# Patient Record
Sex: FEMALE | Race: WHITE | NOT HISPANIC OR LATINO | Employment: FULL TIME | URBAN - METROPOLITAN AREA
[De-identification: names, ages, dates, MRNs, and addresses within clinical notes are randomized per-mention and may not be internally consistent; named-entity substitution may affect disease eponyms.]

---

## 2017-04-13 DIAGNOSIS — J30.9 ALLERGIC RHINITIS, UNSPECIFIED ALLERGIC RHINITIS TRIGGER, UNSPECIFIED RHINITIS SEASONALITY: ICD-10-CM

## 2017-04-13 RX ORDER — MONTELUKAST SODIUM 10 MG/1
TABLET ORAL
Qty: 30 TABLET | Refills: 3 | Status: SHIPPED | OUTPATIENT
Start: 2017-04-13 | End: 2017-11-19

## 2017-04-13 NOTE — TELEPHONE ENCOUNTER
montelukast (SINGULAIR) 10 MG tablet  Last Written Prescription Date: 10/14/16  Last Fill Quantity: 30,  # refills: 3   Last Office Visit with FMAYDEE, SHANONP or [facility] prescribing provider:  10/14/2016                                              HERVE Gilbert  April 13, 2017  11:05 AM

## 2017-05-30 ENCOUNTER — OFFICE VISIT (OUTPATIENT)
Dept: URGENT CARE | Facility: URGENT CARE | Age: 52
End: 2017-05-30
Payer: COMMERCIAL

## 2017-05-30 VITALS
DIASTOLIC BLOOD PRESSURE: 82 MMHG | HEIGHT: 67 IN | OXYGEN SATURATION: 98 % | TEMPERATURE: 98 F | SYSTOLIC BLOOD PRESSURE: 124 MMHG | RESPIRATION RATE: 18 BRPM | HEART RATE: 66 BPM

## 2017-05-30 DIAGNOSIS — R10.9 FLANK PAIN: Primary | ICD-10-CM

## 2017-05-30 DIAGNOSIS — N30.01 ACUTE CYSTITIS WITH HEMATURIA: ICD-10-CM

## 2017-05-30 LAB
ALBUMIN UR-MCNC: NEGATIVE MG/DL
APPEARANCE UR: CLEAR
BILIRUB UR QL STRIP: NEGATIVE
COLOR UR AUTO: YELLOW
GLUCOSE UR STRIP-MCNC: NEGATIVE MG/DL
HGB UR QL STRIP: ABNORMAL
KETONES UR STRIP-MCNC: NEGATIVE MG/DL
LEUKOCYTE ESTERASE UR QL STRIP: ABNORMAL
NITRATE UR QL: NEGATIVE
PH UR STRIP: 5 PH (ref 5–7)
RBC #/AREA URNS AUTO: NORMAL /HPF (ref 0–2)
SP GR UR STRIP: >1.03 (ref 1–1.03)
URN SPEC COLLECT METH UR: ABNORMAL
UROBILINOGEN UR STRIP-ACNC: 0.2 EU/DL (ref 0.2–1)
WBC #/AREA URNS AUTO: NORMAL /HPF (ref 0–2)

## 2017-05-30 PROCEDURE — 87086 URINE CULTURE/COLONY COUNT: CPT | Performed by: NURSE PRACTITIONER

## 2017-05-30 PROCEDURE — 81001 URINALYSIS AUTO W/SCOPE: CPT | Performed by: NURSE PRACTITIONER

## 2017-05-30 PROCEDURE — 99213 OFFICE O/P EST LOW 20 MIN: CPT | Performed by: NURSE PRACTITIONER

## 2017-05-30 RX ORDER — NITROFURANTOIN 25; 75 MG/1; MG/1
100 CAPSULE ORAL 2 TIMES DAILY
Qty: 14 CAPSULE | Refills: 0 | Status: SHIPPED | OUTPATIENT
Start: 2017-05-30 | End: 2017-10-31

## 2017-05-30 NOTE — MR AVS SNAPSHOT
"              After Visit Summary   5/30/2017    Rayna Jackson    MRN: [id number]           Patient Information     Date Of Birth          1965        Visit Information        Provider Department      5/30/2017 7:20 PM Karl Trinh APRN [place] [facility] URGENT CARE        Today's Diagnoses     Flank pain    -  1    Acute cystitis with hematuria          Care Instructions      Dysuria  Dysuria is pain felt during urination. It is often described as a burning. Learn more about this problem and how it can be treated.     Painful urination (dysuria) is often caused by a problem in the urinary tract.   What causes dysuria?  Possible causes include:    Infection with a bacteria or virus. This can be a urinary tract infection (UTI). Or it may be a sexually transmitted infection (STI).    Sensitivity or allergy to chemicals. These chemicals are found in lotions and other products.    Prostate or bladder problems    Radiation therapy to the pelvic area  How is dysuria diagnosed?  Your health care provider will examine you. He or she will ask about your symptoms and health. After talking with you and doing a physical exam, your health care provider may know what is causing your dysuria. He or she will usually request  a sample of your urine. Tests of your urine, or a \"urinalysis,\" are done. A urinalysis may include:    Looking at the urine sample (visual exam)    Checking for substances (chemical exam)    Checking a small amount under a microscope (microscopic exam)  Some parts of the urinalysis may be done in the provider's office and some in a lab. And, the urine sample may be checked for bacteria and yeast (urine culture). Your health care provider will tell you more about these tests if they are needed.  How is dysuria treated?  Treatment depends on the cause. If you have a bacterial infection, you may need antibiotics. You may be given medications to make it easier for you to urinate and help relieve " pain. Your health care provider can tell you more about your treatment options. Untreated, symptoms may get worse.  Call the health care provider right away if you have any of the following:    Fever of 100.4 F (38 C) or higher     No improvement after three days of treatment    Trouble urinating because of pain    New or increased discharge from the vagina or penis    Rash or joint pain    Increased back or abdominal pain    Enlarged painful lymph nodes (lumps) in the groin     [id number] [employer]. [address] [place] [address]. All rights reserved. This information is not intended as a substitute for professional medical care. Always follow your healthcare professional's instructions.                Follow-ups after your visit        Your next 10 appointments already scheduled     Jun 02, 2017  7:00 AM CDT   SHORT with Mitra Keita MD   [facility] ([facility])    [address] [place] [id number] [phone]              Who to contact     If you have questions or need follow up information about today's clinic visit or your schedule please contact [facility] URGENT CARE directly at [phone].  Normal or non-critical lab and imaging results will be communicated to you by MyChart, letter or phone within 4 business days after the clinic has received the results. If you do not hear from us within 7 days, please contact the clinic through MyChart or phone. If you have a critical or abnormal lab result, we will notify you by phone as soon as possible.  Submit refill requests through Splice or call your pharmacy and they will forward the refill request to us. Please allow 3 business days for your refill to be completed.          Additional Information About Your Visit        [email or web address] Information     Splice gives you secure access to your electronic health record. If you see a primary care provider, you can  "also send messages to your care team and make appointments. If you have questions, please call your primary care clinic.  If you do not have a primary care provider, please call [phone] and they will assist you.        Care EveryWhere ID     This is your Care EveryWhere ID. This could be used by other organizations to access your [place] medical records  [id number]        Your Vitals Were     Pulse Temperature Respirations Height Pulse Oximetry       66 98  F (36.7  C) (Oral) 18 5' 7\" (1.702 m) 98%        Blood Pressure from Last 3 Encounters:   05/30/17 124/82   10/14/16 110/72   09/23/16 112/76    Weight from Last 3 Encounters:   10/14/16 207 lb (93.9 kg)   09/23/16 205 lb 4.8 oz (93.1 kg)   09/08/16 205 lb 3.2 oz (93.1 kg)              We Performed the Following     *UA reflex to Microscopic and Culture ([place] and [facility] (except [place] and [place])     Urine Culture Aerobic Bacterial     Urine Microscopic          Today's Medication Changes          These changes are accurate as of: 5/30/17  7:56 PM.  If you have any questions, ask your nurse or doctor.               Start taking these medicines.        Dose/Directions    nitrofurantoin (macrocrystal-monohydrate) 100 MG capsule   Commonly known as:  MACROBID   Used for:  Acute cystitis with hematuria   Started by:  Karl Trinh APRN CNP        Dose:  100 mg   Take 1 capsule (100 mg) by mouth 2 times daily   Quantity:  14 capsule   Refills:  0            Where to get your medicines      These medications were sent to [facility] [address] [place] [address] [address] [facility] [address]  [address] [facility] [address]     Phone:  [phone]     nitrofurantoin (macrocrystal-monohydrate) 100 MG capsule                Primary Care Provider Office Phone # Fax #    Mitra Keita [phone]       [address] [address]      "   Thank you!     Thank you for choosing [facility] URGENT CARE  for your care. Our goal is always to provide you with excellent care. Hearing back from our patients is one way we can continue to improve our services. Please take a few minutes to complete the written survey that you may receive in the mail after your visit with us. Thank you!             Your Updated Medication List - Protect others around you: Learn how to safely use, store and throw away your medicines at www.disposemymeds.org.          This list is accurate as of: 5/30/17  7:56 PM.  Always use your most recent med list.                   Brand Name Dispense Instructions for use    albuterol 108 (90 BASE) MCG/ACT Inhaler    PROAIR HFA/PROVENTIL HFA/VENTOLIN HFA    1 Inhaler    Inhale 2 puffs into the lungs as needed for shortness of breath / dyspnea or wheezing       fluticasone 50 MCG/ACT spray    FLONASE    1 Bottle    Spray 2 sprays into both nostrils daily       fluticasone 50 MCG/BLIST Aepb    FLOVENT DISKUS    120 each    Inhale 2 puffs (100 mcg) into the lungs daily       furosemide 20 MG tablet    LASIX    30 tablet    Take 1 tablet (20 mg) by mouth daily as needed       levonorgestrel 20 MCG/24HR IUD    MIRENA     1 each by Intrauterine route once       * LISINOPRIL PO      Take 5 mg by mouth daily       * lisinopril 5 MG tablet    PRINIVIL/ZESTRIL    90 tablet    Take 1 tablet (5 mg) by mouth daily       montelukast 10 MG tablet    SINGULAIR    30 tablet    TAKE 1 TABLET(10 MG) BY MOUTH AT BEDTIME       multivitamin, therapeutic with minerals Tabs tablet     30 each    Take 2 tablets by mouth daily       nitrofurantoin (macrocrystal-monohydrate) 100 MG capsule    MACROBID    14 capsule    Take 1 capsule (100 mg) by mouth 2 times daily       triamcinolone 0.1 % cream    KENALOG     Apply topically 2 times daily       * vitamin D3 2000 UNITS Caps     30 capsule    Take 2,000 Units by mouth daily       * cholecalciferol 1000 UNIT  tablet    vitamin D    30 tablet    Take 1 tablet (1,000 Units) by mouth daily       * Notice:  This list has 4 medication(s) that are the same as other medications prescribed for you. Read the directions carefully, and ask your doctor or other care provider to review them with you.

## 2017-05-31 NOTE — PROGRESS NOTES
"Chief Complaint   Patient presents with     Urgent Care     concerend for kidney infection, fever     Urinary Problem     frequency, fatique, nausea, swelling        SUBJECTIVE:  Rayna Jackson is a 52 year old female who presents with 2 days of urinary frequency, urgency and dysuria. Also some slight right-sided flank discomfort. No fevers. No chills. Some mild nausea. No prior history of kidney stone. Has an IUD. No STD concerns.        OBJECTIVE:  /82  Pulse 66  Temp 98  F (36.7  C) (Oral)  Resp 18  Ht 5' 7\" (1.702 m)  SpO2 98%   middle-aged female in no acute distress. Nontoxic looking. Soft nondistended abdomen with bowel sounds active throughout. No organomegaly. No rebound tenderness. Slight right-sided flank tenderness to percussion. Pelvic exam was deferred. Lung sounds were clear to auscultation throughout. Heart was of  regular rhythm and rate.    .  Results for orders placed or performed in visit on 05/30/17   *UA reflex to Microscopic and Culture ([place] and [facility] (except [place] and [place])   Result Value Ref Range    Color Urine Yellow     Appearance Urine Clear     Glucose Urine Negative NEG mg/dL    Bilirubin Urine Negative NEG    Ketones Urine Negative NEG mg/dL    Specific Gravity Urine >1.030 1.003 - 1.035    Blood Urine Trace (A) NEG    pH Urine 5.0 5.0 - 7.0 pH    Protein Albumin Urine Negative NEG mg/dL    Urobilinogen Urine 0.2 0.2 - 1.0 EU/dL    Nitrite Urine Negative NEG    Leukocyte Esterase Urine Small (A) NEG    Source Midstream Urine    Urine Microscopic   Result Value Ref Range    WBC Urine O - 2 0 - 2 /HPF    RBC Urine O - 2 0 - 2 /HPF         ASSESSMENT/PLAN:    (R10.9) Flank pain  (primary encounter diagnosis)//N30.01) Acute cystitis with hematuria  Comment: Symptoms suspicious of acute cystitis despite an equivocal urinalysis. Treated clinically. Differentials including kidney stones, and other etiologies were discussed. Urine culture is pending. " Plan to follow-up with persisting concerns. She has a follow-up appointment in 2 days.  Plan: nitrofurantoin, macrocrystal-monohydrate,         (MACROBID) 100 MG capsule, Urine Culture         Aerobic Bacterial            MIRZA Sparrow CNP

## 2017-05-31 NOTE — NURSING NOTE
"Chief Complaint   Patient presents with     Urgent Care     concerend for kidney infection, fever     Urinary Problem     frequency, fatique, nausea, swelling        Initial /82  Pulse 66  Temp 98  F (36.7  C) (Oral)  Resp 18  Ht 5' 7\" (1.702 m)  SpO2 98% Estimated body mass index is 32.42 kg/(m^2) as calculated from the following:    Height as of 9/23/16: 5' 7\" (1.702 m).    Weight as of 10/14/16: 207 lb (93.9 kg).  Medication Reconciliation: complete       Jerrica Crockett CMA      "

## 2017-05-31 NOTE — PATIENT INSTRUCTIONS
"  Dysuria  Dysuria is pain felt during urination. It is often described as a burning. Learn more about this problem and how it can be treated.     Painful urination (dysuria) is often caused by a problem in the urinary tract.   What causes dysuria?  Possible causes include:    Infection with a bacteria or virus. This can be a urinary tract infection (UTI). Or it may be a sexually transmitted infection (STI).    Sensitivity or allergy to chemicals. These chemicals are found in lotions and other products.    Prostate or bladder problems    Radiation therapy to the pelvic area  How is dysuria diagnosed?  Your health care provider will examine you. He or she will ask about your symptoms and health. After talking with you and doing a physical exam, your health care provider may know what is causing your dysuria. He or she will usually request  a sample of your urine. Tests of your urine, or a \"urinalysis,\" are done. A urinalysis may include:    Looking at the urine sample (visual exam)    Checking for substances (chemical exam)    Checking a small amount under a microscope (microscopic exam)  Some parts of the urinalysis may be done in the provider's office and some in a lab. And, the urine sample may be checked for bacteria and yeast (urine culture). Your health care provider will tell you more about these tests if they are needed.  How is dysuria treated?  Treatment depends on the cause. If you have a bacterial infection, you may need antibiotics. You may be given medications to make it easier for you to urinate and help relieve pain. Your health care provider can tell you more about your treatment options. Untreated, symptoms may get worse.  Call the health care provider right away if you have any of the following:    Fever of 100.4 F (38 C) or higher     No improvement after three days of treatment    Trouble urinating because of pain    New or increased discharge from the vagina or penis    Rash or joint " pain    Increased back or abdominal pain    Enlarged painful lymph nodes (lumps) in the groin     [id number] [employer]. [address] [place] [address], [place] [address]. All rights reserved. This information is not intended as a substitute for professional medical care. Always follow your healthcare professional's instructions.

## 2017-06-02 ENCOUNTER — OFFICE VISIT (OUTPATIENT)
Dept: FAMILY MEDICINE | Facility: CLINIC | Age: 52
End: 2017-06-02
Payer: COMMERCIAL

## 2017-06-02 VITALS
OXYGEN SATURATION: 94 % | HEIGHT: 67 IN | RESPIRATION RATE: 14 BRPM | SYSTOLIC BLOOD PRESSURE: 131 MMHG | HEART RATE: 73 BPM | BODY MASS INDEX: 32.02 KG/M2 | TEMPERATURE: 98.5 F | DIASTOLIC BLOOD PRESSURE: 90 MMHG | WEIGHT: 204 LBS

## 2017-06-02 DIAGNOSIS — M19.071 OSTEOARTHRITIS OF BOTH FEET, UNSPECIFIED OSTEOARTHRITIS TYPE: ICD-10-CM

## 2017-06-02 DIAGNOSIS — M17.0 OSTEOARTHRITIS OF BOTH KNEES, UNSPECIFIED OSTEOARTHRITIS TYPE: Primary | ICD-10-CM

## 2017-06-02 DIAGNOSIS — Z12.11 COLON CANCER SCREENING: ICD-10-CM

## 2017-06-02 DIAGNOSIS — M19.072 OSTEOARTHRITIS OF BOTH FEET, UNSPECIFIED OSTEOARTHRITIS TYPE: ICD-10-CM

## 2017-06-02 DIAGNOSIS — Z23 NEED FOR VACCINATION: ICD-10-CM

## 2017-06-02 PROCEDURE — 99214 OFFICE O/P EST MOD 30 MIN: CPT | Mod: 25 | Performed by: FAMILY MEDICINE

## 2017-06-02 PROCEDURE — 90715 TDAP VACCINE 7 YRS/> IM: CPT | Performed by: FAMILY MEDICINE

## 2017-06-02 PROCEDURE — 90471 IMMUNIZATION ADMIN: CPT | Performed by: FAMILY MEDICINE

## 2017-06-02 RX ORDER — TOPIRAMATE 100 MG/1
TABLET, FILM COATED ORAL
Refills: 0 | COMMUNITY
Start: 2017-01-03 | End: 2017-10-31

## 2017-06-02 NOTE — NURSING NOTE
"Chief Complaint   Patient presents with     Arthritis       Initial /90 (BP Location: Left arm, Patient Position: Chair, Cuff Size: Adult Large)  Pulse 73  Temp 98.5  F (36.9  C) (Oral)  Resp 14  Ht 5' 7\" (1.702 m)  Wt 204 lb (92.5 kg)  SpO2 94%  BMI 31.95 kg/m2 Estimated body mass index is 31.95 kg/(m^2) as calculated from the following:    Height as of this encounter: 5' 7\" (1.702 m).    Weight as of this encounter: 204 lb (92.5 kg).  Medication Reconciliation: complete   Tuan Erwin CMA       "

## 2017-06-02 NOTE — MR AVS SNAPSHOT
After Visit Summary   6/2/2017    Rayna Jackson    MRN: [id number]           Patient Information     Date Of Birth          1965        Visit Information        Provider Department      6/2/2017 7:00 AM Mitra Keita MD [facility]        Today's Diagnoses     Osteoarthritis of both knees, unspecified osteoarthritis type    -  1    Osteoarthritis of both feet, unspecified osteoarthritis type        Colon cancer screening          Care Instructions    Follow up as needed            Follow-ups after your visit        Additional Services     GASTROENTEROLOGY ADULT REF PROCEDURE ONLY       Last Lab Result: Creatinine (mg/dL)       Date                     Value                 09/08/2016               0.69             ----------  Body mass index is 31.95 kg/(m^2).     Needed:  No  Language:  English    Patient will be contacted to schedule procedure.     Please be aware that coverage of these services is subject to the terms and limitations of your health insurance plan.  Call member services at your health plan with any benefit or coverage questions.  Any procedures must be performed at a [facility] facility OR coordinated by your clinic's referral office.    Please bring the following with you to your appointment:    (1) Any X-Rays, CTs or MRIs which have been performed.  Contact the facility where they were done to arrange for  prior to your scheduled appointment.    (2) List of current medications   (3) This referral request   (4) Any documents/labs given to you for this referral            ORTHO  REFERRAL       [facility] is referring you to the Orthopedic  Services at [facility].       The  Representative will assist you in the coordination of your Orthopedic and Musculoskeletal Care as prescribed by your physician.    The  Representative will call you within 1 business day  to help schedule your appointment, or you may contact the [facility] Representative at:    All areas ~ [phone]     Type of Referral : Non Surgical       Timeframe requested: Routine    Coverage of these services is subject to the terms and limitations of your health insurance plan.  Please call member services at your health plan with any benefit or coverage questions.      If X-rays, CT or MRI's have been performed, please contact the facility where they were done to arrange for , prior to your scheduled appointment.  Please bring this referral request to your appointment and present it to your specialist.                  Who to contact     If you have questions or need follow up information about today's clinic visit or your schedule please contact [facility] directly at [phone].  Normal or non-critical lab and imaging results will be communicated to you by AVIShart, letter or phone within 4 business days after the clinic has received the results. If you do not hear from us within 7 days, please contact the clinic through AVIShart or phone. If you have a critical or abnormal lab result, we will notify you by phone as soon as possible.  Submit refill requests through [employer]ze or call your pharmacy and they will forward the refill request to us. Please allow 3 business days for your refill to be completed.          Additional Information About Your Visit        Channel Breeze Information     [employer] gives you secure access to your electronic health record. If you see a primary care provider, you can also send messages to your care team and make appointments. If you have questions, please call your primary care clinic.  If you do not have a primary care provider, please call [phone] and they will assist you.        Care EveryWhere ID     This is your Care EveryWhere ID. This could be used by other organizations to access your [place] medical records  [id number]        Your Vitals  "Were     Pulse Temperature Respirations Height Pulse Oximetry BMI (Body Mass Index)    73 98.5  F (36.9  C) (Oral) 14 5' 7\" (1.702 m) 94% 31.95 kg/m2       Blood Pressure from Last 3 Encounters:   06/02/17 131/90   05/30/17 124/82   10/14/16 110/72    Weight from Last 3 Encounters:   06/02/17 204 lb (92.5 kg)   10/14/16 207 lb (93.9 kg)   09/23/16 205 lb 4.8 oz (93.1 kg)              We Performed the Following     GASTROENTEROLOGY ADULT REF PROCEDURE ONLY     ORTHO  REFERRAL        Primary Care Provider Office Phone # Fax #    Mitra Keita [phone]       [address] [address]        Thank you!     Thank you for choosing [facility]  for your care. Our goal is always to provide you with excellent care. Hearing back from our patients is one way we can continue to improve our services. Please take a few minutes to complete the written survey that you may receive in the mail after your visit with us. Thank you!             Your Updated Medication List - Protect others around you: Learn how to safely use, store and throw away your medicines at www.disposemymeds.org.          This list is accurate as of: 6/2/17  7:34 AM.  Always use your most recent med list.                   Brand Name Dispense Instructions for use    albuterol 108 (90 BASE) MCG/ACT Inhaler    PROAIR HFA/PROVENTIL HFA/VENTOLIN HFA    1 Inhaler    Inhale 2 puffs into the lungs as needed for shortness of breath / dyspnea or wheezing       fluticasone 50 MCG/ACT spray    FLONASE    1 Bottle    Spray 2 sprays into both nostrils daily       fluticasone 50 MCG/BLIST Aepb    FLOVENT DISKUS    120 each    Inhale 2 puffs (100 mcg) into the lungs daily       furosemide 20 MG tablet    LASIX    30 tablet    Take 1 tablet (20 mg) by mouth daily as needed       levonorgestrel 20 MCG/24HR IUD    MIRENA     1 each by Intrauterine route once       * LISINOPRIL " PO      Take 5 mg by mouth daily       * lisinopril 5 MG tablet    PRINIVIL/ZESTRIL    90 tablet    Take 1 tablet (5 mg) by mouth daily       montelukast 10 MG tablet    SINGULAIR    30 tablet    TAKE 1 TABLET(10 MG) BY MOUTH AT BEDTIME       multivitamin, therapeutic with minerals Tabs tablet     30 each    Take 2 tablets by mouth daily       nitrofurantoin (macrocrystal-monohydrate) 100 MG capsule    MACROBID    14 capsule    Take 1 capsule (100 mg) by mouth 2 times daily       topiramate 100 MG tablet    TOPAMAX     TK 1 T PO QPM       triamcinolone 0.1 % cream    KENALOG     Apply topically 2 times daily       * vitamin D3 2000 UNITS Caps     30 capsule    Take 2,000 Units by mouth daily       * cholecalciferol 1000 UNIT tablet    vitamin D    30 tablet    Take 1 tablet (1,000 Units) by mouth daily       * Notice:  This list has 4 medication(s) that are the same as other medications prescribed for you. Read the directions carefully, and ask your doctor or other care provider to review them with you.

## 2017-06-02 NOTE — PROGRESS NOTES
SUBJECTIVE:                                                    Rayna Jackson is a 52 year old female who presents to clinic today for the following health issues:      Joint Pain     Onset: over 10    Description:   Location: left knee, right knee, left ankle, right ankle and hands  Character: stabbing    Intensity: 10/10    Progression of Symptoms: worse    Accompanying Signs & Symptoms:  Other symptoms: none   History:   Previous similar pain: YES      Precipitating factors:   Trauma or overuse: no     Alleviating factors:  Improved by: nothing       Therapies Tried and outcome: Advil does not help    Per patient, has been experiencing more pain in her ankles and knees lately. Has had Synvisc shot to her knee at least once and several steroid shots. Has yet to establish care with ortho since moving to the area.   Admits she has gained a lot of weight and knows this could be contributing to it. Has started on a paleo diet the last 6 weeks and is not seeing any progress.   Pain is better today.       Problem list and histories reviewed & adjusted, as indicated.  Additional history: as documented    Patient Active Problem List   Diagnosis     Benign essential hypertension     Osteoarthritis of both knees, unspecified osteoarthritis type     Bilateral edema of lower extremity     Osteoarthritis of both feet, unspecified osteoarthritis type     Past Surgical History:   Procedure Laterality Date     appendicitis  1980     CYSTECTOMY OVARIAN BENIGN  1980     KNEE SURGERY Left        Social History   Substance Use Topics     Smoking status: Never Smoker     Smokeless tobacco: Not on file     Alcohol use Not on file     Family History   Problem Relation Age of Onset     Hypertension Mother      Thyroid Disease Mother      Hypertension Father      DIABETES Other      great grandmother     Thyroid Disease Sister      Thyroid Disease Maternal Aunt            Reviewed and updated as needed this visit by clinical staff      "  Reviewed and updated as needed this visit by Provider         ROS:  Constitutional, msk, neuro  systems are negative, except as otherwise noted.    OBJECTIVE:                                                    /90 (BP Location: Left arm, Patient Position: Chair, Cuff Size: Adult Large)  Pulse 73  Temp 98.5  F (36.9  C) (Oral)  Resp 14  Ht 5' 7\" (1.702 m)  Wt 204 lb (92.5 kg)  SpO2 94%  BMI 31.95 kg/m2  Body mass index is 31.95 kg/(m^2).  GENERAL: healthy, alert and no distress  MS: b/l feet/ knees- no tenderness to palpation, no obvious effusion noted, full ROM  SKIN: no suspicious lesions or rashes  NEURO: Normal strength and tone, sensory exam grossly normal and gait normal including heel/toe/tandem walking    Diagnostic Test Results:  none      ASSESSMENT/PLAN:                                                        1. Osteoarthritis of both knees, unspecified osteoarthritis type  - recommend water aerobics as tolerated. Will refer to ortho for further evaluation.   No injection today as she does not have any acute pain.   - ORTHO  REFERRAL    2. Osteoarthritis of both feet, unspecified osteoarthritis type  - recommend proper foot wear for support.   - ORTHO  REFERRAL    3. Colon cancer screening  - due for colonoscopy  - GASTROENTEROLOGY ADULT REF PROCEDURE ONLY    4. Need for vaccination    - TDAP VACCINE (ADACEL)    See Patient Instructions    Mitra Keita MD  [facility]  "

## 2017-06-07 LAB
BACTERIA SPEC CULT: NORMAL
MICRO REPORT STATUS: NORMAL
SPECIMEN SOURCE: NORMAL

## 2017-06-30 ENCOUNTER — HOSPITAL ENCOUNTER (OUTPATIENT)
Facility: CLINIC | Age: 52
Discharge: HOME | End: 2017-06-30
Attending: INTERNAL MEDICINE | Admitting: INTERNAL MEDICINE
Payer: COMMERCIAL

## 2017-06-30 ENCOUNTER — RADIANT APPOINTMENT (OUTPATIENT)
Dept: GENERAL RADIOLOGY | Facility: CLINIC | Age: 52
End: 2017-06-30
Attending: FAMILY MEDICINE
Payer: COMMERCIAL

## 2017-06-30 ENCOUNTER — OFFICE VISIT (OUTPATIENT)
Dept: ORTHOPEDICS | Facility: CLINIC | Age: 52
End: 2017-06-30
Payer: COMMERCIAL

## 2017-06-30 VITALS
SYSTOLIC BLOOD PRESSURE: 110 MMHG | RESPIRATION RATE: 15 BRPM | OXYGEN SATURATION: 95 % | DIASTOLIC BLOOD PRESSURE: 79 MMHG

## 2017-06-30 VITALS
SYSTOLIC BLOOD PRESSURE: 110 MMHG | HEIGHT: 67 IN | DIASTOLIC BLOOD PRESSURE: 79 MMHG | BODY MASS INDEX: 32.02 KG/M2 | WEIGHT: 204 LBS

## 2017-06-30 DIAGNOSIS — G89.29 CHRONIC PAIN OF BOTH ANKLES: Primary | ICD-10-CM

## 2017-06-30 DIAGNOSIS — M25.571 BILATERAL ANKLE PAIN, UNSPECIFIED CHRONICITY: ICD-10-CM

## 2017-06-30 DIAGNOSIS — M25.572 CHRONIC PAIN OF BOTH ANKLES: Primary | ICD-10-CM

## 2017-06-30 DIAGNOSIS — M25.572 BILATERAL ANKLE PAIN, UNSPECIFIED CHRONICITY: ICD-10-CM

## 2017-06-30 DIAGNOSIS — M25.571 CHRONIC PAIN OF BOTH ANKLES: Primary | ICD-10-CM

## 2017-06-30 DIAGNOSIS — M19.071 PRIMARY OSTEOARTHRITIS OF BOTH ANKLES: ICD-10-CM

## 2017-06-30 DIAGNOSIS — M19.072 PRIMARY OSTEOARTHRITIS OF BOTH ANKLES: ICD-10-CM

## 2017-06-30 LAB — COLONOSCOPY: NORMAL

## 2017-06-30 PROCEDURE — G0121 COLON CA SCRN NOT HI RSK IND: HCPCS | Performed by: INTERNAL MEDICINE

## 2017-06-30 PROCEDURE — G0500 MOD SEDAT ENDO SERVICE >5YRS: HCPCS | Performed by: INTERNAL MEDICINE

## 2017-06-30 PROCEDURE — 45378 DIAGNOSTIC COLONOSCOPY: CPT | Performed by: INTERNAL MEDICINE

## 2017-06-30 PROCEDURE — 73610 X-RAY EXAM OF ANKLE: CPT | Mod: LT

## 2017-06-30 PROCEDURE — 99204 OFFICE O/P NEW MOD 45 MIN: CPT | Performed by: FAMILY MEDICINE

## 2017-06-30 PROCEDURE — 25000128 H RX IP 250 OP 636: Performed by: INTERNAL MEDICINE

## 2017-06-30 RX ORDER — FENTANYL CITRATE 50 UG/ML
INJECTION, SOLUTION INTRAMUSCULAR; INTRAVENOUS PRN
Status: DISCONTINUED | OUTPATIENT
Start: 2017-06-30 | End: 2017-06-30 | Stop reason: HOSPADM

## 2017-06-30 RX ORDER — ONDANSETRON 2 MG/ML
4 INJECTION INTRAMUSCULAR; INTRAVENOUS
Status: DISCONTINUED | OUTPATIENT
Start: 2017-06-30 | End: 2017-06-30 | Stop reason: HOSPADM

## 2017-06-30 RX ORDER — ONDANSETRON 4 MG/1
4 TABLET, ORALLY DISINTEGRATING ORAL EVERY 6 HOURS PRN
Status: DISCONTINUED | OUTPATIENT
Start: 2017-06-30 | End: 2017-06-30 | Stop reason: HOSPADM

## 2017-06-30 RX ORDER — LIDOCAINE 40 MG/G
CREAM TOPICAL
Status: DISCONTINUED | OUTPATIENT
Start: 2017-06-30 | End: 2017-06-30 | Stop reason: HOSPADM

## 2017-06-30 RX ORDER — NALOXONE HYDROCHLORIDE 0.4 MG/ML
.1-.4 INJECTION, SOLUTION INTRAMUSCULAR; INTRAVENOUS; SUBCUTANEOUS
Status: DISCONTINUED | OUTPATIENT
Start: 2017-06-30 | End: 2017-06-30 | Stop reason: HOSPADM

## 2017-06-30 RX ORDER — ONDANSETRON 2 MG/ML
4 INJECTION INTRAMUSCULAR; INTRAVENOUS EVERY 6 HOURS PRN
Status: DISCONTINUED | OUTPATIENT
Start: 2017-06-30 | End: 2017-06-30 | Stop reason: HOSPADM

## 2017-06-30 RX ORDER — FLUMAZENIL 0.1 MG/ML
0.2 INJECTION, SOLUTION INTRAVENOUS
Status: DISCONTINUED | OUTPATIENT
Start: 2017-06-30 | End: 2017-06-30 | Stop reason: HOSPADM

## 2017-06-30 NOTE — NURSING NOTE
"Chief Complaint   Patient presents with     Musculoskeletal Problem       Initial /79  Ht 5' 7\" (1.702 m)  Wt 204 lb (92.5 kg)  BMI 31.95 kg/m2 Estimated body mass index is 31.95 kg/(m^2) as calculated from the following:    Height as of this encounter: 5' 7\" (1.702 m).    Weight as of this encounter: 204 lb (92.5 kg).  Medication Reconciliation: complete     Chadd Singh ATC    "

## 2017-06-30 NOTE — PROGRESS NOTES
"ASSESSMENT & PLAN    ICD-10-CM    1. Chronic pain of both ankles M25.571 XR Ankle Bilateral G/E 3 Views    G89.29     M25.572    2. Primary osteoarthritis of both ankles M19.071     M19.072    Reviewed xrays - ankle > midfoot osteoarthritis  Discussed OTC use   Return for cortisone injections  Can pursue Physical therapy if desired  Can also consider custom orthotics    Follow up as needed. Call direct clinic number [[phone]] at any time with questions or concerns. Instructed to call the office if the condition evolves or worsens.    -----    SUBJECTIVE  Rayna Jackson is a/an 52 year old female who is seen in consultation at the request of Dr. Keita for evaluation of bilateral anterior ankle and proximal dorsal foot pain . The patient is seen by themselves. Has intermittent swelling, even non-weight bearing/without walking.    Onset: 6 month(s) ago. Reports insidious onset without acute precipitating event.  Worsened by: walking, sometimes stairs  Better with: rest  Quality: aching, swelling, constant (dorsal foot feels broken)  Pain Scale (maximum/current)/10: 10/10 / 5/10 mainly but reports no pain today  Treatments tried: ibuprofen  Orthopedic history: NO  Relevant surgical history: NO  Patient Social History: works at [employer]    Patient's past medical, surgical, social, and family histories were reviewed today and no changes are noted.    REVIEW OF SYSTEMS:  10 point ROS is negative other than symptoms noted above in HPI, Past Medical History or as stated below  Constitutional: NEGATIVE for fever, chills, change in weight  Skin: NEGATIVE for worrisome rashes, moles or lesions  GI/: NEGATIVE for bowel or bladder changes  Neuro: NEGATIVE for weakness, dizziness or paresthesias    OBJECTIVE:  /79  Ht 5' 7\" (1.702 m)  Wt 204 lb (92.5 kg)  BMI 31.95 kg/m2   General: healthy, alert and in no distress  HEENT: no scleral icterus or conjunctival erythema  Skin: no suspicious lesions " or rash. No jaundice.  CV:  no pedal edema  Resp: normal respiratory effort without conversational dyspnea   Psych: normal mood and affect  Gait: normal steady gait with appropriate coordination and balance  Neuro: Normal light sensory exam of lower extremity  MSK:  BILATERAL FOOT  Inspection:    no swelling, pes planus  Palpation:    Tender about the anterior/anteromedial tibiotalar joint. Mildly tender over midfoot.  Otherwise remainder of bony/ligamentous landmarks are non-tender.  Range of Motion:     Grossly intact and non-painful  Strength:    Grossly intact in all planes  Special Tests:    Negative: anterior drawer and Lisfranc joint tenderness    Independent visualization of the below image:  ANKLE BILATERAL THREE OR MORE VIEWS   6/30/2017 4:40 PM      HISTORY:  Pain in right ankle and joints of right and left feet.     IMPRESSION:   1. Right ankle: Mild spurring at the inferomedial aspect of the  tibiotalar joint. Calcaneal spurring.  2. Left ankle: Calcaneal spurring. Otherwise unremarkable.     MADINA WILSON MD    Patient's conditions were thoroughly discussed during today's visit with greater than 50% of the visit spent counseling the patient with total time spent face-to-face with the patient being 20 minutes.    Dilia Moura, DO [facility]

## 2017-06-30 NOTE — H&P
Pre-Endoscopy History and Physical     Rayna Jackson MRN# [id number]   YOB: 1965 Age: 52 year old     Date of Procedure: 6/30/2017  Primary care provider: Mitra Keita  Type of Endoscopy: Colonoscopy with possible biopsy, possible polypectomy  Reason for Procedure: screen  Type of Anesthesia Anticipated: Conscious Sedation    HPI:    Rayna is a 52 year old female who will be undergoing the above procedure.      A history and physical has been performed. The patient's medications and allergies have been reviewed. The risks and benefits of the procedure and the sedation options and risks were discussed with the patient.  All questions were answered and informed consent was obtained.      She denies a personal or family history of anesthesia complications or bleeding disorders.     Patient Active Problem List   Diagnosis     Benign essential hypertension     Osteoarthritis of both knees, unspecified osteoarthritis type     Bilateral edema of lower extremity     Osteoarthritis of both feet, unspecified osteoarthritis type        Past Medical History:   Diagnosis Date     Torn meniscus 2013    left         Past Surgical History:   Procedure Laterality Date     appendicitis  1980     CYSTECTOMY OVARIAN BENIGN  1980     KNEE SURGERY Left        Social History   Substance Use Topics     Smoking status: Never Smoker     Smokeless tobacco: Not on file     Alcohol use Not on file       Family History   Problem Relation Age of Onset     Hypertension Mother      Thyroid Disease Mother      Hypertension Father      DIABETES Other      great grandmother     Thyroid Disease Sister      Thyroid Disease Maternal Aunt        Prior to Admission medications    Medication Sig Start Date End Date Taking? Authorizing Provider   topiramate (TOPAMAX) 100 MG tablet TK 1 T PO QPM 1/3/17  Yes Reported, Patient   montelukast (SINGULAIR) 10 MG tablet TAKE 1 TABLET(10 MG) BY MOUTH AT BEDTIME 4/13/17  Yes Bossman  Mitra Yadav MD   fluticasone (FLOVENT DISKUS) 50 MCG/BLIST AEPB Inhale 2 puffs (100 mcg) into the lungs daily 10/14/16  Yes Mitra Keita MD   albuterol (PROAIR HFA, PROVENTIL HFA, VENTOLIN HFA) 108 (90 BASE) MCG/ACT inhaler Inhale 2 puffs into the lungs as needed for shortness of breath / dyspnea or wheezing 10/14/16  Yes Mitra Keita MD   Cholecalciferol (VITAMIN D3) 2000 UNITS CAPS Take 2,000 Units by mouth daily 10/14/16  Yes Mitra Keita MD   cholecalciferol (VITAMIN D3) 1000 UNIT tablet Take 1 tablet (1,000 Units) by mouth daily 10/14/16  Yes Mitra Keita MD   multivitamin, therapeutic with minerals (MULTI-VITAMIN) TABS Take 2 tablets by mouth daily 10/14/16  Yes Mitra Keita MD   fluticasone (FLONASE) 50 MCG/ACT nasal spray Spray 2 sprays into both nostrils daily 10/14/16  Yes Mitra Keita MD   triamcinolone (KENALOG) 0.1 % cream Apply topically 2 times daily   Yes Reported, Patient   nitrofurantoin, macrocrystal-monohydrate, (MACROBID) 100 MG capsule Take 1 capsule (100 mg) by mouth 2 times daily 5/30/17   Karl Trinh APRN CNP   lisinopril (PRINIVIL,ZESTRIL) 5 MG tablet Take 1 tablet (5 mg) by mouth daily 10/14/16   Mitra Keita MD   LISINOPRIL PO Take 5 mg by mouth daily    Reported, Patient   levonorgestrel (MIRENA) 20 MCG/24HR IUD 1 each by Intrauterine route once    Reported, Patient   furosemide (LASIX) 20 MG tablet Take 1 tablet (20 mg) by mouth daily as needed 9/8/16   Mitra Keita MD       Allergies   Allergen Reactions     Aspirin Other (See Comments)     Throat swells     Codeine Unknown     headache        REVIEW OF SYSTEMS:   5 point ROS negative except as noted above in HPI, including Gen., Resp., CV, GI &  system review.    PHYSICAL EXAM:   There were no vitals taken for this visit. Estimated body mass index is 31.95 kg/(m^2) as calculated from the  "following:    Height as of 6/2/17: 1.702 m (5' 7\").    Weight as of 6/2/17: 92.5 kg (204 lb).   GENERAL APPEARANCE: alert, and oriented  MENTAL STATUS: alert  AIRWAY EXAM: Mallampatti Class I (visualization of the soft palate, fauces, uvula, anterior and posterior pillars)  RESP: lungs clear to auscultation - no rales, rhonchi or wheezes  CV: regular rates and rhythm  DIAGNOSTICS:    Not indicated    IMPRESSION   ASA Class 1 - Healthy patient, no medical problems    PLAN:   Plan for Colonoscopy with possible biopsy, possible polypectomy. We discussed the risks, benefits and alternatives and the patient wished to proceed.    The above has been forwarded to the consulting provider.      Signed Electronically by: Brooks Villa  June 30, 2017          "

## 2017-06-30 NOTE — PATIENT INSTRUCTIONS
Thank you for allowing us to participate in your care today.  Please find below your visit diagnosis and the plan going forward.    1. Chronic pain of both ankles    2. Primary osteoarthritis of both ankles      Reviewed your xrays - ankle > midfoot osteoarthritis  Ok to take Tylenol (1,000mg - 2tbs) two times a day. If needed, can take Advil (2 tbs) in between the tylenol doses. If you're needing more than 5 days of Advil use to control pain then recommend cortisone. Call and schedule for bilateral ankle injections.  Can pursue Physical therapy if desired  Can also consider custom orthotics    Follow up as needed. Call direct clinic number [[phone]] at any time with questions or concerns.    Dilia Moura DO [facility]  Website: [email or web address]  Twitter: [email or web address]

## 2017-06-30 NOTE — OR NURSING
When pt received in triage area she said she is very  anxiouse and worried and that she is not  A cooperative pt ,pt was reassured and felt at ease ,during the procedure pt start loudly  Saying outch more sedation given and o2 applied then pt start feeling better and tolerated procedure well and said it was not bad ,but pt agreed with md it will be done next time with mac in or ,pt denies nausea

## 2017-06-30 NOTE — LETTER
June 19, 2017      Rayna Jackson  [address] [facility] [address]              Dear Rayna Jackson,    Thank you for choosing [facility]. You are scheduled for the following service.     Date:  6/30/2017 Friday             Procedure:  COLONOSCOPY  Doctor:        Dr. Brooks Villa   Arrival Time:  8:30 AM  *check in at Emergency/Endoscopy desk*  Procedure Time:  9:00 AM    Location:   [facility]        Endoscopy Department, First Floor (Enter through ER Doors) *        [address] [place], [place] [address]      [phone] or [phone] () to reschedule        Colonoscopy is the most accurate test to detect colon polyps and colon cancer; and the only test where polyps can be removed. During this procedure, a doctor examines the lining of your large intestine and rectum through a flexible tube.           Transportation  Arrange for a ride for the day of your procedure with a responsible adult.  A taxi ride is not an option unless you are accompanied by a responsible adult. If you fail to arrange transportation with a responsible adult, your procedure will be cancelled and rescheduled.    MIRALAX -GATORADE  PREP    Purchase the following supplies at your local pharmacy:  - 2 (two) bisacodyl tablets: each tablet contains 5 mg.  (Dulcolax  laxative NOT Dulcolax  stool softener)   - 1 (one) 8.3 oz bottle of Polyethylene Glycol (PEG) 3350 Powder   (MiraLAX , Smooth LAX , ClearLAX  or equivalent)  - 64 oz Gatorade    Regular Gatorade, Gatorade G2 , Powerade , Powerade Zero  or Pedialyte  is acceptable. Red colored flavors are not allowed; all other colors (yellow, green, orange, purple and blue) are okay. It is also okay to buy two 2.12 oz packets of powdered Gatorade that can be mixed with water to a total volume of 64 oz of liquid.  - 1 (one) 10 oz bottle of Magnesium Citrate (Red colored flavors are not allowed)  It is also okay for you to use  a 0.5 oz package of powdered magnesium citrate (17 g) mixed with 10 oz of water.      PREPARATION FOR COLONOSCOPY    7 days before:    Discontinue fiber supplements and medications containing iron. This includes Metamucil  and Fibercon ; and multivitamins with iron.  3 days before:    Begin a low-fiber diet. A low-fiber diet helps making the cleanout more effective.     Examples of a low-fiber diet include (but are not limited to): white bread, white rice, pasta, crackers, fish, chicken, eggs, ground beef, creamy peanut butter, cooked/steamed/boiled vegetables, canned fruit, bananas, melons, milk, plain yogurt cheese, salad dressing and other condiments.     The following are not allowed on a low-fiber diet: seeds, nuts, popcorn, bran, whole wheat, corn, quinoa, raw fruits and vegetables, berries and dried fruit, beans and lentils.    For additional details on low-fiber diet, please refer to the table on the last page.  2 days before:    Continue the low-fiber diet.     Drink at least 8 glasses of water throughout the day.     Stop eating solid foods at 11:45 pm.  1 day before:    In the morning: begin a clear liquid diet (liquids you can see through).     Examples of a clear liquid diet include: water, clear broth or bouillon, Gatorade, Pedialyte or Powerade, carbonated and non-carbonated soft drinks (Sprite , 7-Up , ginger ale), strained fruit juices without pulp (apple, white grape, white cranberry), Jell-O  and popsicles.     The following are not allowed on a clear liquid diet: red liquids, alcoholic beverages, coffee, dairy products (milk, creamer, and yogurt), protein shakes, creamy broths, juice with pulp and chewing tobacco.    At noon: take 2 (two) bisacodyl tablets     At 4 (and no later than 6pm): start drinking the Miralax-Gatorade preparation (8.3 oz of Miralax mixed with 64 oz of Gatorade in a large pitcher). Drink 1(one) 8 oz glass every 15 minutes thereafter, until the mixture is gone.    COLON  CLEANSING TIPS: drink adequate amounts of fluids before and after your colon cleansing to prevent dehydration. Stay near a toilet because you will have diarrhea. Even if you are sitting on the toilet, continue to drink the cleansing solution every 15 minutes. If you feel nauseous or vomit, rinse your mouth with water, take a 15 to 30-minute-break and then continue drinking the solution. You will be uncomfortable until the stool has flushed from your colon (in about 2 to 4 hours). You may feel chilled.    Day of your procedure  You may take all of your morning medications including blood pressure medications, blood thinners (if you have not been instructed to stop these by our office), methadone, anti-seizure medications with sips of water 3 hours prior to your procedure or earlier. Do not take insulin or vitamins prior to your procedure. Continue the clear liquid diet.   4 hours prior: drink 10 oz of magnesium citrate. It may be easier to drink it with a straw.    STOP consuming all liquids after that.     Do not take anything by mouth during this time.     Allow extra time to travel to your procedure as you may need to stop and use a restroom along the way.  You are ready for the procedure, if you followed all instructions and your stool is no longer formed, but clear or yellow liquid. If you are unsure whether your colon is clean, please call our office at [phone] before you leave for your appointment.  Bring the following to your procedure:  - Insurance Card/Photo ID.   - List of current medications including over-the-counter medications and supplements.   - Your rescue inhaler if you currently use one to control asthma.      Canceling or rescheduling your appointment:   If you must cancel or reschedule your appointment, please call [phone] as soon as possible.      COLONOSCOPY PRE-PROCEDURE CHECKLIST  If you have diabetes, ask your regular doctor for diet and medication restrictions.  If you take an  anticoagulant or anti-platelet medication (such as Coumadin , Lovenox , Pradaxa , Xarelto , Eliquis , etc.), please call your primary doctor for advice on holding this medication.  If you take aspirin you may continue to do so.  If you are or may be pregnant, please discuss the risks and benefits of this procedure with your doctor.          What happens during a colonoscopy?    Plan to spend up to two hours, starting at registration time, at the endoscopy center the day of your procedure. The colonoscopy takes an average of 15 to 30 minutes. Recovery time is about 30 minutes.    Before the exam:    You will change into a gown.    Your medical history and medication list will be reviewed with you, unless that has been done over the phone prior to the procedure.     A nurse will insert an intravenous (IV) line into your hand or arm.    The doctor will meet with you and will give you a consent form to sign.    During the exam:     Medicine will be given through the IV line to help you relax.     Your heart rate and oxygen levels will be monitored. If your blood pressure is low, you may be given fluids through the IV line.     The doctor will insert a flexible hollow tube, called a colonoscope, into your rectum. The scope will be advanced slowly through the large intestine (colon).    You may have a feeling of fullness or pressure.     If an abnormal tissue or a polyp is found, the doctor may remove it through the endoscope for closer examination, or biopsy. Tissue removal is painless    After the exam:           Any tissue samples removed during the exam will be sent to a lab for evaluation. It may take 5-7 working days for you to be notified of the results.     A nurse will provide you with complete discharge instructions before you leave the endoscopy center. Be sure to ask the nurse for specific instructions if you take blood thinners such as Aspirin, Coumadin or Plavix.     The doctor will prepare a full report for  you and for the physician who referred you for the procedure.     Your doctor will talk with you about the initial results of your exam.      Medication given during the exam will prohibit you from driving for the rest of the day.     Following the exam, you may resume your normal diet. Your first meal should be light, no greasy foods. Avoid alcohol until the next day.     You may resume your regular activities the day after the procedure.     LOW-FIBER DIET    Foods RECOMMENDED Foods to AVOID   Breads, Cereal, Rice and Pasta:   White bread, rolls, biscuits, croissant and lenora toast.   Waffles, Kinyarwanda toast and pancakes.   White rice, noodles, pasta, macaroni and peeled cooked potatoes.   Plain crackers and saltines.   Cooked cereals: farina, cream of rice.   Cold cereals: Puffed Rice , Rice Krispies , Corn Flakes  and Special K    Breads, Cereal, Rice and Pasta:   Breads or rolls with nuts, seeds or fruit.   Whole wheat, pumpernickel, rye breads and cornbread.   Potatoes with skin, brown or wild rice, and kasha (buckwheat).     Vegetables:   Tender cooked and canned vegetables without seeds: carrots, asparagus tips, green or wax beans, pumpkin, spinach, lima beans. Vegetables:   Raw or steamed vegetables.   Vegetables with seeds.   Sauerkraut.   Winter squash, peas, broccoli, Brussel sprouts, cabbage, onions, cauliflower, baked beans, peas and corn.   Fruits:   Strained fruit juice.   Canned fruit, except pineapple.   Ripe bananas and melon. Fruits:   Prunes and prune juice.   Raw fruits.   Dried fruits: figs, dates and raisins.   Milk/Dairy:   Milk: plain or flavored.   Yogurt, custard and ice cream.   Cheese and cottage cheese Milk/Dairy:     Meat and other proteins:   ground, well-cooked tender beef, lamb, ham, veal, pork, fish, poultry and organ meats.   Eggs.   Peanut butter without nuts. Meat and other proteins:   Tough, fibrous meats with gristle.   Dry beans, peas and lentils.   Peanut butter with  nuts.   Tofu.   Fats, Snack, Sweets, Condiments and Beverages:   Margarine, butter, oils, mayonnaise, sour cream and salad dressing, plain gravy.   Sugar, hard candy, clear jelly, honey and syrup.   Spices, cooked herbs, bouillon, broth and soups made with allowed vegetable, ketchup and mustard.   Coffee, tea and carbonated drinks.   Plain cakes, cookies and pretzels.   Gelatin, plain puddings, custard, ice cream, sherbet and popsicles. Fats, Snack, Sweets, Condiments and Beverages:   Nuts, seeds and coconut.   Jam, marmalade and preserves.   Pickles, olives, relish and horseradish.   All desserts containing nuts, seeds, dried fruit and coconut; or made from whole grains or bran.   Candy made with nuts or seeds.   Popcorn.           DIRECTIONS TO THE ENDOSCOPY DEPARTMENT     From the north ([facility])  Take [address] South, exit on [address] [address]. Get into the left hand davina, turn left (east), go one-half mile to [address] and turn left. Go north to the first stoplight, take a right on [address] and follow it to the Emergency entrance.    From the south ([facility])  Take 35N to the [address]E split and exit on [address] [address]. On [address] [address], turn left (west) to [address]. Turn right (north) on [address]. Go north to the first stoplight, take a right on [address] and follow it to the Emergency entrance.    From the east via 35E ([facility])  Take [address]E south to [address] [address] exit. Turn right on [address] [address]. Go west to [address]. Turn right (north) on [address]. Go to the first stoplight, take a right and follow on [address] to the Emergency entrance.    From the east via [address] ([facility])  Take [address] to [address]. Turn left (south) on [address] to [address]. Turn left (east) on [address] and follow it to the Emergency entrance.    From the west via [address] ([place])  Take [address]  east to [address]. Turn right (south) on [address] to [place]. Turn left (east) on [address] and follow it to the Emergency entrance.

## 2017-06-30 NOTE — MR AVS SNAPSHOT
After Visit Summary   6/30/2017    Rayna Jackson    MRN: [id number]           Patient Information     Date Of Birth          1965        Visit Information        Provider Department      6/30/2017 4:00 PM Dilia Moura DO [facility]        Today's Diagnoses     Chronic pain of both ankles    -  1    Primary osteoarthritis of both ankles          Care Instructions    Thank you for allowing us to participate in your care today.  Please find below your visit diagnosis and the plan going forward.    1. Chronic pain of both ankles    2. Primary osteoarthritis of both ankles      Reviewed your xrays - ankle > midfoot osteoarthritis  Ok to take Tylenol (1,000mg - 2tbs) two times a day. If needed, can take Advil (2 tbs) in between the tylenol doses. If you're needing more than 5 days of Advil use to control pain then recommend cortisone. Call and schedule for bilateral ankle injections.  Can pursue Physical therapy if desired  Can also consider custom orthotics    Follow up as needed. Call direct clinic number [[phone]] at any time with questions or concerns.    Dilia Moura DO [facility]  Website: [email or web address]  Twitter: [email or web address]            Follow-ups after your visit        Who to contact     If you have questions or need follow up information about today's clinic visit or your schedule please contact [facility] directly at [phone].  Normal or non-critical lab and imaging results will be communicated to you by MyChart, letter or phone within 4 business days after the clinic has received the results. If you do not hear from us within 7 days, please contact the clinic through MyChart or phone. If you have a critical or abnormal lab result, we will notify you by phone as soon as possible.  Submit refill requests through [employer] or call your pharmacy and they will forward the refill request to us.  "Please allow 3 business days for your refill to be completed.          Additional Information About Your Visit        MyChart Information     [employer] gives you secure access to your electronic health record. If you see a primary care provider, you can also send messages to your care team and make appointments. If you have questions, please call your primary care clinic.  If you do not have a primary care provider, please call [phone] and they will assist you.        Care EveryWhere ID     This is your Care EveryWhere ID. This could be used by other organizations to access your [facility] medical records  [id number]        Your Vitals Were     Height BMI (Body Mass Index)                5' 7\" (1.702 m) 31.95 kg/m2           Blood Pressure from Last 3 Encounters:   06/30/17 110/79   06/30/17 110/79   06/02/17 131/90    Weight from Last 3 Encounters:   06/30/17 204 lb (92.5 kg)   06/02/17 204 lb (92.5 kg)   10/14/16 207 lb (93.9 kg)               Primary Care Provider Office Phone # Fax #    Mitra Keita [phone]       [address] [address]        Equal Access to Services     JIMMIE GONZALEZ : Hadii aad ku hadasho Soomaali, waaxda luqadaha, qaybta kaalmada adeegyada, waxay idiin hayaven payton baldwin lalibra ah. So [facility] [phone].    ATENCIÓN: Si habla español, tiene a zamora disposición servicios gratuitos de asistencia lingüística. Llame al [phone].    We comply with applicable federal civil rights laws and [place] laws. We do not discriminate on the basis of race, color, national origin, age, disability sex, sexual orientation or gender identity.            Thank you!     Thank you for choosing [facility]  for your care. Our goal is always to provide you with excellent care. Hearing back from our patients is one way we can continue to improve our services. Please take a few minutes to complete the written " survey that you may receive in the mail after your visit with us. Thank you!             Your Updated Medication List - Protect others around you: Learn how to safely use, store and throw away your medicines at www.disposemymeds.org.          This list is accurate as of: 6/30/17  4:50 PM.  Always use your most recent med list.                   Brand Name Dispense Instructions for use Diagnosis    albuterol 108 (90 BASE) MCG/ACT Inhaler    PROAIR HFA/PROVENTIL HFA/VENTOLIN HFA    1 Inhaler    Inhale 2 puffs into the lungs as needed for shortness of breath / dyspnea or wheezing    Allergic rhinitis, unspecified allergic rhinitis trigger, unspecified rhinitis seasonality       fluticasone 50 MCG/ACT spray    FLONASE    1 Bottle    Spray 2 sprays into both nostrils daily    Allergic rhinitis, unspecified allergic rhinitis trigger, unspecified rhinitis seasonality       fluticasone 50 MCG/BLIST Aepb    FLOVENT DISKUS    120 each    Inhale 2 puffs (100 mcg) into the lungs daily    Allergic rhinitis, unspecified allergic rhinitis trigger, unspecified rhinitis seasonality       furosemide 20 MG tablet    LASIX    30 tablet    Take 1 tablet (20 mg) by mouth daily as needed    Bilateral edema of lower extremity       levonorgestrel 20 MCG/24HR IUD    MIRENA     1 each by Intrauterine route once        * LISINOPRIL PO      Take 5 mg by mouth daily        * lisinopril 5 MG tablet    PRINIVIL/ZESTRIL    90 tablet    Take 1 tablet (5 mg) by mouth daily    Benign essential hypertension       montelukast 10 MG tablet    SINGULAIR    30 tablet    TAKE 1 TABLET(10 MG) BY MOUTH AT BEDTIME    Allergic rhinitis, unspecified allergic rhinitis trigger, unspecified rhinitis seasonality       multivitamin, therapeutic with minerals Tabs tablet     30 each    Take 2 tablets by mouth daily    Vitamin D deficiency       nitroFURantoin (macrocrystal-monohydrate) 100 MG capsule    MACROBID    14 capsule    Take 1 capsule (100 mg) by mouth 2  times daily    Acute cystitis with hematuria       topiramate 100 MG tablet    TOPAMAX     TK 1 T PO QPM        triamcinolone 0.1 % cream    KENALOG     Apply topically 2 times daily        * vitamin D3 2000 UNITS Caps     30 capsule    Take 2,000 Units by mouth daily    Vitamin D deficiency       * cholecalciferol 1000 UNIT tablet    vitamin D    30 tablet    Take 1 tablet (1,000 Units) by mouth daily    Vitamin D deficiency       * Notice:  This list has 4 medication(s) that are the same as other medications prescribed for you. Read the directions carefully, and ask your doctor or other care provider to review them with you.

## 2017-10-26 ENCOUNTER — TELEPHONE (OUTPATIENT)
Dept: FAMILY MEDICINE | Facility: CLINIC | Age: 52
End: 2017-10-26

## 2017-10-26 NOTE — TELEPHONE ENCOUNTER
Panel Management Review      Patient has the following on her problem list:     Hypertension   Last three blood pressure readings:  BP Readings from Last 3 Encounters:   06/30/17 110/79   06/30/17 110/79   06/02/17 131/90     Blood pressure: Passed    HTN Guidelines:  Age 18-59 BP range:  Less than 140/90  Age 60-85 with Diabetes:  Less than 140/90  Age 60-85 without Diabetes:  less than 150/90        Composite cancer screening  Chart review shows that this patient is due/due soon for the following Colonoscopy  Summary:    Patient is due/failing the following:   BP CHECK    Action needed:   Patient needs nurse only appointment.    Type of outreach:    Phone, spoke to patient.  scheduled nurse only BP check for 10/27/17 @ 3:45 pm    Questions for provider review:    None                                                                                                                                    Edward Minor MA       Chart routed to none .

## 2017-10-27 ENCOUNTER — ALLIED HEALTH/NURSE VISIT (OUTPATIENT)
Dept: NURSING | Facility: CLINIC | Age: 52
End: 2017-10-27
Payer: COMMERCIAL

## 2017-10-27 VITALS — SYSTOLIC BLOOD PRESSURE: 142 MMHG | HEART RATE: 63 BPM | DIASTOLIC BLOOD PRESSURE: 100 MMHG

## 2017-10-27 DIAGNOSIS — Z01.30 BP CHECK: Primary | ICD-10-CM

## 2017-10-27 PROCEDURE — 99207 ZZC NO CHARGE NURSE ONLY: CPT

## 2017-10-27 NOTE — MR AVS SNAPSHOT
After Visit Summary   10/27/2017    Rayna Jackson    MRN: [id number]           Patient Information     Date Of Birth          1965        Visit Information        Provider Department      10/27/2017 3:45 PM CR GOLD MA/LPN [facility]        Today's Diagnoses     BP check    -  1       Follow-ups after your visit        Your next 10 appointments already scheduled     Nov 16, 2017  8:00 AM CST   SHORT with Mitra Keita MD   [facility] ([facility])    [address] [place] [address] [phone]              Who to contact     If you have questions or need follow up information about today's clinic visit or your schedule please contact [facility] directly at [phone].  Normal or non-critical lab and imaging results will be communicated to you by MyChart, letter or phone within 4 business days after the clinic has received the results. If you do not hear from us within 7 days, please contact the clinic through MyChart or phone. If you have a critical or abnormal lab result, we will notify you by phone as soon as possible.  Submit refill requests through [employer] or call your pharmacy and they will forward the refill request to us. Please allow 3 business days for your refill to be completed.          Additional Information About Your Visit        MyChart Information     [employer] gives you secure access to your electronic health record. If you see a primary care provider, you can also send messages to your care team and make appointments. If you have questions, please call your primary care clinic.  If you do not have a primary care provider, please call [phone] and they will assist you.        Care EveryWhere ID     This is your Care EveryWhere ID. This could be used by other organizations to access your [place] medical records  [id number]        Your Vitals Were     Pulse                    63            Blood Pressure from Last 3 Encounters:   10/27/17 (!) 142/100   06/30/17 110/79   06/30/17 110/79    Weight from Last 3 Encounters:   06/30/17 204 lb (92.5 kg)   06/02/17 204 lb (92.5 kg)   10/14/16 207 lb (93.9 kg)              Today, you had the following     No orders found for display       Primary Care Provider Office Phone # Fax #    Mitra Keita [phone]       [address] [facility] [address]        Equal Access to Services     [facility]: Hadii aad keshav hadasho Sobarrera, waaxda luqadaha, qaybta kaalmada aderamírezyaander, giacomo engel . So [facility] [phone].    ATENCIÓN: Si habla español, tiene a zamora disposición servicios gratuitos de asistencia lingüística. Llame al [phone].    We comply with applicable federal civil rights laws and [place] laws. We do not discriminate on the basis of race, color, national origin, age, disability, sex, sexual orientation, or gender identity.            Thank you!     Thank you for choosing [facility]  for your care. Our goal is always to provide you with excellent care. Hearing back from our patients is one way we can continue to improve our services. Please take a few minutes to complete the written survey that you may receive in the mail after your visit with us. Thank you!             Your Updated Medication List - Protect others around you: Learn how to safely use, store and throw away your medicines at www.disposemymeds.org.          This list is accurate as of: 10/27/17  4:19 PM.  Always use your most recent med list.                   Brand Name Dispense Instructions for use Diagnosis    albuterol 108 (90 BASE) MCG/ACT Inhaler    PROAIR HFA/PROVENTIL HFA/VENTOLIN HFA    1 Inhaler    Inhale 2 puffs into the lungs as needed for shortness of breath / dyspnea or wheezing    Allergic rhinitis, unspecified allergic rhinitis trigger, unspecified rhinitis seasonality        fluticasone 50 MCG/ACT spray    FLONASE    1 Bottle    Spray 2 sprays into both nostrils daily    Allergic rhinitis, unspecified allergic rhinitis trigger, unspecified rhinitis seasonality       fluticasone 50 MCG/BLIST Aepb    FLOVENT DISKUS    120 each    Inhale 2 puffs (100 mcg) into the lungs daily    Allergic rhinitis, unspecified allergic rhinitis trigger, unspecified rhinitis seasonality       furosemide 20 MG tablet    LASIX    30 tablet    Take 1 tablet (20 mg) by mouth daily as needed    Bilateral edema of lower extremity       levonorgestrel 20 MCG/24HR IUD    MIRENA     1 each by Intrauterine route once        * LISINOPRIL PO      Take 5 mg by mouth daily        * lisinopril 5 MG tablet    PRINIVIL/ZESTRIL    90 tablet    Take 1 tablet (5 mg) by mouth daily    Benign essential hypertension       montelukast 10 MG tablet    SINGULAIR    30 tablet    TAKE 1 TABLET(10 MG) BY MOUTH AT BEDTIME    Allergic rhinitis, unspecified allergic rhinitis trigger, unspecified rhinitis seasonality       multivitamin, therapeutic with minerals Tabs tablet     30 each    Take 2 tablets by mouth daily    Vitamin D deficiency       nitroFURantoin (macrocrystal-monohydrate) 100 MG capsule    MACROBID    14 capsule    Take 1 capsule (100 mg) by mouth 2 times daily    Acute cystitis with hematuria       topiramate 100 MG tablet    TOPAMAX     TK 1 T PO QPM        triamcinolone 0.1 % cream    KENALOG     Apply topically 2 times daily        * vitamin D3 2000 UNITS Caps     30 capsule    Take 2,000 Units by mouth daily    Vitamin D deficiency       * cholecalciferol 1000 UNIT tablet    vitamin D3    30 tablet    Take 1 tablet (1,000 Units) by mouth daily    Vitamin D deficiency       * Notice:  This list has 4 medication(s) that are the same as other medications prescribed for you. Read the directions carefully, and ask your doctor or other care provider to review them with you.

## 2017-10-27 NOTE — PROGRESS NOTES
Rayna Jackson is a 52 year old female who comes in today for a Blood Pressure check because of Panel patient .    *Document pulse and BP  *Use new set of vitals button for multiple readings.  *Use extended vitals for orthostatic    Vitals as recorded, a large cuff was used.    Current complaints: none    Disposition: results routed to MD/AP

## 2017-10-31 ENCOUNTER — OFFICE VISIT (OUTPATIENT)
Dept: FAMILY MEDICINE | Facility: CLINIC | Age: 52
End: 2017-10-31
Payer: COMMERCIAL

## 2017-10-31 VITALS
TEMPERATURE: 98.1 F | RESPIRATION RATE: 20 BRPM | HEIGHT: 65 IN | WEIGHT: 201 LBS | SYSTOLIC BLOOD PRESSURE: 141 MMHG | HEART RATE: 71 BPM | BODY MASS INDEX: 33.49 KG/M2 | DIASTOLIC BLOOD PRESSURE: 92 MMHG

## 2017-10-31 DIAGNOSIS — A08.4 VIRAL GASTROENTERITIS: Primary | ICD-10-CM

## 2017-10-31 PROCEDURE — 99213 OFFICE O/P EST LOW 20 MIN: CPT | Performed by: FAMILY MEDICINE

## 2017-10-31 NOTE — MR AVS SNAPSHOT
After Visit Summary   10/31/2017    Rayna Jackson    MRN: [id number]           Patient Information     Date Of Birth          1965        Visit Information        Provider Department      10/31/2017 11:00 AM Mitra Keita MD [facility]        Today's Diagnoses     Viral gastroenteritis    -  1      Care Instructions      Increase fluids  Follow up if symptoms persist or worsen   Viral Gastroenteritis (Adult)    Gastroenteritis is commonly called the stomach flu. It is most often caused by a virus that affects the stomach and intestinal tract and usually lasts from 2 to 7 days. Common viruses causing gastroenteritis include norovirus, rotavirus, and hepatitis A. Non-viral causes of gastroenteritis include bacteria, parasites, and toxins.  The danger from repeated vomiting or diarrhea is dehydration. This is the loss of too much fluid from the body. When this occurs, body fluids must be replaced. Antibiotics do not help with this illness because it is usually viral.Simple home treatment will be helpful.  Symptoms of viral gastroenteritis may include:    Watery, loose stools    Stomach pain or abdominal cramps    Fever and chills    Nausea and vomiting    Loss of bowel control    Headache  Home care  Gastroenteritis is transmitted by contact with the stool or vomit of an infected person. This can occur from person to person or from contact with a contaminated surface.  Follow these guidelines when caring for yourself at home:    If symptoms are severe, rest at home for the next 24 hours or until you are feeling better.    Wash your hands with soap and water or use alcohol-based  to prevent the spread of infection. Wash your hands after touching anyone who is sick.    Wash your hands or use alcohol-based  after using the toilet and before meals. Clean the toilet after each use.  Remember these tips when preparing food:    People with diarrhea should  not prepare or serve food to others. When preparing foods, wash your hands before and after.    Wash your hands after using cutting boards, countertops, knives, or utensils that have been in contact with raw food.    Keep uncooked meats away from cooked and ready-to-eat foods.  Medicine  You may use acetaminophen or NSAID medicines like ibuprofen or naproxen to control fever unless another medicine was given. If you have chronic liver or kidney disease, talk with your healthcare provider before using these medicines. Also talk with your provider if you've had a stomach ulcer or gastrointestinal bleeding. Don't give aspirin to anyone under 18 years of age who is ill with a fever. It may cause severe liver damage. Don't use NSAIDS is you are already taking one for another condition (like arthritis) or are on aspirin (such as for heart disease or after a stroke).  If medicine for vomiting or diarrhea are prescribed, take these only as directed. Do not take over-the-counter medicines for vomiting or diarrhea unless instructed by your healthcare provider.  Diet  Follow these guidelines for food:    Water and liquids are important so you don't get dehydrated. Drink a small amount at a time or suck on ice chips if you are vomiting.    If you eat, avoid fatty, greasy, spicy, or fried foods.    Don't eat dairy if you have diarrhea. This can make diarrhea worse.    Avoid tobacco, alcohol, and caffeine which may worsen symptoms.  During the first 24 hours (the first full day), follow the diet below:    Beverages. Sports drinks, soft drinks without caffeine, ginger ale, mineral water (plain or flavored), decaffeinated tea and coffee. If you are very dehydrated, sports drinks aren't a good choice. They have too much sugar and not enough electrolytes. In this case, commercially available products called oral rehydration solutions, are best.    Soups. Eat clear broth, consommé, and bouillon.    Desserts. Eat gelatin, popsicles,  and fruit juice bars.  During the next 24 hours (the second day), you may add the following to the above:    Hot cereal, plain toast, bread, rolls, and crackers    Plain noodles, rice, mashed potatoes, chicken noodle or rice soup    Unsweetened canned fruit (avoid pineapple), bananas    Limit fat intake to less than 15 grams per day. Do this by avoiding margarine, butter, oils, mayonnaise, sauces, gravies, fried foods, peanut butter, meat, poultry, and fish.    Limit fiber and avoid raw or cooked vegetables, fresh fruits (except bananas), and bran cereals.    Limit caffeine and chocolate. Don't use spices or seasonings other than salt.    Limit dairy products.    Avoid alcohol.  During the next 24 hours:    Gradually resume a normal diet as you feel better and your symptoms improve.    If at any time it starts getting worse again, go back to clear liquids until you feel better.  Follow-up care  Follow up with your healthcare provider, or as advised. Call your provider if you don't get better within 24 hours or if diarrhea lasts more than a week. Also follow up if you are unable to keep down liquids and get dehydrated. If a stool (diarrhea) sample was taken, call as directed for the results.  Call 911  Call 911 if any of these occur:    Trouble breathing    Chest pain    Confused    Severe drowsiness or trouble awakening    Fainting or loss of consciousness    Rapid heart rate    Seizure    Stiff neck  When to seek medical advice  Call your healthcare provider right away if any of these occur:    Abdominal pain that gets worse    Continued vomiting (unable to keep liquids down)    Frequent diarrhea (more than 5 times a day)    Blood in vomit or stool (black or red color)    Dark urine, reduced urine output, or extreme thirst    Weakness or dizziness    Drowsiness    Fever of 100.4 F (38 C) oral or higher that does not get better with fever medicine    New rash  Date Last Reviewed: 1/3/2016    2524-4774 [employer]. [address] [place] [address]. All rights reserved. This information is not intended as a substitute for professional medical care. Always follow your healthcare professional's instructions.                Follow-ups after your visit        Your next 10 appointments already scheduled     Nov 16, 2017  8:00 AM CST   SHORT with Mitra Keita MD   [facility] ([facility])    [address] [place] [address] [phone]              Who to contact     If you have questions or need follow up information about today's clinic visit or your schedule please contact [facility] directly at [phone].  Normal or non-critical lab and imaging results will be communicated to you by Immediahart, letter or phone within 4 business days after the clinic has received the results. If you do not hear from us within 7 days, please contact the clinic through Immediahart or phone. If you have a critical or abnormal lab result, we will notify you by phone as soon as possible.  Submit refill requests through [employer] or call your pharmacy and they will forward the refill request to us. Please allow 3 business days for your refill to be completed.          Additional Information About Your Visit        ImmediaharYongChe Information     [employer] gives you secure access to your electronic health record. If you see a primary care provider, you can also send messages to your care team and make appointments. If you have questions, please call your primary care clinic.  If you do not have a primary care provider, please call [phone] and they will assist you.        Care EveryWhere ID     This is your Care EveryWhere ID. This could be used by other organizations to access your [place] medical records  [id number]        Your Vitals Were     Pulse Temperature Respirations Height Breastfeeding? BMI (Body Mass Index)    71 98.1  F (36.7  C)  "(Oral) 20 5' 5\" (1.651 m) No 33.45 kg/m2       Blood Pressure from Last 3 Encounters:   10/31/17 (!) 141/92   10/27/17 (!) 142/100   06/30/17 110/79    Weight from Last 3 Encounters:   10/31/17 201 lb (91.2 kg)   06/30/17 204 lb (92.5 kg)   06/02/17 204 lb (92.5 kg)              Today, you had the following     No orders found for display         Today's Medication Changes          These changes are accurate as of: 10/31/17 11:24 AM.  If you have any questions, ask your nurse or doctor.               Stop taking these medicines if you haven't already. Please contact your care team if you have questions.     furosemide 20 MG tablet   Commonly known as:  LASIX   Stopped by:  Mitra Keita MD           lisinopril 5 MG tablet   Commonly known as:  PRINIVIL/ZESTRIL   Stopped by:  Mitra Keita MD           LISINOPRIL PO   Stopped by:  Mitra Keita MD           nitroFURantoin (macrocrystal-monohydrate) 100 MG capsule   Commonly known as:  MACROBID   Stopped by:  Mitra Keita MD           topiramate 100 MG tablet   Commonly known as:  TOPAMAX   Stopped by:  Mitra Keita MD                    Primary Care Provider Office Phone # Fax #    Mitra Keita [phone] [address] [facility] [address]        Equal Access to Services     [facility]: Hadii maday stout Sobarrera, waaxda luqadaha, qaybta kaalmagiacomo wilson. So [facility] [phone].    ATENCIÓN: Si habla español, tiene a zamora disposición servicios gratuitos de asistencia lingüística. Llame al [phone].    We comply with applicable federal civil rights laws and [place] laws. We do not discriminate on the basis of race, color, national origin, age, disability, sex, sexual orientation, or gender identity.            Thank you!     Thank you for choosing [facility]  for your care. Our " goal is always to provide you with excellent care. Hearing back from our patients is one way we can continue to improve our services. Please take a few minutes to complete the written survey that you may receive in the mail after your visit with us. Thank you!             Your Updated Medication List - Protect others around you: Learn how to safely use, store and throw away your medicines at www.disposemymeds.org.          This list is accurate as of: 10/31/17 11:24 AM.  Always use your most recent med list.                   Brand Name Dispense Instructions for use Diagnosis    albuterol 108 (90 BASE) MCG/ACT Inhaler    PROAIR HFA/PROVENTIL HFA/VENTOLIN HFA    1 Inhaler    Inhale 2 puffs into the lungs as needed for shortness of breath / dyspnea or wheezing    Allergic rhinitis, unspecified allergic rhinitis trigger, unspecified rhinitis seasonality       fluticasone 50 MCG/ACT spray    FLONASE    1 Bottle    Spray 2 sprays into both nostrils daily    Allergic rhinitis, unspecified allergic rhinitis trigger, unspecified rhinitis seasonality       fluticasone 50 MCG/BLIST Aepb    FLOVENT DISKUS    120 each    Inhale 2 puffs (100 mcg) into the lungs daily    Allergic rhinitis, unspecified allergic rhinitis trigger, unspecified rhinitis seasonality       levonorgestrel 20 MCG/24HR IUD    MIRENA     1 each by Intrauterine route once        montelukast 10 MG tablet    SINGULAIR    30 tablet    TAKE 1 TABLET(10 MG) BY MOUTH AT BEDTIME    Allergic rhinitis, unspecified allergic rhinitis trigger, unspecified rhinitis seasonality       multivitamin, therapeutic with minerals Tabs tablet     30 each    Take 2 tablets by mouth daily    Vitamin D deficiency       triamcinolone 0.1 % cream    KENALOG     Apply topically 2 times daily        * vitamin D3 2000 UNITS Caps     30 capsule    Take 2,000 Units by mouth daily    Vitamin D deficiency       * cholecalciferol 1000 UNIT tablet    vitamin D3    30 tablet    Take 1 tablet  (1,000 Units) by mouth daily    Vitamin D deficiency       * Notice:  This list has 2 medication(s) that are the same as other medications prescribed for you. Read the directions carefully, and ask your doctor or other care provider to review them with you.

## 2017-10-31 NOTE — PROGRESS NOTES
SUBJECTIVE:   Rayna Jackson is a 52 year old female who presents to clinic today for the following health issues:      Diarrhea  Onset: x2 days    Description:   Consistency of stool: watery  Blood in stool: no   Number of loose stools in past 24 hours: unsure, she states in the 100's    Progression of Symptoms:  improving    Accompanying Signs & Symptoms:  Fever: no   Nausea or vomiting; YES  Abdominal pain: YES  Episodes of constipation: no   Weight loss: no     History:   Ill contacts: no   Recent use of antibiotics: no    Recent travels: no          Recent medication-new or changes(Rx or OTC): no     Precipitating factors:       Alleviating factors:       Therapies Tried and outcome:  none; Outcome: none    Ate out over the weekend- pizza at [employer]  Symptoms started Sunday afternoon and continues to persist.   States if drinks water had diarrhea.   Denies any fever, or blood in stool.   Grandson was sick about a week ago when whole day care came down with a viral infection.           Problem list and histories reviewed & adjusted, as indicated.  Additional history: as documented    Patient Active Problem List   Diagnosis     Benign essential hypertension     Osteoarthritis of both knees, unspecified osteoarthritis type     Bilateral edema of lower extremity     Osteoarthritis of both feet, unspecified osteoarthritis type     Past Surgical History:   Procedure Laterality Date     appendicitis  1980     COLONOSCOPY N/A 6/30/2017    Procedure: COLONOSCOPY;  COLONOSCOPY   ;  Surgeon: Brooks Villa MD;  Location:  GI     CYSTECTOMY OVARIAN BENIGN  1980     KNEE SURGERY Left        Social History   Substance Use Topics     Smoking status: Never Smoker     Smokeless tobacco: Not on file     Alcohol use Not on file     Family History   Problem Relation Age of Onset     Hypertension Mother      Thyroid Disease Mother      Hypertension Father      DIABETES Other      great grandmother     Thyroid  "Disease Sister      Thyroid Disease Maternal Aunt              Reviewed and updated as needed this visit by clinical staffTobacco  Allergies       Reviewed and updated as needed this visit by Provider         ROS:  Constitutional, gi systems are negative, except as otherwise noted.      OBJECTIVE:   BP (!) 141/92 (BP Location: Right arm, Patient Position: Chair, Cuff Size: Adult Large)  Pulse 71  Temp 98.1  F (36.7  C) (Oral)  Resp 20  Ht 5' 5\" (1.651 m)  Wt 201 lb (91.2 kg)  Breastfeeding? No  BMI 33.45 kg/m2  Body mass index is 33.45 kg/(m^2).  GENERAL: healthy, alert and no distress  RESP: lungs clear to auscultation - no rales, rhonchi or wheezes  CV: regular rate and rhythm, normal S1 S2,  ABDOMEN: soft, nontender,and bowel sounds normal    Diagnostic Test Results:  none     ASSESSMENT/PLAN:       1. Viral gastroenteritis  - etiology and natural course discussed. Will continue with supportive care for now.   If persists or worsens will consider stool studies.       See Patient Instructions    Mitra Keita MD  [facility]    "

## 2017-10-31 NOTE — PATIENT INSTRUCTIONS
Increase fluids  Follow up if symptoms persist or worsen   Viral Gastroenteritis (Adult)    Gastroenteritis is commonly called the stomach flu. It is most often caused by a virus that affects the stomach and intestinal tract and usually lasts from 2 to 7 days. Common viruses causing gastroenteritis include norovirus, rotavirus, and hepatitis A. Non-viral causes of gastroenteritis include bacteria, parasites, and toxins.  The danger from repeated vomiting or diarrhea is dehydration. This is the loss of too much fluid from the body. When this occurs, body fluids must be replaced. Antibiotics do not help with this illness because it is usually viral.Simple home treatment will be helpful.  Symptoms of viral gastroenteritis may include:    Watery, loose stools    Stomach pain or abdominal cramps    Fever and chills    Nausea and vomiting    Loss of bowel control    Headache  Home care  Gastroenteritis is transmitted by contact with the stool or vomit of an infected person. This can occur from person to person or from contact with a contaminated surface.  Follow these guidelines when caring for yourself at home:    If symptoms are severe, rest at home for the next 24 hours or until you are feeling better.    Wash your hands with soap and water or use alcohol-based  to prevent the spread of infection. Wash your hands after touching anyone who is sick.    Wash your hands or use alcohol-based  after using the toilet and before meals. Clean the toilet after each use.  Remember these tips when preparing food:    People with diarrhea should not prepare or serve food to others. When preparing foods, wash your hands before and after.    Wash your hands after using cutting boards, countertops, knives, or utensils that have been in contact with raw food.    Keep uncooked meats away from cooked and ready-to-eat foods.  Medicine  You may use acetaminophen or NSAID medicines like ibuprofen or naproxen to control  fever unless another medicine was given. If you have chronic liver or kidney disease, talk with your healthcare provider before using these medicines. Also talk with your provider if you've had a stomach ulcer or gastrointestinal bleeding. Don't give aspirin to anyone under 18 years of age who is ill with a fever. It may cause severe liver damage. Don't use NSAIDS is you are already taking one for another condition (like arthritis) or are on aspirin (such as for heart disease or after a stroke).  If medicine for vomiting or diarrhea are prescribed, take these only as directed. Do not take over-the-counter medicines for vomiting or diarrhea unless instructed by your healthcare provider.  Diet  Follow these guidelines for food:    Water and liquids are important so you don't get dehydrated. Drink a small amount at a time or suck on ice chips if you are vomiting.    If you eat, avoid fatty, greasy, spicy, or fried foods.    Don't eat dairy if you have diarrhea. This can make diarrhea worse.    Avoid tobacco, alcohol, and caffeine which may worsen symptoms.  During the first 24 hours (the first full day), follow the diet below:    Beverages. Sports drinks, soft drinks without caffeine, ginger ale, mineral water (plain or flavored), decaffeinated tea and coffee. If you are very dehydrated, sports drinks aren't a good choice. They have too much sugar and not enough electrolytes. In this case, commercially available products called oral rehydration solutions, are best.    Soups. Eat clear broth, consommé, and bouillon.    Desserts. Eat gelatin, popsicles, and fruit juice bars.  During the next 24 hours (the second day), you may add the following to the above:    Hot cereal, plain toast, bread, rolls, and crackers    Plain noodles, rice, mashed potatoes, chicken noodle or rice soup    Unsweetened canned fruit (avoid pineapple), bananas    Limit fat intake to less than 15 grams per day. Do this by avoiding margarine, butter,  oils, mayonnaise, sauces, gravies, fried foods, peanut butter, meat, poultry, and fish.    Limit fiber and avoid raw or cooked vegetables, fresh fruits (except bananas), and bran cereals.    Limit caffeine and chocolate. Don't use spices or seasonings other than salt.    Limit dairy products.    Avoid alcohol.  During the next 24 hours:    Gradually resume a normal diet as you feel better and your symptoms improve.    If at any time it starts getting worse again, go back to clear liquids until you feel better.  Follow-up care  Follow up with your healthcare provider, or as advised. Call your provider if you don't get better within 24 hours or if diarrhea lasts more than a week. Also follow up if you are unable to keep down liquids and get dehydrated. If a stool (diarrhea) sample was taken, call as directed for the results.  Call 911  Call 911 if any of these occur:    Trouble breathing    Chest pain    Confused    Severe drowsiness or trouble awakening    Fainting or loss of consciousness    Rapid heart rate    Seizure    Stiff neck  When to seek medical advice  Call your healthcare provider right away if any of these occur:    Abdominal pain that gets worse    Continued vomiting (unable to keep liquids down)    Frequent diarrhea (more than 5 times a day)    Blood in vomit or stool (black or red color)    Dark urine, reduced urine output, or extreme thirst    Weakness or dizziness    Drowsiness    Fever of 100.4 F (38 C) oral or higher that does not get better with fever medicine    New rash  Date Last Reviewed: 1/3/2016    8880-5694 [employer]. [address] [place] [address], [place] [address]. All rights reserved. This information is not intended as a substitute for professional medical care. Always follow your healthcare professional's instructions.

## 2017-10-31 NOTE — NURSING NOTE
"Chief Complaint   Patient presents with     Gastrointestinal Problem     GI concerns x2 days, c/o loose stool and vomiting       Initial BP (!) 141/92 (BP Location: Right arm, Patient Position: Chair, Cuff Size: Adult Large)  Pulse 71  Temp 98.1  F (36.7  C) (Oral)  Resp 20  Ht 5' 5\" (1.651 m)  Wt 201 lb (91.2 kg)  Breastfeeding? No  BMI 33.45 kg/m2 Estimated body mass index is 33.45 kg/(m^2) as calculated from the following:    Height as of this encounter: 5' 5\" (1.651 m).    Weight as of this encounter: 201 lb (91.2 kg).  Medication Reconciliation: complete     Nichole Bermudez/MARGE  [place]---[facility]      "

## 2017-11-16 ENCOUNTER — OFFICE VISIT (OUTPATIENT)
Dept: FAMILY MEDICINE | Facility: CLINIC | Age: 52
End: 2017-11-16
Payer: COMMERCIAL

## 2017-11-16 VITALS
BODY MASS INDEX: 33.82 KG/M2 | WEIGHT: 203 LBS | HEART RATE: 65 BPM | SYSTOLIC BLOOD PRESSURE: 130 MMHG | RESPIRATION RATE: 16 BRPM | DIASTOLIC BLOOD PRESSURE: 88 MMHG | HEIGHT: 65 IN | TEMPERATURE: 98.4 F

## 2017-11-16 DIAGNOSIS — R63.5 WEIGHT GAIN: ICD-10-CM

## 2017-11-16 DIAGNOSIS — G89.29 CHRONIC NONINTRACTABLE HEADACHE, UNSPECIFIED HEADACHE TYPE: ICD-10-CM

## 2017-11-16 DIAGNOSIS — I10 BENIGN ESSENTIAL HYPERTENSION: Primary | ICD-10-CM

## 2017-11-16 DIAGNOSIS — R51.9 CHRONIC NONINTRACTABLE HEADACHE, UNSPECIFIED HEADACHE TYPE: ICD-10-CM

## 2017-11-16 PROBLEM — Z97.5 IUD (INTRAUTERINE DEVICE) IN PLACE: Status: ACTIVE | Noted: 2017-11-16

## 2017-11-16 PROCEDURE — 99214 OFFICE O/P EST MOD 30 MIN: CPT | Performed by: FAMILY MEDICINE

## 2017-11-16 RX ORDER — TOPIRAMATE 25 MG/1
TABLET, FILM COATED ORAL
Qty: 90 TABLET | Refills: 1 | Status: SHIPPED | OUTPATIENT
Start: 2017-11-16 | End: 2018-02-12

## 2017-11-16 NOTE — MR AVS SNAPSHOT
After Visit Summary   11/16/2017    Rayna Jackson    MRN: [id number]           Patient Information     Date Of Birth          1965        Visit Information        Provider Department      11/16/2017 8:00 AM Mitra Keita MD [facility]        Today's Diagnoses     Weight gain    -  1    Chronic nonintractable headache, unspecified headache type        IUD (intrauterine device) in place        Benign essential hypertension          Care Instructions    Track for the next few weeks  Follow up in 2-3 weeks or sooner if needed  Start topamax at bedtime and advil or preferred medication as needed          Follow-ups after your visit        Additional Services     ENDOCRINOLOGY ADULT REFERRAL       Your provider has referred you to: [facility]: [facility] [phone]   [email or web address]      Please be aware that coverage of these services is subject to the terms and limitations of your health insurance plan.  Call member services at your health plan with any benefit or coverage questions.      Please bring the following to your appointment:    >>   Any x-rays, CTs or MRIs which have been performed.  Contact the facility where they were done to arrange for  prior to your scheduled appointment.    >>   List of current medications   >>   This referral request   >>   Any documents/labs given to you for this referral                  Who to contact     If you have questions or need follow up information about today's clinic visit or your schedule please contact [facility] directly at [phone].  Normal or non-critical lab and imaging results will be communicated to you by MyChart, letter or phone within 4 business days after the clinic has received the results. If you do not hear from us within 7 days, please contact the clinic through MyChart or phone. If you have a critical or abnormal lab  "result, we will notify you by phone as soon as possible.  Submit refill requests through [employer] or call your pharmacy and they will forward the refill request to us. Please allow 3 business days for your refill to be completed.          Additional Information About Your Visit        [email or web address] Information     [employer] gives you secure access to your electronic health record. If you see a primary care provider, you can also send messages to your care team and make appointments. If you have questions, please call your primary care clinic.  If you do not have a primary care provider, please call [phone] and they will assist you.        Care EveryWhere ID     This is your Care EveryWhere ID. This could be used by other organizations to access your [facility] medical records  [id number]        Your Vitals Were     Pulse Temperature Respirations Height Breastfeeding? BMI (Body Mass Index)    65 98.4  F (36.9  C) (Oral) 16 5' 5\" (1.651 m) No 33.78 kg/m2       Blood Pressure from Last 3 Encounters:   11/16/17 130/88   10/31/17 (!) 141/92   10/27/17 (!) 142/100    Weight from Last 3 Encounters:   11/16/17 203 lb (92.1 kg)   10/31/17 201 lb (91.2 kg)   06/30/17 204 lb (92.5 kg)              We Performed the Following     ENDOCRINOLOGY ADULT REFERRAL          Today's Medication Changes          These changes are accurate as of: 11/16/17  8:29 AM.  If you have any questions, ask your nurse or doctor.               Start taking these medicines.        Dose/Directions    topiramate 25 MG tablet   Commonly known as:  TOPAMAX   Used for:  Chronic nonintractable headache, unspecified headache type   Started by:  Mitra Keita MD        Take 1 tablet at bedtime for 1 week then 2 tablets at bedtime   Quantity:  90 tablet   Refills:  1            Where to get your medicines      These medications were sent to [facility] [address] [place], [place] - [address] AT [facility] [place] [address], " [facility] [address]     Phone:  [phone]     topiramate 25 MG tablet                Primary Care Provider Office Phone # Fax #    Mitra Keita [phone] [address]  [facility] [address]        Equal Access to Services     JIMMIE GONZALEZ : Hadii aad ku hadasho Soomaali, waaxda luqadaha, qaybta kaalmada adeegyada, waxay idiin hayaan aderamírez kharlensh laantonian . So [facility] [phone].    ATENCIÓN: Si habla español, tiene a zamora disposición servicios gratuitos de asistencia lingüística. Harleyame al [phone].    We comply with applicable federal civil rights laws and [place] laws. We do not discriminate on the basis of race, color, national origin, age, disability, sex, sexual orientation, or gender identity.            Thank you!     Thank you for choosing [facility]  for your care. Our goal is always to provide you with excellent care. Hearing back from our patients is one way we can continue to improve our services. Please take a few minutes to complete the written survey that you may receive in the mail after your visit with us. Thank you!             Your Updated Medication List - Protect others around you: Learn how to safely use, store and throw away your medicines at www.disposemymeds.org.          This list is accurate as of: 11/16/17  8:29 AM.  Always use your most recent med list.                   Brand Name Dispense Instructions for use Diagnosis    albuterol 108 (90 BASE) MCG/ACT Inhaler    PROAIR HFA/PROVENTIL HFA/VENTOLIN HFA    1 Inhaler    Inhale 2 puffs into the lungs as needed for shortness of breath / dyspnea or wheezing    Allergic rhinitis, unspecified allergic rhinitis trigger, unspecified rhinitis seasonality       fluticasone 50 MCG/ACT spray    FLONASE    1 Bottle    Spray 2 sprays into both nostrils daily    Allergic rhinitis, unspecified allergic rhinitis trigger, unspecified rhinitis seasonality       fluticasone 50 MCG/BLIST  Aepb    FLOVENT DISKUS    120 each    Inhale 2 puffs (100 mcg) into the lungs daily    Allergic rhinitis, unspecified allergic rhinitis trigger, unspecified rhinitis seasonality       levonorgestrel 20 MCG/24HR IUD    MIRENA     1 each by Intrauterine route once        montelukast 10 MG tablet    SINGULAIR    30 tablet    TAKE 1 TABLET(10 MG) BY MOUTH AT BEDTIME    Allergic rhinitis, unspecified allergic rhinitis trigger, unspecified rhinitis seasonality       multivitamin, therapeutic with minerals Tabs tablet     30 each    Take 2 tablets by mouth daily    Vitamin D deficiency       topiramate 25 MG tablet    TOPAMAX    90 tablet    Take 1 tablet at bedtime for 1 week then 2 tablets at bedtime    Chronic nonintractable headache, unspecified headache type       triamcinolone 0.1 % cream    KENALOG     Apply topically 2 times daily        * vitamin D3 2000 UNITS Caps     30 capsule    Take 2,000 Units by mouth daily    Vitamin D deficiency       * cholecalciferol 1000 UNIT tablet    vitamin D3    30 tablet    Take 1 tablet (1,000 Units) by mouth daily    Vitamin D deficiency       * Notice:  This list has 2 medication(s) that are the same as other medications prescribed for you. Read the directions carefully, and ask your doctor or other care provider to review them with you.

## 2017-11-16 NOTE — PROGRESS NOTES
SUBJECTIVE:   Rayna Jackson is a 52 year old female who presents to clinic today for the following health issues:      Increased BP reading at recent nurse only visit.   Patient here today for recheck. In the past patient was on low dose BP med but stopped due to low blood pressures.   Tracks her BP at home and denies any highs.     Patient also reports worsening headaches over the last few months. She was on topamax daily for many years but was titrated off this med at [place] since it was getting better. She has not really had any headache issues until recently.   Usually wakes up with a  Headache- Takes 2 tylenol in Am and at night and advil prn but pain usually takes up to midday to get better. Denies any vision changes - had new glasses about 2-3 months ago.       She also had concerns about weight gain in the last year or so since moving to the area. When it is warmer walks about 1 mile.   Tried a weight loss program ([employer])  through her insurance with her results. Wants a diet pill to help with weight loss.     Wt Readings from Last 10 Encounters:   11/16/17 203 lb (92.1 kg)   10/31/17 201 lb (91.2 kg)   06/30/17 204 lb (92.5 kg)   06/02/17 204 lb (92.5 kg)   10/14/16 207 lb (93.9 kg)   09/23/16 205 lb 4.8 oz (93.1 kg)   09/08/16 205 lb 3.2 oz (93.1 kg)           Problem list and histories reviewed & adjusted, as indicated.  Additional history: as documented    Patient Active Problem List   Diagnosis     Benign essential hypertension     Osteoarthritis of both knees, unspecified osteoarthritis type     Bilateral edema of lower extremity     Osteoarthritis of both feet, unspecified osteoarthritis type     IUD (intrauterine device) in place     Chronic nonintractable headache, unspecified headache type     Past Surgical History:   Procedure Laterality Date     appendicitis  1980     COLONOSCOPY N/A 6/30/2017    Procedure: COLONOSCOPY;  COLONOSCOPY   ;  Surgeon: Brooks Villa MD;  [facility]      "CYSTECTOMY OVARIAN BENIGN  1980     KNEE SURGERY Left        Social History   Substance Use Topics     Smoking status: Never Smoker     Smokeless tobacco: Never Used     Alcohol use 0.0 oz/week     0 Standard drinks or equivalent per week      Comment: occ     Family History   Problem Relation Age of Onset     Hypertension Mother      Thyroid Disease Mother      Hypertension Father      DIABETES Other      great grandmother     Thyroid Disease Sister      Thyroid Disease Maternal Aunt              Reviewed and updated as needed this visit by clinical staffTobacco       Reviewed and updated as needed this visit by Provider          ROS:  Constitutional, HEENT, cardiovascular, pulmonary, GI, , musculoskeletal, neuro, skin, endocrine and psych systems are negative, except as otherwise noted.      OBJECTIVE:   /88 (BP Location: Left arm, Patient Position: Chair, Cuff Size: Adult Large)  Pulse 65  Temp 98.4  F (36.9  C) (Oral)  Resp 16  Ht 5' 5\" (1.651 m)  Wt 203 lb (92.1 kg)  Breastfeeding? No  BMI 33.78 kg/m2  Body mass index is 33.78 kg/(m^2).  GENERAL: healthy, alert and no distress  HENT: ear canals and TM's normal, nose and mouth without ulcers or lesions  NECK: no adenopathy, no asymmetry, masses, or scars and thyroid normal to palpation  RESP: lungs clear to auscultation - no rales, rhonchi or wheezes  CV: regular rate and rhythm, normal S1 S2, no S3 or S4, no murmur, click or rub, no peripheral edema and peripheral pulses strong  MS: no edema  PSYCH: mentation appears normal, affect normal/bright    Diagnostic Test Results:  none     ASSESSMENT/PLAN:     1. Benign essential hypertension  - continue to monitor BP if persistently elevated at > 130/90's will restart BP med.       2. Chronic nonintractable headache, unspecified headache type  - will restart on topamax nightly to see if this helps.   - topiramate (TOPAMAX) 25 MG tablet; Take 1 tablet at bedtime for 1 week then 2 tablets at bedtime  " Dispense: 90 tablet; Refill: 1    3. Weight gain  - will refer to endocrine for further discussion/treatment   - ENDOCRINOLOGY ADULT REFERRAL          See Patient Instructions    Mitra Keita MD  [facility]

## 2017-11-16 NOTE — PATIENT INSTRUCTIONS
Track for the next few weeks  Follow up in 2-3 weeks or sooner if needed  Start topamax at bedtime and advil or preferred medication as needed

## 2017-11-16 NOTE — NURSING NOTE
"Chief Complaint   Patient presents with     Hypertension     increased BP readings       Initial /88 (BP Location: Left arm, Patient Position: Chair, Cuff Size: Adult Large)  Pulse 65  Temp 98.4  F (36.9  C) (Oral)  Resp 16  Ht 5' 5\" (1.651 m)  Wt 203 lb (92.1 kg)  Breastfeeding? No  BMI 33.78 kg/m2 Estimated body mass index is 33.78 kg/(m^2) as calculated from the following:    Height as of this encounter: 5' 5\" (1.651 m).    Weight as of this encounter: 203 lb (92.1 kg).  Medication Reconciliation: complete     Nichole Bermudez/MARGE  [place]---[facility]      "

## 2017-11-17 PROBLEM — R51.9 CHRONIC NONINTRACTABLE HEADACHE, UNSPECIFIED HEADACHE TYPE: Status: ACTIVE | Noted: 2017-11-17

## 2017-11-17 PROBLEM — G89.29 CHRONIC NONINTRACTABLE HEADACHE, UNSPECIFIED HEADACHE TYPE: Status: ACTIVE | Noted: 2017-11-17

## 2017-11-19 ENCOUNTER — MYC REFILL (OUTPATIENT)
Dept: FAMILY MEDICINE | Facility: CLINIC | Age: 52
End: 2017-11-19

## 2017-11-19 DIAGNOSIS — J30.9 ALLERGIC RHINITIS: ICD-10-CM

## 2017-11-19 DIAGNOSIS — J30.2 SEASONAL ALLERGIC RHINITIS: Primary | ICD-10-CM

## 2017-11-20 NOTE — TELEPHONE ENCOUNTER
Message from [employer]:  Original authorizing provider: MD Rayna Trujillo would like a refill of the following medications:  montelukast (SINGULAIR) 10 MG tablet [Mitra Keita MD]    Preferred pharmacy: [facility] [address] [place] [address] [address] AT [facility] [address]    Comment:

## 2017-11-21 ENCOUNTER — MYC REFILL (OUTPATIENT)
Dept: FAMILY MEDICINE | Facility: CLINIC | Age: 52
End: 2017-11-21

## 2017-11-21 ENCOUNTER — OFFICE VISIT (OUTPATIENT)
Dept: NURSING | Facility: CLINIC | Age: 52
End: 2017-11-21

## 2017-11-21 VITALS
WEIGHT: 205.7 LBS | HEIGHT: 65 IN | DIASTOLIC BLOOD PRESSURE: 95 MMHG | SYSTOLIC BLOOD PRESSURE: 143 MMHG | BODY MASS INDEX: 34.27 KG/M2 | HEART RATE: 72 BPM

## 2017-11-21 DIAGNOSIS — I10 BENIGN ESSENTIAL HYPERTENSION: Primary | ICD-10-CM

## 2017-11-21 DIAGNOSIS — J30.9 ALLERGIC RHINITIS: ICD-10-CM

## 2017-11-21 DIAGNOSIS — J30.2 SEASONAL ALLERGIC RHINITIS: Primary | ICD-10-CM

## 2017-11-21 PROCEDURE — 99207 ZZC NO CHARGE NURSE ONLY: CPT | Performed by: FAMILY MEDICINE

## 2017-11-21 NOTE — MR AVS SNAPSHOT
"              After Visit Summary   11/21/2017    Rayna Jackson    MRN: [id number]           Patient Information     Date Of Birth          1965        Visit Information        Provider Department      11/21/2017 2:41 PM Janel Mckeon MD [facility]        Today's Diagnoses     Benign essential hypertension    -  1      Care Instructions    [facility] Hypertension Study   Visit 1     Thank you for your interest in the [facility] hypertension research study.    At the visit today we swabbed your cheeks to obtain a genetic test that will be used to guide your blood pressure treatment.      The research protocol requires that a patient currently on 1 blood pressure medicine stop or taper off of their blood pressure medication before starting genetically guided treatment.This is called a \"washout period\" and allows your body time to remove this medicine.  This process has been safely done in prior blood pressure studies.  You should have been given instructions on how to do this today.  If you still have questions please contact the research coordinator at [phone].    In the coming days you will be contacted by a research team member to schedule your next office visit.  After it is scheduled, be sure to let the research coordinator know as soon as possible if you cannot make it so that the visit can be rescheduled for you.     As a participant in the [facility] for Hypertension Study you will be asked to use a blood pressure cuff for the first 30 days of your study participation to see how your blood pressure is when you are off of your blood pressure medication. You are being asked to wear the blood pressure cuff to measure your blood pressure twice daily. Please ensure that measurements are taken in the morning after a five minute resting period and before exercising, eating, or consuming alcohol or caffeine products. Wait at least 30 minutes after showering before taking measurement.    You will be asked to " enter these daily blood pressure measurements onto a paper log. At the end of each week, you will need to enter your blood pressure values into the online blood pressure log provided by [employer] via  email link. If you are completing the surveys on paper, please return the paper blood pressure log to your clinic during your second study visit.     **IMPORTANT** If during these 30 days you record a systolic reading of 170  or higher or a diastolic reading of 110 or higher for one of your two blood pressure readings, wait 2-3 minutes and take a third blood pressure measurement. We ask that you then call the [employer] 24/7 triage system at [phone] if you record two high blood pressure readings. In addition, please contact the [employer] triage system if you experience any symptoms that may be related to hypertension.     If you record blood pressure at this level and also experience symptoms such  as chest pain, shortness of breath, numbness/weakness, change in vision or difficulty speaking call 911      In four weeks, your hypertension medications will be prescribed via phone or through [employer].     If you have any questions or concerns about the study please contact the research coordinator at [phone]. If your phone number, email or address changes please alert the research coordinator.    In the meantime, we ask that you complete the online surveys emailed out to  you, if completing online, or mailed out to you, if completing paper surveys,  before your next visit.    Lifestyle changes that can help control high blood pressure:  Even though PGEN is a study to test effectiveness of genetically guided medications for managing high blood pressure, there are several things you can do to ensure your blood pressure stays in good control:    Maintain a healthy weight (BMI<26). A modest amount of weight loss can be helpful    Limit salt intake to under 2400mg daily    Follow the DASH diet (lean meats, low salt, whole  grains, lots of fruits/vegies)    Stay active, try to get in 30 minutes of exercise daily.    Manage your daily stress.    Do not smoke cigarettes (or cut back)    Limit alcohol (2 drinks/day for men, 1 drink/day for women)            Follow-ups after your visit        Follow-up notes from your care team     Return in about 4 weeks (around 12/19/2017) for PGEN study visit.      Future tests that were ordered for you today     Open Future Orders        Priority Expected Expires Ordered    Basic metabolic panel Routine  11/21/2018 11/21/2017    Lipid panel reflex to direct LDL Fasting Routine 10/22/2018 11/21/2018 11/21/2017            Who to contact     If you have questions or need follow up information about today's clinic visit or your schedule please contact [facility] directly at [phone].  Normal or non-critical lab and imaging results will be communicated to you by [employer], letter or phone within 4 business days after the clinic has received the results. If you do not hear from us within 7 days, please contact the clinic through [employer] or phone. If you have a critical or abnormal lab result, we will notify you by phone as soon as possible.  Submit refill requests through [employer] or call your pharmacy and they will forward the refill request to us. Please allow 3 business days for your refill to be completed.          Additional Information About Your Visit        [employer] Information     [employer] gives you secure access to your electronic health record. If you see a primary care provider, you can also send messages to your care team and make appointments. If you have questions, please call your primary care clinic.  If you do not have a primary care provider, please call [phone] and they will assist you.        Care EveryWhere ID     This is your Care EveryWhere ID. This could be used by other organizations to access your [place] medical records  [id number]        Your Vitals Were   "   Pulse Height BMI (Body Mass Index)             72 5' 5\" (1.651 m) 34.23 kg/m2          Blood Pressure from Last 3 Encounters:   11/21/17 (!) 145/95   11/16/17 130/88   10/31/17 (!) 141/92    Weight from Last 3 Encounters:   11/21/17 205 lb 11.2 oz (93.3 kg)   11/16/17 203 lb (92.1 kg)   10/31/17 201 lb (91.2 kg)               Primary Care Provider Office Phone # Fax #    Mitra Vicenta Keita [phone]       [id number] [facility] [address]        Equal Access to Services     JIMMIE GONZALEZ : Shi Palmer, wakaydenda bubba, qaybta kaalmada esha, giacomo engel . [facility] [phone].    ATENCIÓN: Si habla español, tiene a zamora disposición servicios gratuitos de asistencia lingüística. [facility] [phone].    We comply with applicable federal civil rights laws and [place] laws. We do not discriminate on the basis of race, color, national origin, age, disability, sex, sexual orientation, or gender identity.            Thank you!     Thank you for choosing [facility]  for your care. Our goal is always to provide you with excellent care. Hearing back from our patients is one way we can continue to improve our services. Please take a few minutes to complete the written survey that you may receive in the mail after your visit with us. Thank you!             Your Updated Medication List - Protect others around you: Learn how to safely use, store and throw away your medicines at www.disposemymeds.org.          This list is accurate as of: 11/21/17  3:04 PM.  Always use your most recent med list.                   Brand Name Dispense Instructions for use Diagnosis    albuterol 108 (90 BASE) MCG/ACT Inhaler    PROAIR HFA/PROVENTIL HFA/VENTOLIN HFA    1 Inhaler    Inhale 2 puffs into the lungs as needed for shortness of breath / dyspnea or wheezing    Allergic rhinitis, unspecified allergic rhinitis trigger, unspecified rhinitis " seasonality       fluticasone 50 MCG/ACT spray    FLONASE    1 Bottle    Spray 2 sprays into both nostrils daily    Allergic rhinitis, unspecified allergic rhinitis trigger, unspecified rhinitis seasonality       fluticasone 50 MCG/BLIST Aepb    FLOVENT DISKUS    120 each    Inhale 2 puffs (100 mcg) into the lungs daily    Allergic rhinitis, unspecified allergic rhinitis trigger, unspecified rhinitis seasonality       levonorgestrel 20 MCG/24HR IUD    MIRENA     1 each by Intrauterine route once        montelukast 10 MG tablet    SINGULAIR    30 tablet    TAKE 1 TABLET(10 MG) BY MOUTH AT BEDTIME    Allergic rhinitis, unspecified allergic rhinitis trigger, unspecified rhinitis seasonality       multivitamin, therapeutic with minerals Tabs tablet     30 each    Take 2 tablets by mouth daily    Vitamin D deficiency       topiramate 25 MG tablet    TOPAMAX    90 tablet    Take 1 tablet at bedtime for 1 week then 2 tablets at bedtime    Chronic nonintractable headache, unspecified headache type       triamcinolone 0.1 % cream    KENALOG     Apply topically 2 times daily        * vitamin D3 2000 UNITS Caps     30 capsule    Take 2,000 Units by mouth daily    Vitamin D deficiency       * cholecalciferol 1000 UNIT tablet    vitamin D3    30 tablet    Take 1 tablet (1,000 Units) by mouth daily    Vitamin D deficiency       * Notice:  This list has 2 medication(s) that are the same as other medications prescribed for you. Read the directions carefully, and ask your doctor or other care provider to review them with you.

## 2017-11-21 NOTE — PROGRESS NOTES
"PGEN study visit :  Uncontrolled hypertension arm , first visit      SUBJECTIVE:  Patient is here for first PGEN research study visit. Please refer to research tab in the header for further details. Patient has uncontrolled hypertension and has a goal of 140/90 (per Problem list target chosen by PCP)     PCP note reviewed.  Current blood pressure medication is : none.  Off of medications for a year. Lisinopril made her cough      Current diet & lifestyle: active.  Ave diet  Smoking:  No   Alcohol consumption minimal   Other pertinent history       BP (!) 143/95  Pulse 72  Ht 5' 5\" (1.651 m)  Wt 205 lb 11.2 oz (93.3 kg)  BMI 34.23 kg/m2  Body mass index is 34.23 kg/(m^2).    Estimated body mass index is 34.23 kg/(m^2) as calculated from the following:    Height as of this encounter: 5' 5\" (1.651 m).    Weight as of this encounter: 205 lb 11.2 oz (93.3 kg).      Current Outpatient Prescriptions on File Prior to Visit:  topiramate (TOPAMAX) 25 MG tablet Take 1 tablet at bedtime for 1 week then 2 tablets at bedtime   montelukast (SINGULAIR) 10 MG tablet TAKE 1 TABLET(10 MG) BY MOUTH AT BEDTIME   fluticasone (FLOVENT DISKUS) 50 MCG/BLIST AEPB Inhale 2 puffs (100 mcg) into the lungs daily   albuterol (PROAIR HFA, PROVENTIL HFA, VENTOLIN HFA) 108 (90 BASE) MCG/ACT inhaler Inhale 2 puffs into the lungs as needed for shortness of breath / dyspnea or wheezing   Cholecalciferol (VITAMIN D3) 2000 UNITS CAPS Take 2,000 Units by mouth daily   cholecalciferol (VITAMIN D3) 1000 UNIT tablet Take 1 tablet (1,000 Units) by mouth daily   multivitamin, therapeutic with minerals (MULTI-VITAMIN) TABS Take 2 tablets by mouth daily   fluticasone (FLONASE) 50 MCG/ACT nasal spray Spray 2 sprays into both nostrils daily   triamcinolone (KENALOG) 0.1 % cream Apply topically 2 times daily   levonorgestrel (MIRENA) 20 MCG/24HR IUD 1 each by Intrauterine route once     No current facility-administered medications on file prior to visit. " "    Last Basic Metabolic Panel:  Lab Results   Component Value Date     09/08/2016      Lab Results   Component Value Date    POTASSIUM 4.0 09/08/2016     Lab Results   Component Value Date    CHLORIDE 105 09/08/2016     Lab Results   Component Value Date    RUPINDER 9.4 09/08/2016     Lab Results   Component Value Date    CO2 27 09/08/2016     Lab Results   Component Value Date    BUN 8 09/08/2016     Lab Results   Component Value Date    CR 0.69 09/08/2016     Lab Results   Component Value Date    GLC 97 09/08/2016        A baseline potassium, creatinine, BUN, GFR has not been done within past 12 months      OBJECTIVE:  Patient in in no apparent distress and able to provide full history for today's encounter. she  denies pain or any current illness   BP (!) 143/95  Pulse 72  Ht 5' 5\" (1.651 m)  Wt 205 lb 11.2 oz (93.3 kg)  BMI 34.23 kg/m2  Body mass index is 34.23 kg/(m^2).  Estimated body mass index is 34.23 kg/(m^2) as calculated from the following:    Height as of this encounter: 5' 5\" (1.651 m).    Weight as of this encounter: 205 lb 11.2 oz (93.3 kg).    Today's BP completed using cuff size: large on right side arm.    Is pulse 55 or greater? - Yes  Other Exam findings : speech and gait normal.                ASSESSMENT/PLAN  (I10)  Hypertension goal BP (blood pressure) < 140/90 (primary encounter diagnosis). PGEN  enrollment/first study visit.      Consent obtained and documented and research folder provided to patient today.    Patient consents to washout period of 4 weeks without blood pressure medications: NA.  Patient not on medications for 1 year.    Cheek swabs (genetic profile test) was obtained and provided to clinic lab today : Yes    Patient should NOT be provided genetic profile results until the end of the study (this is a single blinded study.  Clinicians will use genetic profile (see media tab) results to chose order of blood pressure medications in patients randomized to the intervention " arm.   Patients will remain blinded to results until the end of the study)    Full research packet also provide to patient .  Our research team will reach out to patient to schedule follow up visit as well.     Patient was counseled regarding the lifestyle changes (listed below)  to help with BP management Yes  Lifestyle changes that can help control high blood pressure:  Even though PGEN is a study to test effectiveness of genetically guided medications for managing high blood pressure, there are several things you can do to ensure your blood pressure stays in good control:    Maintain a healthy weight (BMI<26). A modest amount of weight loss can be helpful    Limit salt intake to under 2400mg daily    Follow the DASH diet (lean meats, low salt, whole grains, lots of fruits/vegies)    Stay active, try to get in 30 minutes of exercise daily.    Manage your daily stress.    Do not smoke cigarettes (or cut back)    Limit alcohol (2 drinks/day for men, 1 drink/day for women)  Was AVS  provided to patient with the relevant <dot>PGENPI dot phrase pulled into patient instructions Yes    Patient was given an opportunity to ask questions.  Patient verbalized understanding of this plan and is agreeable to continuing with this research study    Janel Mckeon MD

## 2017-11-21 NOTE — PATIENT INSTRUCTIONS
"[facility] Hypertension Study   Visit 1     Thank you for your interest in the [facility] hypertension research study.    At the visit today we swabbed your cheeks to obtain a genetic test that will be used to guide your blood pressure treatment.      The research protocol requires that a patient currently on 1 blood pressure medicine stop or taper off of their blood pressure medication before starting genetically guided treatment.This is called a \"washout period\" and allows your body time to remove this medicine.  This process has been safely done in prior blood pressure studies.  You should have been given instructions on how to do this today.  If you still have questions please contact the research coordinator at [phone].    In the coming days you will be contacted by a research team member to schedule your next office visit.  After it is scheduled, be sure to let the research coordinator know as soon as possible if you cannot make it so that the visit can be rescheduled for you.     As a participant in the [facility] for Hypertension Study you will be asked to use a blood pressure cuff for the first 30 days of your study participation to see how your blood pressure is when you are off of your blood pressure medication. You are being asked to wear the blood pressure cuff to measure your blood pressure twice daily. Please ensure that measurements are taken in the morning after a five minute resting period and before exercising, eating, or consuming alcohol or caffeine products. Wait at least 30 minutes after showering before taking measurement.    You will be asked to enter these daily blood pressure measurements onto a paper log. At the end of each week, you will need to enter your blood pressure values into the online blood pressure log provided by [employer] via  email link. If you are completing the surveys on paper, please return the paper blood pressure log to your clinic during your second study visit.     **IMPORTANT** If " during these 30 days you record a systolic reading of 170  or higher or a diastolic reading of 110 or higher for one of your two blood pressure readings, wait 2-3 minutes and take a third blood pressure measurement. We ask that you then call the [employer] 24/7 triage system at [phone] if you record two high blood pressure readings. In addition, please contact the [employer] triage system if you experience any symptoms that may be related to hypertension.     If you record blood pressure at this level and also experience symptoms such  as chest pain, shortness of breath, numbness/weakness, change in vision or difficulty speaking call 911      In four weeks, your hypertension medications will be prescribed via phone or through [employer].     If you have any questions or concerns about the study please contact the research coordinator at [phone]. If your phone number, email or address changes please alert the research coordinator.    In the meantime, we ask that you complete the online surveys emailed out to  you, if completing online, or mailed out to you, if completing paper surveys,  before your next visit.    Lifestyle changes that can help control high blood pressure:  Even though PGEN is a study to test effectiveness of genetically guided medications for managing high blood pressure, there are several things you can do to ensure your blood pressure stays in good control:    Maintain a healthy weight (BMI<26). A modest amount of weight loss can be helpful    Limit salt intake to under 2400mg daily    Follow the DASH diet (lean meats, low salt, whole grains, lots of fruits/vegies)    Stay active, try to get in 30 minutes of exercise daily.    Manage your daily stress.    Do not smoke cigarettes (or cut back)    Limit alcohol (2 drinks/day for men, 1 drink/day for women)

## 2017-11-22 RX ORDER — MONTELUKAST SODIUM 10 MG/1
10 TABLET ORAL AT BEDTIME
Qty: 30 TABLET | Refills: 3 | Status: SHIPPED | OUTPATIENT
Start: 2017-11-22 | End: 2018-08-20

## 2017-11-22 NOTE — TELEPHONE ENCOUNTER
Message from [employer]:  Original authorizing provider: MD Rayna Trujillo would like a refill of the following medications:  montelukast (SINGULAIR) 10 MG tablet [Mitra Keita MD]    Preferred pharmacy: [facility] [address] [place] [address] [address] [facility] [address]    Comment:

## 2017-11-22 NOTE — TELEPHONE ENCOUNTER
Message from [employer]:  Original authorizing provider: MD Rayna Trujillo would like a refill of the following medications:  montelukast (SINGULAIR) 10 MG tablet [Mitra Keita MD]    Preferred pharmacy: [facility] [address] [place] [address] [address] AT [facility] [address]    Comment:

## 2017-11-22 NOTE — TELEPHONE ENCOUNTER
Message from MyChart:  Original authorizing provider: MD Rayna Trujillo would like a refill of the following medications:  fluticasone (FLOVENT DISKUS) 50 MCG/BLIST AEPB [Mitra Keita MD]  montelukast (SINGULAIR) 10 MG tablet [Mitra Keita MD]    Preferred pharmacy: [facility] [address] [place] [address] [address] AT [facility] [address]    Comment:

## 2017-11-24 RX ORDER — MONTELUKAST SODIUM 10 MG/1
TABLET ORAL
Status: SHIPPED
Start: 2017-11-24 | End: 2018-03-26

## 2017-12-18 DIAGNOSIS — I10 HYPERTENSION GOAL BP (BLOOD PRESSURE) < 140/90: Primary | ICD-10-CM

## 2017-12-18 RX ORDER — CHLORTHALIDONE 25 MG/1
12.5 TABLET ORAL DAILY
Qty: 45 TABLET | Refills: 1 | Status: SHIPPED | OUTPATIENT
Start: 2017-12-18 | End: 2018-01-04 | Stop reason: ALTCHOICE

## 2017-12-18 NOTE — TELEPHONE ENCOUNTER
Hi    Your patient is a participant in the PGEN blood pressure study that [facility] is conducting.     she is scheduled to be started on medications based on PGEN protocol.    she has been randomized to JNC8 standard of care arm.  Please also do NOT share which group she has been randomized to since this is a single blinded study design.     I have placed the standing order as well as the initial blood pressure medication according to our study protocol.  Please refer to those orders for more details.       If you do NOT agree with the standing order, please route back to me with the changes you would like to see and I can then modify the order to reflect your adjustment based on clinical judgement.    More details about this study can be found by going to [email or web address].  Or typing <dot>[email or web address]>  In Epic      Study team please advise patient of prescription : chlorthalidone is a water pill that is generally well tolerated.  He should take only 1/2 pill daily (cut each pill in half). Any medication can cause side effects and the pharmacist will review some of the most common ones when they dispense the medication.  Please ask patient to advise us if anything unusual develops.  We will also likely need to recheck kidney function at a future study visit while on this medication as well.    Please arrange follow-up visit per protocol      Janel Mckeon MD  [facility]

## 2017-12-18 NOTE — TELEPHONE ENCOUNTER
This patient will be ready to be started with medication tomorrow, Tuesday, December 19th based on JNC-8 guidelines. Her pharmacy of choice is the [facility] [id number] ([address] [place], [place]). As a reminder please place the standing order in addition to prescribing medication.

## 2017-12-31 ENCOUNTER — OFFICE VISIT (OUTPATIENT)
Dept: URGENT CARE | Facility: URGENT CARE | Age: 52
End: 2017-12-31
Payer: COMMERCIAL

## 2017-12-31 VITALS
HEART RATE: 78 BPM | SYSTOLIC BLOOD PRESSURE: 110 MMHG | DIASTOLIC BLOOD PRESSURE: 80 MMHG | OXYGEN SATURATION: 96 % | TEMPERATURE: 99.4 F

## 2017-12-31 DIAGNOSIS — R05.9 COUGH: Primary | ICD-10-CM

## 2017-12-31 PROCEDURE — 99213 OFFICE O/P EST LOW 20 MIN: CPT | Performed by: FAMILY MEDICINE

## 2017-12-31 RX ORDER — AZITHROMYCIN 250 MG/1
TABLET, FILM COATED ORAL
Qty: 6 TABLET | Refills: 0 | Status: SHIPPED | OUTPATIENT
Start: 2017-12-31 | End: 2018-08-20

## 2017-12-31 RX ORDER — PREDNISONE 20 MG/1
40 TABLET ORAL DAILY
Qty: 10 TABLET | Refills: 0 | Status: SHIPPED | OUTPATIENT
Start: 2017-12-31 | End: 2018-01-05

## 2017-12-31 NOTE — PROGRESS NOTES
SUBJECTIVE:  Rayna Jackson, a 52 year old female scheduled an appointment to discuss the following issues:    Cough; cough is been going on for greater than a week.  There is associated shortness of breath with the significant cough.    Low-grade fever appetite is decreased no nausea and vomiting no diarrhea    Medical, social, surgical, and family histories reviewed.    ROS:  CONSTITUTIONAL: As above  E: NEGATIVE for vision changes   RESP:as above  CV: NEGATIVE for chest pain, palpitations   GI: NEGATIVE for nausea, abdominal pain, heartburn, or change in bowel habits  : NEGATIVE for frequency, dysuria, or hematuria  M: NEGATIVE for significant arthralgias or myalgia  N: NEGATIVE for weakness, dizziness or paresthesias or headache    OBJECTIVE:  /80 (BP Location: Right arm, Patient Position: Chair, Cuff Size: Adult Large)  Pulse 78  Temp 99.4  F (37.4  C) (Oral)  SpO2 96%  EXAM:  GENERAL APPEARANCE: mild distress  EYES: EOMI,  PERRL  HENT: ear significant nasal drainage  RESP: Coarse breath sounds  CV: regular rates and rhythm, normal S1 S2, no S3 or S4 and no murmur, click or rub -  ABDOMEN:  soft, nontender, no HSM or masses and bowel sounds normal  MS: extremities normal- no gross deformities noted, no evidence of inflammation in joints, FROM in all extremities.  SKIN: no suspicious lesions or rashes  NEURO: Normal strength and tone, sensory exam grossly normal, mentation intact and speech normal    ASSESSMENT/PLAN:  (R05) Cough  (primary encounter diagnosis)  Comment: Bronchitis, upper respiratory infection  Plan: azithromycin (ZITHROMAX) 250 MG tablet,         predniSONE (DELTASONE) 20 MG tablet

## 2017-12-31 NOTE — NURSING NOTE
"Chief Complaint   Patient presents with     Urgent Care     URI     Cough and colds x1.5 weeks- HA, bilateral eye swelling, hard to breath, Cx congestion. coughing up phlegm       Initial /80 (BP Location: Right arm, Patient Position: Chair, Cuff Size: Adult Large)  Pulse 78  Temp 99.4  F (37.4  C) (Oral)  SpO2 96% Estimated body mass index is 34.23 kg/(m^2) as calculated from the following:    Height as of 11/21/17: 5' 5\" (1.651 m).    Weight as of 11/21/17: 205 lb 11.2 oz (93.3 kg).  Medication Reconciliation: complete     Darcie Ramesh CMA (EMILI)        "

## 2018-01-02 ENCOUNTER — ALLIED HEALTH/NURSE VISIT (OUTPATIENT)
Dept: NURSING | Facility: CLINIC | Age: 53
End: 2018-01-02
Payer: COMMERCIAL

## 2018-01-02 VITALS
DIASTOLIC BLOOD PRESSURE: 88 MMHG | SYSTOLIC BLOOD PRESSURE: 138 MMHG | WEIGHT: 203 LBS | BODY MASS INDEX: 33.78 KG/M2 | HEART RATE: 72 BPM

## 2018-01-02 DIAGNOSIS — I10 BENIGN ESSENTIAL HYPERTENSION: ICD-10-CM

## 2018-01-02 LAB
ANION GAP SERPL CALCULATED.3IONS-SCNC: 11 MMOL/L (ref 3–14)
BUN SERPL-MCNC: 21 MG/DL (ref 7–30)
CALCIUM SERPL-MCNC: 9.5 MG/DL (ref 8.5–10.1)
CHLORIDE SERPL-SCNC: 101 MMOL/L (ref 94–109)
CHOLEST SERPL-MCNC: 212 MG/DL
CO2 SERPL-SCNC: 27 MMOL/L (ref 20–32)
CREAT SERPL-MCNC: 0.71 MG/DL (ref 0.52–1.04)
GFR SERPL CREATININE-BSD FRML MDRD: 87 ML/MIN/1.7M2
GLUCOSE SERPL-MCNC: 90 MG/DL (ref 70–99)
HDLC SERPL-MCNC: 58 MG/DL
LDLC SERPL CALC-MCNC: 123 MG/DL
NONHDLC SERPL-MCNC: 154 MG/DL
POTASSIUM SERPL-SCNC: 3 MMOL/L (ref 3.4–5.3)
SODIUM SERPL-SCNC: 139 MMOL/L (ref 133–144)
TRIGL SERPL-MCNC: 154 MG/DL

## 2018-01-02 PROCEDURE — 80061 LIPID PANEL: CPT | Performed by: FAMILY MEDICINE

## 2018-01-02 PROCEDURE — 36415 COLL VENOUS BLD VENIPUNCTURE: CPT | Performed by: FAMILY MEDICINE

## 2018-01-02 PROCEDURE — 80048 BASIC METABOLIC PNL TOTAL CA: CPT | Performed by: FAMILY MEDICINE

## 2018-01-02 PROCEDURE — 99207 ZZC NO CHARGE NURSE ONLY: CPT

## 2018-01-02 NOTE — PROGRESS NOTES
PGEN study visit  UNCONTROLLED HYPERTENSION ARM visit 2    SUBJECTIVE:  Rayna is here for 2nd PGEN research study visit. Please refer to research tab in the header and today's flow sheet for further details. Patient had uncontrolled  hypertension at enrollment and has a goal of <  140/90  (per Problem list target chosen by PCP)     Prior research note reviewed. Patient is on blood pressure medication(s) at this time.  she has been taking chlorthalidone  and is tolerating the medication well.      Current diet & lifestyle: DASH  Smoking: NA  Alcohol consumption occasional   Other pertinent history : was seen in  over the weekend for cough was Rx Zithromax.      BP Readings from Last 3 Encounters:   01/02/18 138/88   12/31/17 110/80   11/21/17 (!) 143/95       Current Outpatient Prescriptions   Medication Sig Dispense Refill     azithromycin (ZITHROMAX) 250 MG tablet Two tablets first day, then one tablet daily for four days. 6 tablet 0     predniSONE (DELTASONE) 20 MG tablet Take 2 tablets (40 mg) by mouth daily for 5 days 10 tablet 0     chlorthalidone (HYGROTON) 25 MG tablet Take 0.5 tablets (12.5 mg) by mouth daily 45 tablet 1     fluticasone (FLOVENT DISKUS) 50 MCG/BLIST AEPB Inhale 2 puffs into the lungs daily 120 each 1     montelukast (SINGULAIR) 10 MG tablet TAKE 1 TABLET(10 MG) BY MOUTH AT BEDTIME       montelukast (SINGULAIR) 10 MG tablet TAKE 1 TABLET(10 MG) BY MOUTH AT BEDTIME       montelukast (SINGULAIR) 10 MG tablet TAKE 1 TABLET(10 MG) BY MOUTH AT BEDTIME       montelukast (SINGULAIR) 10 MG tablet Take 1 tablet (10 mg) by mouth At Bedtime 30 tablet 3     topiramate (TOPAMAX) 25 MG tablet Take 1 tablet at bedtime for 1 week then 2 tablets at bedtime 90 tablet 1     albuterol (PROAIR HFA, PROVENTIL HFA, VENTOLIN HFA) 108 (90 BASE) MCG/ACT inhaler Inhale 2 puffs into the lungs as needed for shortness of breath / dyspnea or wheezing 1 Inhaler 3     Cholecalciferol (VITAMIN D3) 2000 UNITS CAPS Take 2,000  Units by mouth daily 30 capsule 3     cholecalciferol (VITAMIN D3) 1000 UNIT tablet Take 1 tablet (1,000 Units) by mouth daily 30 tablet 3     multivitamin, therapeutic with minerals (MULTI-VITAMIN) TABS Take 2 tablets by mouth daily 30 each 1     fluticasone (FLONASE) 50 MCG/ACT nasal spray Spray 2 sprays into both nostrils daily 1 Bottle 3     triamcinolone (KENALOG) 0.1 % cream Apply topically 2 times daily       levonorgestrel (MIRENA) 20 MCG/24HR IUD 1 each by Intrauterine route once       Potassium   Date Value Ref Range Status   09/08/2016 4.0 3.4 - 5.3 mmol/L Final     Creatinine   Date Value Ref Range Status   09/08/2016 0.69 0.52 - 1.04 mg/dL Final     Urea Nitrogen   Date Value Ref Range Status   09/08/2016 8 7 - 30 mg/dL Final     GFR Estimate   Date Value Ref Range Status   09/08/2016 >90  Non  GFR Calc   >60 mL/min/1.7m2 Final      A baseline potassium, creatinine, BUN, GFR has been done within past 12 months      OBJECTIVE:  Patient in in no apparent distress and able to provide full history for today's encounter. she denies pain or any current illness   Vitals: /88  Pulse 72  Wt 203 lb (92.1 kg)  BMI 33.78 kg/m2  Today's BP completed using cuff size: regular on right side arm.    Pulse Readings from Last 1 Encounters:   01/02/18 72     Is pulse 55 or greater? - Yes    BMI= Body mass index is 33.78 kg/(m^2).  Other exam findings no pertinent        ASSESSMENT/PLAN:   [facility] (this saves flowsheet data)    Blood pressure reading today is at the provider specified goal of <140/90.      1.  Based on PGEN RN HTN MGMT standing order the patient will be advised continue current medication regimen unchanged.     2.  We will be checking a metabolic lab panel today.  yes    3.  Follow up instructions include:     Next Provider visit: Follow up 4 weeks.    See avs for more details.  Full research packet also provide to patient previously  Our  research team will reach out to patient to schedule follow up visit as well.   Patient was counseled regarding the lifestyle changes (listed below)  to help with BP management Yes  Lifestyle changes that can help control high blood pressure:  Even though PGEN is a study to test effectiveness of genetically guided medications for managing high blood pressure, there are several things you can do to ensure your blood pressure stays in good control:    Maintain a healthy weight (BMI<26). A modest amount of weight loss can be helpful    Limit salt intake to under 2400mg daily    Follow the DASH diet (lean meats, low salt, whole grains, lots of fruits/vegies)    Stay active, try to get in 30 minutes of exercise daily.    Manage your daily stress.    Do not smoke cigarettes (or cut back)    Limit alcohol (2 drinks/day for men, 1 drink/day for women)  Was AVS  provided to patient with the relevant <dot>PGENPI dot phrase pulled into patient instructions Yes    Sadia Barroso CNP

## 2018-01-02 NOTE — MR AVS SNAPSHOT
After Visit Summary   1/2/2018    Rayna Jackson    MRN: [id number]           Patient Information     Date Of Birth          1965        Visit Information        Provider Department      1/2/2018 8:00 AM Provider, Chris Reyes [facility]        Today's Diagnoses     Benign essential hypertension          Care Instructions    [facility] Hypertension Study  Visit 2     Thank you for your continued participation in the hypertension study!  Please continue taking your hypertension medications as directed. Your next visit will be in four weeks and will be scheduled for you in the coming days. After it is scheduled, be sure to let the research coordinator know as soon as possible if you cannot make it so that the visit can be rescheduled for you.     Please contact the research coordinator if you receive care for your hypertension outside of your study visits.    If you have any questions, please contact the research coordinator at [phone]. If you experience any symptoms please call the [place] 24/7 triage number at [phone]. If your phone number, email or address changes please alert the research coordinator.     In the meantime, we ask that you complete the online surveys emailed out to you, if completing online, or mailed out to you, if completing paper surveys, before your next visit.            Follow-ups after your visit        Who to contact     If you have questions or need follow up information about today's clinic visit or your schedule please contact [facility] directly at [phone].  Normal or non-critical lab and imaging results will be communicated to you by MyChart, letter or phone within 4 business days after the clinic has received the results. If you do not hear from us within 7 days, please contact the clinic through MyChart or phone. If you have a critical or abnormal lab result, we will notify you by phone as soon as possible.  Submit refill requests  through [employer] or call your pharmacy and they will forward the refill request to us. Please allow 3 business days for your refill to be completed.          Additional Information About Your Visit        Today Tix[employer] Information     [employer] gives you secure access to your electronic health record. If you see a primary care provider, you can also send messages to your care team and make appointments. If you have questions, please call your primary care clinic.  If you do not have a primary care provider, please call [phone] and they will assist you.        Care EveryWhere ID     This is your Care EveryWhere ID. This could be used by other organizations to access your [place] medical records  [id number]         Blood Pressure from Last 3 Encounters:   12/31/17 110/80   11/21/17 (!) 143/95   11/16/17 130/88    Weight from Last 3 Encounters:   11/21/17 205 lb 11.2 oz (93.3 kg)   11/16/17 203 lb (92.1 kg)   10/31/17 201 lb (91.2 kg)              We Performed the Following     Basic metabolic panel     Lipid panel reflex to direct LDL Fasting        Primary Care Provider Office Phone # Fax #    Mitra Keita [phone] [address] [facility] [address]        Equal Access to Services     JIMMIE GONZALEZ : Hadii aad ku hadasho Soomaali, waaxda luqadaha, qaybta kaalmada adeegyada, giacomo vega. So [facility] [phone].    ATENCIÓN: Si habla español, tiene a zamora disposición servicios gratuitos de asistencia lingüística. Llame al [phone].    We comply with applicable federal civil rights laws and [place] laws. We do not discriminate on the basis of race, color, national origin, age, disability, sex, sexual orientation, or gender identity.            Thank you!     Thank you for choosing [facility]  for your care. Our goal is always to provide you with excellent care. Hearing back from our patients is one way we can continue to improve  our services. Please take a few minutes to complete the written survey that you may receive in the mail after your visit with us. Thank you!             Your Updated Medication List - Protect others around you: Learn how to safely use, store and throw away your medicines at www.disposemymeds.org.          This list is accurate as of: 1/2/18  8:19 AM.  Always use your most recent med list.                   Brand Name Dispense Instructions for use Diagnosis    albuterol 108 (90 BASE) MCG/ACT Inhaler    PROAIR HFA/PROVENTIL HFA/VENTOLIN HFA    1 Inhaler    Inhale 2 puffs into the lungs as needed for shortness of breath / dyspnea or wheezing    Allergic rhinitis, unspecified allergic rhinitis trigger, unspecified rhinitis seasonality       azithromycin 250 MG tablet    ZITHROMAX    6 tablet    Two tablets first day, then one tablet daily for four days.    Cough       chlorthalidone 25 MG tablet    HYGROTON    45 tablet    Take 0.5 tablets (12.5 mg) by mouth daily    Hypertension goal BP (blood pressure) < 140/90       fluticasone 50 MCG/ACT spray    FLONASE    1 Bottle    Spray 2 sprays into both nostrils daily    Allergic rhinitis, unspecified allergic rhinitis trigger, unspecified rhinitis seasonality       fluticasone 50 MCG/BLIST Aepb    FLOVENT DISKUS    120 each    Inhale 2 puffs into the lungs daily    Allergic rhinitis       levonorgestrel 20 MCG/24HR IUD    MIRENA     1 each by Intrauterine route once        * montelukast 10 MG tablet    SINGULAIR    30 tablet    Take 1 tablet (10 mg) by mouth At Bedtime    Seasonal allergic rhinitis       * montelukast 10 MG tablet    SINGULAIR     TAKE 1 TABLET(10 MG) BY MOUTH AT BEDTIME    Allergic rhinitis       * montelukast 10 MG tablet    SINGULAIR     TAKE 1 TABLET(10 MG) BY MOUTH AT BEDTIME    Seasonal allergic rhinitis       * montelukast 10 MG tablet    SINGULAIR     TAKE 1 TABLET(10 MG) BY MOUTH AT BEDTIME    Allergic rhinitis       multivitamin, therapeutic with  minerals Tabs tablet     30 each    Take 2 tablets by mouth daily    Vitamin D deficiency       predniSONE 20 MG tablet    DELTASONE    10 tablet    Take 2 tablets (40 mg) by mouth daily for 5 days    Cough       topiramate 25 MG tablet    TOPAMAX    90 tablet    Take 1 tablet at bedtime for 1 week then 2 tablets at bedtime    Chronic nonintractable headache, unspecified headache type       triamcinolone 0.1 % cream    KENALOG     Apply topically 2 times daily        * vitamin D3 2000 UNITS Caps     30 capsule    Take 2,000 Units by mouth daily    Vitamin D deficiency       * cholecalciferol 1000 UNIT tablet    vitamin D3    30 tablet    Take 1 tablet (1,000 Units) by mouth daily    Vitamin D deficiency       * Notice:  This list has 6 medication(s) that are the same as other medications prescribed for you. Read the directions carefully, and ask your doctor or other care provider to review them with you.

## 2018-01-02 NOTE — PATIENT INSTRUCTIONS
[facility] Hypertension Study  Visit 2     Thank you for your continued participation in the hypertension study!  Please continue taking your hypertension medications as directed. Your next visit will be in four weeks and will be scheduled for you in the coming days. After it is scheduled, be sure to let the research coordinator know as soon as possible if you cannot make it so that the visit can be rescheduled for you.     Please contact the research coordinator if you receive care for your hypertension outside of your study visits.    If you have any questions, please contact the research coordinator at [phone]. If you experience any symptoms please call the [facility] 24/7 triage number at [phone]. If your phone number, email or address changes please alert the research coordinator.     In the meantime, we ask that you complete the online surveys emailed out to you, if completing online, or mailed out to you, if completing paper surveys, before your next visit.

## 2018-01-04 ENCOUNTER — TELEPHONE (OUTPATIENT)
Dept: FAMILY MEDICINE | Facility: CLINIC | Age: 53
End: 2018-01-04
Payer: COMMERCIAL

## 2018-01-04 DIAGNOSIS — Z53.9 ERRONEOUS ENCOUNTER--DISREGARD: Primary | ICD-10-CM

## 2018-01-04 RX ORDER — AMLODIPINE BESYLATE 5 MG/1
5 TABLET ORAL DAILY
Qty: 30 TABLET | Refills: 1 | Status: SHIPPED | OUTPATIENT
Start: 2018-01-04 | End: 2018-01-30

## 2018-01-04 NOTE — PROGRESS NOTES
Reviewed BMP result with Patient.  She is on MVI with minerals. She will DC of Chlorthalidone and  start Norvasc 5 mg at this time.  Repeat BMP at follow up.   Patient amenable to this follow up plan.   Walter ALMARAZ

## 2018-01-30 ENCOUNTER — ALLIED HEALTH/NURSE VISIT (OUTPATIENT)
Dept: NURSING | Facility: CLINIC | Age: 53
End: 2018-01-30
Payer: COMMERCIAL

## 2018-01-30 DIAGNOSIS — I10 BENIGN ESSENTIAL HYPERTENSION: Primary | ICD-10-CM

## 2018-01-30 LAB
ANION GAP SERPL CALCULATED.3IONS-SCNC: 8 MMOL/L (ref 3–14)
BUN SERPL-MCNC: 15 MG/DL (ref 7–30)
CALCIUM SERPL-MCNC: 9.3 MG/DL (ref 8.5–10.1)
CHLORIDE SERPL-SCNC: 110 MMOL/L (ref 94–109)
CO2 SERPL-SCNC: 23 MMOL/L (ref 20–32)
CREAT SERPL-MCNC: 0.65 MG/DL (ref 0.52–1.04)
GFR SERPL CREATININE-BSD FRML MDRD: >90 ML/MIN/1.7M2
GLUCOSE SERPL-MCNC: 95 MG/DL (ref 70–99)
POTASSIUM SERPL-SCNC: 3.5 MMOL/L (ref 3.4–5.3)
SODIUM SERPL-SCNC: 141 MMOL/L (ref 133–144)

## 2018-01-30 PROCEDURE — 36415 COLL VENOUS BLD VENIPUNCTURE: CPT | Performed by: NURSE PRACTITIONER

## 2018-01-30 PROCEDURE — 80048 BASIC METABOLIC PNL TOTAL CA: CPT | Performed by: NURSE PRACTITIONER

## 2018-01-30 PROCEDURE — 99207 ZZC NO CHARGE LOS: CPT

## 2018-01-30 RX ORDER — AMLODIPINE BESYLATE 5 MG/1
5 TABLET ORAL DAILY
Qty: 30 TABLET | Refills: 1 | Status: SHIPPED | OUTPATIENT
Start: 2018-01-30 | End: 2018-04-06

## 2018-01-30 NOTE — MR AVS SNAPSHOT
After Visit Summary   1/30/2018    Rayna Jackson    MRN: [id number]           Patient Information     Date Of Birth          1965        Visit Information        Provider Department      1/30/2018 8:00 AM Provider, Chris Reyes [facility]        Today's Diagnoses     Benign essential hypertension    -  1      Care Instructions    [facility] Hypertension Study   Visit 3     Thank you for your continued participation in the hypertension study!  Please continue taking your hypertension medication as directed. Your next visit will be in four weeks and will be scheduled for you in the coming days. After scheduled, be sure to let the research coordinator know as soon as possible if you cannot make it so that the visit can be rescheduled for you.     Please contact the research coordinator if you receive care for your hypertension outside of your study visits.    If you have any questions, please contact the research coordinator at [phone]. If you experience any symptoms please call the [place] 24/7 triage number at [phone]. If your phone number, email or address changes please alert the research coordinator.     In the meantime, we ask that you complete the online surveys emailed out to you, if completing online, or mailed out to you, if completing paper surveys, before your next visit.            Follow-ups after your visit        Who to contact     If you have questions or need follow up information about today's clinic visit or your schedule please contact [facility] directly at [phone].  Normal or non-critical lab and imaging results will be communicated to you by MyChart, letter or phone within 4 business days after the clinic has received the results. If you do not hear from us within 7 days, please contact the clinic through MyChart or phone. If you have a critical or abnormal lab result, we will notify you by phone as soon as possible.  Submit refill requests  through [employer] or call your pharmacy and they will forward the refill request to us. Please allow 3 business days for your refill to be completed.          Additional Information About Your Visit        [employer] Information     [employer] gives you secure access to your electronic health record. If you see a primary care provider, you can also send messages to your care team and make appointments. If you have questions, please call your primary care clinic.  If you do not have a primary care provider, please call [phone] and they will assist you.        Care EveryWhere ID     This is your Care EveryWhere ID. This could be used by other organizations to access your [place] medical records  [id number]        Your Vitals Were     Pulse                   78            Blood Pressure from Last 3 Encounters:   01/30/18 110/80   01/02/18 138/88   12/31/17 110/80    Weight from Last 3 Encounters:   01/02/18 203 lb (92.1 kg)   11/21/17 205 lb 11.2 oz (93.3 kg)   11/16/17 203 lb (92.1 kg)              We Performed the Following     Basic metabolic panel  (Ca, Cl, CO2, Creat, Gluc, K, Na, BUN)          Where to get your medicines      These medications were sent to [facility]/pharmacy #[id number] - [facility] [address]  [address] [facility] [address]     Phone:  [phone]     amLODIPine 5 MG tablet          Primary Care Provider Office Phone # Fax #    Mitra Keita [phone] [address] [facility] [address]        Equal Access to Services     CHARLIE GONZALEZ : Hadii aad ku hadasho Soverónicaali, waaxda luqadaha, qaybta kaalmada adeneena, giacomo vega. So [facility] [phone].    ATENCIÓN: Si habla español, tiene a zamora disposición servicios gratuitos de asistencia lingüística. Llame al [phone].    We comply with applicable federal civil rights laws and [place] laws. We do not discriminate on the basis of race, color, national origin,  age, disability, sex, sexual orientation, or gender identity.            Thank you!     Thank you for choosing [facility]  for your care. Our goal is always to provide you with excellent care. Hearing back from our patients is one way we can continue to improve our services. Please take a few minutes to complete the written survey that you may receive in the mail after your visit with us. Thank you!             Your Updated Medication List - Protect others around you: Learn how to safely use, store and throw away your medicines at www.disposemymeds.org.          This list is accurate as of 1/30/18  8:13 AM.  Always use your most recent med list.                   Brand Name Dispense Instructions for use Diagnosis    albuterol 108 (90 BASE) MCG/ACT Inhaler    PROAIR HFA/PROVENTIL HFA/VENTOLIN HFA    1 Inhaler    Inhale 2 puffs into the lungs as needed for shortness of breath / dyspnea or wheezing    Allergic rhinitis, unspecified allergic rhinitis trigger, unspecified rhinitis seasonality       amLODIPine 5 MG tablet    NORVASC    30 tablet    Take 1 tablet (5 mg) by mouth daily    Benign essential hypertension       azithromycin 250 MG tablet    ZITHROMAX    6 tablet    Two tablets first day, then one tablet daily for four days.    Cough       fluticasone 50 MCG/ACT spray    FLONASE    1 Bottle    Spray 2 sprays into both nostrils daily    Allergic rhinitis, unspecified allergic rhinitis trigger, unspecified rhinitis seasonality       fluticasone 50 MCG/BLIST Aepb    FLOVENT DISKUS    120 each    Inhale 2 puffs into the lungs daily    Allergic rhinitis       levonorgestrel 20 MCG/24HR IUD    MIRENA     1 each by Intrauterine route once        * montelukast 10 MG tablet    SINGULAIR    30 tablet    Take 1 tablet (10 mg) by mouth At Bedtime    Seasonal allergic rhinitis       * montelukast 10 MG tablet    SINGULAIR     TAKE 1 TABLET(10 MG) BY MOUTH AT BEDTIME    Allergic rhinitis       * montelukast 10  MG tablet    SINGULAIR     TAKE 1 TABLET(10 MG) BY MOUTH AT BEDTIME    Seasonal allergic rhinitis       * montelukast 10 MG tablet    SINGULAIR     TAKE 1 TABLET(10 MG) BY MOUTH AT BEDTIME    Allergic rhinitis       multivitamin, therapeutic with minerals Tabs tablet     30 each    Take 2 tablets by mouth daily    Vitamin D deficiency       topiramate 25 MG tablet    TOPAMAX    90 tablet    Take 1 tablet at bedtime for 1 week then 2 tablets at bedtime    Chronic nonintractable headache, unspecified headache type       triamcinolone 0.1 % cream    KENALOG     Apply topically 2 times daily        * vitamin D3 2000 UNITS Caps     30 capsule    Take 2,000 Units by mouth daily    Vitamin D deficiency       * cholecalciferol 1000 UNIT tablet    vitamin D3    30 tablet    Take 1 tablet (1,000 Units) by mouth daily    Vitamin D deficiency       * Notice:  This list has 6 medication(s) that are the same as other medications prescribed for you. Read the directions carefully, and ask your doctor or other care provider to review them with you.

## 2018-01-30 NOTE — PROGRESS NOTES
PGEN study visit  UNCONTROLLED HYPERTENSION ARM visit 3    SUBJECTIVE:  Rayna is here for 3rd PGEN research study visit. Please refer to research tab in the header and today's flow sheet for further details. Patient had uncontrolled  hypertension at enrollment and has a goal of <  140/90 (per Problem list target chosen by PCP)     Prior research note reviewed. Patient is on blood pressure medication(s) at this time.  she has been taking Amlodipine 5 mg daily and is tolerating the medication well.      Current diet & lifestyle: unchanged  Smoking: none  Alcohol consumption unchanged  Other pertinent history none     BP Readings from Last 3 Encounters:   01/02/18 138/88   12/31/17 110/80   11/21/17 (!) 143/95       Current Outpatient Prescriptions   Medication Sig Dispense Refill     amLODIPine (NORVASC) 5 MG tablet Take 1 tablet (5 mg) by mouth daily 30 tablet 1     azithromycin (ZITHROMAX) 250 MG tablet Two tablets first day, then one tablet daily for four days. 6 tablet 0     fluticasone (FLOVENT DISKUS) 50 MCG/BLIST AEPB Inhale 2 puffs into the lungs daily 120 each 1     montelukast (SINGULAIR) 10 MG tablet TAKE 1 TABLET(10 MG) BY MOUTH AT BEDTIME       montelukast (SINGULAIR) 10 MG tablet TAKE 1 TABLET(10 MG) BY MOUTH AT BEDTIME       montelukast (SINGULAIR) 10 MG tablet TAKE 1 TABLET(10 MG) BY MOUTH AT BEDTIME       montelukast (SINGULAIR) 10 MG tablet Take 1 tablet (10 mg) by mouth At Bedtime 30 tablet 3     topiramate (TOPAMAX) 25 MG tablet Take 1 tablet at bedtime for 1 week then 2 tablets at bedtime 90 tablet 1     albuterol (PROAIR HFA, PROVENTIL HFA, VENTOLIN HFA) 108 (90 BASE) MCG/ACT inhaler Inhale 2 puffs into the lungs as needed for shortness of breath / dyspnea or wheezing 1 Inhaler 3     Cholecalciferol (VITAMIN D3) 2000 UNITS CAPS Take 2,000 Units by mouth daily 30 capsule 3     cholecalciferol (VITAMIN D3) 1000 UNIT tablet Take 1 tablet (1,000 Units) by mouth daily 30 tablet 3     multivitamin,  therapeutic with minerals (MULTI-VITAMIN) TABS Take 2 tablets by mouth daily 30 each 1     fluticasone (FLONASE) 50 MCG/ACT nasal spray Spray 2 sprays into both nostrils daily 1 Bottle 3     triamcinolone (KENALOG) 0.1 % cream Apply topically 2 times daily       levonorgestrel (MIRENA) 20 MCG/24HR IUD 1 each by Intrauterine route once       Potassium   Date Value Ref Range Status   01/02/2018 3.0 (L) 3.4 - 5.3 mmol/L Final     Creatinine   Date Value Ref Range Status   01/02/2018 0.71 0.52 - 1.04 mg/dL Final     Urea Nitrogen   Date Value Ref Range Status   01/02/2018 21 7 - 30 mg/dL Final     GFR Estimate   Date Value Ref Range Status   01/02/2018 87 >60 mL/min/1.7m2 Final     Comment:     Non  GFR Calc      A baseline potassium, creatinine, BUN, GFR has been done within past 12 months      OBJECTIVE:  Patient in in no apparent distress and able to provide full history for today's encounter. she denies pain or any current illness   Vitals: There were no vitals taken for this visit.  Today's BP completed using cuff size: regular on left side  arm.    Pulse Readings from Last 1 Encounters:   01/02/18 72     Is pulse 55 or greater? - Yes    BMI= There is no height or weight on file to calculate BMI.  Other exam findings none       ASSESSMENT/PLAN:   PGEN Flowsheet has been  'filed' ) for this visit (this saves flowsheet data)    Blood pressure reading today is at the provider specified goal of <140/90.      1.  Based on PGEN RN HTN MGMT standing order the patient will be advised continue current medication regimen unchanged.     2.  We will be checking a metabolic lab panel today.  Yes     3.  Follow up instructions include: hypokalemia noted on last BMP. To follow by next provider.      Next Provider visit: Follow up in 1 month..    See avs for more details.  Full research packet also provide to patient previously  Our research team will reach out to patient to schedule follow up visit as well.    Patient was counseled regarding the lifestyle changes (listed below)  to help with BP management Yes  Lifestyle changes that can help control high blood pressure:  Even though PGEN is a study to test effectiveness of genetically guided medications for managing high blood pressure, there are several things you can do to ensure your blood pressure stays in good control:    Maintain a healthy weight (BMI<26). A modest amount of weight loss can be helpful    Limit salt intake to under 2400mg daily    Follow the DASH diet (lean meats, low salt, whole grains, lots of fruits/vegies)    Stay active, try to get in 30 minutes of exercise daily.    Manage your daily stress.    Do not smoke cigarettes (or cut back)    Limit alcohol (2 drinks/day for men, 1 drink/day for women)  Was AVS  provided to patient with the relevant <dot>PGENPI dot phrase pulled into patient instructions Yes    MIRZA Mccall, CNP

## 2018-01-30 NOTE — PATIENT INSTRUCTIONS
[facility] Hypertension Study   Visit 3     Thank you for your continued participation in the hypertension study!  Please continue taking your hypertension medication as directed. Your next visit will be in four weeks and will be scheduled for you in the coming days. After scheduled, be sure to let the research coordinator know as soon as possible if you cannot make it so that the visit can be rescheduled for you.     Please contact the research coordinator if you receive care for your hypertension outside of your study visits.    If you have any questions, please contact the research coordinator at [phone]. If you experience any symptoms please call the [employer] 24/7 triage number at [phone]. If your phone number, email or address changes please alert the research coordinator.     In the meantime, we ask that you complete the online surveys emailed out to you, if completing online, or mailed out to you, if completing paper surveys, before your next visit.

## 2018-01-31 ENCOUNTER — MYC MEDICAL ADVICE (OUTPATIENT)
Dept: FAMILY MEDICINE | Facility: CLINIC | Age: 53
End: 2018-01-31

## 2018-02-09 ENCOUNTER — TELEPHONE (OUTPATIENT)
Dept: OTHER | Facility: CLINIC | Age: 53
End: 2018-02-09

## 2018-02-09 NOTE — TELEPHONE ENCOUNTER
2/9/2018    Call Regarding Onboarding P1 MMB    Attempt 1    Message on voicemail     Comments:           Outreach   AT

## 2018-02-12 DIAGNOSIS — G89.29 CHRONIC NONINTRACTABLE HEADACHE, UNSPECIFIED HEADACHE TYPE: ICD-10-CM

## 2018-02-12 DIAGNOSIS — R51.9 CHRONIC NONINTRACTABLE HEADACHE, UNSPECIFIED HEADACHE TYPE: ICD-10-CM

## 2018-02-12 NOTE — LETTER
[facility]  [address] [place], [address]  [phone]        February 14, 2018    Rayna Jackson                                                                                                                                       [address] [address] [address]            We recently received a call from your pharmacy requesting a refill of Topamax.     A review of your chart indicates that an appointment is required with your provider for 2-3 week med recheck/migraine recheck per Dr. Keita at your 11/16/17 visit. Please call the clinic at [phone] to schedule your appointment.     Taking care of your health is important to us and ongoing visits with your provider are vital to your care.  We look forward to seeing you in the near future.     Sincerely,     [facility]

## 2018-02-13 NOTE — TELEPHONE ENCOUNTER
"Requested Prescriptions   Pending Prescriptions Disp Refills     topiramate (TOPAMAX) 25 MG tablet [Pharmacy Med Name: TOPIRAMATE 25 MG TABLET]  Last Written Prescription Date:  11/16/2017  Last Fill Quantity: 90 tablet,  # refills: 1   Last office visit: 11/16/2017 with prescribing provider:  Bossman     Future Office Visit:   Next 5 appointments (look out 90 days)     Feb 27, 2018  8:00 AM CST   Nurse Only with  Pgen Provider   [facility]    [facility] [address] [place] [address] [phone]               90 tablet 0     Sig: TAKE 1 TABLET BY MOUTH AT BEDTIME X1 WEEK, THEN TAKE 2 TABLETS BY MOUTH AT BEDTIME    Anticonvulsants Protocol  Failed    2/12/2018 12:39 PM       Failed - Review Authorizing provider's last note.     Refer to last progress notes: confirm request is for original authorizing provider (cannot be through other providers).         Passed - Normal serum creatinine on file in past 6 months    Recent Labs   Lab Test  01/30/18   0831   CR  0.65          Passed - No active pregnancy on record       Passed - No positive pregnancy test in last 12 months       Passed - Recent or future visit with authorizing provider    Patient had office visit in the last 6 months or has a visit in the next 30 days with authorizing provider.  See \"Patient Info\" tab in inbasket, or \"Choose Columns\" in Meds & Orders section of the refill encounter.              "

## 2018-02-14 RX ORDER — TOPIRAMATE 25 MG/1
TABLET, FILM COATED ORAL
Qty: 90 TABLET | Refills: 0 | Status: SHIPPED | OUTPATIENT
Start: 2018-02-14 | End: 2019-02-21

## 2018-02-14 NOTE — TELEPHONE ENCOUNTER
Per below note was to RTC in 2-3 wks for recheck.  Letter sent.  Sent to provider.  Che Quiñones RN      11/16/17  ASSESSMENT/PLAN:      1. Benign essential hypertension  - continue to monitor BP if persistently elevated at > 130/90's will restart BP med.         2. Chronic nonintractable headache, unspecified headache type  - will restart on topamax nightly to see if this helps.   - topiramate (TOPAMAX) 25 MG tablet; Take 1 tablet at bedtime for 1 week then 2 tablets at bedtime  Dispense: 90 tablet; Refill: 1     3. Weight gain  - will refer to endocrine for further discussion/treatment   - ENDOCRINOLOGY ADULT REFERRAL     See Patient Instructions     Mitra Keita MD  [facility]     Instructions   Track for the next few weeks  Follow up in 2-3 weeks or sooner if needed  Start topamax at bedtime and advil or preferred medication as needed

## 2018-02-15 NOTE — TELEPHONE ENCOUNTER
2/15/2018    Call Regarding Onboarding P1 MMB    Attempt 2    Message on voicemail     Comments:       Outreach   Rosalinda Wooten

## 2018-02-15 NOTE — TELEPHONE ENCOUNTER
LMTRC on home/cell number    Nichole Bermudez/MARGE  [place]---[facility]

## 2018-02-15 NOTE — TELEPHONE ENCOUNTER
"Pt returned call,  Talking frantically about why she needed to be seen  Upset about recheck on BP because she is in HTN study  \"don't you guys talk to each other\"  Explained she needed follow up from 11.30.18 appt to recheck BP, follow up on restarting Topamax, HA's, wt loss  Reminded her to make appt with endocrinologist also,  \"she knows I am going to do that later when I get back in town\"    Susan Moss RN, BS  Clinical Nurse Triage.    "

## 2018-02-21 ENCOUNTER — MYC REFILL (OUTPATIENT)
Dept: FAMILY MEDICINE | Facility: CLINIC | Age: 53
End: 2018-02-21

## 2018-02-21 DIAGNOSIS — J30.2 SEASONAL ALLERGIC RHINITIS: Primary | ICD-10-CM

## 2018-02-22 RX ORDER — FLUTICASONE PROPIONATE 50 MCG
2 SPRAY, SUSPENSION (ML) NASAL DAILY
Qty: 1 BOTTLE | Refills: 1 | Status: SHIPPED | OUTPATIENT
Start: 2018-02-22 | End: 2018-06-29

## 2018-02-22 NOTE — TELEPHONE ENCOUNTER
Message from YaBattlehart:  Original authorizing provider: MD Rayna Trujillo would like a refill of the following medications:  fluticasone (FLONASE) 50 MCG/ACT nasal spray [Mitra Keita MD]    Preferred pharmacy: [facility]Y [facility][id number] [id number]    Comment:

## 2018-02-27 ENCOUNTER — ALLIED HEALTH/NURSE VISIT (OUTPATIENT)
Dept: NURSING | Facility: CLINIC | Age: 53
End: 2018-02-27
Payer: COMMERCIAL

## 2018-02-27 VITALS
HEART RATE: 70 BPM | SYSTOLIC BLOOD PRESSURE: 124 MMHG | WEIGHT: 202 LBS | BODY MASS INDEX: 33.61 KG/M2 | DIASTOLIC BLOOD PRESSURE: 86 MMHG

## 2018-02-27 DIAGNOSIS — I10 HTN, GOAL BELOW 140/90: Primary | ICD-10-CM

## 2018-02-27 PROCEDURE — 99207 ZZC NO CHARGE LOS: CPT

## 2018-02-27 NOTE — MR AVS SNAPSHOT
After Visit Summary   2/27/2018    Rayna Jackson    MRN: [id number]           Patient Information     Date Of Birth          1965        Visit Information        Provider Department      2/27/2018 8:00 AM Provider, Chris Reyes [facility]        Care Instructions      [facility]   Visit 4     Thank you for your continued participation in the hypertension study!  Please continue taking your hypertension medications as directed. Your next visit will be in one and a half months and will be scheduled for you in the coming days.     After it is scheduled, be sure to let the research coordinator know as soon as possible if you cannot make it so that the visit can be rescheduled for you.     Please contact the research coordinator if you receive care for your hypertension outside of your study visits.    If you have any questions, please contact the research coordinator at [phone]. If you experience any symptoms please call the [facility] 24/7 triage number at [phone]. If your phone number, email or address changes please alert the research coordinator.     In the meantime, we ask that you complete the online surveys emailed out to you, if completing online, or mailed out to you, if completing paper surveys, before your next visit.            Follow-ups after your visit        Who to contact     If you have questions or need follow up information about today's clinic visit or your schedule please contact [facility] directly at [phone].  Normal or non-critical lab and imaging results will be communicated to you by MyChart, letter or phone within 4 business days after the clinic has received the results. If you do not hear from us within 7 days, please contact the clinic through MyChart or phone. If you have a critical or abnormal lab result, we will notify you by phone as soon as possible.  Submit refill requests through [employer] or call your pharmacy and  they will forward the refill request to us. Please allow 3 business days for your refill to be completed.          Additional Information About Your Visit        [employer] Information     [employer] gives you secure access to your electronic health record. If you see a primary care provider, you can also send messages to your care team and make appointments. If you have questions, please call your primary care clinic.  If you do not have a primary care provider, please call [phone] and they will assist you.        Care EveryWhere ID     This is your Care EveryWhere ID. This could be used by other organizations to access your [facility] medical records  [id number]        Your Vitals Were     Pulse BMI (Body Mass Index)                70 33.61 kg/m2           Blood Pressure from Last 3 Encounters:   02/27/18 124/86   01/30/18 110/80   01/02/18 138/88    Weight from Last 3 Encounters:   02/27/18 91.6 kg (202 lb)   01/02/18 92.1 kg (203 lb)   11/21/17 93.3 kg (205 lb 11.2 oz)              Today, you had the following     No orders found for display       Primary Care Provider Office Phone # Fax #    Mitra Keita [phone] [address] [facility] [address]        Equal Access to Services     JIMMIE GONZALEZ : Hadii maday ku hadasho Soomaali, waaxda luqadaha, qaybta kaalmada adeegyada, giacomo vega. So [facility] [phone].    ATENCIÓN: Si habla español, tiene a zamora disposición servicios gratuitos de asistencia lingüística. Llame al [phone].    We comply with applicable federal civil rights laws and [place] laws. We do not discriminate on the basis of race, color, national origin, age, disability, sex, sexual orientation, or gender identity.            Thank you!     Thank you for choosing [facility]  for your care. Our goal is always to provide you with excellent care. Hearing back from our patients is one way we can continue to  improve our services. Please take a few minutes to complete the written survey that you may receive in the mail after your visit with us. Thank you!             Your Updated Medication List - Protect others around you: Learn how to safely use, store and throw away your medicines at www.disposemymeds.org.          This list is accurate as of 2/27/18  8:21 AM.  Always use your most recent med list.                   Brand Name Dispense Instructions for use Diagnosis    albuterol 108 (90 BASE) MCG/ACT Inhaler    PROAIR HFA/PROVENTIL HFA/VENTOLIN HFA    1 Inhaler    Inhale 2 puffs into the lungs as needed for shortness of breath / dyspnea or wheezing    Allergic rhinitis, unspecified allergic rhinitis trigger, unspecified rhinitis seasonality       amLODIPine 5 MG tablet    NORVASC    30 tablet    Take 1 tablet (5 mg) by mouth daily    Benign essential hypertension       azithromycin 250 MG tablet    ZITHROMAX    6 tablet    Two tablets first day, then one tablet daily for four days.    Cough       fluticasone 50 MCG/ACT spray    FLONASE    1 Bottle    Spray 2 sprays into both nostrils daily    Seasonal allergic rhinitis       fluticasone 50 MCG/BLIST Aepb    FLOVENT DISKUS    120 each    Inhale 2 puffs into the lungs daily    Allergic rhinitis       levonorgestrel 20 MCG/24HR IUD    MIRENA     1 each by Intrauterine route once        * montelukast 10 MG tablet    SINGULAIR    30 tablet    Take 1 tablet (10 mg) by mouth At Bedtime    Seasonal allergic rhinitis       * montelukast 10 MG tablet    SINGULAIR     TAKE 1 TABLET(10 MG) BY MOUTH AT BEDTIME    Allergic rhinitis       * montelukast 10 MG tablet    SINGULAIR     TAKE 1 TABLET(10 MG) BY MOUTH AT BEDTIME    Seasonal allergic rhinitis       * montelukast 10 MG tablet    SINGULAIR     TAKE 1 TABLET(10 MG) BY MOUTH AT BEDTIME    Allergic rhinitis       multivitamin, therapeutic with minerals Tabs tablet     30 each    Take 2 tablets by mouth daily    Vitamin D  deficiency       topiramate 25 MG tablet    TOPAMAX    90 tablet    TAKE 1 TABLET BY MOUTH AT BEDTIME X1 WEEK, THEN TAKE 2 TABLETS BY MOUTH AT BEDTIME    Chronic nonintractable headache, unspecified headache type       triamcinolone 0.1 % cream    KENALOG     Apply topically 2 times daily        * vitamin D3 2000 UNITS Caps     30 capsule    Take 2,000 Units by mouth daily    Vitamin D deficiency       * cholecalciferol 1000 UNIT tablet    vitamin D3    30 tablet    Take 1 tablet (1,000 Units) by mouth daily    Vitamin D deficiency       * Notice:  This list has 6 medication(s) that are the same as other medications prescribed for you. Read the directions carefully, and ask your doctor or other care provider to review them with you.

## 2018-02-27 NOTE — PATIENT INSTRUCTIONS
[facility] Hypertension Study   Visit 4     Thank you for your continued participation in the hypertension study!  Please continue taking your hypertension medications as directed. Your next visit will be in one and a half months and will be scheduled for you in the coming days.     After it is scheduled, be sure to let the research coordinator know as soon as possible if you cannot make it so that the visit can be rescheduled for you.     Please contact the research coordinator if you receive care for your hypertension outside of your study visits.    If you have any questions, please contact the research coordinator at [phone]. If you experience any symptoms please call the [facility] 24/7 triage number at [phone]. If your phone number, email or address changes please alert the research coordinator.     In the meantime, we ask that you complete the online surveys emailed out to you, if completing online, or mailed out to you, if completing paper surveys, before your next visit.

## 2018-02-27 NOTE — PROGRESS NOTES
PGEN study visit  UNCONTROLLED HYPERTENSION ARM visit 4    SUBJECTIVE:  Rayna is here for 4th PGEN research study visit. Please refer to research tab in the header and today's flow sheet for further details. Patient had uncontrolled  hypertension at enrollment and has a goal of <  140/90(per Problem list target chosen by PCP)     Prior research note reviewed. Patient is on blood pressure medication(s) at this time.  she has been taking amlodipine 5 mg daily and is tolerating the medication well.      Current diet & lifestyle: none   Other pertinent history none    BP Readings from Last 3 Encounters:   02/27/18 124/86   01/30/18 110/80   01/02/18 138/88       Current Outpatient Prescriptions   Medication Sig Dispense Refill     fluticasone (FLONASE) 50 MCG/ACT spray Spray 2 sprays into both nostrils daily 1 Bottle 1     topiramate (TOPAMAX) 25 MG tablet TAKE 1 TABLET BY MOUTH AT BEDTIME X1 WEEK, THEN TAKE 2 TABLETS BY MOUTH AT BEDTIME 90 tablet 0     amLODIPine (NORVASC) 5 MG tablet Take 1 tablet (5 mg) by mouth daily 30 tablet 1     azithromycin (ZITHROMAX) 250 MG tablet Two tablets first day, then one tablet daily for four days. 6 tablet 0     fluticasone (FLOVENT DISKUS) 50 MCG/BLIST AEPB Inhale 2 puffs into the lungs daily 120 each 1     montelukast (SINGULAIR) 10 MG tablet TAKE 1 TABLET(10 MG) BY MOUTH AT BEDTIME       montelukast (SINGULAIR) 10 MG tablet TAKE 1 TABLET(10 MG) BY MOUTH AT BEDTIME       montelukast (SINGULAIR) 10 MG tablet TAKE 1 TABLET(10 MG) BY MOUTH AT BEDTIME       montelukast (SINGULAIR) 10 MG tablet Take 1 tablet (10 mg) by mouth At Bedtime 30 tablet 3     albuterol (PROAIR HFA, PROVENTIL HFA, VENTOLIN HFA) 108 (90 BASE) MCG/ACT inhaler Inhale 2 puffs into the lungs as needed for shortness of breath / dyspnea or wheezing 1 Inhaler 3     Cholecalciferol (VITAMIN D3) 2000 UNITS CAPS Take 2,000 Units by mouth daily 30 capsule 3     cholecalciferol (VITAMIN D3) 1000 UNIT tablet Take 1 tablet  (1,000 Units) by mouth daily 30 tablet 3     multivitamin, therapeutic with minerals (MULTI-VITAMIN) TABS Take 2 tablets by mouth daily 30 each 1     triamcinolone (KENALOG) 0.1 % cream Apply topically 2 times daily       levonorgestrel (MIRENA) 20 MCG/24HR IUD 1 each by Intrauterine route once       Potassium   Date Value Ref Range Status   01/30/2018 3.5 3.4 - 5.3 mmol/L Final     Creatinine   Date Value Ref Range Status   01/30/2018 0.65 0.52 - 1.04 mg/dL Final     Urea Nitrogen   Date Value Ref Range Status   01/30/2018 15 7 - 30 mg/dL Final     GFR Estimate   Date Value Ref Range Status   01/30/2018 >90 >60 mL/min/1.7m2 Final     Comment:     Non  GFR Calc      A baseline potassium, creatinine, BUN, GFR has been done within past 12 months      OBJECTIVE:  Patient in in no apparent distress and able to provide full history for today's encounter. she denies pain or any current illness   Vitals: /86 (BP Location: Left arm)  Pulse 70  Wt 202 lb (91.6 kg)  BMI 33.61 kg/m2  Today's BP completed using cuff size: regular on left side  arm.    Pulse Readings from Last 1 Encounters:   02/27/18 70     Is pulse 55 or greater? - Yes    BMI= Body mass index is 33.61 kg/(m^2).  Other exam findings none       ASSESSMENT/PLAN:   PATRICIAN Flowsheet has been  'filed' ) for this visit (this saves flowsheet data)    Blood pressure reading today is at the provider specified goal of <140/90.      1.  Based on PGECIPRI[facility]O RN HTN MGMT standing order the patient will be advised continue current medication regimen unchanged.     2.  We will not be checking a metabolic lab panel today.      3.  Follow up instructions include:     Next Provider visit: Follow up 6 weeks.    See avs for more details.  Full research packet also provide to patient previously  Our research team will reach out to patient to schedule follow up visit as well.   Patient was counseled regarding the lifestyle changes (listed below)  to help with BP  management Yes  Lifestyle changes that can help control high blood pressure:  Even though PGEN is a study to test effectiveness of genetically guided medications for managing high blood pressure, there are several things you can do to ensure your blood pressure stays in good control:    Maintain a healthy weight (BMI<26). A modest amount of weight loss can be helpful    Limit salt intake to under 2400mg daily    Follow the DASH diet (lean meats, low salt, whole grains, lots of fruits/vegies)    Stay active, try to get in 30 minutes of exercise daily.    Manage your daily stress.    Do not smoke cigarettes (or cut back)    Limit alcohol (2 drinks/day for men, 1 drink/day for women)  Was AVS  provided to patient with the relevant <dot>PGENPI dot phrase pulled into patient instructions Yes    MIRZA Mccall, CNP  PGen Research study

## 2018-03-01 VITALS
HEART RATE: 78 BPM | BODY MASS INDEX: 33.78 KG/M2 | WEIGHT: 203 LBS | DIASTOLIC BLOOD PRESSURE: 80 MMHG | SYSTOLIC BLOOD PRESSURE: 110 MMHG

## 2018-03-24 DIAGNOSIS — J30.9 ALLERGIC RHINITIS: ICD-10-CM

## 2018-03-26 RX ORDER — MONTELUKAST SODIUM 10 MG/1
TABLET ORAL
Qty: 30 TABLET | Refills: 4 | Status: SHIPPED | OUTPATIENT
Start: 2018-03-26 | End: 2018-08-20

## 2018-04-06 DIAGNOSIS — I10 BENIGN ESSENTIAL HYPERTENSION: ICD-10-CM

## 2018-04-06 RX ORDER — AMLODIPINE BESYLATE 5 MG/1
5 TABLET ORAL DAILY
Qty: 30 TABLET | Refills: 1 | Status: SHIPPED | OUTPATIENT
Start: 2018-04-06 | End: 2018-05-24 | Stop reason: SINTOL

## 2018-04-06 NOTE — TELEPHONE ENCOUNTER
Controlled Substance Refill Request for Amlodipine 5 mg tab   Problem List Complete:  No     PROVIDER TO CONSIDER COMPLETION OF PROBLEM LIST AND OVERVIEW/CONTROLLED SUBSTANCE AGREEMENT    Last Written Prescription Date:  013018  Last Fill Quantity: 30,   # refills: 1    Last Office Visit with [facility] primary care provider: 11/16/17 Dr Causey    Future Office visit:   Next 5 appointments (look out 90 days)     Apr 11, 2018  8:00 AM CDT   Nurse Only with  Pgen Provider   [facility] ([facility]    [address] [place] [address] [phone]                  Controlled substance agreement on file: No.     Processing:  Fax Rx to [facility]  pharmacy   checked in past 6 months?  No, route to RN

## 2018-04-11 ENCOUNTER — ALLIED HEALTH/NURSE VISIT (OUTPATIENT)
Dept: NURSING | Facility: CLINIC | Age: 53
End: 2018-04-11
Payer: COMMERCIAL

## 2018-04-11 VITALS — DIASTOLIC BLOOD PRESSURE: 88 MMHG | SYSTOLIC BLOOD PRESSURE: 132 MMHG | BODY MASS INDEX: 33.95 KG/M2 | WEIGHT: 204 LBS

## 2018-04-11 DIAGNOSIS — I10 HTN, GOAL BELOW 140/90: Primary | ICD-10-CM

## 2018-04-11 PROCEDURE — 99207 ZZC NO CHARGE NURSE ONLY: CPT

## 2018-04-11 RX ORDER — LOSARTAN POTASSIUM 50 MG/1
50 TABLET ORAL DAILY
Qty: 30 TABLET | Refills: 1 | Status: SHIPPED | OUTPATIENT
Start: 2018-04-11 | End: 2018-05-24

## 2018-04-11 NOTE — PATIENT INSTRUCTIONS
[facility] Hypertension Study  Visit 5    Stop Amlodipine. Start Losartan 50mg daily.    Thank you for your continued participation in the hypertension study!  Please continue taking your hypertension medications as directed. Your next visit will be in one and a half months and will be scheduled for you in the coming days. After it is scheduled, be sure to let the research coordinator know as soon as possible if you cannot make it so that the visit can be rescheduled for you.     Please contact the research coordinator if you receive care for your hypertension outside of your study visits.    If you have any questions, please contact the research coordinator at [phone]. If you experience any symptoms please call the [employer] 24/7 triage number at [phone]. If your phone number, email or address changes please alert the research coordinator.     In the meantime, we ask that you complete the online surveys emailed out to you, if completing online, or mailed out to you, if completing paper surveys, before your next visit.

## 2018-04-11 NOTE — MR AVS SNAPSHOT
After Visit Summary   4/11/2018    Rayna Jackson    MRN: [id number]           Patient Information     Date Of Birth          1965        Visit Information        Provider Department      4/11/2018 8:00 AM Provider, Chris Reyes [facility]        Care Instructions    [facility]  Visit 5    Stop Amlodipine. Start Losartan 50mg daily.    Thank you for your continued participation in the hypertension study!  Please continue taking your hypertension medications as directed. Your next visit will be in one and a half months and will be scheduled for you in the coming days. After it is scheduled, be sure to let the research coordinator know as soon as possible if you cannot make it so that the visit can be rescheduled for you.     Please contact the research coordinator if you receive care for your hypertension outside of your study visits.    If you have any questions, please contact the research coordinator at [phone]. If you experience any symptoms please call the [place] 24/7 triage number at [phone]. If your phone number, email or address changes please alert the research coordinator.     In the meantime, we ask that you complete the online surveys emailed out to you, if completing online, or mailed out to you, if completing paper surveys, before your next visit.            Follow-ups after your visit        Who to contact     If you have questions or need follow up information about today's clinic visit or your schedule please contact [facility] [place] directly at [phone].  Normal or non-critical lab and imaging results will be communicated to you by MyChart, letter or phone within 4 business days after the clinic has received the results. If you do not hear from us within 7 days, please contact the clinic through MyChart or phone. If you have a critical or abnormal lab result, we will notify you by phone as soon as possible.  Submit refill requests  through ITDatabase or call your pharmacy and they will forward the refill request to us. Please allow 3 business days for your refill to be completed.          Additional Information About Your Visit        eCareer[employer] Information     ITDatabase gives you secure access to your electronic health record. If you see a primary care provider, you can also send messages to your care team and make appointments. If you have questions, please call your primary care clinic.  If you do not have a primary care provider, please call [phone] and they will assist you.        Care EveryWhere ID     This is your Care EveryWhere ID. This could be used by other organizations to access your [place] medical records  [id number]         Blood Pressure from Last 3 Encounters:   02/27/18 124/86   01/30/18 110/80   01/02/18 138/88    Weight from Last 3 Encounters:   02/27/18 202 lb (91.6 kg)   01/30/18 203 lb (92.1 kg)   01/02/18 203 lb (92.1 kg)              Today, you had the following     No orders found for display       Primary Care Provider Office Phone # Fax #    Mitra Keita [phone]       [address] [facility] [address]        Equal Access to Services     JIMMIE GONZALEZ : Hadii maday parr hadasho Soomaali, waaxda luqadaha, qaybta kaalmada adeegyada, giacomo vega. So [facility] [phone].    ATENCIÓN: Si habla español, tiene a zamora disposición servicios gratuitos de asistencia lingüística. Llame al [phone].    We comply with applicable federal civil rights laws and [place] laws. We do not discriminate on the basis of race, color, national origin, age, disability, sex, sexual orientation, or gender identity.            Thank you!     Thank you for choosing [facility]  for your care. Our goal is always to provide you with excellent care. Hearing back from our patients is one way we can continue to improve our services. Please take a few minutes to complete  the written survey that you may receive in the mail after your visit with us. Thank you!             Your Updated Medication List - Protect others around you: Learn how to safely use, store and throw away your medicines at www.[employer].org.          This list is accurate as of 4/11/18  8:16 AM.  Always use your most recent med list.                   Brand Name Dispense Instructions for use Diagnosis    albuterol 108 (90 Base) MCG/ACT Inhaler    PROAIR HFA/PROVENTIL HFA/VENTOLIN HFA    1 Inhaler    Inhale 2 puffs into the lungs as needed for shortness of breath / dyspnea or wheezing    Allergic rhinitis, unspecified allergic rhinitis trigger, unspecified rhinitis seasonality       amLODIPine 5 MG tablet    NORVASC    30 tablet    Take 1 tablet (5 mg) by mouth daily    Benign essential hypertension       azithromycin 250 MG tablet    ZITHROMAX    6 tablet    Two tablets first day, then one tablet daily for four days.    Cough       fluticasone 50 MCG/ACT spray    FLONASE    1 Bottle    Spray 2 sprays into both nostrils daily    Seasonal allergic rhinitis       fluticasone 50 MCG/BLIST Aepb    FLOVENT DISKUS    120 each    Inhale 2 puffs into the lungs daily    Allergic rhinitis       levonorgestrel 20 MCG/24HR IUD    MIRENA     1 each by Intrauterine route once        * montelukast 10 MG tablet    SINGULAIR    30 tablet    Take 1 tablet (10 mg) by mouth At Bedtime    Seasonal allergic rhinitis       * montelukast 10 MG tablet    SINGULAIR    30 tablet    TAKE 1 TABLET BY MOUTH AT BEDTIME    Allergic rhinitis       multivitamin, therapeutic with minerals Tabs tablet     30 each    Take 2 tablets by mouth daily    Vitamin D deficiency       topiramate 25 MG tablet    TOPAMAX    90 tablet    TAKE 1 TABLET BY MOUTH AT BEDTIME X1 WEEK, THEN TAKE 2 TABLETS BY MOUTH AT BEDTIME    Chronic nonintractable headache, unspecified headache type       triamcinolone 0.1 % cream    KENALOG     Apply topically 2 times daily         * vitamin D3 2000 units Caps     30 capsule    Take 2,000 Units by mouth daily    Vitamin D deficiency       * cholecalciferol 1000 UNIT tablet    vitamin D3    30 tablet    Take 1 tablet (1,000 Units) by mouth daily    Vitamin D deficiency       * Notice:  This list has 4 medication(s) that are the same as other medications prescribed for you. Read the directions carefully, and ask your doctor or other care provider to review them with you.

## 2018-04-11 NOTE — Clinical Note
I'm pretty sure her med changes were made in the wrong order - standing order said chlorthalidone, then losartan, then amlodipine (visit #2). She went from chlorthalidone to amlodipine, and today we switched to losartan due to swelling (visit #5). Do we need to do anything about this?

## 2018-04-11 NOTE — PROGRESS NOTES
PGEN study visit  UNCONTROLLED HYPERTENSION ARM visit 5    SUBJECTIVE:  Rayna is here for 5th PGEN research study visit. Please refer to research tab in the header and today's flow sheet for further details. Patient had uncontrolled  hypertension at enrollment and has a goal of <  140/90 (per Problem list target chosen by PCP)     Prior research note reviewed. Patient is on blood pressure medication(s) at this time.  she has been taking Amlodipine 5mg daily and is not tolerating the medication well.  Patient reports swelling of her ankles/feet for the past month or so, especially at night. She wakes up in pain due to the ankle swelling    Current diet & lifestyle: unchanged  Other pertinent history Ankle swelling, see above      BP Readings from Last 3 Encounters:   04/11/18 132/88   02/27/18 124/86   01/30/18 110/80       Current Outpatient Prescriptions   Medication Sig Dispense Refill     amLODIPine (NORVASC) 5 MG tablet Take 1 tablet (5 mg) by mouth daily 30 tablet 1     montelukast (SINGULAIR) 10 MG tablet TAKE 1 TABLET BY MOUTH AT BEDTIME 30 tablet 4     fluticasone (FLONASE) 50 MCG/ACT spray Spray 2 sprays into both nostrils daily 1 Bottle 1     topiramate (TOPAMAX) 25 MG tablet TAKE 1 TABLET BY MOUTH AT BEDTIME X1 WEEK, THEN TAKE 2 TABLETS BY MOUTH AT BEDTIME 90 tablet 0     azithromycin (ZITHROMAX) 250 MG tablet Two tablets first day, then one tablet daily for four days. 6 tablet 0     fluticasone (FLOVENT DISKUS) 50 MCG/BLIST AEPB Inhale 2 puffs into the lungs daily 120 each 1     montelukast (SINGULAIR) 10 MG tablet Take 1 tablet (10 mg) by mouth At Bedtime 30 tablet 3     albuterol (PROAIR HFA, PROVENTIL HFA, VENTOLIN HFA) 108 (90 BASE) MCG/ACT inhaler Inhale 2 puffs into the lungs as needed for shortness of breath / dyspnea or wheezing 1 Inhaler 3     Cholecalciferol (VITAMIN D3) 2000 UNITS CAPS Take 2,000 Units by mouth daily 30 capsule 3     cholecalciferol (VITAMIN D3) 1000 UNIT tablet Take 1 tablet  (1,000 Units) by mouth daily 30 tablet 3     multivitamin, therapeutic with minerals (MULTI-VITAMIN) TABS Take 2 tablets by mouth daily 30 each 1     triamcinolone (KENALOG) 0.1 % cream Apply topically 2 times daily       levonorgestrel (MIRENA) 20 MCG/24HR IUD 1 each by Intrauterine route once       Potassium   Date Value Ref Range Status   01/30/2018 3.5 3.4 - 5.3 mmol/L Final     Creatinine   Date Value Ref Range Status   01/30/2018 0.65 0.52 - 1.04 mg/dL Final     Urea Nitrogen   Date Value Ref Range Status   01/30/2018 15 7 - 30 mg/dL Final     GFR Estimate   Date Value Ref Range Status   01/30/2018 >90 >60 mL/min/1.7m2 Final     Comment:     Non  GFR Calc      A baseline potassium, creatinine, BUN, GFR has been done within past 12 months      OBJECTIVE:  Patient in in no apparent distress and able to provide full history for today's encounter. she denies pain or any current illness   Vitals: /88 (BP Location: Right arm, Cuff Size: Adult Regular)  Wt 204 lb (92.5 kg)  BMI 33.95 kg/m2  Today's BP completed using cuff size: regular on right side arm.    Pulse Readings from Last 1 Encounters:   02/27/18 70     Is pulse 55 or greater? - N/A    BMI= Body mass index is 33.95 kg/(m^2).  Other exam findings NA       ASSESSMENT/PLAN:   PGEN Flowsheet has been  'filed' ) for this visit (this saves flowsheet data)    Blood pressure reading today is at the provider specified goal of <140/90.      1.  Based on PGECIPRIANO RN HTN MGMT standing order the patient will be advised Stop amlodipine due to ankle swelling. Start Losartan 50mg daily. This was the 2nd med in her standing order, but patient started amlodipine after stopping chlorthalidone due to lab abnormalities.     2.  We will not be checking a metabolic lab panel today.  Check at visit #6 after being on Losartan    3.  Follow up instructions include:     Next Provider visit: Follow up 6 weeks.    See avs for more details.  Full research packet also  provide to patient previously  Our research team will reach out to patient to schedule follow up visit as well.   Patient was counseled regarding the lifestyle changes (listed below)  to help with BP management Yes  Lifestyle changes that can help control high blood pressure:  Even though PGEN is a study to test effectiveness of genetically guided medications for managing high blood pressure, there are several things you can do to ensure your blood pressure stays in good control:    Maintain a healthy weight (BMI<26). A modest amount of weight loss can be helpful    Limit salt intake to under 2400mg daily    Follow the DASH diet (lean meats, low salt, whole grains, lots of fruits/vegies)    Stay active, try to get in 30 minutes of exercise daily.    Manage your daily stress.    Do not smoke cigarettes (or cut back)    Limit alcohol (2 drinks/day for men, 1 drink/day for women)  Was AVS  provided to patient with the relevant <dot>PGENPI dot phrase pulled into patient instructions Yes    Letty SinghD

## 2018-05-24 ENCOUNTER — ALLIED HEALTH/NURSE VISIT (OUTPATIENT)
Dept: NURSING | Facility: CLINIC | Age: 53
End: 2018-05-24
Payer: COMMERCIAL

## 2018-05-24 VITALS
HEART RATE: 64 BPM | SYSTOLIC BLOOD PRESSURE: 122 MMHG | BODY MASS INDEX: 33.95 KG/M2 | WEIGHT: 204 LBS | DIASTOLIC BLOOD PRESSURE: 88 MMHG

## 2018-05-24 DIAGNOSIS — I10 HTN, GOAL BELOW 140/90: Primary | ICD-10-CM

## 2018-05-24 LAB
ANION GAP SERPL CALCULATED.3IONS-SCNC: 6 MMOL/L (ref 3–14)
BUN SERPL-MCNC: 10 MG/DL (ref 7–30)
CALCIUM SERPL-MCNC: 9 MG/DL (ref 8.5–10.1)
CHLORIDE SERPL-SCNC: 111 MMOL/L (ref 94–109)
CO2 SERPL-SCNC: 24 MMOL/L (ref 20–32)
CREAT SERPL-MCNC: 0.65 MG/DL (ref 0.52–1.04)
GFR SERPL CREATININE-BSD FRML MDRD: >90 ML/MIN/1.7M2
GLUCOSE SERPL-MCNC: 96 MG/DL (ref 70–99)
POTASSIUM SERPL-SCNC: 4 MMOL/L (ref 3.4–5.3)
SODIUM SERPL-SCNC: 141 MMOL/L (ref 133–144)

## 2018-05-24 PROCEDURE — 99207 ZZC NO CHARGE NURSE ONLY: CPT

## 2018-05-24 PROCEDURE — 80048 BASIC METABOLIC PNL TOTAL CA: CPT

## 2018-05-24 PROCEDURE — 36415 COLL VENOUS BLD VENIPUNCTURE: CPT

## 2018-05-24 RX ORDER — LOSARTAN POTASSIUM 50 MG/1
50 TABLET ORAL DAILY
Qty: 90 TABLET | Refills: 1 | Status: SHIPPED | OUTPATIENT
Start: 2018-05-24 | End: 2018-08-13

## 2018-05-24 NOTE — MR AVS SNAPSHOT
After Visit Summary   5/24/2018    Rayna Jackson    MRN: [id number]           Patient Information     Date Of Birth          1965        Visit Information        Provider Department      5/24/2018 8:30 AM Provider, Chris Reyes [facility]        Today's Diagnoses     HTN, goal below 140/90    -  1      Care Instructions    [facility] Hypertension Study  Visit 6     Continue Losartan 50mg daily    Thank you for your continued participation in the hypertension study!  Please continue taking your hypertension medications as directed. Your next visit will be in three months and will be scheduled for you in the coming days. After it is scheduled, be sure to let the research coordinator know as soon as possible if you cannot make it so that the visit can be rescheduled for you.     Please contact the research coordinator if you receive care for your hypertension outside of your study visits.    If you have any questions, please contact the research coordinator at [phone]. If you experience any symptoms please call the [place] 24/7 triage number at [phone]. If your phone number, email or address changes please alert the research coordinator.     In the meantime, we ask that you complete the online surveys emailed out to you, if completing online, or mailed out to you, if completing paper surveys, before your next visit.            Follow-ups after your visit        Follow-up notes from your care team     Return in about 3 months (around 8/24/2018).      Who to contact     If you have questions or need follow up information about today's clinic visit or your schedule please contact [facility] directly at [phone].  Normal or non-critical lab and imaging results will be communicated to you by MyChart, letter or phone within 4 business days after the clinic has received the results. If you do not hear from us within 7 days, please contact the clinic through [employer] or  phone. If you have a critical or abnormal lab result, we will notify you by phone as soon as possible.  Submit refill requests through [employer] or call your pharmacy and they will forward the refill request to us. Please allow 3 business days for your refill to be completed.          Additional Information About Your Visit        [employer] Information     [employer] gives you secure access to your electronic health record. If you see a primary care provider, you can also send messages to your care team and make appointments. If you have questions, please call your primary care clinic.  If you do not have a primary care provider, please call [phone] and they will assist you.        Care EveryWhere ID     This is your Care EveryWhere ID. This could be used by other organizations to access your [place] medical records  [id number]         Blood Pressure from Last 3 Encounters:   04/11/18 132/88   02/27/18 124/86   01/30/18 110/80    Weight from Last 3 Encounters:   04/11/18 204 lb (92.5 kg)   02/27/18 202 lb (91.6 kg)   01/30/18 203 lb (92.1 kg)              Today, you had the following     No orders found for display         Today's Medication Changes          These changes are accurate as of 5/24/18  8:31 AM.  If you have any questions, ask your nurse or doctor.               Stop taking these medicines if you haven't already. Please contact your care team if you have questions.     amLODIPine 5 MG tablet   Commonly known as:  NORVASC                    Primary Care Provider Office Phone # Fax #    Mitra Keita [phone] [address] [facility] [address]        Equal Access to Services     [facility]: Hadii maday parr hadasho Sobarrera, waaxda luqadaha, qaybta kaalmagiacomo wilson. So [facility] [phone].    ATENCIÓN: Si habla español, tiene a zamora disposición servicios gratuitos de asistencia lingüística. Llame al [phone].    We  comply with applicable federal civil rights laws and [place] laws. We do not discriminate on the basis of race, color, national origin, age, disability, sex, sexual orientation, or gender identity.            Thank you!     Thank you for choosing [facility]  for your care. Our goal is always to provide you with excellent care. Hearing back from our patients is one way we can continue to improve our services. Please take a few minutes to complete the written survey that you may receive in the mail after your visit with us. Thank you!             Your Updated Medication List - Protect others around you: Learn how to safely use, store and throw away your medicines at www.disposemymeds.org.          This list is accurate as of 5/24/18  8:31 AM.  Always use your most recent med list.                   Brand Name Dispense Instructions for use Diagnosis    albuterol 108 (90 Base) MCG/ACT Inhaler    PROAIR HFA/PROVENTIL HFA/VENTOLIN HFA    1 Inhaler    Inhale 2 puffs into the lungs as needed for shortness of breath / dyspnea or wheezing    Allergic rhinitis, unspecified allergic rhinitis trigger, unspecified rhinitis seasonality       azithromycin 250 MG tablet    ZITHROMAX    6 tablet    Two tablets first day, then one tablet daily for four days.    Cough       fluticasone 50 MCG/ACT spray    FLONASE    1 Bottle    Spray 2 sprays into both nostrils daily    Seasonal allergic rhinitis       fluticasone 50 MCG/BLIST Aepb    FLOVENT DISKUS    120 each    Inhale 2 puffs into the lungs daily    Allergic rhinitis       levonorgestrel 20 MCG/24HR IUD    MIRENA     1 each by Intrauterine route once        losartan 50 MG tablet    COZAAR    30 tablet    Take 1 tablet (50 mg) by mouth daily    HTN, goal below 140/90       * montelukast 10 MG tablet    SINGULAIR    30 tablet    Take 1 tablet (10 mg) by mouth At Bedtime    Seasonal allergic rhinitis       * montelukast 10 MG tablet    SINGULAIR    30 tablet    TAKE 1  TABLET BY MOUTH AT BEDTIME    Allergic rhinitis       multivitamin, therapeutic with minerals Tabs tablet     30 each    Take 2 tablets by mouth daily    Vitamin D deficiency       topiramate 25 MG tablet    TOPAMAX    90 tablet    TAKE 1 TABLET BY MOUTH AT BEDTIME X1 WEEK, THEN TAKE 2 TABLETS BY MOUTH AT BEDTIME    Chronic nonintractable headache, unspecified headache type       triamcinolone 0.1 % cream    KENALOG     Apply topically 2 times daily        * vitamin D3 2000 units Caps     30 capsule    Take 2,000 Units by mouth daily    Vitamin D deficiency       * cholecalciferol 1000 UNIT tablet    vitamin D3    30 tablet    Take 1 tablet (1,000 Units) by mouth daily    Vitamin D deficiency       * Notice:  This list has 4 medication(s) that are the same as other medications prescribed for you. Read the directions carefully, and ask your doctor or other care provider to review them with you.

## 2018-05-24 NOTE — PATIENT INSTRUCTIONS
[facility] Hypertension Study  Visit 6     Continue Losartan 50mg daily    Thank you for your continued participation in the hypertension study!  Please continue taking your hypertension medications as directed. Your next visit will be in three months and will be scheduled for you in the coming days. After it is scheduled, be sure to let the research coordinator know as soon as possible if you cannot make it so that the visit can be rescheduled for you.     Please contact the research coordinator if you receive care for your hypertension outside of your study visits.    If you have any questions, please contact the research coordinator at [phone]. If you experience any symptoms please call the [place] [employer] 24/7 triage number at [phone]. If your phone number, email or address changes please alert the research coordinator.     In the meantime, we ask that you complete the online surveys emailed out to you, if completing online, or mailed out to you, if completing paper surveys, before your next visit.

## 2018-05-24 NOTE — PROGRESS NOTES
PGEN study visit  UNCONTROLLED HYPERTENSION ARM visit 6    SUBJECTIVE:  Rayna is here for 6th PGEN research study visit. Please refer to research tab in the header and today's flow sheet for further details. Patient had uncontrolled  hypertension at enrollment and has a goal of <  140/90 (per Problem list target chosen by PCP)     Prior research note reviewed. Patient is on blood pressure medication(s) at this time.  she has been taking Losartan 50mg daily and is tolerating the medication well.      Current diet & lifestyle: unchanged  Other pertinent history: Patient had swelling from amlodipine last time and that has cleared up since switching to Losartan      BP Readings from Last 3 Encounters:   05/24/18 122/88   04/11/18 132/88   02/27/18 124/86       Current Outpatient Prescriptions   Medication Sig Dispense Refill     losartan (COZAAR) 50 MG tablet Take 1 tablet (50 mg) by mouth daily 90 tablet 1     albuterol (PROAIR HFA, PROVENTIL HFA, VENTOLIN HFA) 108 (90 BASE) MCG/ACT inhaler Inhale 2 puffs into the lungs as needed for shortness of breath / dyspnea or wheezing 1 Inhaler 3     azithromycin (ZITHROMAX) 250 MG tablet Two tablets first day, then one tablet daily for four days. 6 tablet 0     cholecalciferol (VITAMIN D3) 1000 UNIT tablet Take 1 tablet (1,000 Units) by mouth daily 30 tablet 3     Cholecalciferol (VITAMIN D3) 2000 UNITS CAPS Take 2,000 Units by mouth daily 30 capsule 3     fluticasone (FLONASE) 50 MCG/ACT spray Spray 2 sprays into both nostrils daily 1 Bottle 1     fluticasone (FLOVENT DISKUS) 50 MCG/BLIST AEPB Inhale 2 puffs into the lungs daily 120 each 1     levonorgestrel (MIRENA) 20 MCG/24HR IUD 1 each by Intrauterine route once       montelukast (SINGULAIR) 10 MG tablet TAKE 1 TABLET BY MOUTH AT BEDTIME 30 tablet 4     montelukast (SINGULAIR) 10 MG tablet Take 1 tablet (10 mg) by mouth At Bedtime 30 tablet 3     multivitamin, therapeutic with minerals (MULTI-VITAMIN) TABS Take 2 tablets by  mouth daily 30 each 1     topiramate (TOPAMAX) 25 MG tablet TAKE 1 TABLET BY MOUTH AT BEDTIME X1 WEEK, THEN TAKE 2 TABLETS BY MOUTH AT BEDTIME 90 tablet 0     triamcinolone (KENALOG) 0.1 % cream Apply topically 2 times daily       [DISCONTINUED] losartan (COZAAR) 50 MG tablet Take 1 tablet (50 mg) by mouth daily 30 tablet 1     Potassium   Date Value Ref Range Status   01/30/2018 3.5 3.4 - 5.3 mmol/L Final     Creatinine   Date Value Ref Range Status   01/30/2018 0.65 0.52 - 1.04 mg/dL Final     Urea Nitrogen   Date Value Ref Range Status   01/30/2018 15 7 - 30 mg/dL Final     GFR Estimate   Date Value Ref Range Status   01/30/2018 >90 >60 mL/min/1.7m2 Final     Comment:     Non  GFR Calc      A baseline potassium, creatinine, BUN, GFR has been done within past 12 months      OBJECTIVE:  Patient in in no apparent distress and able to provide full history for today's encounter. she denies pain or any current illness   Vitals: /88 (BP Location: Right arm, Cuff Size: Adult Regular)  Pulse 64  Wt 204 lb (92.5 kg)  BMI 33.95 kg/m2  Today's BP completed using cuff size: regular on right side arm.    Pulse Readings from Last 1 Encounters:   05/24/18 64     Is pulse 55 or greater? - Yes    BMI= Body mass index is 33.95 kg/(m^2).  Other exam findings        ASSESSMENT/PLAN:   PGEN Flowsheet has been  'filed' ) for this visit (this saves flowsheet data)    Blood pressure reading today is at the provider specified goal of <140/90.      1.  Based on PGEN RN HTN MGMT standing order the patient will be advised continue current medication regimen unchanged - Losartan 50mg daily.     2.  We will be checking a metabolic lab panel today.      3.  Follow up instructions include:     Next Provider visit: Follow up in 3 months..    See avs for more details.  Full research packet also provide to patient previously  Our research team will reach out to patient to schedule follow up visit as well.   Patient was  counseled regarding the lifestyle changes (listed below)  to help with BP management Yes  Lifestyle changes that can help control high blood pressure:  Even though PGEN is a study to test effectiveness of genetically guided medications for managing high blood pressure, there are several things you can do to ensure your blood pressure stays in good control:    Maintain a healthy weight (BMI<26). A modest amount of weight loss can be helpful    Limit salt intake to under 2400mg daily    Follow the DASH diet (lean meats, low salt, whole grains, lots of fruits/vegies)    Stay active, try to get in 30 minutes of exercise daily.    Manage your daily stress.    Do not smoke cigarettes (or cut back)    Limit alcohol (2 drinks/day for men, 1 drink/day for women)  Was AVS  provided to patient with the relevant <dot>PGENPI dot phrase pulled into patient instructions Yes    Letty SinghD

## 2018-06-29 DIAGNOSIS — J30.2 SEASONAL ALLERGIC RHINITIS: ICD-10-CM

## 2018-07-02 RX ORDER — FLUTICASONE PROPIONATE 50 MCG
SPRAY, SUSPENSION (ML) NASAL
Qty: 16 ML | Refills: 1 | Status: SHIPPED | OUTPATIENT
Start: 2018-07-02 | End: 2018-08-20

## 2018-07-02 NOTE — TELEPHONE ENCOUNTER
"Requested Prescriptions   Pending Prescriptions Disp Refills     fluticasone (FLONASE) 50 MCG/ACT spray [Pharmacy Med Name: FLUTICASONE PROP 50 MCG SPRAY]  Last Written Prescription Date:  2/22/2018  Last Fill Quantity: 1 bottle,  # refills: 1   Last office visit: 11/16/2017 with prescribing provider:  Bossman    16 mL 1     Sig: SPRAY 2 SPRAYS INTO BOTH NOSTRILS DAILY    Inhaled Steroids Protocol Passed    6/29/2018  4:41 PM       Passed - Patient is age 12 or older       Passed - Recent (12 mo) or future (30 days) visit within the authorizing provider's specialty    Patient had office visit in the last 12 months or has a visit in the next 30 days with authorizing provider or within the authorizing provider's specialty.  See \"Patient Info\" tab in inbasket, or \"Choose Columns\" in Meds & Orders section of the refill encounter.              "

## 2018-08-13 ENCOUNTER — ALLIED HEALTH/NURSE VISIT (OUTPATIENT)
Dept: FAMILY MEDICINE | Facility: CLINIC | Age: 53
End: 2018-08-13
Payer: COMMERCIAL

## 2018-08-13 VITALS
HEART RATE: 63 BPM | WEIGHT: 204 LBS | RESPIRATION RATE: 16 BRPM | TEMPERATURE: 98.7 F | SYSTOLIC BLOOD PRESSURE: 128 MMHG | OXYGEN SATURATION: 97 % | DIASTOLIC BLOOD PRESSURE: 78 MMHG | BODY MASS INDEX: 33.99 KG/M2 | HEIGHT: 65 IN

## 2018-08-13 DIAGNOSIS — I10 HTN, GOAL BELOW 140/90: ICD-10-CM

## 2018-08-13 PROCEDURE — 99213 OFFICE O/P EST LOW 20 MIN: CPT | Performed by: PHYSICIAN ASSISTANT

## 2018-08-13 RX ORDER — LOSARTAN POTASSIUM 50 MG/1
50 TABLET ORAL DAILY
Qty: 90 TABLET | Refills: 1 | Status: SHIPPED | OUTPATIENT
Start: 2018-08-13 | End: 2019-05-28

## 2018-08-13 NOTE — PROGRESS NOTES
PGEN study visit  UNCONTROLLED HYPERTENSION ARM visit 7    SUBJECTIVE:  Rayna is here for 7th PGEN research study visit. Please refer to research tab in the header and today's flow sheet for further details. Patient had uncontrolled  hypertension at enrollment and has a goal of <  140/90 (per Problem list target chosen by PCP)     Prior research note reviewed. Patient is on blood pressure medication(s) at this time.  She has been taking Losartan 50 mg and is tolerating the medication well.      Current diet & lifestyle: unchanged  Other pertinent history: Patient had swelling from amlodipine last time and that has cleared up since switching to Losartan       BP Readings from Last 3 Encounters:   08/13/18 128/78   05/24/18 122/88   04/11/18 132/88       Current Outpatient Prescriptions   Medication Sig Dispense Refill     albuterol (PROAIR HFA, PROVENTIL HFA, VENTOLIN HFA) 108 (90 BASE) MCG/ACT inhaler Inhale 2 puffs into the lungs as needed for shortness of breath / dyspnea or wheezing 1 Inhaler 3     cholecalciferol (VITAMIN D3) 1000 UNIT tablet Take 1 tablet (1,000 Units) by mouth daily 30 tablet 3     Cholecalciferol (VITAMIN D3) 2000 UNITS CAPS Take 2,000 Units by mouth daily 30 capsule 3     fluticasone (FLONASE) 50 MCG/ACT spray SPRAY 2 SPRAYS INTO BOTH NOSTRILS DAILY 16 mL 1     fluticasone (FLOVENT DISKUS) 50 MCG/BLIST AEPB Inhale 2 puffs into the lungs daily 120 each 1     levonorgestrel (MIRENA) 20 MCG/24HR IUD 1 each by Intrauterine route once       losartan (COZAAR) 50 MG tablet Take 1 tablet (50 mg) by mouth daily 90 tablet 1     montelukast (SINGULAIR) 10 MG tablet TAKE 1 TABLET BY MOUTH AT BEDTIME 30 tablet 4     montelukast (SINGULAIR) 10 MG tablet Take 1 tablet (10 mg) by mouth At Bedtime 30 tablet 3     multivitamin, therapeutic with minerals (MULTI-VITAMIN) TABS Take 2 tablets by mouth daily 30 each 1     topiramate (TOPAMAX) 25 MG tablet TAKE 1 TABLET BY MOUTH AT BEDTIME X1 WEEK, THEN TAKE 2  "TABLETS BY MOUTH AT BEDTIME 90 tablet 0     triamcinolone (KENALOG) 0.1 % cream Apply topically 2 times daily       azithromycin (ZITHROMAX) 250 MG tablet Two tablets first day, then one tablet daily for four days. (Patient not taking: Reported on 8/13/2018) 6 tablet 0     [DISCONTINUED] losartan (COZAAR) 50 MG tablet Take 1 tablet (50 mg) by mouth daily 90 tablet 1     Potassium   Date Value Ref Range Status   05/24/2018 4.0 3.4 - 5.3 mmol/L Final     Creatinine   Date Value Ref Range Status   05/24/2018 0.65 0.52 - 1.04 mg/dL Final     Urea Nitrogen   Date Value Ref Range Status   05/24/2018 10 7 - 30 mg/dL Final     GFR Estimate   Date Value Ref Range Status   05/24/2018 >90 >60 mL/min/1.7m2 Final     Comment:     Non  GFR Calc      A baseline potassium, creatinine, BUN, GFR has been done within past 12 months      OBJECTIVE:  Patient in in no apparent distress and able to provide full history for today's encounter. she denies pain or any current illness   Vitals: /78 (BP Location: Left arm, Patient Position: Sitting, Cuff Size: Adult Large)  Pulse 63  Temp 98.7  F (37.1  C) (Oral)  Resp 16  Ht 5' 4.5\" (1.638 m)  Wt 204 lb (92.5 kg)  SpO2 97%  BMI 34.48 kg/m2  Today's BP completed using cuff size: large on left side  arm.    Pulse Readings from Last 1 Encounters:   08/13/18 63     Is pulse 55 or greater? - Yes    BMI= Body mass index is 34.48 kg/(m^2).  Other exam findings: none       ASSESSMENT/PLAN:   PGEN Flowsheet has been  'filed' ) for this visit (this saves flowsheet data)    Blood pressure reading today is at the provider specified goal of <140/90.      1.  Based on PGEN RN HTN MGMT standing order the patient will be advised continue current medication regimen unchanged.     2.  We will not be checking a metabolic lab panel today.    3.  Follow up instructions include:     Next Provider visit: Follow up 4 months.    See avs for more details.  Full research packet also provide " to patient previously  Our research team will reach out to patient to schedule follow up visit as well.   Patient was counseled regarding the lifestyle changes (listed below)  to help with BP management Yes  Lifestyle changes that can help control high blood pressure:  Even though PGEN is a study to test effectiveness of genetically guided medications for managing high blood pressure, there are several things you can do to ensure your blood pressure stays in good control:    Maintain a healthy weight (BMI<26). A modest amount of weight loss can be helpful    Limit salt intake to under 2400mg daily    Follow the DASH diet (lean meats, low salt, whole grains, lots of fruits/vegies)    Stay active, try to get in 30 minutes of exercise daily.    Manage your daily stress.    Do not smoke cigarettes (or cut back)    Limit alcohol (2 drinks/day for men, 1 drink/day for women)  Was AVS  provided to patient with the relevant <dot>PGENPI dot phrase pulled into patient instructions Yes    Alisha BAEZ

## 2018-08-13 NOTE — PATIENT INSTRUCTIONS
[facility] Hypertension Study  Visit 7     Thank you for your continued participation in the hypertension study!  Please continue taking your hypertension medications as directed. Your next visit will be in four months and will be scheduled for you in the coming days. After it is scheduled, be sure to let the research coordinator know as soon as possible if you cannot make it so that the visit can be rescheduled for you.     Please contact the research coordinator if you receive care for your hypertension outside of your study visits.    If you have any questions, please contact the research coordinator at [phone]. If you experience any symptoms please call the [employer] 24/7 triage number at [phone]. If your phone number, email or address changes please alert the research coordinator.     In the meantime, we ask that you complete the online surveys emailed out to you, if completing online, or mailed out to you, if completing paper surveys, before your next visit.

## 2018-08-13 NOTE — MR AVS SNAPSHOT
After Visit Summary   8/13/2018    Rayna Jackson    MRN: [id number]           Patient Information     Date Of Birth          1965        Visit Information        Provider Department      8/13/2018 8:20 AM Alisha Nicole PA-C [facility]        Care Instructions    PGen Hypertension Study  Visit 7     Thank you for your continued participation in the hypertension study!  Please continue taking your hypertension medications as directed. Your next visit will be in four months and will be scheduled for you in the coming days. After it is scheduled, be sure to let the research coordinator know as soon as possible if you cannot make it so that the visit can be rescheduled for you.     Please contact the research coordinator if you receive care for your hypertension outside of your study visits.    If you have any questions, please contact the research coordinator at [phone]. If you experience any symptoms please call the [place] 24/7 triage number at [phone]. If your phone number, email or address changes please alert the research coordinator.     In the meantime, we ask that you complete the online surveys emailed out to you, if completing online, or mailed out to you, if completing paper surveys, before your next visit.          Follow-ups after your visit        Follow-up notes from your care team     Return in about 4 months (around 12/13/2018), or if symptoms worsen or fail to improve.      Who to contact     If you have questions or need follow up information about today's clinic visit or your schedule please contact [facility] directly at [phone].  Normal or non-critical lab and imaging results will be communicated to you by MyChart, letter or phone within 4 business days after the clinic has received the results. If you do not hear from us within 7 days, please contact the clinic through MyChart or phone. If you have a critical or abnormal  "lab result, we will notify you by phone as soon as possible.  Submit refill requests through [email or web address] or call your pharmacy and they will forward the refill request to us. Please allow 3 business days for your refill to be completed.          Additional Information About Your Visit        [employer] Information     [email or web address] gives you secure access to your electronic health record. If you see a primary care provider, you can also send messages to your care team and make appointments. If you have questions, please call your primary care clinic.  If you do not have a primary care provider, please call [phone] and they will assist you.        Care EveryWhere ID     This is your Care EveryWhere ID. This could be used by other organizations to access your [facility] medical records  [id number]        Your Vitals Were     Pulse Temperature Respirations Height Pulse Oximetry BMI (Body Mass Index)    63 98.7  F (37.1  C) (Oral) 16 5' 4.5\" (1.638 m) 97% 34.48 kg/m2       Blood Pressure from Last 3 Encounters:   08/13/18 128/78   05/24/18 122/88   04/11/18 132/88    Weight from Last 3 Encounters:   08/13/18 204 lb (92.5 kg)   05/24/18 204 lb (92.5 kg)   04/11/18 204 lb (92.5 kg)              Today, you had the following     No orders found for display       Primary Care Provider Office Phone # Fax #    Mitra Keita [phone]       [id number] [facility] [address]        Equal Access to Services     [facility] AH: Hadii aad ku hadasho Soomaali, waaxda luqadaha, qaybta kaalmada adeegyada, giacomo vega. So [facility] [phone].    ATENCIÓN: Si habla español, tiene a zamora disposición servicios gratuitos de asistencia lingüística. Llame al [phone].    We comply with applicable federal civil rights laws and [place] laws. We do not discriminate on the basis of race, color, national origin, age, disability, sex, sexual orientation, or gender identity.          "   Thank you!     Thank you for choosing [facility]  for your care. Our goal is always to provide you with excellent care. Hearing back from our patients is one way we can continue to improve our services. Please take a few minutes to complete the written survey that you may receive in the mail after your visit with us. Thank you!             Your Updated Medication List - Protect others around you: Learn how to safely use, store and throw away your medicines at www.disposemymeds.org.          This list is accurate as of 8/13/18  8:45 AM.  Always use your most recent med list.                   Brand Name Dispense Instructions for use Diagnosis    albuterol 108 (90 Base) MCG/ACT inhaler    PROAIR HFA/PROVENTIL HFA/VENTOLIN HFA    1 Inhaler    Inhale 2 puffs into the lungs as needed for shortness of breath / dyspnea or wheezing    Allergic rhinitis, unspecified allergic rhinitis trigger, unspecified rhinitis seasonality       azithromycin 250 MG tablet    ZITHROMAX    6 tablet    Two tablets first day, then one tablet daily for four days.    Cough       fluticasone 50 MCG/ACT spray    FLONASE    16 mL    SPRAY 2 SPRAYS INTO BOTH NOSTRILS DAILY    Seasonal allergic rhinitis       fluticasone 50 MCG/BLIST Aepb    FLOVENT DISKUS    120 each    Inhale 2 puffs into the lungs daily    Allergic rhinitis       levonorgestrel 20 MCG/24HR IUD    MIRENA     1 each by Intrauterine route once        losartan 50 MG tablet    COZAAR    90 tablet    Take 1 tablet (50 mg) by mouth daily    HTN, goal below 140/90       * montelukast 10 MG tablet    SINGULAIR    30 tablet    Take 1 tablet (10 mg) by mouth At Bedtime    Seasonal allergic rhinitis       * montelukast 10 MG tablet    SINGULAIR    30 tablet    TAKE 1 TABLET BY MOUTH AT BEDTIME    Allergic rhinitis       multivitamin, therapeutic with minerals Tabs tablet     30 each    Take 2 tablets by mouth daily    Vitamin D deficiency       topiramate 25 MG tablet     TOPAMAX    90 tablet    TAKE 1 TABLET BY MOUTH AT BEDTIME X1 WEEK, THEN TAKE 2 TABLETS BY MOUTH AT BEDTIME    Chronic nonintractable headache, unspecified headache type       triamcinolone 0.1 % cream    KENALOG     Apply topically 2 times daily        * vitamin D3 2000 units Caps     30 capsule    Take 2,000 Units by mouth daily    Vitamin D deficiency       * cholecalciferol 1000 UNIT tablet    vitamin D3    30 tablet    Take 1 tablet (1,000 Units) by mouth daily    Vitamin D deficiency       * Notice:  This list has 4 medication(s) that are the same as other medications prescribed for you. Read the directions carefully, and ask your doctor or other care provider to review them with you.

## 2018-08-15 ENCOUNTER — TELEPHONE (OUTPATIENT)
Dept: FAMILY MEDICINE | Facility: CLINIC | Age: 53
End: 2018-08-15

## 2018-08-15 NOTE — TELEPHONE ENCOUNTER
8/15/2018    Attempt 1    Contacted patient in regards to scheduling VIP mammogram  Message on voicemail         Comments:       Outreach   Jenni ANTONY

## 2018-08-20 ENCOUNTER — RADIANT APPOINTMENT (OUTPATIENT)
Dept: MAMMOGRAPHY | Facility: CLINIC | Age: 53
End: 2018-08-20
Payer: COMMERCIAL

## 2018-08-20 ENCOUNTER — OFFICE VISIT (OUTPATIENT)
Dept: FAMILY MEDICINE | Facility: CLINIC | Age: 53
End: 2018-08-20
Payer: COMMERCIAL

## 2018-08-20 VITALS
BODY MASS INDEX: 33.82 KG/M2 | WEIGHT: 203 LBS | HEART RATE: 73 BPM | DIASTOLIC BLOOD PRESSURE: 83 MMHG | OXYGEN SATURATION: 96 % | HEIGHT: 65 IN | RESPIRATION RATE: 16 BRPM | TEMPERATURE: 98.1 F | SYSTOLIC BLOOD PRESSURE: 119 MMHG

## 2018-08-20 DIAGNOSIS — J45.20 MILD INTERMITTENT ASTHMA WITHOUT COMPLICATION: ICD-10-CM

## 2018-08-20 DIAGNOSIS — J30.9 CHRONIC ALLERGIC RHINITIS, UNSPECIFIED SEASONALITY, UNSPECIFIED TRIGGER: Primary | ICD-10-CM

## 2018-08-20 DIAGNOSIS — I10 HTN, GOAL BELOW 140/90: ICD-10-CM

## 2018-08-20 DIAGNOSIS — G62.9 NEUROPATHY: ICD-10-CM

## 2018-08-20 DIAGNOSIS — Z12.31 VISIT FOR SCREENING MAMMOGRAM: ICD-10-CM

## 2018-08-20 DIAGNOSIS — G89.29 CHRONIC NONINTRACTABLE HEADACHE, UNSPECIFIED HEADACHE TYPE: ICD-10-CM

## 2018-08-20 DIAGNOSIS — R51.9 CHRONIC NONINTRACTABLE HEADACHE, UNSPECIFIED HEADACHE TYPE: ICD-10-CM

## 2018-08-20 LAB
HBA1C MFR BLD: 4.9 % (ref 0–5.6)
T4 FREE SERPL-MCNC: 0.88 NG/DL (ref 0.76–1.46)
TSH SERPL DL<=0.005 MIU/L-ACNC: 4.36 MU/L (ref 0.4–4)
VIT B12 SERPL-MCNC: 300 PG/ML (ref 193–986)

## 2018-08-20 PROCEDURE — 77063 BREAST TOMOSYNTHESIS BI: CPT | Mod: TC

## 2018-08-20 PROCEDURE — 84439 ASSAY OF FREE THYROXINE: CPT | Performed by: FAMILY MEDICINE

## 2018-08-20 PROCEDURE — 36415 COLL VENOUS BLD VENIPUNCTURE: CPT | Performed by: FAMILY MEDICINE

## 2018-08-20 PROCEDURE — 84207 ASSAY OF VITAMIN B-6: CPT | Mod: 90 | Performed by: FAMILY MEDICINE

## 2018-08-20 PROCEDURE — 84443 ASSAY THYROID STIM HORMONE: CPT | Performed by: FAMILY MEDICINE

## 2018-08-20 PROCEDURE — 83036 HEMOGLOBIN GLYCOSYLATED A1C: CPT | Performed by: FAMILY MEDICINE

## 2018-08-20 PROCEDURE — 99215 OFFICE O/P EST HI 40 MIN: CPT | Performed by: FAMILY MEDICINE

## 2018-08-20 PROCEDURE — 77067 SCR MAMMO BI INCL CAD: CPT | Mod: TC

## 2018-08-20 PROCEDURE — 99000 SPECIMEN HANDLING OFFICE-LAB: CPT | Performed by: FAMILY MEDICINE

## 2018-08-20 PROCEDURE — 82306 VITAMIN D 25 HYDROXY: CPT | Performed by: FAMILY MEDICINE

## 2018-08-20 PROCEDURE — 82607 VITAMIN B-12: CPT | Performed by: FAMILY MEDICINE

## 2018-08-20 RX ORDER — MONTELUKAST SODIUM 10 MG/1
1 TABLET ORAL AT BEDTIME
Qty: 90 TABLET | Refills: 1 | Status: SHIPPED | OUTPATIENT
Start: 2018-08-20 | End: 2019-08-20

## 2018-08-20 RX ORDER — TOPIRAMATE 25 MG/1
TABLET, FILM COATED ORAL
Qty: 90 TABLET | Refills: 0 | Status: CANCELLED | OUTPATIENT
Start: 2018-08-20

## 2018-08-20 RX ORDER — FLUTICASONE PROPIONATE 50 MCG
SPRAY, SUSPENSION (ML) NASAL
Qty: 16 ML | Refills: 3 | Status: SHIPPED | OUTPATIENT
Start: 2018-08-20 | End: 2019-11-09

## 2018-08-20 RX ORDER — TRIAMCINOLONE ACETONIDE 1 MG/G
CREAM TOPICAL 2 TIMES DAILY
Status: CANCELLED | OUTPATIENT
Start: 2018-08-20

## 2018-08-20 RX ORDER — FEXOFENADINE HCL 180 MG/1
180 TABLET ORAL
COMMUNITY
Start: 2018-05-28 | End: 2018-08-20

## 2018-08-20 RX ORDER — LOSARTAN POTASSIUM 50 MG/1
50 TABLET ORAL DAILY
Qty: 90 TABLET | Refills: 1 | Status: CANCELLED | OUTPATIENT
Start: 2018-08-20

## 2018-08-20 RX ORDER — ALBUTEROL SULFATE 90 UG/1
2 AEROSOL, METERED RESPIRATORY (INHALATION) PRN
Qty: 2 INHALER | Refills: 3 | Status: SHIPPED | OUTPATIENT
Start: 2018-08-20 | End: 2020-06-17

## 2018-08-20 RX ORDER — FEXOFENADINE HCL 180 MG/1
180 TABLET ORAL DAILY
Qty: 90 TABLET | Refills: 1 | Status: SHIPPED | OUTPATIENT
Start: 2018-08-20 | End: 2022-04-14

## 2018-08-20 RX ORDER — FLUTICASONE PROPIONATE 110 UG/1
2 AEROSOL, METERED RESPIRATORY (INHALATION) 2 TIMES DAILY
Qty: 3 INHALER | Refills: 1 | Status: SHIPPED | OUTPATIENT
Start: 2018-08-20 | End: 2019-05-09

## 2018-08-20 NOTE — PATIENT INSTRUCTIONS
Follow up in 6 months or sooner if needed  Please track headaches and notify me if worsening- set up e-visit

## 2018-08-20 NOTE — MR AVS SNAPSHOT
After Visit Summary   8/20/2018    Rayna Jackson    MRN: [id number]           Patient Information     Date Of Birth          1965        Visit Information        Provider Department      8/20/2018 7:00 AM Mitra Keita MD [facility]        Today's Diagnoses     Mild intermittent asthma without complication    -  1    Chronic allergic rhinitis, unspecified seasonality, unspecified trigger        HTN, goal below 140/90        Chronic nonintractable headache, unspecified headache type        Neuropathy          Care Instructions    Follow up in 6 months or sooner if needed  Please track headaches and notify me if worsening- set up e-visit            Follow-ups after your visit        Additional Services     NEUROLOGY ADULT REFERRAL       Your provider has referred you for the following:   Consult at [facility]: [facility] [phone]   [email or web address]    Please be aware that coverage of these services is subject to the terms and limitations of your health insurance plan.  Call member services at your health plan with any benefit or coverage questions.      Please bring the following with you to your appointment:    (1) Any X-Rays, CTs or MRIs which have been performed.  Contact the facility where they were done to arrange for  prior to your scheduled appointment.    (2) List of current medications  (3) This referral request   (4) Any documents/labs given to you for this referral                  Follow-up notes from your care team     Return in about 6 months (around 2/20/2019).      Your next 10 appointments already scheduled     Aug 20, 2018  8:30 AM CDT   MA SCREENING DIGITAL BILATERAL with EAMA1   [facility] [place] ([facility])    [address] ,[address]  [place] [address] [phone]           Do not use any powder, lotion or deodorant under your arms or on  "your breast. If you do, we will ask you to remove it before your exam.  Wear comfortable, two-piece clothing.  If you have any allergies, tell your care team.  Bring any previous mammograms from other facilities or have them mailed to the breast center. Three-dimensional (3D) mammograms are available at [facility] locations in [facility], [facility], [facility], [place], [place], and [place]. [facility] locations include [place] and [facility] in [place]. Benefits of 3D mammograms include: - Improved rate of cancer detection - Decreases your chance of having to go back for more tests, which means fewer: - \"False-positive\" results (This means that there is an abnormal area but it isn't cancer.) - Invasive testing procedures, such as a biopsy or surgery - Can provide clearer images of the breast if you have dense breast tissue. 3D mammography is an optional exam that anyone can have with a 2D mammogram. It doesn't replace or take the place of a 2D mammogram. 2D mammograms remain an effective screening test for all women.  Not all insurance companies cover the cost of a 3D mammogram. Check with your insurance.              Who to contact     If you have questions or need follow up information about today's clinic visit or your schedule please contact [facility] directly at [phone].  Normal or non-critical lab and imaging results will be communicated to you by MyChart, letter or phone within 4 business days after the clinic has received the results. If you do not hear from us within 7 days, please contact the clinic through MyChart or phone. If you have a critical or abnormal lab result, we will notify you by phone as soon as possible.  Submit refill requests through [employer] or call your pharmacy and they will forward the refill request to us. Please allow 3 business days for your refill to be completed.          Additional Information About Your Visit      " "  F[employer]hart Information     [employer] gives you secure access to your electronic health record. If you see a primary care provider, you can also send messages to your care team and make appointments. If you have questions, please call your primary care clinic.  If you do not have a primary care provider, please call [phone] and they will assist you.        Care EveryWhere ID     This is your Care EveryWhere ID. This could be used by other organizations to access your [facility] medical records  [id number]        Your Vitals Were     Pulse Temperature Respirations Height Pulse Oximetry Breastfeeding?    73 98.1  F (36.7  C) (Oral) 16 5' 4.5\" (1.638 m) 96% No    BMI (Body Mass Index)                   34.31 kg/m2            Blood Pressure from Last 3 Encounters:   08/20/18 119/83   08/13/18 128/78   05/24/18 122/88    Weight from Last 3 Encounters:   08/20/18 203 lb (92.1 kg)   08/13/18 204 lb (92.5 kg)   05/24/18 204 lb (92.5 kg)              We Performed the Following     Hemoglobin A1c     NEUROLOGY ADULT REFERRAL     TSH with free T4 reflex     Vitamin B12     Vitamin B6          Today's Medication Changes          These changes are accurate as of 8/20/18  7:48 AM.  If you have any questions, ask your nurse or doctor.               Start taking these medicines.        Dose/Directions    fluticasone 110 MCG/ACT Inhaler   Commonly known as:  FLOVENT HFA   Used for:  Mild intermittent asthma without complication   Started by:  Mitra Kieta MD        Dose:  2 puff   Inhale 2 puffs into the lungs 2 times daily   Quantity:  3 Inhaler   Refills:  1         These medicines have changed or have updated prescriptions.        Dose/Directions    fexofenadine 180 MG tablet   Commonly known as:  ALLEGRA   This may have changed:  when to take this   Used for:  Chronic allergic rhinitis, unspecified seasonality, unspecified trigger   Changed by:  Mitra Keita MD        Dose:  180 mg   Take 1 " tablet (180 mg) by mouth daily Take 180 mg by mouth   Quantity:  90 tablet   Refills:  1       montelukast 10 MG tablet   Commonly known as:  SINGULAIR   This may have changed:    - See the new instructions.  - Another medication with the same name was removed. Continue taking this medication, and follow the directions you see here.   Used for:  Chronic allergic rhinitis, unspecified seasonality, unspecified trigger, Mild intermittent asthma without complication   Changed by:  Mitra Keita MD        Dose:  1 tablet   Take 1 tablet (10 mg) by mouth At Bedtime   Quantity:  90 tablet   Refills:  1         Stop taking these medicines if you haven't already. Please contact your care team if you have questions.     azithromycin 250 MG tablet   Commonly known as:  ZITHROMAX   Stopped by:  Mitra Keita MD           fluticasone 50 MCG/BLIST Aepb   Commonly known as:  FLOVENT DISKUS   Stopped by:  Mitra Keita MD                Where to get your medicines      These medications were sent to [facility]/pharmacy #[id number] - [facility] [address]  [address] [address] [address]     Phone:  [phone]     albuterol 108 (90 Base) MCG/ACT inhaler    fexofenadine 180 MG tablet    fluticasone 110 MCG/ACT Inhaler    fluticasone 50 MCG/ACT spray    montelukast 10 MG tablet                Primary Care Provider Office Phone # Fax #    Mitra Keita [phone] [address] [address] [address]        Equal Access to Services     [facility]: Hadii maday parr hadshano Sobarrera, waaxda luqadaha, qaybta kaalmada adeneena, giacomo idiin hayaan adeeg kharash la'aan . So [facility] [phone].    ATENCIÓN: Si habla español, tiene a zamora disposición servicios gratuitos de asistencia lingüística. Llame al [phone].    We comply with applicable federal civil rights laws and [place] laws. We do not discriminate on the basis of race, color,  national origin, age, disability, sex, sexual orientation, or gender identity.            Thank you!     Thank you for choosing [facility]  for your care. Our goal is always to provide you with excellent care. Hearing back from our patients is one way we can continue to improve our services. Please take a few minutes to complete the written survey that you may receive in the mail after your visit with us. Thank you!             Your Updated Medication List - Protect others around you: Learn how to safely use, store and throw away your medicines at www.disposemymeds.org.          This list is accurate as of 8/20/18  7:48 AM.  Always use your most recent med list.                   Brand Name Dispense Instructions for use Diagnosis    albuterol 108 (90 Base) MCG/ACT inhaler    PROAIR HFA/PROVENTIL HFA/VENTOLIN HFA    2 Inhaler    Inhale 2 puffs into the lungs as needed for shortness of breath / dyspnea or wheezing    Mild intermittent asthma without complication       fexofenadine 180 MG tablet    ALLEGRA    90 tablet    Take 1 tablet (180 mg) by mouth daily Take 180 mg by mouth    Chronic allergic rhinitis, unspecified seasonality, unspecified trigger       fluticasone 110 MCG/ACT Inhaler    FLOVENT HFA    3 Inhaler    Inhale 2 puffs into the lungs 2 times daily    Mild intermittent asthma without complication       fluticasone 50 MCG/ACT spray    FLONASE    16 mL    SPRAY 2 SPRAYS INTO BOTH NOSTRILS DAILY    Chronic allergic rhinitis, unspecified seasonality, unspecified trigger       levonorgestrel 20 MCG/24HR IUD    MIRENA     1 each by Intrauterine route once        losartan 50 MG tablet    COZAAR    90 tablet    Take 1 tablet (50 mg) by mouth daily    HTN, goal below 140/90       montelukast 10 MG tablet    SINGULAIR    90 tablet    Take 1 tablet (10 mg) by mouth At Bedtime    Chronic allergic rhinitis, unspecified seasonality, unspecified trigger, Mild intermittent asthma without complication        multivitamin, therapeutic with minerals Tabs tablet     30 each    Take 2 tablets by mouth daily    Vitamin D deficiency       topiramate 25 MG tablet    TOPAMAX    90 tablet    TAKE 1 TABLET BY MOUTH AT BEDTIME X1 WEEK, THEN TAKE 2 TABLETS BY MOUTH AT BEDTIME    Chronic nonintractable headache, unspecified headache type       triamcinolone 0.1 % cream    KENALOG     Apply topically 2 times daily        * vitamin D3 2000 units Caps     30 capsule    Take 2,000 Units by mouth daily    Vitamin D deficiency       * cholecalciferol 1000 UNIT tablet    vitamin D3    30 tablet    Take 1 tablet (1,000 Units) by mouth daily    Vitamin D deficiency       * Notice:  This list has 2 medication(s) that are the same as other medications prescribed for you. Read the directions carefully, and ask your doctor or other care provider to review them with you.

## 2018-08-20 NOTE — PROGRESS NOTES
SUBJECTIVE:   Rayna Jackson is a 53 year old female who presents to clinic today for the following health issues:      History of Present Illness     Hypertension:     Outpatient blood pressures:  Are not being checked    Dietary sodium intake::  Not monitoring salt intake      Numbness of right great toe for the last month or so.   No inciting events. Does not recall ever having this issue in the past.     Also reports her asthma has been bad this season due to worsening allergies.   She was seen at an urgent care and given another flovent inhaler so she is now using 2 flovent inhalers and her rescue inhaler.     She continues to have headaches- did not find topamax helpful and has been off it for about a month.   Believes her headaches are worse when her allergies are worse.       Problem list and histories reviewed & adjusted, as indicated.  Additional history: as documented      Patient Active Problem List   Diagnosis     Benign essential hypertension     Osteoarthritis of both knees, unspecified osteoarthritis type     Bilateral edema of lower extremity     Osteoarthritis of both feet, unspecified osteoarthritis type     IUD (intrauterine device) in place     Chronic nonintractable headache, unspecified headache type     HTN, goal below 140/90     Past Surgical History:   Procedure Laterality Date     appendicitis  1980     COLONOSCOPY N/A 6/30/2017    Procedure: COLONOSCOPY;  COLONOSCOPY   ;  Surgeon: Brooks Villa MD;  Location:  GI     CYSTECTOMY OVARIAN BENIGN  1980     KNEE SURGERY Left        Social History   Substance Use Topics     Smoking status: Never Smoker     Smokeless tobacco: Never Used     Alcohol use 0.0 oz/week     0 Standard drinks or equivalent per week      Comment: occ     Family History   Problem Relation Age of Onset     Hypertension Mother      Thyroid Disease Mother      Hypertension Father      Diabetes Other      great grandmother     Thyroid Disease Sister      Thyroid  "Disease Maternal Aunt            ROS:  Constitutional, HEENT, cardiovascular, pulmonary, GI, , musculoskeletal, neuro, skin, endocrine and psych systems are negative, except as otherwise noted.    OBJECTIVE:     /83 (BP Location: Left arm, Patient Position: Chair, Cuff Size: Adult Large)  Pulse 73  Temp 98.1  F (36.7  C) (Oral)  Resp 16  Ht 5' 4.5\" (1.638 m)  Wt 203 lb (92.1 kg)  SpO2 96%  Breastfeeding? No  BMI 34.31 kg/m2  Body mass index is 34.31 kg/(m^2).  GENERAL: healthy, alert and no distress  HENT: ear canals and TM's normal, nose and mouth without ulcers or lesions  NECK: no adenopathy, no asymmetry, masses, or scars and thyroid normal to palpation  RESP: lungs clear to auscultation - no rales, rhonchi or wheezes  CV: regular rate and rhythm, normal S1 S2  MS: no edema  NEURO: decreased monofilament and vibratory sense right foot   SKIN: no rashes     Diagnostic Test Results:  none     ASSESSMENT/PLAN:       1. Chronic allergic rhinitis, unspecified seasonality, unspecified trigger  - has had some trouble this summer   - fluticasone (FLONASE) 50 MCG/ACT spray; SPRAY 2 SPRAYS INTO BOTH NOSTRILS DAILY  Dispense: 16 mL; Refill: 3  - montelukast (SINGULAIR) 10 MG tablet; Take 1 tablet (10 mg) by mouth At Bedtime  Dispense: 90 tablet; Refill: 1  - fexofenadine (ALLEGRA) 180 MG tablet; Take 1 tablet (180 mg) by mouth daily Take 180 mg by mouth  Dispense: 90 tablet; Refill: 1    2. HTN, goal below 140/90  - BP stable     3. Chronic nonintractable headache, unspecified headache type  - unchanged with topamax. She prefers to stay off topamax for now and monitor her symptoms.     4. Mild intermittent asthma without complication  - reviewed meds. Advised to use only one flovent inhaler rather than two as there is no validity in that  - albuterol (PROAIR HFA/PROVENTIL HFA/VENTOLIN HFA) 108 (90 Base) MCG/ACT inhaler; Inhale 2 puffs into the lungs as needed for shortness of breath / dyspnea or wheezing  " Dispense: 2 Inhaler; Refill: 3  - montelukast (SINGULAIR) 10 MG tablet; Take 1 tablet (10 mg) by mouth At Bedtime  Dispense: 90 tablet; Refill: 1  - fluticasone (FLOVENT HFA) 110 MCG/ACT Inhaler; Inhale 2 puffs into the lungs 2 times daily  Dispense: 3 Inhaler; Refill: 1  - T4 free    5. Neuropathy  - unknown etiology. [facility] database  - Hemoglobin A1c  - NEUROLOGY ADULT REFERRAL  - Vitamin B12  - Vitamin B6  - TSH with free T4 reflex    See Patient Instructions    Total time spent face to face was 45 minutes, greater than 50% in education and counseling regarding the above issues.       Mitra Keita MD  [facility]

## 2018-08-21 ASSESSMENT — ASTHMA QUESTIONNAIRES: ACT_TOTALSCORE: 15

## 2018-08-22 ENCOUNTER — MYC MEDICAL ADVICE (OUTPATIENT)
Dept: FAMILY MEDICINE | Facility: CLINIC | Age: 53
End: 2018-08-22

## 2018-08-22 LAB — VIT B6 SERPL-MCNC: 264.3 NMOL/L (ref 20–125)

## 2018-08-23 LAB — DEPRECATED CALCIDIOL+CALCIFEROL SERPL-MC: 41 UG/L (ref 20–75)

## 2018-12-03 ENCOUNTER — OFFICE VISIT (OUTPATIENT)
Dept: NURSING | Facility: CLINIC | Age: 53
End: 2018-12-03
Payer: COMMERCIAL

## 2018-12-03 VITALS — DIASTOLIC BLOOD PRESSURE: 80 MMHG | SYSTOLIC BLOOD PRESSURE: 138 MMHG | HEART RATE: 68 BPM

## 2018-12-03 DIAGNOSIS — Z00.6 EXAMINATION OF PARTICIPANT OR CONTROL IN CLINICAL RESEARCH: Primary | ICD-10-CM

## 2018-12-03 PROCEDURE — 99207 ZZC NO CHARGE NURSE ONLY: CPT

## 2018-12-03 NOTE — MR AVS SNAPSHOT
After Visit Summary   12/3/2018    Rayna Jackson    MRN: [id number]           Patient Information     Date Of Birth          1965        Visit Information        Provider Department      12/3/2018 8:00 AM ProviderRa [facility]        Today's Diagnoses     Examination of participant or control in clinical research    -  1       Follow-ups after your visit        Who to contact     If you have questions or need follow up information about today's clinic visit or your schedule please contact [facility] directly at [phone].  Normal or non-critical lab and imaging results will be communicated to you by A[employer], letter or phone within 4 business days after the clinic has received the results. If you do not hear from us within 7 days, please contact the clinic through [employer] or phone. If you have a critical or abnormal lab result, we will notify you by phone as soon as possible.  Submit refill requests through [employer] or call your pharmacy and they will forward the refill request to us. Please allow 3 business days for your refill to be completed.          Additional Information About Your Visit        MyChart Information     [employer] gives you secure access to your electronic health record. If you see a primary care provider, you can also send messages to your care team and make appointments. If you have questions, please call your primary care clinic.  If you do not have a primary care provider, please call [phone] and they will assist you.        Care EveryWhere ID     This is your Care EveryWhere ID. This could be used by other organizations to access your [place] medical records  [id number]        Your Vitals Were     Pulse                   68            Blood Pressure from Last 3 Encounters:   12/03/18 138/80   08/20/18 119/83   08/13/18 128/78    Weight from Last 3 Encounters:   08/20/18 203 lb (92.1 kg)   08/13/18 204 lb (92.5 kg)   05/24/18  204 lb (92.5 kg)              Today, you had the following     No orders found for display       Primary Care Provider Office Phone # Fax #    Mitra Keita [phone] [address]  [facility] [address]        Equal Access to Services     [facility] : Hadii aad ku hadasho Soomaali, waaxda luqadaha, qaybta kaalmada adeegyada, waxay yaimain hayaven aderamírez nicolasa toro . So [facility] [phone].    ATENCIÓN: Si habla español, tiene a zamora disposición servicios gratuitos de asistencia lingüística. Ll[facility] [phone].    We comply with applicable federal civil rights laws and [place] laws. We do not discriminate on the basis of race, color, national origin, age, disability, sex, sexual orientation, or gender identity.            Thank you!     Thank you for choosing [facility]  for your care. Our goal is always to provide you with excellent care. Hearing back from our patients is one way we can continue to improve our services. Please take a few minutes to complete the written survey that you may receive in the mail after your visit with us. Thank you!             Your Updated Medication List - Protect others around you: Learn how to safely use, store and throw away your medicines at www.disposemymeds.org.          This list is accurate as of 12/3/18  8:20 AM.  Always use your most recent med list.                   Brand Name Dispense Instructions for use Diagnosis    albuterol 108 (90 Base) MCG/ACT inhaler    PROAIR HFA/PROVENTIL HFA/VENTOLIN HFA    2 Inhaler    Inhale 2 puffs into the lungs as needed for shortness of breath / dyspnea or wheezing    Mild intermittent asthma without complication       fexofenadine 180 MG tablet    ALLEGRA    90 tablet    Take 1 tablet (180 mg) by mouth daily Take 180 mg by mouth    Chronic allergic rhinitis, unspecified seasonality, unspecified trigger       fluticasone 110 MCG/ACT inhaler    FLOVENT HFA    3 Inhaler    Inhale  2 puffs into the lungs 2 times daily    Mild intermittent asthma without complication       fluticasone 50 MCG/ACT nasal spray    FLONASE    16 mL    SPRAY 2 SPRAYS INTO BOTH NOSTRILS DAILY    Chronic allergic rhinitis, unspecified seasonality, unspecified trigger       levonorgestrel 20 MCG/24HR IUD    MIRENA     1 each by Intrauterine route once        losartan 50 MG tablet    COZAAR    90 tablet    Take 1 tablet (50 mg) by mouth daily    HTN, goal below 140/90       montelukast 10 MG tablet    SINGULAIR    90 tablet    Take 1 tablet (10 mg) by mouth At Bedtime    Chronic allergic rhinitis, unspecified seasonality, unspecified trigger, Mild intermittent asthma without complication       multivitamin w/minerals tablet     30 each    Take 2 tablets by mouth daily    Vitamin D deficiency       topiramate 25 MG tablet    TOPAMAX    90 tablet    TAKE 1 TABLET BY MOUTH AT BEDTIME X1 WEEK, THEN TAKE 2 TABLETS BY MOUTH AT BEDTIME    Chronic nonintractable headache, unspecified headache type       triamcinolone 0.1 % external cream    KENALOG     Apply topically 2 times daily        * vitamin D3 2000 units Caps     30 capsule    Take 2,000 Units by mouth daily    Vitamin D deficiency       * vitamin D3 1000 units (25 mcg) tablet    CHOLECALCIFEROL    30 tablet    Take 1 tablet (1,000 Units) by mouth daily    Vitamin D deficiency       * Notice:  This list has 2 medication(s) that are the same as other medications prescribed for you. Read the directions carefully, and ask your doctor or other care provider to review them with you.

## 2018-12-03 NOTE — PROGRESS NOTES
PGEN study visit  UNCONTROLLED HYPERTENSION ARM visit 8    SUBJECTIVE:  Rayna is here for 8th PGEN research study visit. Please refer to research tab in the header and today's flow sheet for further details. Patient had uncontrolled  hypertension at enrollment and has a goal of <  140/90 (per Problem list target chosen by PCP)     Prior research note reviewed. Patient is on blood pressure medication(s) at this time.  She has been taking losartan 50 mg daily and is tolerating the medication well.      Current diet & lifestyle: no change  Smoking: no change  Alcohol consumption no change  Other pertinent history: none reported      BP Readings from Last 3 Encounters:   08/20/18 119/83   08/13/18 128/78   05/24/18 122/88       Current Outpatient Prescriptions   Medication Sig Dispense Refill     albuterol (PROAIR HFA/PROVENTIL HFA/VENTOLIN HFA) 108 (90 Base) MCG/ACT inhaler Inhale 2 puffs into the lungs as needed for shortness of breath / dyspnea or wheezing 2 Inhaler 3     cholecalciferol (VITAMIN D3) 1000 UNIT tablet Take 1 tablet (1,000 Units) by mouth daily 30 tablet 3     Cholecalciferol (VITAMIN D3) 2000 UNITS CAPS Take 2,000 Units by mouth daily 30 capsule 3     fexofenadine (ALLEGRA) 180 MG tablet Take 1 tablet (180 mg) by mouth daily Take 180 mg by mouth 90 tablet 1     fluticasone (FLONASE) 50 MCG/ACT spray SPRAY 2 SPRAYS INTO BOTH NOSTRILS DAILY 16 mL 3     fluticasone (FLOVENT HFA) 110 MCG/ACT Inhaler Inhale 2 puffs into the lungs 2 times daily 3 Inhaler 1     levonorgestrel (MIRENA) 20 MCG/24HR IUD 1 each by Intrauterine route once       losartan (COZAAR) 50 MG tablet Take 1 tablet (50 mg) by mouth daily 90 tablet 1     montelukast (SINGULAIR) 10 MG tablet Take 1 tablet (10 mg) by mouth At Bedtime 90 tablet 1     multivitamin, therapeutic with minerals (MULTI-VITAMIN) TABS Take 2 tablets by mouth daily 30 each 1     topiramate (TOPAMAX) 25 MG tablet TAKE 1 TABLET BY MOUTH AT BEDTIME X1 WEEK, THEN TAKE 2  TABLETS BY MOUTH AT BEDTIME 90 tablet 0     triamcinolone (KENALOG) 0.1 % cream Apply topically 2 times daily       Potassium   Date Value Ref Range Status   05/24/2018 4.0 3.4 - 5.3 mmol/L Final     Creatinine   Date Value Ref Range Status   05/24/2018 0.65 0.52 - 1.04 mg/dL Final     Urea Nitrogen   Date Value Ref Range Status   05/24/2018 10 7 - 30 mg/dL Final     GFR Estimate   Date Value Ref Range Status   05/24/2018 >90 >60 mL/min/1.7m2 Final     Comment:     Non  GFR Calc      A baseline potassium, creatinine, BUN, GFR has been done within past 12 months      OBJECTIVE:  Patient in in no apparent distress and able to provide full history for today's encounter. she denies pain or any current illness   Vitals: There were no vitals taken for this visit.  Today's BP completed using cuff size: large on right side arm.    Pulse Readings from Last 1 Encounters:   08/20/18 73     Is pulse 55 or greater? - Yes    BMI= There is no height or weight on file to calculate BMI.       ASSESSMENT/PLAN:   [facility] Flowsheet has been  'filed' ) for this visit (this saves flowsheet data)    Blood pressure reading today is at the provider specified goal of <140/90.      1.  [facility] the patient will be advised continue current medication regimen unchanged.     2.  We will not be checking a metabolic lab panel today.      3.  Follow up instructions include: making an appointment with her PCP (Mitra Keita) in the next 3 - 6 months as this is the last visit as part of the [facility] blood pressure study. I told Rayna that we will send her genetic results to her if she requests them from the central study office.       See avs for more details.  Full research packet also provide to patient previously    Patient was counseled regarding the lifestyle changes (listed below)  to help with BP management Yes  Lifestyle changes that can help control high blood pressure:  Even though [facility]  study to test effectiveness of genetically guided medications for managing high blood pressure, there are several things you can do to ensure your blood pressure stays in good control:    Maintain a healthy weight (BMI<26). A modest amount of weight loss can be helpful    Limit salt intake to under 2400mg daily    Follow the DASH diet (lean meats, low salt, whole grains, lots of fruits/vegies)    Stay active, try to get in 30 minutes of exercise daily.    Manage your daily stress.    Do not smoke cigarettes (or cut back)    Limit alcohol (2 drinks/day for men, 1 drink/day for women)  Was AVS  provided to patient with the relevant <dot>PGENPI dot phrase pulled into patient instructions No    Patient was advised to follow up with PCP (Bossman)  to continue ongoing care of HTN since study visits are now complete.      Eloy Kaur PharmD

## 2018-12-03 NOTE — Clinical Note
BP within range, no change.  Instructed Rayna to make an appt with PCP for BP management in the next 3 - 6 months.

## 2018-12-20 ENCOUNTER — TELEPHONE (OUTPATIENT)
Dept: FAMILY MEDICINE | Facility: CLINIC | Age: 53
End: 2018-12-20

## 2018-12-20 PROBLEM — J45.909 ASTHMA: Status: ACTIVE | Noted: 2018-05-28

## 2018-12-20 PROBLEM — J45.40 MODERATE PERSISTENT ASTHMA WITHOUT COMPLICATION: Status: ACTIVE | Noted: 2018-06-14

## 2018-12-20 PROBLEM — Z91.09 ENVIRONMENTAL ALLERGIES: Status: ACTIVE | Noted: 2018-06-14

## 2018-12-20 PROBLEM — J30.9 ALLERGIC RHINITIS: Status: ACTIVE | Noted: 2018-05-28

## 2018-12-20 NOTE — TELEPHONE ENCOUNTER
Panel Management Review      Patient has the following on her problem list: None      Composite cancer screening  Chart review shows that this patient is due/due soon for the following None  Summary:    Patient is due/failing the following:   Problem list    Action needed:   Updated problem list with dx of asthma per chart    Type of outreach:    update problem list    Questions for provider review:    None                                                                                                                                    Nichole Bermudez/MARGE  [place]---[facility]       Chart routed to none .

## 2019-02-18 ENCOUNTER — OFFICE VISIT (OUTPATIENT)
Dept: URGENT CARE | Facility: URGENT CARE | Age: 54
End: 2019-02-18
Payer: COMMERCIAL

## 2019-02-18 VITALS
TEMPERATURE: 102.7 F | OXYGEN SATURATION: 95 % | SYSTOLIC BLOOD PRESSURE: 109 MMHG | DIASTOLIC BLOOD PRESSURE: 72 MMHG | HEART RATE: 97 BPM

## 2019-02-18 DIAGNOSIS — J11.1 INFLUENZA-LIKE ILLNESS: ICD-10-CM

## 2019-02-18 DIAGNOSIS — R07.0 THROAT PAIN: ICD-10-CM

## 2019-02-18 DIAGNOSIS — N10 PYELONEPHRITIS, ACUTE: Primary | ICD-10-CM

## 2019-02-18 DIAGNOSIS — R30.0 DYSURIA: ICD-10-CM

## 2019-02-18 DIAGNOSIS — R82.90 NONSPECIFIC FINDING ON EXAMINATION OF URINE: ICD-10-CM

## 2019-02-18 DIAGNOSIS — J45.40 MODERATE PERSISTENT ASTHMA WITHOUT COMPLICATION: ICD-10-CM

## 2019-02-18 LAB
ALBUMIN UR-MCNC: 100 MG/DL
APPEARANCE UR: ABNORMAL
BACTERIA #/AREA URNS HPF: ABNORMAL /HPF
BILIRUB UR QL STRIP: ABNORMAL
COLOR UR AUTO: YELLOW
FLUAV+FLUBV AG SPEC QL: NEGATIVE
FLUAV+FLUBV AG SPEC QL: NEGATIVE
GLUCOSE UR STRIP-MCNC: NEGATIVE MG/DL
HGB UR QL STRIP: ABNORMAL
KETONES UR STRIP-MCNC: 15 MG/DL
LEUKOCYTE ESTERASE UR QL STRIP: ABNORMAL
MUCOUS THREADS #/AREA URNS LPF: PRESENT /LPF
NITRATE UR QL: NEGATIVE
NON-SQ EPI CELLS #/AREA URNS LPF: ABNORMAL /LPF
PH UR STRIP: 5.5 PH (ref 5–7)
RBC #/AREA URNS AUTO: ABNORMAL /HPF
SOURCE: ABNORMAL
SP GR UR STRIP: >1.03 (ref 1–1.03)
SPECIMEN SOURCE: NORMAL
UROBILINOGEN UR STRIP-ACNC: 0.2 EU/DL (ref 0.2–1)
WBC #/AREA URNS AUTO: ABNORMAL /HPF

## 2019-02-18 PROCEDURE — 87804 INFLUENZA ASSAY W/OPTIC: CPT | Performed by: FAMILY MEDICINE

## 2019-02-18 PROCEDURE — 96372 THER/PROPH/DIAG INJ SC/IM: CPT | Performed by: FAMILY MEDICINE

## 2019-02-18 PROCEDURE — 99215 OFFICE O/P EST HI 40 MIN: CPT | Mod: 25 | Performed by: FAMILY MEDICINE

## 2019-02-18 PROCEDURE — 87086 URINE CULTURE/COLONY COUNT: CPT | Performed by: FAMILY MEDICINE

## 2019-02-18 PROCEDURE — 81001 URINALYSIS AUTO W/SCOPE: CPT | Performed by: FAMILY MEDICINE

## 2019-02-18 RX ORDER — OSELTAMIVIR PHOSPHATE 75 MG/1
75 CAPSULE ORAL 2 TIMES DAILY
Qty: 10 CAPSULE | Refills: 0 | Status: SHIPPED | OUTPATIENT
Start: 2019-02-18 | End: 2019-03-11

## 2019-02-18 RX ORDER — CEFTRIAXONE SODIUM 1 G
1 VIAL (EA) INJECTION ONCE
Status: COMPLETED | OUTPATIENT
Start: 2019-02-18 | End: 2019-02-18

## 2019-02-18 RX ORDER — CEFDINIR 300 MG/1
300 CAPSULE ORAL 2 TIMES DAILY
Qty: 20 CAPSULE | Refills: 0 | Status: SHIPPED | OUTPATIENT
Start: 2019-02-18 | End: 2019-03-11

## 2019-02-18 RX ADMIN — Medication 1 G: at 15:11

## 2019-02-18 NOTE — PROGRESS NOTES
SUBJECTIVE:  Chief Complaint   Patient presents with     Urgent Care     URI     Cold, fever, cough, coughing up phlegm, sore throat, myalgia, HA. Sx yesterday     Urinary Problem     Lower back pain radiates up to flank, lower abdominal quadrant pain- urgency        Rayna Jackson is a 54 year old female who  presents today for a possible UTI with associated  left side back pain.   Symptoms of dysuria, frequency, burning, back pain, nausea, fever and chills have been going on for 2day(s).  Hematuria no.  sudden onset, still present and worseningand severe.  Has    symptoms of fever and chills.   has nausea , one time vomiting.  No history of vaginal discharge. This patient does   have a history of urinary tract infections.   Has not had previous pyelonephritis.   Patient denies long duration and rigors or vaginal discharge, vaginal odor and vaginal itching -  But she says she feels the worse she has ever felt with an illness    She is concerned that she has influenza, though no specific exposure, with her sudden onset of fevers/ chills, body aches, headache, sore throat, forceful cough-    She wants treatment with tamiflu    She has history of asthma moderate persistent-  Uses flovent inhaler bid and singulair daily,  And has needed albuterol bid and feels more tightness in her chest with this illness    Past Medical History:   Diagnosis Date     Torn meniscus 2013    left      Patient Active Problem List   Diagnosis     Benign essential hypertension     Osteoarthritis of both knees, unspecified osteoarthritis type     Bilateral edema of lower extremity     Osteoarthritis of both feet, unspecified osteoarthritis type     IUD (intrauterine device) in place     Chronic nonintractable headache, unspecified headache type     HTN, goal below 140/90     Allergic rhinitis     Asthma     Environmental allergies     Moderate persistent asthma without complication       ALLERGIES:  Lisinopril; Amlodipine; Aspirin; and  Codeine      Current Outpatient Medications on File Prior to Visit:  fluticasone (FLONASE) 50 MCG/ACT spray SPRAY 2 SPRAYS INTO BOTH NOSTRILS DAILY   levonorgestrel (MIRENA) 20 MCG/24HR IUD 1 each by Intrauterine route once   losartan (COZAAR) 50 MG tablet Take 1 tablet (50 mg) by mouth daily   albuterol (PROAIR HFA/PROVENTIL HFA/VENTOLIN HFA) 108 (90 Base) MCG/ACT inhaler Inhale 2 puffs into the lungs as needed for shortness of breath / dyspnea or wheezing (Patient not taking: Reported on 2/18/2019)   cholecalciferol (VITAMIN D3) 1000 UNIT tablet Take 1 tablet (1,000 Units) by mouth daily (Patient not taking: Reported on 12/3/2018)   Cholecalciferol (VITAMIN D3) 2000 UNITS CAPS Take 2,000 Units by mouth daily (Patient not taking: Reported on 12/3/2018)   fexofenadine (ALLEGRA) 180 MG tablet Take 1 tablet (180 mg) by mouth daily Take 180 mg by mouth (Patient not taking: Reported on 2/18/2019)   fluticasone (FLOVENT HFA) 110 MCG/ACT Inhaler Inhale 2 puffs into the lungs 2 times daily (Patient not taking: Reported on 2/18/2019)   montelukast (SINGULAIR) 10 MG tablet Take 1 tablet (10 mg) by mouth At Bedtime (Patient not taking: Reported on 2/18/2019)   multivitamin, therapeutic with minerals (MULTI-VITAMIN) TABS Take 2 tablets by mouth daily (Patient not taking: Reported on 12/3/2018)   topiramate (TOPAMAX) 25 MG tablet TAKE 1 TABLET BY MOUTH AT BEDTIME X1 WEEK, THEN TAKE 2 TABLETS BY MOUTH AT BEDTIME (Patient not taking: Reported on 2/18/2019)   triamcinolone (KENALOG) 0.1 % cream Apply topically 2 times daily     No current facility-administered medications on file prior to visit.     Social History     Tobacco Use     Smoking status: Never Smoker     Smokeless tobacco: Never Used   Substance Use Topics     Alcohol use: Yes     Alcohol/week: 0.0 oz     Comment: occ       Family History   Problem Relation Age of Onset     Hypertension Mother      Thyroid Disease Mother      Hypertension Father      Diabetes Other          great grandmother     Thyroid Disease Sister      Thyroid Disease Maternal Aunt      Review Of Systems    Constitutional:   fevers, chills, fatigue  Skin: negative for rash or lesions  Eyes: negative for eye pain, visual changes  Ears/Nose/Throat: negative for earache  , sinus trouble, has  sore throat  Respiratory:  some shortness of breath, dyspnea on exertion  , wheezing  Cardiovascular: negative for, palpitations,  But has felt tachycardia    Gastrointestinal: negative for vomiting, abdominal pain and diarrhea  Genitourinary:  mild dysuria and no  Hematuria- pain over bladder  Back:   pain with back movement,  CVA pain  Left greater than right  Musculoskeletal:  generalized joint/ musle pain, with no joint swelling    Neurologic:   generalized fatigue, but no local weakness or incoordination  Psychiatric:  feeling anxious with severity of illness       OBJECTIVE:  /72 (BP Location: Right arm, Patient Position: Chair, Cuff Size: Adult Large)   Pulse 97   Temp 102.7  F (39.3  C) (Oral)   SpO2 95%   GENERAL APPEARANCE: alert, moderate distress and cooperative  RESP: lungs clear to auscultation - no rales, rhonchi or wheezes  CV: regular rates and rhythm, normal S1 S2, no murmur noted  ABDOMEN:  soft, nontender, no HSM or masses and bowel sounds normal  BACK: left  CVA tenderness  SKIN: no suspicious lesions or rashes     EYES:   EOMI,   conjunctiva clear  HENT:   External ears with no swelling or lesions   Nose and lips without  Swelling, ulcers, erythema or lesions  NECK:   normal pain free ROM  EXTREMITIES:   Full ROM without expression of pain or limitation x 4 extremities  NEURO:   Normal strength and tone, ambulation without difficulty,   normal speech and mentation  SKIN:  no suspicious lesions or rashes  PSYCH:   mentation and affect appears normal and patient appearance--appropriately groomed    Results for orders placed or performed in visit on 02/18/19   UA reflex to Microscopic and Culture    Result Value Ref Range    Color Urine Yellow     Appearance Urine Cloudy     Glucose Urine Negative NEG^Negative mg/dL    Bilirubin Urine Small (A) NEG^Negative    Ketones Urine 15 (A) NEG^Negative mg/dL    Specific Gravity Urine >1.030 1.003 - 1.035    Blood Urine Moderate (A) NEG^Negative    pH Urine 5.5 5.0 - 7.0 pH    Protein Albumin Urine 100 (A) NEG^Negative mg/dL    Urobilinogen Urine 0.2 0.2 - 1.0 EU/dL    Nitrite Urine Negative NEG^Negative    Leukocyte Esterase Urine Small (A) NEG^Negative    Source Midstream Urine    Urine Microscopic   Result Value Ref Range    WBC Urine 10-25 (A) OTO5^0 - 5 /HPF    RBC Urine 10-25 (A) OTO2^O - 2 /HPF    Squamous Epithelial /LPF Urine Few FEW^Few /LPF    Bacteria Urine Many (A) NEG^Negative /HPF    Mucous Urine Present (A) NEG^Negative /LPF   Influenza A/B antigen   Result Value Ref Range    Influenza A/B Agn Specimen Nasal     Influenza A Negative NEG^Negative    Influenza B Negative NEG^Negative          ASSESSMENT:   Throat pain     - Influenza A/B antigen  - Urine Microscopic    Dysuria     - UA reflex to Microscopic and Culture  - Urine Culture Aerobic Bacterial    Nonspecific finding on examination of urine     - Urine Culture Aerobic Bacterial    Pyelonephritis, acute     - cefdinir (OMNICEF) 300 MG capsule; Take 1 capsule (300 mg) by mouth 2 times daily for 10 days    Drink plenty of fluids.  Prevention and treatment of UTI's discussed.Signs and symptoms of pyelonephritis mentioned.  Follow up  At  or ER physician if worsening, especially increased fever/ chills/ back pain/ weakness    We discussed that kidney infections can be quite severe illnesses and so more aggressive and prolonged treatment of antibiotics is necessary.  In very severe cases a patient may require hospitalization    We discussed that a urine culture is being performed and that if we find that if there is a bacteria in the urine that is resistant to the prescribed antibiotic he/she will  receive call to change the antibiotic to better cover the infection.      Influenza-like illness     - oseltamivir (TAMIFLU) 75 MG capsule; Take 1 capsule (75 mg) by mouth 2 times daily for 5 days May give suspension as alternative     We discussed the limitations of influenza testing and limitations of  antiviral therapy against influenza, that treatment should usually be initiated within 2 days of the start of symptoms and is most critical for persons with weak immunity , very young, very old and chronic respiratory illnesses.  Discussed that the influenza test only detects about 50- 60 % of cases of influenza. So even though the flu test is negative, that influenza disease is still possible.  In the context of the current widespread influenza in the state and community,  The likelihood of the patient having active influenza disease is high since there is a typical influenza presentation.         Symptomatic therapy suggested:  Rest, drink lots of fluids,  Acetaminophen / ibuprofen for body aches and fever,  Salt water gargles for sore throat,  OTC anesthetic lozenges for sore throat,  OTC cough medications.   Call or return to clinic prn if these symptoms worsen or fail to improve as anticipated.    Treatment and prophylaxis for close contacts  was discussed  Advised that influenza illness usually lasts a week, sometimes more and that the patient should avoid contact with others, and cover the mouth when coughing to limit spread of the infection.    Discussed that influenza is a potent virus that can cause damage to airways making increased risk for developing bronchitis or pneumonia.  In severe cases of chest symptoms and antibiotic can treat the bacterial superinfection, but I explained that the antibiotic is not effective against the influenza virus.    Moderate persistent asthma without complication   patient currently controlling asthma with flovent bid and daily singulair and prn albuterol inhaler,  Concern  of exacerbation of her asthma with influenza-  Discussed that oral steroids could complicate her fight against pyelonephritis and influenza,  So instead I recommended that if she is having worsening wheezing to increase her flovent to TID-  So the steroid effect will be local, not systemic      Patient was advised to follow-up with primary care in 1-2 days- to re-assess her status (close follow-up) -  She is dealing with 3 serious conditions - pyelonephritis, influenza and asthma,  With high risk of complications.  If acute worsening go to ER

## 2019-02-18 NOTE — LETTER
[facility]  [address]  [facility] [address]  [phone]      February 18, 2019    RE:  Rayna Jackson                                                                                                                                                       [address]  [facility] [address]            To whom it may concern:    Rayna Jackson is under my professional care for    Throat pain  Dysuria  Nonspecific finding on examination of urine  Pyelonephritis, acute  Influenza-like illness.   May return to work /   with no restrictions when shortness of breath, severe cough, fevers and chills are resolved.  Influenza is usually infectious for 7-10 days.      Sincerely,        Morelia Main MD  [facility]

## 2019-02-18 NOTE — PATIENT INSTRUCTIONS
Patient Education     Influenza (Adult)    Influenza is also called the flu. It is a viral illness that affects the air passages of your lungs. It is different from the common cold. The flu can easily be passed from one to person to another. It may be spread through the air by coughing and sneezing. Or it can be spread by touching the sick person and then touching your own eyes, nose, or mouth.  The flu starts 1 to 3 days after you are exposed to the flu virus. It may last for 1 to 2 weeks but many people feel tired or fatigued for many weeks afterward. You usually don t need to take antibiotics unless you have a complication. This might be an ear or sinus infection or pneumonia.  Symptoms of the flu may be mild or severe. They can include extreme tiredness (wanting to stay in bed all day), chills, fevers, muscle aches, soreness with eye movement, headache, and a dry, hacking cough.  Home care  Follow these guidelines when caring for yourself at home:    Avoid being around cigarette smoke, whether yours or other people s.    Acetaminophen or ibuprofen will help ease your fever, muscle aches, and headache. Don t give aspirin to anyone younger than 18 who has the flu. Aspirin can harm the liver.    Nausea and loss of appetite are common with the flu. Eat light meals. Drink 6 to 8 glasses of liquids every day. Good choices are water, sport drinks, soft drinks without caffeine, juices, tea, and soup. Extra fluids will also help loosen secretions in your nose and lungs.    Over-the-counter cold medicines will not make the flu go away faster. But the medicines may help with coughing, sore throat, and congestion in your nose and sinuses. Don t use a decongestant if you have high blood pressure.    Stay home until your fever has been gone for at least 24 hours without using medicine to reduce fever.  Follow-up care  Follow up with your healthcare provider, or as advised, if you are not getting better over the next  "week.  If you are age 65 or older, talk with your provider about getting a pneumococcal vaccine every 5 years. You should also get this vaccine if you have chronic asthma or COPD. All adults should get a flu vaccine every fall. Ask your provider about this.  When to seek medical advice  Call your healthcare provider right away if any of these occur:    Cough with lots of colored mucus (sputum) or blood in your mucus    Chest pain, shortness of breath, wheezing, or trouble breathing    Severe headache, or face, neck, or ear pain    New rash with fever    Fever of 100.4 F (38 C) or higher, or as directed by your healthcare provider    Confusion, behavior change, or seizure    Severe weakness or dizziness    You get a new fever or cough after getting better for a few days  Date Last Reviewed: 1/1/2017 2000-2018 [employer]. [address] [place] [address]. All rights reserved. This information is not intended as a substitute for professional medical care. Always follow your healthcare professional's instructions.           Patient Education     Kidney Infection (Adult Female)    An infection in one or both kidneys is called \"pyelonephritis.\" It usually happens when bacteria (or rarely, viruses, fungi, or other disease-causing organisms) get into the kidney. The bacteria (or other disease-causing organisms) can enter the kidneys from the bladder or blood traveling from other parts of the body. A kidney infection can become serious. It can cause severe illness, scarring of the kidneys, or kidney failure if not treated properly.  Common causes for this problem include:    Not keeping the genital area clean and dry, which promotes the growth of bacteria    Wiping back to front which drags bacteria from the rectum toward the urinary opening (urethra)    Wearing tight pants or underwear (this lets moisture build up in the genital area, which helps bacteria grow)    Holding urine in for long periods " of time    Dehydration  Kidney infections can cause symptoms similar to a bladder infection. Symptoms include:    Pain (or burning) when urinating    Having to urinate more often than usual    Blood in the urine (pink or red)    Abdominal pain or discomfort, usually in the lower abdomen    Pain in the side or back    Pain above the pubic bone    Fever or chills    Vomiting    Loss of appetite  Treatment is oral antibiotics, or in more severe cases, intramuscular or IV antibiotics. These are started right away and may be changed once urine culture results determine the infecting organisms. Treatment helps prevent a more serious kidney infection.  Medicines  Medicines can help in the treatment of a bladder infection:    Take antibiotics until they are used up, even if you feel better. It is important to finish them to make sure the infection is gone.    Unless another medicine was prescribed, you can use over-the-counter medicines for pain, fever, or discomfort. If you have chronic liver of kidney disease, talk with your healthcare provider before using these medicines. Also talk with your provider if you've ever had a stomach ulcer or gastrointestinal (GI) bleeding, or are taking blood thinners.  Home care  The following are general care guidelines:    Stay home from work or school. Rest in bed until your fever breaks and you are feeling better, or as advised by your healthcare provider.    Drink lots of fluid unless you must restrict fluids for other medical reasons. This will force the medicine into your urinary system and flush the bacteria out of your body. Ask your healthcare provider how much you should drink.    Don't have sex until you have finished all of your medicine and your symptoms are gone.    Don't have caffeine, alcohol, or spicy foods. These foods may irritate the kidneys and bladder.    Don't take bubble baths. Sensitivity to the chemicals in bubble baths can irritate the urethra.    Make sure you  wipe from front to back after using the toilet.    Wear loose cloths and cotton underwear.  Prevention  These self-care steps can help prevent future infections:    Drink plenty of fluids to prevent dehydration and flush out the bladder. Do this unless you must restrict fluids for other health reasons, or your healthcare provider told you not to.    Proper cleaning after going to the bathroom in important. Make sure you wipe from front to back after using the toilet.    Urinate more often. Don't try to hold urine in for a long time.    Don't wear tight-fitting pants and underwear.    Improve your diet to prevent constipation. Eat more fruits, vegetables, and fiber. Eat less junk and fatty foods. Constipation can make a urinary tract infection more likely. Talk with your healthcare provider if you have trouble with bowel movements.    Urinate right after intercourse to flush out the bladder.  Follow-up care  Follow up with your healthcare provider, or as advised. Additional testing may be needed to make sure the infection has cleared. Close follow-up and further testing is very important to find the cause and to prevent future infections.  If a urine culture was done, you will be contacted if your treatment needs to be changed. If directed, you may call to find out the results.  If you had an X-ray, CT scan, or other diagnostic test, you will be notified of any new findings that may affect your care.  Call 911  Call 911 if any of the following occur:    Trouble breathing    Fainting or loss of consciousness    Rapid or very slow heart rate    Weakness, dizziness, or fainting    Difficulty arousing or confusion  When to seek medical advice  Call your healthcare provider right away if any of these occur:    Fever 100.4 F (38 C) or higher, or as directed by your healthcare provider    Not feeling better within 1 to 2 days after starting antibiotics    Any symptom that continues after 3 days of treatment    Increasing  pain in the stomach, back, side, or groin area    Repeated vomiting    Not able to take prescribed medicine due to nausea or another reason    Bloody, dark-colored, or foul smelling urine    Trouble urinating or decreased urine output    No urine for 8 hours, no tears when crying, sunken eyes, or dry mouth  Date Last Reviewed: 10/1/2016    0829-0667 [employer]. [address] [place] [address]. All rights reserved. This information is not intended as a substitute for professional medical care. Always follow your healthcare professional's instructions.

## 2019-02-19 ENCOUNTER — TRANSFERRED RECORDS (OUTPATIENT)
Dept: HEALTH INFORMATION MANAGEMENT | Facility: CLINIC | Age: 54
End: 2019-02-19

## 2019-02-20 LAB
BACTERIA SPEC CULT: NORMAL
SPECIMEN SOURCE: NORMAL

## 2019-02-21 ENCOUNTER — OFFICE VISIT (OUTPATIENT)
Dept: FAMILY MEDICINE | Facility: CLINIC | Age: 54
End: 2019-02-21
Payer: COMMERCIAL

## 2019-02-21 VITALS
SYSTOLIC BLOOD PRESSURE: 102 MMHG | HEIGHT: 65 IN | HEART RATE: 76 BPM | BODY MASS INDEX: 33.32 KG/M2 | RESPIRATION RATE: 12 BRPM | TEMPERATURE: 98.7 F | OXYGEN SATURATION: 97 % | DIASTOLIC BLOOD PRESSURE: 70 MMHG | WEIGHT: 200 LBS

## 2019-02-21 DIAGNOSIS — J11.1 INFLUENZA-LIKE ILLNESS: ICD-10-CM

## 2019-02-21 DIAGNOSIS — N10 PYELONEPHRITIS, ACUTE: Primary | ICD-10-CM

## 2019-02-21 LAB
ALBUMIN UR-MCNC: NEGATIVE MG/DL
APPEARANCE UR: CLEAR
BACTERIA #/AREA URNS HPF: ABNORMAL /HPF
BILIRUB UR QL STRIP: NEGATIVE
COLOR UR AUTO: YELLOW
GLUCOSE UR STRIP-MCNC: NEGATIVE MG/DL
HGB UR QL STRIP: ABNORMAL
KETONES UR STRIP-MCNC: NEGATIVE MG/DL
LEUKOCYTE ESTERASE UR QL STRIP: ABNORMAL
MUCOUS THREADS #/AREA URNS LPF: PRESENT /LPF
NITRATE UR QL: NEGATIVE
NON-SQ EPI CELLS #/AREA URNS LPF: ABNORMAL /LPF
PH UR STRIP: 6.5 PH (ref 5–7)
RBC #/AREA URNS AUTO: ABNORMAL /HPF
SOURCE: ABNORMAL
SP GR UR STRIP: 1.01 (ref 1–1.03)
TRANS CELLS #/AREA URNS HPF: ABNORMAL /HPF
UROBILINOGEN UR STRIP-ACNC: 0.2 EU/DL (ref 0.2–1)
WBC #/AREA URNS AUTO: ABNORMAL /HPF

## 2019-02-21 PROCEDURE — 81001 URINALYSIS AUTO W/SCOPE: CPT | Performed by: FAMILY MEDICINE

## 2019-02-21 PROCEDURE — 99213 OFFICE O/P EST LOW 20 MIN: CPT | Performed by: FAMILY MEDICINE

## 2019-02-21 ASSESSMENT — MIFFLIN-ST. JEOR: SCORE: 1510.13

## 2019-02-21 NOTE — LETTER
My Asthma Action Plan  Name: Rayna Jackson   YOB: 1965  Date: 2/21/2019   My doctor: Mitra Keita MD   My clinic: [facility]        My Control Medicine:   My Rescue Medicine:    My Asthma Severity:   Avoid your asthma triggers:                GREEN ZONE   Good Control    I feel good    No cough or wheeze    Can work, sleep and play without asthma symptoms       Take your asthma control medicine every day.     1. If exercise triggers your asthma, take your rescue medication    15 minutes before exercise or sports, and    During exercise if you have asthma symptoms  2. Spacer to use with inhaler: If you have a spacer, make sure to use it with your inhaler             YELLOW ZONE Getting Worse  I have ANY of these:    I do not feel good    Cough or wheeze    Chest feels tight    Wake up at night   1. Keep taking your Green Zone medications  2. Start taking your rescue medicine:    every 20 minutes for up to 1 hour. Then every 4 hours for 24-48 hours.  3. If you stay in the Yellow Zone for more than 12-24 hours, contact your doctor.  4. If you do not return to the Green Zone in 12-24 hours or you get worse, start taking your oral steroid medicine if prescribed by your provider.           RED ZONE Medical Alert - Get Help  I have ANY of these:    I feel awful    Medicine is not helping    Breathing getting harder    Trouble walking or talking    Nose opens wide to breathe       1. Take your rescue medicine NOW  2. If your provider has prescribed an oral steroid medicine, start taking it NOW  3. Call your doctor NOW  4. If you are still in the Red Zone after 20 minutes and you have not reached your doctor:    Take your rescue medicine again and    Call 911 or go to the emergency room right away    See your regular doctor within 2 weeks of an Emergency Room or Urgent Care visit for follow-up treatment.          Annual Reminders:  Meet with Asthma Educator,  Flu Shot in the  Fall, consider Pneumonia Vaccination for patients with asthma (aged 19 and older).    Pharmacy:    [id number] - [place], [place] - [address] AT [facility] [address]  [id number] - [place], [place] - [address]                      Asthma Triggers  How To Control Things That Make Your Asthma Worse    Triggers are things that make your asthma worse.  Look at the list below to help you find your triggers and what you can do about them.  You can help prevent asthma flare-ups by staying away from your triggers.      Trigger                                                          What you can do   Cigarette Smoke  Tobacco smoke can make asthma worse. Do not allow smoking in your home, car or around you.  Be sure no one smokes at a child s day care or school.  If you smoke, ask your health care provider for ways to help you quit.  Ask family members to quit too.  Ask your health care provider for a referral to Quit Plan to help you quit smoking, or call 3-173-873-PLAN.     Colds, Flu, Bronchitis  These are common triggers of asthma. Wash your hands often.  Don t touch your eyes, nose or mouth.  Get a flu shot every year.     Dust Mites  These are tiny bugs that live in cloth or carpet. They are too small to see. Wash sheets and blankets in hot water every week.   Encase pillows and mattress in dust mite proof covers.  Avoid having carpet if you can. If you have carpet, vacuum weekly.   Use a dust mask and HEPA vacuum.   Pollen and Outdoor Mold  Some people are allergic to trees, grass, or weed pollen, or molds. Try to keep your windows closed.  Limit time out doors when pollen count is high.   Ask you health care provider about taking medicine during allergy season.     Animal Dander  Some people are allergic to skin flakes, urine or saliva from pets with fur or feathers. Keep pets with fur or feathers out of your home.    If you can t keep the pet outdoors, then keep the pet out  of your bedroom.  Keep the bedroom door closed.  Keep pets off cloth furniture and away from stuffed toys.     Mice, Rats, and Cockroaches  Some people are allergic to the waste from these pests.   Cover food and garbage.  Clean up spills and food crumbs.  Store grease in the refrigerator.   Keep food out of the bedroom.   Indoor Mold  This can be a trigger if your home has high moisture. Fix leaking faucets, pipes, or other sources of water.   Clean moldy surfaces.  Dehumidify basement if it is damp and smelly.   Smoke, Strong Odors, and Sprays  These can reduce air quality. Stay away from strong odors and sprays, such as perfume, powder, hair spray, paints, smoke incense, paint, cleaning products, candles and new carpet.   Exercise or Sports  Some people with asthma have this trigger. Be active!  Ask your doctor about taking medicine before sports or exercise to prevent symptoms.    Warm up for 5-10 minutes before and after sports or exercise.     Other Triggers of Asthma  Cold air:  Cover your nose and mouth with a scarf.  Sometimes laughing or crying can be a trigger.  Some medicines and food can trigger asthma.

## 2019-02-21 NOTE — PROGRESS NOTES
"  SUBJECTIVE:   Rayna Jackson is a 54 year old female who presents to clinic today for the following health issues:      ED/UC Followup:    Facility:  [facility]  Date of visit: 2/18/2019  Reason for visit: uri and uti  Current Status: better     States urinary symptoms are still present. No blood in urine. On Monday her pain was >10/10 but today is 2/10. Denies any fever.   Reports having a lot of post nasal drainage and some cough. Not using cough medicine at this time because \" I'm taking too many things right now\".        Problem list and histories reviewed & adjusted, as indicated.  Additional history: as documented    Patient Active Problem List   Diagnosis     Benign essential hypertension     Osteoarthritis of both knees, unspecified osteoarthritis type     Bilateral edema of lower extremity     Osteoarthritis of both feet, unspecified osteoarthritis type     IUD (intrauterine device) in place     Chronic nonintractable headache, unspecified headache type     HTN, goal below 140/90     Allergic rhinitis     Asthma     Environmental allergies     Moderate persistent asthma without complication     Past Surgical History:   Procedure Laterality Date     appendicitis  1980     COLONOSCOPY N/A 6/30/2017    Procedure: COLONOSCOPY;  COLONOSCOPY   ;  Surgeon: Brooks Villa MD;  Location:  GI     CYSTECTOMY OVARIAN BENIGN  1980     KNEE SURGERY Left        Social History     Tobacco Use     Smoking status: Never Smoker     Smokeless tobacco: Never Used   Substance Use Topics     Alcohol use: Yes     Alcohol/week: 0.0 oz     Comment: occ     Family History   Problem Relation Age of Onset     Hypertension Mother      Thyroid Disease Mother      Hypertension Father      Diabetes Other         great grandmother     Thyroid Disease Sister      Thyroid Disease Maternal Aunt            Reviewed and updated as needed this visit by clinical staff       Reviewed and updated as needed this visit by " "Provider         ROS:  Constitutional, HEENT, pulmonary, gi and gu systems are negative, except as otherwise noted.    OBJECTIVE:     /70 (BP Location: Right arm, Patient Position: Chair, Cuff Size: Adult Regular)   Pulse 76   Temp 98.7  F (37.1  C) (Oral)   Resp 12   Ht 1.638 m (5' 4.5\")   Wt 90.7 kg (200 lb)   SpO2 97%   BMI 33.80 kg/m    Body mass index is 33.8 kg/m .  GENERAL: healthy, alert and no distress  HENT: ear canals and TM's normal, nose and mouth without ulcers or lesions  NECK: no adenopathy, no asymmetry, masses, or scars and thyroid normal to palpation  RESP: lungs clear to auscultation - no rales, rhonchi or wheezes and some cough during exam   CV: regular rate and rhythm, normal S1 S2  ABDOMEN: soft, nontender,  and tenderness suprapubic, mildly positive CVAT L>R    Diagnostic Test Results:  Results for orders placed or performed in visit on 02/21/19 (from the past 24 hour(s))   *UA reflex to Microscopic and Culture ([place] [facility] (except [place] and [place])   Result Value Ref Range    Color Urine Yellow     Appearance Urine Clear     Glucose Urine Negative NEG^Negative mg/dL    Bilirubin Urine Negative NEG^Negative    Ketones Urine Negative NEG^Negative mg/dL    Specific Gravity Urine 1.015 1.003 - 1.035    Blood Urine Small (A) NEG^Negative    pH Urine 6.5 5.0 - 7.0 pH    Protein Albumin Urine Negative NEG^Negative mg/dL    Urobilinogen Urine 0.2 0.2 - 1.0 EU/dL    Nitrite Urine Negative NEG^Negative    Leukocyte Esterase Urine Small (A) NEG^Negative    Source Midstream Urine    Urine Microscopic   Result Value Ref Range    WBC Urine 5-10 (A) OTO5^0 - 5 /HPF    RBC Urine O - 2 OTO2^O - 2 /HPF    Squamous Epithelial /LPF Urine Moderate (A) FEW^Few /LPF    Transitional Epi Few FEW^Few /HPF    Bacteria Urine Few (A) NEG^Negative /HPF    Mucous Urine Present (A) NEG^Negative /LPF       ASSESSMENT/PLAN:         1. Pyelonephritis, acute  - UA better. Continue with " omnicef as prescribed  - *UA reflex to Microscopic and Culture ([place] [facility] (except [place] and [place])  - Urine Microscopic    2. Influenza-like illness  - improving. Continue supportive care       See Patient Instructions    Mitra Keita MD  [facility]

## 2019-02-22 ASSESSMENT — ASTHMA QUESTIONNAIRES: ACT_TOTALSCORE: 25

## 2019-03-11 ENCOUNTER — ANCILLARY PROCEDURE (OUTPATIENT)
Dept: GENERAL RADIOLOGY | Facility: CLINIC | Age: 54
End: 2019-03-11
Attending: FAMILY MEDICINE
Payer: COMMERCIAL

## 2019-03-11 ENCOUNTER — OFFICE VISIT (OUTPATIENT)
Dept: FAMILY MEDICINE | Facility: CLINIC | Age: 54
End: 2019-03-11
Payer: COMMERCIAL

## 2019-03-11 VITALS
BODY MASS INDEX: 34.49 KG/M2 | OXYGEN SATURATION: 98 % | SYSTOLIC BLOOD PRESSURE: 126 MMHG | TEMPERATURE: 98.2 F | HEIGHT: 65 IN | WEIGHT: 207 LBS | HEART RATE: 67 BPM | DIASTOLIC BLOOD PRESSURE: 82 MMHG | RESPIRATION RATE: 16 BRPM

## 2019-03-11 DIAGNOSIS — R53.83 FATIGUE, UNSPECIFIED TYPE: ICD-10-CM

## 2019-03-11 DIAGNOSIS — J10.00 PNEUMONIA DUE TO INFLUENZA A VIRUS, UNSPECIFIED LATERALITY, UNSPECIFIED PART OF LUNG: ICD-10-CM

## 2019-03-11 DIAGNOSIS — R06.02 SHORTNESS OF BREATH: ICD-10-CM

## 2019-03-11 DIAGNOSIS — R06.02 SHORTNESS OF BREATH: Primary | ICD-10-CM

## 2019-03-11 LAB — HBA1C MFR BLD: 5.4 % (ref 0–5.6)

## 2019-03-11 PROCEDURE — 71046 X-RAY EXAM CHEST 2 VIEWS: CPT

## 2019-03-11 PROCEDURE — 83036 HEMOGLOBIN GLYCOSYLATED A1C: CPT | Performed by: FAMILY MEDICINE

## 2019-03-11 PROCEDURE — 99214 OFFICE O/P EST MOD 30 MIN: CPT | Performed by: FAMILY MEDICINE

## 2019-03-11 PROCEDURE — 84207 ASSAY OF VITAMIN B-6: CPT | Mod: 90 | Performed by: FAMILY MEDICINE

## 2019-03-11 PROCEDURE — 99000 SPECIMEN HANDLING OFFICE-LAB: CPT | Performed by: FAMILY MEDICINE

## 2019-03-11 PROCEDURE — 36415 COLL VENOUS BLD VENIPUNCTURE: CPT | Performed by: FAMILY MEDICINE

## 2019-03-11 PROCEDURE — 84443 ASSAY THYROID STIM HORMONE: CPT | Performed by: FAMILY MEDICINE

## 2019-03-11 PROCEDURE — 84439 ASSAY OF FREE THYROXINE: CPT | Performed by: FAMILY MEDICINE

## 2019-03-11 PROCEDURE — 82306 VITAMIN D 25 HYDROXY: CPT | Performed by: FAMILY MEDICINE

## 2019-03-11 RX ORDER — PREDNISONE 20 MG/1
20 TABLET ORAL DAILY
Qty: 5 TABLET | Refills: 0 | Status: SHIPPED | OUTPATIENT
Start: 2019-03-11 | End: 2019-04-15

## 2019-03-11 RX ORDER — OLOPATADINE HYDROCHLORIDE 2 MG/ML
SOLUTION/ DROPS OPHTHALMIC
COMMUNITY
Start: 2018-05-12 | End: 2020-05-12

## 2019-03-11 RX ORDER — LEVOFLOXACIN 500 MG/1
500 TABLET, FILM COATED ORAL DAILY
Qty: 7 TABLET | Refills: 0 | Status: SHIPPED | OUTPATIENT
Start: 2019-03-11 | End: 2019-04-15

## 2019-03-11 ASSESSMENT — MIFFLIN-ST. JEOR: SCORE: 1549.83

## 2019-03-11 NOTE — PROGRESS NOTES
"  SUBJECTIVE:   Rayna Jackson is a 54 year old female who presents to clinic today for the following health issues:      f/u influenza, has improved but c/o extreme fatigue, ST, and cough and chest congestion    Reports some post nasal drainage.   Feeling super fatigued since recent flu.   Notes some shortness of breath- \"seems to come and go. Some days feels like gasping for air'.   Increase use of inhalers from baseline.   Notes this fatigue has been there for many months just worsening now.           Problem list and histories reviewed & adjusted, as indicated.  Additional history: as documented    Patient Active Problem List   Diagnosis     Benign essential hypertension     Osteoarthritis of both knees, unspecified osteoarthritis type     Bilateral edema of lower extremity     Osteoarthritis of both feet, unspecified osteoarthritis type     IUD (intrauterine device) in place     Chronic nonintractable headache, unspecified headache type     HTN, goal below 140/90     Allergic rhinitis     Asthma     Environmental allergies     Moderate persistent asthma without complication     Past Surgical History:   Procedure Laterality Date     appendicitis  1980     COLONOSCOPY N/A 6/30/2017    Procedure: COLONOSCOPY;  COLONOSCOPY   ;  Surgeon: Brooks Villa MD;  Location:  GI     CYSTECTOMY OVARIAN BENIGN  1980     KNEE SURGERY Left        Social History     Tobacco Use     Smoking status: Never Smoker     Smokeless tobacco: Never Used   Substance Use Topics     Alcohol use: Yes     Alcohol/week: 0.0 oz     Comment: occ     Family History   Problem Relation Age of Onset     Hypertension Mother      Thyroid Disease Mother      Hypertension Father      Diabetes Other         great grandmother     Thyroid Disease Sister      Thyroid Disease Maternal Aunt            Reviewed and updated as needed this visit by clinical staff  Allergies  Meds       Reviewed and updated as needed this visit by Provider     " "    ROS:  Constitutional, HEENT, pulmonary, GI, , musculoskeletal, neuro, skin, endocrine and psych systems are negative, except as otherwise noted.    OBJECTIVE:     /82 (BP Location: Right arm, Patient Position: Chair, Cuff Size: Adult Large)   Pulse 67   Temp 98.2  F (36.8  C) (Oral)   Resp 16   Ht 1.651 m (5' 5\")   Wt 93.9 kg (207 lb)   SpO2 98%   Breastfeeding? No   BMI 34.45 kg/m    Body mass index is 34.45 kg/m .  GENERAL: no distress and fatigued  NECK: no adenopathy, no asymmetry, masses, or scars and thyroid normal to palpation  RESP: lungs clear to auscultation - no rales, rhonchi or wheezes  CV: regular rate and rhythm, normal S1 S2,  PSYCH: mentation appears normal, affect normal/bright and judgement and insight intact    Diagnostic Test Results:  CXR. I independently visualized the xray: no acute infiltrates noted     ASSESSMENT/PLAN:         1. Shortness of breath  - XR Chest 2 Views; Future  - predniSONE (DELTASONE) 20 MG tablet; Take 20 mg by mouth daily.  Dispense: 5 tablet; Refill: 0  - T4 free  - T4 free    2. Fatigue, unspecified type  - will rule out underlying cause   - TSH with free T4 reflex  - Vitamin D Deficiency  - Vitamin B6  - Hemoglobin A1c    3. Pneumonia due to influenza A virus, unspecified laterality, unspecified part of lung  - armstrong tart on Abx. Supportive care discussed   - levofloxacin (LEVAQUIN) 500 MG tablet; Take 1 tablet (500 mg) by mouth daily for 7 days  Dispense: 7 tablet; Refill: 0    See Patient Instructions    Mitra Keita MD  [facility]    "

## 2019-03-12 LAB
DEPRECATED CALCIDIOL+CALCIFEROL SERPL-MC: 23 UG/L (ref 20–75)
T4 FREE SERPL-MCNC: 0.97 NG/DL (ref 0.76–1.46)
TSH SERPL DL<=0.005 MIU/L-ACNC: 6.7 MU/L (ref 0.4–4)

## 2019-03-13 DIAGNOSIS — E03.8 SUBCLINICAL HYPOTHYROIDISM: Primary | ICD-10-CM

## 2019-03-13 RX ORDER — LEVOTHYROXINE SODIUM 25 UG/1
25 TABLET ORAL DAILY
Qty: 90 TABLET | Refills: 0 | Status: SHIPPED | OUTPATIENT
Start: 2019-03-13 | End: 2019-05-20

## 2019-03-14 LAB — VIT B6 SERPL-MCNC: 54.2 NMOL/L (ref 20–125)

## 2019-04-15 ENCOUNTER — OFFICE VISIT (OUTPATIENT)
Dept: FAMILY MEDICINE | Facility: CLINIC | Age: 54
End: 2019-04-15
Payer: COMMERCIAL

## 2019-04-15 ENCOUNTER — ANCILLARY PROCEDURE (OUTPATIENT)
Dept: GENERAL RADIOLOGY | Facility: CLINIC | Age: 54
End: 2019-04-15
Attending: FAMILY MEDICINE
Payer: COMMERCIAL

## 2019-04-15 VITALS
TEMPERATURE: 98.3 F | DIASTOLIC BLOOD PRESSURE: 86 MMHG | HEIGHT: 65 IN | OXYGEN SATURATION: 98 % | SYSTOLIC BLOOD PRESSURE: 122 MMHG | RESPIRATION RATE: 16 BRPM | HEART RATE: 71 BPM | WEIGHT: 205 LBS | BODY MASS INDEX: 34.16 KG/M2

## 2019-04-15 DIAGNOSIS — M16.12 OSTEOARTHRITIS OF LEFT HIP, UNSPECIFIED OSTEOARTHRITIS TYPE: ICD-10-CM

## 2019-04-15 DIAGNOSIS — M25.552 LEFT HIP PAIN: ICD-10-CM

## 2019-04-15 DIAGNOSIS — M25.552 LEFT HIP PAIN: Primary | ICD-10-CM

## 2019-04-15 DIAGNOSIS — E03.8 SUBCLINICAL HYPOTHYROIDISM: ICD-10-CM

## 2019-04-15 DIAGNOSIS — L72.3 SEBACEOUS CYST: ICD-10-CM

## 2019-04-15 PROCEDURE — 73502 X-RAY EXAM HIP UNI 2-3 VIEWS: CPT

## 2019-04-15 PROCEDURE — 99214 OFFICE O/P EST MOD 30 MIN: CPT | Performed by: FAMILY MEDICINE

## 2019-04-15 ASSESSMENT — MIFFLIN-ST. JEOR: SCORE: 1540.75

## 2019-04-15 NOTE — PATIENT INSTRUCTIONS
Patient Education     Understanding Osteoarthritis of the Hip    A joint is a place where two bones meet. The hip is a ball-and-socket joint. It is formed where the ball at the top of the thighbone (femur) rests in the socket in the pelvic bone. The hip joint allows the leg to move freely in many directions.  Hip osteoarthritis is a condition where parts of the hip joint wear out. It can lead to pain, stiffness, and limited movement.   What is osteoarthritis?  All joints contain a smooth tissue called cartilage. Cartilage cushions the ends of bones, helping them glide against each other. Hip osteoarthritis occurs when cartilage in the hip joint begins to break down and wear away. The ball and socket bones may then become exposed and rub together. The cartilage may become rough and pitted and may begin to wear away. This prevents smooth movement of the joint and can lead to pain.  Causes of osteoarthritis of the hip  Causes can include:    Wear and tear from normal use of the hip over time    Overuse of the hip during sports or work activities    Being overweight. This increases stress on the hip joint.    Injury to the hip    Infection of the hip joint  Symptoms of osteoarthritis of the hip  The main symptom of hip osteoarthritis is pain. The pain is most often felt in the groin area. It may also travel down the leg to the knee or to the back of the hip. The pain usually gets worse with activity, such as walking or climbing stairs. The pain may get better with rest. The joint may also be stiff first thing in the morning or after periods of sitting or lying down. Stiffness usually gets better with movement.  Treating osteoarthritis of the hip  Osteoarthritis is a long-term condition. Treatment usually focuses on managing symptoms. Treatment may include:    Over-the-counter or prescription medicines taken by mouth to help relieve pain and swelling    Injections of medicine into the joint to help relieve symptoms for  a time    A weight-loss plan if you are overweight    A plan of physical therapy and exercises to improve strength and flexibility around the joint    Cold or heat packs help relieve pain and stiffness    Assistive devices that help with movement, such as a cane or a walker    Assistive devices that make activities of daily life easier, such as raised toilet seats or shower bars  If other treatments don t do enough to relieve severe symptoms, you may need surgery to replace the joint. This surgery replaces the hip joint with an artificial joint. It can help relieve pain and stiffness and improve function of the hip.     When to call your healthcare provider  Call your healthcare provider right away if you have any of these:    Fever of 100.4 F (38 C) or higher, or as directed    Symptoms that don t get better with prescribed medicines or get worse    New symptoms   Date Last Reviewed: 3/10/2016    [id number] [employer]. [address] [place] [address], [place] [address]. All rights reserved. This information is not intended as a substitute for professional medical care. Always follow your healthcare professional's instructions.

## 2019-04-15 NOTE — PROGRESS NOTES
SUBJECTIVE:   Rayna Jackson is a 54 year old female who presents to clinic today for the following health issues:      HPI      1/ lump on right side of neck for several years, getting bigger in size   Denies any tenderness.     2/ fell @1 month ago and c/o right hip pain, not improving  Per patient, she was shoveling snow when she slipped and fell. She fell on her left side and has had constant pain since then. Denies any bruising.   Notes that since then she is having more pain especially in the underwear line and sometimes radiates to leg.   Rolling in beg, sitting for long periods of time worsens the pain  Alleviating factors: none  Therapies tried: advil, tylenol.        Additional history: as documented    Reviewed and updated as needed this visit by clinical staff  Tobacco  Allergies  Meds         Reviewed and updated as needed this visit by Provider           Patient Active Problem List   Diagnosis     Benign essential hypertension     Osteoarthritis of both knees, unspecified osteoarthritis type     Bilateral edema of lower extremity     Osteoarthritis of both feet, unspecified osteoarthritis type     IUD (intrauterine device) in place     Chronic nonintractable headache, unspecified headache type     HTN, goal below 140/90     Allergic rhinitis     Asthma     Environmental allergies     Moderate persistent asthma without complication     Past Surgical History:   Procedure Laterality Date     appendicitis  1980     COLONOSCOPY N/A 6/30/2017    Procedure: COLONOSCOPY;  COLONOSCOPY   ;  Surgeon: Brooks Villa MD;  Location: [facility]     CYSTECTOMY OVARIAN BENIGN  1980     KNEE SURGERY Left        Social History     Tobacco Use     Smoking status: Never Smoker     Smokeless tobacco: Never Used   Substance Use Topics     Alcohol use: Yes     Alcohol/week: 0.0 oz     Comment: occ     Family History   Problem Relation Age of Onset     Hypertension Mother      Thyroid Disease Mother      Hypertension Father   "    Diabetes Other         great grandmother     Thyroid Disease Sister      Thyroid Disease Maternal Aunt            ROS:  Constitutional, msk, skin systems are negative, except as otherwise noted.    OBJECTIVE:     /86 (BP Location: Right arm, Patient Position: Chair, Cuff Size: Adult Large)   Pulse 71   Temp 98.3  F (36.8  C) (Oral)   Resp 16   Ht 1.651 m (5' 5\")   Wt 93 kg (205 lb)   SpO2 98%   Breastfeeding? No   BMI 34.11 kg/m    Body mass index is 34.11 kg/m .  GENERAL: healthy, alert and no distress  MS: left hip- pain with abduction and adduction, + TTP anterior groin, negative log roll   SKIN: ~0.5 cm sebaceous cyst right supraclavicle region. Non-tender     Diagnostic Test Results:  XR left hip: I independently visualized the xray: degenerative changes noted       ASSESSMENT/PLAN:         1. Left hip pain  - XR Hip Left 2-3 Views; Future  - [facility], AND CH[facility]CT[facility] REFERRAL; Future    2. Osteoarthritis of left hip, unspecified osteoarthritis type  - will start PT. Patient will follow up with ortho for joint injection   - [facility], AND CHIR[facility]RACTIC REFERRAL; Future    3. Sebaceous cyst- right supraclavicle   - she prefers watchful waiting for now     4. Subclinical hypothyroidism  - recheck in 1 month   - **TSH with free T4 reflex FUTURE anytime; Future    See Patient Instructions    Mitra Keita MD  [facility]  "

## 2019-04-25 ENCOUNTER — OFFICE VISIT (OUTPATIENT)
Dept: ORTHOPEDICS | Facility: CLINIC | Age: 54
End: 2019-04-25
Payer: COMMERCIAL

## 2019-04-25 DIAGNOSIS — M16.12 PRIMARY OSTEOARTHRITIS OF LEFT HIP: Primary | ICD-10-CM

## 2019-04-25 PROCEDURE — 20611 DRAIN/INJ JOINT/BURSA W/US: CPT | Mod: LT | Performed by: FAMILY MEDICINE

## 2019-04-25 PROCEDURE — 99207 ZZC NO CHARGE LOS: CPT | Performed by: FAMILY MEDICINE

## 2019-04-25 RX ADMIN — METHYLPREDNISOLONE ACETATE 40 MG: 40 INJECTION, SUSPENSION INTRA-ARTICULAR; INTRALESIONAL; INTRAMUSCULAR; SOFT TISSUE at 15:58

## 2019-04-25 RX ADMIN — ROPIVACAINE HYDROCHLORIDE 3 ML: 5 INJECTION, SOLUTION EPIDURAL; INFILTRATION; PERINEURAL at 15:58

## 2019-04-25 NOTE — LETTER
4/25/2019         RE: Rayna Jackson  [address]  [place] [address]        Dear Colleague,    Thank you for referring your patient, Rayna Jackson, to the [facility]. Please see a copy of my visit note below.    ASSESSMENT & PLAN    1. Primary osteoarthritis of left hip      Referral directly from PCP for left hip intra-articular steroid injection  Steroid injection of the left hip: intra-articular  was performed today in clinic    Follow up with your PCP if pain is not improving after 2-3 weeks and with physical therapy.     -----      SUBJECTIVE:  Rayna Jackson is a 54 year old female who is seen in for left hip intra-articular corticosteroid injection as order by Mitra Keita M.D    They indicate that their current pain level is 7/10.  Pain is located left anterior hip and deep inside and is made worse with walking and at night. They have tried Tylenol and ibuprofen.      The patient is seen by themselves.    Independent visualization of the below image:  Recent Results (from the past 744 hour(s))   XR Hip Left 2-3 Views    Narrative    XR LEFT HIP TWO-THREE VIEWS  4/15/2019 4:41 PM     HISTORY: Left hip pain.    COMPARISON: None.      Impression    IMPRESSION: Mild joint space narrowing and irregularity in the left  hip joint space with mild osteophyte formation corresponding to  osteoarthritis. No acute fracture. Pelvic calcifications and IUD  incidentally and partially visible.    MARILYNN HOWARD MD         Large Joint Injection/Arthocentesis: L hip joint  Date/Time: 4/25/2019 3:58 PM  Performed by: Dilia Moura DO  Authorized by: Dilia Moura DO     Indications:  Pain and osteoarthritis  Needle Size:  22 G  Guidance: ultrasound    Approach:  Anterior  Location:  Hip      Site:  L hip joint  Medications:  40 mg methylPREDNISolone 40 MG/ML; 3 mL ropivacaine 5 MG/ML  Outcome:  Tolerated well, no immediate complications  Procedure discussed:  discussed risks, benefits, and alternatives    Consent Given by:  Patient  Timeout: timeout called immediately prior to procedure    Prep: patient was prepped and draped in usual sterile fashion       Pain noted to be a 7/10 before completion of the procedure and 2/10 after completion of the procedure.        Dilia Moura DO [facility]        Again, thank you for allowing me to participate in the care of your patient.        Sincerely,        Dilia Moura DO

## 2019-04-25 NOTE — PATIENT INSTRUCTIONS
1. Primary osteoarthritis of left hip      Steroid injection of the left hip: intra-articular  was performed today in clinic    - Would not soak in a hot tub, bath or swimming pool for 48 hours  - Ok to shower  - Ice today and only do your normal amounts of activity  - The lidocaine (what is giving you pain relief right now) will likely stop working in 1-2 hours.  You will then have pain again, similar to before you received the injection. The corticosteroid will not start working until approximately 1-2 weeks from now.  In a small percentage of people, cortisone can cause flushing/redness in the face. This usually lasts for 1-3 days and resolves. Cool compress and Ibuprofen/Tylenol can help if this happens.    Follow up with your PCP if pain is not improving after 2-3 weeks and with physical therapy.

## 2019-04-25 NOTE — PROGRESS NOTES
"ASSESSMENT & PLAN  There are no Patient Instructions on file for this visit.  -----    SUBJECTIVE  Rayna Jackson is a/an 54 year old female who is seen in consultation at the request of  Mitra Keita M.D. for evaluation of left hip pain. The patient is seen {sjaparent:883299}.    Onset: {sjadays/weeks/months/years:421687}. {Precipitating Event:001218}  Location of Pain: {side:5002} ***  Rating of Pain at worst: {PAIN SCALE:370971}  Rating of Pain Currently: {PAIN SCALE:473596}  Worsened by: ***  Better with: ***  Treatments tried: {sjatreatmentoptions:610492}  Quality: {sjasymptomdescriptor:579241}  Associated symptoms: {thassociatedsx:037537}  Orthopedic history: {YES - DATE/NO:116189::\"NO\"}  Relevant surgical history: {YES - DATE/NO:309689::\"NO\"}  Patient Social History: {social history:121570}    Patient's past medical, surgical, social, and family histories were reviewed today {SBDROSEXCEPTIONS:549120}    REVIEW OF SYSTEMS:  10 point ROS is negative other than symptoms noted above in HPI, Past Medical History or as stated below  Constitutional: NEGATIVE for fever, chills, change in weight  Skin: NEGATIVE for worrisome rashes, moles or lesions  GI/: NEGATIVE for bowel or bladder changes  Neuro: NEGATIVE for weakness, dizziness or paresthesias    OBJECTIVE:  There were no vitals taken for this visit.   General: healthy, alert and in no distress  HEENT: no scleral icterus or conjunctival erythema  Skin: no suspicious lesions or rash. No jaundice.  CV: no pedal edema  Resp: normal respiratory effort without conversational dyspnea   Psych: normal mood and affect  Gait: normal steady gait with appropriate coordination and balance  Neuro: Normal light sensory exam of lower extremity  MSK:  {LEFT/RIGHT CAPS:330032} HIP  Inspection:    No obvious deformity or asymmetry, level pelvis  Palpation:    Tender about the {[facility] HIP PALPATION:496995:a}. Otherwise all other landmarks are nontender.  Active Range of " Motion:     Flexion {Pain limited:365080}, IR {Pain limited:901648}, ER  {Pain limited:977080}  Strength:    Flexion {[facility] STRENGTH RANGE:443187}, adduction {[facility] STRENGTH RANGE:861836}, abduction {[facility] STRENGTH RANGE:621841}  Special Tests:    Positive: {[facility] HIP SPECIAL TESTS REV:864964}    Negative: {[facility] HIP SPECIAL TESTS REV:067827}    Independent visualization of the below image:  No results found for this or any previous visit (from the past 24 hour(s)).  Recent Results (from the past 744 hour(s))   XR Hip Left 2-3 Views    Narrative    XR LEFT HIP TWO-THREE VIEWS  4/15/2019 4:41 PM     HISTORY: Left hip pain.    COMPARISON: None.      Impression    IMPRESSION: Mild joint space narrowing and irregularity in the left  hip joint space with mild osteophyte formation corresponding to  osteoarthritis. No acute fracture. Pelvic calcifications and IUD  incidentally and partially visible.    MARILYNN HOWARD MD         Patient's conditions were thoroughly discussed during today's visit with greater than 50% of the visit spent counseling the patient with total time spent face-to-face with the patient being *** minutes.    Dilia Moura DO [facility]

## 2019-04-25 NOTE — PROGRESS NOTES
ASSESSMENT & PLAN    1. Primary osteoarthritis of left hip      Referral directly from PCP for left hip intra-articular steroid injection  Steroid injection of the left hip: intra-articular  was performed today in clinic    Follow up with your PCP if pain is not improving after 2-3 weeks and with physical therapy.     -----      SUBJECTIVE:  Rayna Jackson is a 54 year old female who is seen in for left hip intra-articular corticosteroid injection as order by Mitra Keita M.D    They indicate that their current pain level is 7/10.  Pain is located left anterior hip and deep inside and is made worse with walking and at night. They have tried Tylenol and ibuprofen.      The patient is seen by themselves.    Independent visualization of the below image:  Recent Results (from the past 744 hour(s))   XR Hip Left 2-3 Views    Narrative    XR LEFT HIP TWO-THREE VIEWS  4/15/2019 4:41 PM     HISTORY: Left hip pain.    COMPARISON: None.      Impression    IMPRESSION: Mild joint space narrowing and irregularity in the left  hip joint space with mild osteophyte formation corresponding to  osteoarthritis. No acute fracture. Pelvic calcifications and IUD  incidentally and partially visible.    MARILYNN HOWARD MD         Large Joint Injection/Arthocentesis: L hip joint  Date/Time: 4/25/2019 3:58 PM  Performed by: Dilia Moura DO  Authorized by: Dilia Moura DO     Indications:  Pain and osteoarthritis  Needle Size:  22 G  Guidance: ultrasound    Approach:  Anterior  Location:  Hip      Site:  L hip joint  Medications:  40 mg methylPREDNISolone 40 MG/ML; 3 mL ropivacaine 5 MG/ML  Outcome:  Tolerated well, no immediate complications  Procedure discussed: discussed risks, benefits, and alternatives    Consent Given by:  Patient  Timeout: timeout called immediately prior to procedure    Prep: patient was prepped and draped in usual sterile fashion       Pain noted to be a 7/10 before completion of the  procedure and 2/10 after completion of the procedure.        Dilia Moura DO [facility]

## 2019-04-30 RX ORDER — ROPIVACAINE HYDROCHLORIDE 5 MG/ML
3 INJECTION, SOLUTION EPIDURAL; INFILTRATION; PERINEURAL
Status: DISCONTINUED | OUTPATIENT
Start: 2019-04-25 | End: 2023-08-09

## 2019-04-30 RX ORDER — METHYLPREDNISOLONE ACETATE 40 MG/ML
40 INJECTION, SUSPENSION INTRA-ARTICULAR; INTRALESIONAL; INTRAMUSCULAR; SOFT TISSUE
Status: DISCONTINUED | OUTPATIENT
Start: 2019-04-25 | End: 2023-08-09

## 2019-05-09 DIAGNOSIS — J45.20 MILD INTERMITTENT ASTHMA WITHOUT COMPLICATION: ICD-10-CM

## 2019-05-09 NOTE — TELEPHONE ENCOUNTER
"Requested Prescriptions   Pending Prescriptions Disp Refills     FLOVENT  MCG/ACT inhaler [Pharmacy Med Name: FLOVENT  MCG INHALER] 36 Inhaler 0     Sig: TAKE 2 PUFFS BY MOUTH TWICE A DAY    Last Written Prescription Date:  8/20/18  Last Fill Quantity: 3 inhaler,  # refills: 1   Last office visit: 4/15/2019 with prescribing provider:  Marium Carmichael   Future Office Visit:   Next 5 appointments (look out 90 days)    May 17, 2019  8:15 AM CDT  [facility] [address] [place] [id number]  [phone]                Inhaled Steroids Protocol Passed - 5/9/2019  5:32 AM        Passed - Patient is age 12 or older        Passed - Asthma control assessment score within normal limits in last 6 months     Please review ACT score.           Passed - Medication is active on med list        Passed - Recent (6 mo) or future (30 days) visit within the authorizing provider's specialty     Patient had office visit in the last 6 months or has a visit in the next 30 days with authorizing provider or within the authorizing provider's specialty.  See \"Patient Info\" tab in inbasket, or \"Choose Columns\" in Meds & Orders section of the refill encounter.              "

## 2019-05-10 ENCOUNTER — THERAPY VISIT (OUTPATIENT)
Dept: PHYSICAL THERAPY | Facility: CLINIC | Age: 54
End: 2019-05-10
Payer: COMMERCIAL

## 2019-05-10 DIAGNOSIS — M25.552 HIP PAIN, LEFT: ICD-10-CM

## 2019-05-10 DIAGNOSIS — M25.552 LEFT HIP PAIN: ICD-10-CM

## 2019-05-10 DIAGNOSIS — M16.12 OSTEOARTHRITIS OF LEFT HIP, UNSPECIFIED OSTEOARTHRITIS TYPE: ICD-10-CM

## 2019-05-10 PROCEDURE — 97530 THERAPEUTIC ACTIVITIES: CPT | Mod: GP | Performed by: PHYSICAL THERAPIST

## 2019-05-10 PROCEDURE — 97110 THERAPEUTIC EXERCISES: CPT | Mod: GP | Performed by: PHYSICAL THERAPIST

## 2019-05-10 PROCEDURE — 97161 PT EVAL LOW COMPLEX 20 MIN: CPT | Mod: GP | Performed by: PHYSICAL THERAPIST

## 2019-05-10 RX ORDER — DEXAMETHASONE 4 MG/1
TABLET ORAL
Qty: 36 INHALER | Refills: 0 | Status: SHIPPED | OUTPATIENT
Start: 2019-05-10 | End: 2020-02-10

## 2019-05-10 ASSESSMENT — ACTIVITIES OF DAILY LIVING (ADL)
STANDING_FOR_15_MINUTES: SLIGHT DIFFICULTY
GOING_DOWN_1_FLIGHT_OF_STAIRS: SLIGHT DIFFICULTY
WALKING_UP_STEEP_HILLS: SLIGHT DIFFICULTY
WALKING_APPROXIMATELY_10_MINUTES: SLIGHT DIFFICULTY
RECREATIONAL_ACTIVITIES: SLIGHT DIFFICULTY
LIGHT_TO_MODERATE_WORK: SLIGHT DIFFICULTY
WALKING_INITIALLY: SLIGHT DIFFICULTY
HOS_ADL_ITEM_SCORE_TOTAL: 40
GOING_UP_1_FLIGHT_OF_STAIRS: SLIGHT DIFFICULTY
GETTING_INTO_AND_OUT_OF_AN_AVERAGE_CAR: SLIGHT DIFFICULTY
HOS_ADL_COUNT: 13
WALKING_15_MINUTES_OR_GREATER: SLIGHT DIFFICULTY
WALKING_DOWN_STEEP_HILLS: SLIGHT DIFFICULTY
HOS_ADL_HIGHEST_POTENTIAL_SCORE: 52
STEPPING_UP_AND_DOWN_CURBS: NO DIFFICULTY AT ALL
PUTTING_ON_SOCKS_AND_SHOES: SLIGHT DIFFICULTY
ROLLING_OVER_IN_BED: SLIGHT DIFFICULTY
SITTING_FOR_15_MINUTES: SLIGHT DIFFICULTY

## 2019-05-10 NOTE — TELEPHONE ENCOUNTER
Prescription approved per [facility] Refill Protocol.  Fara Pappas RN

## 2019-05-10 NOTE — PROGRESS NOTES
[facility] Initial Evaluation  Subjective:  The history is provided by the patient. No  was used.   Rayna Jackson is a 54 year old female with a left hip condition.  Condition occurred with:  Degenerative joint disease.  Condition occurred: for unknown reasons.  This is a new condition  Pt c/o L hip pain x 3 months.  States fell on ice while shoveling.  Imaging showed L hip OA.  MD order date 4/15/19.  Pt had L hip injection 4/25/19 with good improvement noted..    Patient reports pain:  Lateral.  Radiates to:  No radiation.  Pain is described as sharp and aching and is intermittent and reported as 2/10 and 5/10.  Associated symptoms:  Loss of motion/stiffness and loss of strength. Pain is the same all the time.  Symptoms are exacerbated by ascending stairs, bending/squatting, descending stairs and walking and relieved by rest.  Since onset symptoms are gradually improving.  Special tests:  X-ray (OA).  Previous treatment includes other (injection).  There was significant improvement following previous treatment.  General health as reported by patient is good.  Pertinent medical history includes:  Asthma, high blood pressure, overweight and osteoarthritis.  Medical allergies: see EPIC.    Current medications:  High blood pressure medication and thyroid medication.  Current occupation   .  Patient is currently not working due to present treatment problem.  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                                Objective:  System                                           Hip Evaluation  HIP AROM:  AROM:   Left Hip:        Right Hip:   Normal: WFL.                  Hip PROM:  Hip PROM:  Left Hip:    Right Hip:  Normal: WFL, ERP IR/ER.                          Hip Strength:  : glute med 4/5, max 4+/5. : Glute med/max 4+ to 5-/5.                          Hip Special Testing:    Left hip positive for the following special tests:   Fadir/Labrum      Hip Palpation:    Left hip tenderness present at:   Bursa    Functional Testing:          Quad:      Bilateral leg squat:  Moderate loss of control and mild loss of control     Proprioception:    Stork balance test:   Left:    30sec mod mm activity  Right:  30sec   % of Uninvolved:                General     ROS    Assessment/Plan:    Patient is a 54 year old female with left side hip complaints.    Patient has the following significant findings with corresponding treatment plan.                Diagnosis 1:  L hip pain  Pain -  hot/cold therapy and home program  Decreased ROM/flexibility - manual therapy and therapeutic exercise  Decreased strength - therapeutic exercise and therapeutic activities  Decreased proprioception - neuro re-education and therapeutic activities  Impaired muscle performance - neuro re-education  Decreased function - therapeutic activities    Therapy Evaluation Codes:     Cumulative Therapy Evaluation is: Low complexity.    Previous and current functional limitations:  (See Goal Flow Sheet for this information)    Short term and Long term goals: (See Goal Flow Sheet for this information)     Communication ability:  Patient appears to be able to clearly communicate and understand verbal and written communication and follow directions correctly.  Treatment Explanation - The following has been discussed with the patient:   RX ordered/plan of care  Anticipated outcomes  Possible risks and side effects  This patient would benefit from PT intervention to resume normal activities.   Rehab potential is good.    Frequency:  1 X week, once daily  Duration:  for 8 weeks  Discharge Plan:  Achieve all LTG.  Independent in home treatment program.  Reach maximal therapeutic benefit.    Please refer to the daily flowsheet for treatment today, total treatment time and time spent performing 1:1 timed codes.

## 2019-05-17 ENCOUNTER — OFFICE VISIT (OUTPATIENT)
Dept: ENDOCRINOLOGY | Facility: CLINIC | Age: 54
End: 2019-05-17
Payer: COMMERCIAL

## 2019-05-17 VITALS
TEMPERATURE: 97.9 F | DIASTOLIC BLOOD PRESSURE: 81 MMHG | SYSTOLIC BLOOD PRESSURE: 135 MMHG | HEART RATE: 65 BPM | RESPIRATION RATE: 12 BRPM | BODY MASS INDEX: 34.6 KG/M2 | WEIGHT: 207.9 LBS | OXYGEN SATURATION: 96 %

## 2019-05-17 DIAGNOSIS — E66.811 CLASS 1 OBESITY DUE TO EXCESS CALORIES WITH SERIOUS COMORBIDITY AND BODY MASS INDEX (BMI) OF 34.0 TO 34.9 IN ADULT: ICD-10-CM

## 2019-05-17 DIAGNOSIS — E66.09 CLASS 1 OBESITY DUE TO EXCESS CALORIES WITH SERIOUS COMORBIDITY AND BODY MASS INDEX (BMI) OF 34.0 TO 34.9 IN ADULT: ICD-10-CM

## 2019-05-17 DIAGNOSIS — E03.8 SUBCLINICAL HYPOTHYROIDISM: ICD-10-CM

## 2019-05-17 DIAGNOSIS — R63.5 ABNORMAL WEIGHT GAIN: ICD-10-CM

## 2019-05-17 DIAGNOSIS — E78.5 HYPERLIPIDEMIA LDL GOAL <100: Primary | ICD-10-CM

## 2019-05-17 DIAGNOSIS — R73.09 ELEVATED GLUCOSE: ICD-10-CM

## 2019-05-17 LAB
ALBUMIN SERPL-MCNC: 4 G/DL (ref 3.4–5)
ALP SERPL-CCNC: 86 U/L (ref 40–150)
ALT SERPL W P-5'-P-CCNC: 15 U/L (ref 0–50)
ANION GAP SERPL CALCULATED.3IONS-SCNC: 7 MMOL/L (ref 3–14)
AST SERPL W P-5'-P-CCNC: 15 U/L (ref 0–45)
BILIRUB SERPL-MCNC: 1.1 MG/DL (ref 0.2–1.3)
BUN SERPL-MCNC: 11 MG/DL (ref 7–30)
CALCIUM SERPL-MCNC: 9.3 MG/DL (ref 8.5–10.1)
CHLORIDE SERPL-SCNC: 109 MMOL/L (ref 94–109)
CHOLEST SERPL-MCNC: 244 MG/DL
CO2 SERPL-SCNC: 26 MMOL/L (ref 20–32)
CORTIS SERPL-MCNC: 5.7 UG/DL (ref 4–22)
CREAT SERPL-MCNC: 0.66 MG/DL (ref 0.52–1.04)
GFR SERPL CREATININE-BSD FRML MDRD: >90 ML/MIN/{1.73_M2}
GLUCOSE SERPL-MCNC: 87 MG/DL (ref 70–99)
HDLC SERPL-MCNC: 55 MG/DL
INSULIN SERPL-ACNC: 12.7 MU/L (ref 3–25)
LDLC SERPL CALC-MCNC: 155 MG/DL
NONHDLC SERPL-MCNC: 189 MG/DL
POTASSIUM SERPL-SCNC: 3.9 MMOL/L (ref 3.4–5.3)
PROT SERPL-MCNC: 7.6 G/DL (ref 6.8–8.8)
SODIUM SERPL-SCNC: 142 MMOL/L (ref 133–144)
T4 FREE SERPL-MCNC: 0.96 NG/DL (ref 0.76–1.46)
TRIGL SERPL-MCNC: 171 MG/DL
TSH SERPL DL<=0.005 MIU/L-ACNC: 6.46 MU/L (ref 0.4–4)

## 2019-05-17 PROCEDURE — 99214 OFFICE O/P EST MOD 30 MIN: CPT | Performed by: CLINICAL NURSE SPECIALIST

## 2019-05-17 PROCEDURE — 80053 COMPREHEN METABOLIC PANEL: CPT | Performed by: CLINICAL NURSE SPECIALIST

## 2019-05-17 PROCEDURE — 82533 TOTAL CORTISOL: CPT | Performed by: CLINICAL NURSE SPECIALIST

## 2019-05-17 PROCEDURE — 84439 ASSAY OF FREE THYROXINE: CPT | Performed by: FAMILY MEDICINE

## 2019-05-17 PROCEDURE — 84443 ASSAY THYROID STIM HORMONE: CPT | Performed by: FAMILY MEDICINE

## 2019-05-17 PROCEDURE — 80061 LIPID PANEL: CPT | Performed by: CLINICAL NURSE SPECIALIST

## 2019-05-17 PROCEDURE — 84681 ASSAY OF C-PEPTIDE: CPT | Performed by: CLINICAL NURSE SPECIALIST

## 2019-05-17 PROCEDURE — 83525 ASSAY OF INSULIN: CPT | Performed by: CLINICAL NURSE SPECIALIST

## 2019-05-17 PROCEDURE — 36415 COLL VENOUS BLD VENIPUNCTURE: CPT | Performed by: CLINICAL NURSE SPECIALIST

## 2019-05-17 RX ORDER — PHENTERMINE HYDROCHLORIDE 37.5 MG/1
37.5 TABLET ORAL
Qty: 32 TABLET | Refills: 2 | Status: SHIPPED | OUTPATIENT
Start: 2019-05-17 | End: 2019-08-20

## 2019-05-17 NOTE — LETTER
5/17/2019         RE: Rayna Jackson  [address]  [facility] [id number]        Dear Colleague,    Thank you for referring your patient, Rayna Jackson, to the [facility]. Please see a copy of my visit note below.    Name: Rayna Jackson  Seen at the request of No ref. provider found for obesity.  HPI:  Rayna Jackson is a 54 year old female who presents for the evaluation/management of obestiy.  Has noted abnormal weight gain over the past 3 years - states she has gained 70 lbs.   Making healthy food choices - doesn't feel she overeats.  Unable to lose weight and has continued to gain.  Previously tried a program called [employer], also weight watchers, [place] [facility] was most effective - lost 20 lbs     Diarrhea/Constipation:No  Changes in Hair or Skin:No hair loss  Diabetes:No but states her blood sugars are 'always' high-normal.  No GD with pregnancy but was monitored more closely because glucose levels were elevated but not in the diabetes range.  + FH of DM2 - father and MGGM.  Sleep Apnea/Snores:snores - was seen by specialist who did not feel sleep apnea eval was indicated, didn't believe she had sleep apnea  Hypertension or CAD:Yes: Controlled on Losartan  Hyperlipidemia:Yes: untreated.  Family history of Obesity: Yes  Diet:  Healthy food choices, low carb, portion controlled  Exercise: Difficult due to joint pain, + OA  Subclinical Hypothyroidism.  Recently diagnosed.  Currently treated with levothyroxine 25 mcg/day.  TFT's done today are pending.  + fatigue and cold intolerance.  FH + for thyroid disease - Mother, MGM, maternal aunt, sister.  Previous lymph node biopsy + for Lupus - was seen at [place], no diagnosis of Lupus has been confirmed.  PMH/PSH:  Past Medical History:   Diagnosis Date     Torn meniscus 2013    left      Past Surgical History:   Procedure Laterality Date     appendicitis  1980     COLONOSCOPY N/A 6/30/2017    Procedure: COLONOSCOPY;  COLONOSCOPY   ;  Surgeon:  Brooks Villa MD;  Location: [facility]     CYSTECTOMY OVARIAN BENIGN  1980     KNEE SURGERY Left      Family Hx:  Family History   Problem Relation Age of Onset     Hypertension Mother      Thyroid Disease Mother      Hypertension Father      Diabetes Other         great grandmother     Thyroid Disease Sister      Thyroid Disease Maternal Aunt      Thyroid disease: Yes, mother, sister, aunt, MGM        DM2: Yes: father, MGGM         Autoimmune: DM1, SLE, RA, Vitiligo     Social Hx:  Social History     Socioeconomic History     Marital status:      Spouse name: Juan F     Number of children: 4     Years of education: Not on file     Highest education level: Not on file   Occupational History     Occupation: manager     Employer: [employer]   Social Needs     Financial resource strain: Not on file     Food insecurity:     Worry: Not on file     Inability: Not on file     Transportation needs:     Medical: Not on file     Non-medical: Not on file   Tobacco Use     Smoking status: Never Smoker     Smokeless tobacco: Never Used   Substance and Sexual Activity     Alcohol use: Yes     Alcohol/week: 0.0 oz     Comment: occ     Drug use: No     Sexual activity: Yes     Birth control/protection: IUD   Lifestyle     Physical activity:     Days per week: Not on file     Minutes per session: Not on file     Stress: Not on file   Relationships     Social connections:     Talks on phone: Not on file     Gets together: Not on file     Attends Congregation service: Not on file     Active member of club or organization: Not on file     Attends meetings of clubs or organizations: Not on file     Relationship status: Not on file     Intimate partner violence:     Fear of current or ex partner: Not on file     Emotionally abused: Not on file     Physically abused: Not on file     Forced sexual activity: Not on file   Other Topics Concern     Parent/sibling w/ CABG, MI or angioplasty before 65F 55M? Not Asked   Social History  Narrative     Not on file          MEDICATIONS:  has a current medication list which includes the following prescription(s): albuterol, fexofenadine, flovent hfa, fluticasone, levonorgestrel, levothyroxine, losartan, montelukast, olopatadine, triamcinolone, and phentermine, and the following Facility-Administered Medications: methylprednisolone and ropivacaine.    ROS   ROS: 10 point ROS neg other than the symptoms noted above in the HPI.    GENERAL: no weight loss   HEENT: no dysphagia or neck pain or tenderness, + upper respiratory allergy symptoms  CV: no chest pain, pressure or palpitations  LUNGS: no SOB   ABDOMEN: no diarrhea, constipation, abdominal pain  MSK: + joint pain  ENDOCRINE: + cold intolerance    Physical Exam   VS: /81 (BP Location: Right arm, Patient Position: Chair, Cuff Size: Adult Large)   Pulse 65   Temp 97.9  F (36.6  C) (Oral)   Resp 12   Wt 94.3 kg (207 lb 14.4 oz)   SpO2 96%   Breastfeeding? No   BMI 34.60 kg/m     GENERAL: AXOX3, NAD, well dressed, answering questions appropriately, appears stated age.  HEENT: OP clear, no LAD, no TM, non-tender, no exopthalmous, no proptosis, EOMI, no lig lag, no retraction  NECK:  Supple, no thyromegaly, no adenopathy  CV: RRR, no rubs, gallops, no murmurs  LUNGS: CTAB, no wheezes, rales, or ronchi  ABDOMEN: soft, nontender, nondistended, +BS, no organomegaly, no broad striae noted.  EXTREMITIES: no edema, +pulses, no rashes, no lesions  NEUROLOGY: CN grossly intact, + DTR upper and lower extremity, no tremors  MSK: grossly intact  SKIN: no acanthosis, skin tags; no rashes, no lesions, no intertrigo    LABS:  !COMPREHENSIVE Latest Ref Rng & Units 1/30/2018 5/24/2018   SODIUM 133 - 144 mmol/L 141 141   POTASSIUM 3.4 - 5.3 mmol/L 3.5 4.0   CHLORIDE 94 - 109 mmol/L 110 (H) 111 (H)   BUN 7 - 30 mg/dL 15 10   Creatinine 0.52 - 1.04 mg/dL 0.65 0.65   Glucose 70 - 99 mg/dL 95 96   ANION GAP 3 - 14 mmol/L 8 6   CALCIUM 8.5 - 10.1 mg/dL 9.3 9.0  "    !COMPREHENSIVE Latest Ref Rng & Units 9/8/2016   AST 0 - 45 U/L 66 (H)   ALT 0 - 50 U/L 57 (H)     Component      Latest Ref Rng & Units 8/20/2018 3/11/2019   Hemoglobin A1C      0 - 5.6 % 4.9 5.4     !LIPID/HEPATIC Latest Ref Rng & Units 1/2/2018   CHOLESTEROL <200 mg/dL 212 (H)   TRIGLYCERIDES <150 mg/dL 154 (H)   HDL CHOLESTEROL >49 mg/dL 58   LDL CHOLESTEROL, CALCULATED <100 mg/dL 123 (H)   NON HDL CHOLESTEROL <130 mg/dL 154 (H)     Component      Latest Ref Rng & Units 3/11/2019   Vitamin D Deficiency screening      20 - 75 ug/L 23     !THYROID Latest Ref Rng & Units 3/11/2019 8/20/2018   TSH 0.40 - 4.00 mU/L 6.70 (H) 4.36 (H)   T4 FREE 0.76 - 1.46 ng/dL 0.97 0.88     Vital Signs 3/11/2019 4/15/2019 5/17/2019   Weight (LB) 207 lb 205 lb 207 lb 14.4 oz   Height 5' 5\" 5' 5\"    BMI (Calculated) 34.52 34.11    Waist circumference:  43.5\" (today)    All pertinent notes, labs, and images personally reviewed by me.     A/P  Ms.Jodi ROBERT Jackson is a 54 year old here for the evaluation of obestiy:    1. Obesity/abnormal weight gain-  BMI 34-35.  Obesity related comorbidity include HTN, OA, hyperlipidemia.    Obesity is associated with a significant increase in mortality and risk of many disorders, including diabetes mellitus, hypertension, dyslipidemia, heart disease, stroke, sleep apnea, cancer, and many others. Conversely, weight loss is associated with a reduction in obesity-associated morbidity.    Endocrine evaluation of obesity includes Diabetes/prediabetes, Cushing's and thyroid dysfunction.  The relevant work up for the diagnosis and management of obesity and various treatment options were discussed. Body Mass Index (BMI) has been a standard means for calculating risk for overweight and obesity. The new American Association of Clinical Endocrinology (AACE) algorithm recommends lifestyle modifications as the initial phase of treatment for all patients with the BMI equal or greater than 25 kg/m2. Lifestyle " modifications includes use of medical nutrition therapy, exercise, tobacco cessation, adequate quality and quantity of sleep, limited consumption of alcohol and reduced stress through implementation of a structured, multidisciplinary program.    In patients with complications associated with obesity, graded interventions are recommended including pharmacological therapy such as phentermine, orlistat, lorcaserin and phentermine/topiramate ER, contrave ( buproprion/naltreone) and the use of very low calorie meal replacement programs.    If medical intervention is insufficient, surgical therapy may be considered, especially in patients with BMI greater than or equal to 35 kg/m2 with multiple complications. Surgical treatments include lap-band, gastric sleeve or gastric bypass surgery.    I informed the pt that:  1.Weight loss medications can be taken to assist with weight reduction when combined with appropriate healthy lifestyle changes.  2.I discussed possible s/e, risks and benefits of weight loss medications.  3.All medications are FDA approved, however, some may be used ''off label'' for their weight loss benefits and some ''short term'' medications can be used for longer periods to achieve desired clinical outcomes.  4.All patients taking weight loss medications must be seen in clinic for refill authorization.    Counseling exercise ( V65.41)  Dietary counseling( V65.3)  Calculated BMI above the upper parameter and f/u plan was documented in the medical record.  Discussed indications, risks and benefits of all medications prescribed, and answered questions to patient's satisfaction.  Recommend IDM program  Start Phentermine, 1/2 tab x 1 week then 1 tab per day - take later in the morning between 10 am and 2 pm.    2.  History of elevated glucose.  Previous A1c in normal range.  Will obtain fasting glucose, insulin, and cpeptide today to rule out insulin resistance.    3.  Hyperlipidemia.  Obtain fasting lipid  panel today.    Labs ordered today:   Orders Placed This Encounter   Procedures     C-peptide     Insulin level     Lipid panel reflex to direct LDL Fasting     Cortisol     Comprehensive metabolic panel     Radiology/Consults ordered today: None    More than 50% of the time spent with Ms. Jackson on counseling / coordinating her care.  Total face to face time was 30 minutes.      Follow-up:  3 months    Kyra Helms NP  Endocrinology  [facility]  CC: Mitra Keita   ____________________________________________________________          Again, thank you for allowing me to participate in the care of your patient.        Sincerely,        MIRZA Hays CNP

## 2019-05-17 NOTE — PROGRESS NOTES
Name: Rayna Jackson  Seen at the request of No ref. provider found for obesity.  HPI:  Rayna Jackson is a 54 year old female who presents for the evaluation/management of obestiy.  Has noted abnormal weight gain over the past 3 years - states she has gained 70 lbs.   Making healthy food choices - doesn't feel she overeats.  Unable to lose weight and has continued to gain.  Previously tried a program called [employer], also weight watchers, [place] [facility] was most effective - lost 20 lbs     Diarrhea/Constipation:No  Changes in Hair or Skin:No hair loss  Diabetes:No but states her blood sugars are 'always' high-normal.  No GD with pregnancy but was monitored more closely because glucose levels were elevated but not in the diabetes range.  + FH of DM2 - father and MGGM.  Sleep Apnea/Snores:snores - was seen by specialist who did not feel sleep apnea eval was indicated, didn't believe she had sleep apnea  Hypertension or CAD:Yes: Controlled on Losartan  Hyperlipidemia:Yes: untreated.  Family history of Obesity: Yes  Diet:  Healthy food choices, low carb, portion controlled  Exercise: Difficult due to joint pain, + OA  Subclinical Hypothyroidism.  Recently diagnosed.  Currently treated with levothyroxine 25 mcg/day.  TFT's done today are pending.  + fatigue and cold intolerance.  FH + for thyroid disease - Mother, MGM, maternal aunt, sister.  Previous lymph node biopsy + for Lupus - was seen at [place], no diagnosis of Lupus has been confirmed.  PMH/PSH:  Past Medical History:   Diagnosis Date     Torn meniscus 2013    left      Past Surgical History:   Procedure Laterality Date     appendicitis  1980     COLONOSCOPY N/A 6/30/2017    Procedure: COLONOSCOPY;  COLONOSCOPY   ;  Surgeon: Brooks Villa MD;  Location:  GI     CYSTECTOMY OVARIAN BENIGN  1980     KNEE SURGERY Left      Family Hx:  Family History   Problem Relation Age of Onset     Hypertension Mother      Thyroid Disease Mother      Hypertension Father       Diabetes Other         great grandmother     Thyroid Disease Sister      Thyroid Disease Maternal Aunt      Thyroid disease: Yes, mother, sister, aunt, MGM        DM2: Yes: father, MGGM         Autoimmune: DM1, SLE, RA, Vitiligo     Social Hx:  Social History     Socioeconomic History     Marital status:      Spouse name: Juan F     Number of children: 4     Years of education: Not on file     Highest education level: Not on file   Occupational History     Occupation: manager     Employer: [employer]   Social Needs     Financial resource strain: Not on file     Food insecurity:     Worry: Not on file     Inability: Not on file     Transportation needs:     Medical: Not on file     Non-medical: Not on file   Tobacco Use     Smoking status: Never Smoker     Smokeless tobacco: Never Used   Substance and Sexual Activity     Alcohol use: Yes     Alcohol/week: 0.0 oz     Comment: occ     Drug use: No     Sexual activity: Yes     Birth control/protection: IUD   Lifestyle     Physical activity:     Days per week: Not on file     Minutes per session: Not on file     Stress: Not on file   Relationships     Social connections:     Talks on phone: Not on file     Gets together: Not on file     Attends Adventism service: Not on file     Active member of club or organization: Not on file     Attends meetings of clubs or organizations: Not on file     Relationship status: Not on file     Intimate partner violence:     Fear of current or ex partner: Not on file     Emotionally abused: Not on file     Physically abused: Not on file     Forced sexual activity: Not on file   Other Topics Concern     Parent/sibling w/ CABG, MI or angioplasty before 65F 55M? Not Asked   Social History Narrative     Not on file          MEDICATIONS:  has a current medication list which includes the following prescription(s): albuterol, fexofenadine, flovent hfa, fluticasone, levonorgestrel, levothyroxine, losartan, montelukast, olopatadine,  triamcinolone, and phentermine, and the following Facility-Administered Medications: methylprednisolone and ropivacaine.    ROS   ROS: 10 point ROS neg other than the symptoms noted above in the HPI.    GENERAL: no weight loss   HEENT: no dysphagia or neck pain or tenderness, + upper respiratory allergy symptoms  CV: no chest pain, pressure or palpitations  LUNGS: no SOB   ABDOMEN: no diarrhea, constipation, abdominal pain  MSK: + joint pain  ENDOCRINE: + cold intolerance    Physical Exam   VS: /81 (BP Location: Right arm, Patient Position: Chair, Cuff Size: Adult Large)   Pulse 65   Temp 97.9  F (36.6  C) (Oral)   Resp 12   Wt 94.3 kg (207 lb 14.4 oz)   SpO2 96%   Breastfeeding? No   BMI 34.60 kg/m    GENERAL: AXOX3, NAD, well dressed, answering questions appropriately, appears stated age.  HEENT: OP clear, no LAD, no TM, non-tender, no exopthalmous, no proptosis, EOMI, no lig lag, no retraction  NECK:  Supple, no thyromegaly, no adenopathy  CV: RRR, no rubs, gallops, no murmurs  LUNGS: CTAB, no wheezes, rales, or ronchi  ABDOMEN: soft, nontender, nondistended, +BS, no organomegaly, no broad striae noted.  EXTREMITIES: no edema, +pulses, no rashes, no lesions  NEUROLOGY: CN grossly intact, + DTR upper and lower extremity, no tremors  MSK: grossly intact  SKIN: no acanthosis, skin tags; no rashes, no lesions, no intertrigo    LABS:  !COMPREHENSIVE Latest Ref Rng & Units 1/30/2018 5/24/2018   SODIUM 133 - 144 mmol/L 141 141   POTASSIUM 3.4 - 5.3 mmol/L 3.5 4.0   CHLORIDE 94 - 109 mmol/L 110 (H) 111 (H)   BUN 7 - 30 mg/dL 15 10   Creatinine 0.52 - 1.04 mg/dL 0.65 0.65   Glucose 70 - 99 mg/dL 95 96   ANION GAP 3 - 14 mmol/L 8 6   CALCIUM 8.5 - 10.1 mg/dL 9.3 9.0     !COMPREHENSIVE Latest Ref Rng & Units 9/8/2016   AST 0 - 45 U/L 66 (H)   ALT 0 - 50 U/L 57 (H)     Component      Latest Ref Rng & Units 8/20/2018 3/11/2019   Hemoglobin A1C      0 - 5.6 % 4.9 5.4     !LIPID/HEPATIC Latest Ref Rng & Units  "1/2/2018   CHOLESTEROL <200 mg/dL 212 (H)   TRIGLYCERIDES <150 mg/dL 154 (H)   HDL CHOLESTEROL >49 mg/dL 58   LDL CHOLESTEROL, CALCULATED <100 mg/dL 123 (H)   NON HDL CHOLESTEROL <130 mg/dL 154 (H)     Component      Latest Ref Rng & Units 3/11/2019   Vitamin D Deficiency screening      20 - 75 ug/L 23     !THYROID Latest Ref Rng & Units 3/11/2019 8/20/2018   TSH 0.40 - 4.00 mU/L 6.70 (H) 4.36 (H)   T4 FREE 0.76 - 1.46 ng/dL 0.97 0.88     Vital Signs 3/11/2019 4/15/2019 5/17/2019   Weight (LB) 207 lb 205 lb 207 lb 14.4 oz   Height 5' 5\" 5' 5\"    BMI (Calculated) 34.52 34.11    Waist circumference:  43.5\" (today)    All pertinent notes, labs, and images personally reviewed by me.     A/P  Ms.Jodi ROBERT Jackson is a 54 year old here for the evaluation of obestiy:    1. Obesity/abnormal weight gain-  BMI 34-35.  Obesity related comorbidity include HTN, OA, hyperlipidemia.    Obesity is associated with a significant increase in mortality and risk of many disorders, including diabetes mellitus, hypertension, dyslipidemia, heart disease, stroke, sleep apnea, cancer, and many others. Conversely, weight loss is associated with a reduction in obesity-associated morbidity.    Endocrine evaluation of obesity includes Diabetes/prediabetes, Cushing's and thyroid dysfunction.  The relevant work up for the diagnosis and management of obesity and various treatment options were discussed. Body Mass Index (BMI) has been a standard means for calculating risk for overweight and obesity. The new American Association of Clinical Endocrinology (AACE) algorithm recommends lifestyle modifications as the initial phase of treatment for all patients with the BMI equal or greater than 25 kg/m2. Lifestyle modifications includes use of medical nutrition therapy, exercise, tobacco cessation, adequate quality and quantity of sleep, limited consumption of alcohol and reduced stress through implementation of a structured, multidisciplinary program.    In " patients with complications associated with obesity, graded interventions are recommended including pharmacological therapy such as phentermine, orlistat, lorcaserin and phentermine/topiramate ER, contrave ( buproprion/naltreone) and the use of very low calorie meal replacement programs.    If medical intervention is insufficient, surgical therapy may be considered, especially in patients with BMI greater than or equal to 35 kg/m2 with multiple complications. Surgical treatments include lap-band, gastric sleeve or gastric bypass surgery.    I informed the pt that:  1.Weight loss medications can be taken to assist with weight reduction when combined with appropriate healthy lifestyle changes.  2.I discussed possible s/e, risks and benefits of weight loss medications.  3.All medications are FDA approved, however, some may be used ''off label'' for their weight loss benefits and some ''short term'' medications can be used for longer periods to achieve desired clinical outcomes.  4.All patients taking weight loss medications must be seen in clinic for refill authorization.    Counseling exercise ( V65.41)  Dietary counseling( V65.3)  Calculated BMI above the upper parameter and f/u plan was documented in the medical record.  Discussed indications, risks and benefits of all medications prescribed, and answered questions to patient's satisfaction.  Recommend IDM program  Start Phentermine, 1/2 tab x 1 week then 1 tab per day - take later in the morning between 10 am and 2 pm.    2.  History of elevated glucose.  Previous A1c in normal range.  Will obtain fasting glucose, insulin, and cpeptide today to rule out insulin resistance.    3.  Hyperlipidemia.  Obtain fasting lipid panel today.    Labs ordered today:   Orders Placed This Encounter   Procedures     C-peptide     Insulin level     Lipid panel reflex to direct LDL Fasting     Cortisol     Comprehensive metabolic panel     Radiology/Consults ordered today:  None    More than 50% of the time spent with Ms. Jackson on counseling / coordinating her care.  Total face to face time was 30 minutes.      Follow-up:  3 months    Kyra Helms NP  [facility]  CC: Mitra Keita   ____________________________________________________________

## 2019-05-17 NOTE — PATIENT INSTRUCTIONS
"Waist circ. 43.5\"      I recommend reading The Obesity Code.  Affiliated website: [email or web address]    Start Phentermine at 1/2 tab daily x 1 week then increase to one tab per day.  I recommend taking it later in the morning, 10 am to 2 pm - whatever time works best for you.     [place] move program - handouts  Hungry [email or web address] - recipes  Eating Well.com - recipes  [employer] - podcast    Calorie [email or web address]    Follow up in 3 months.      "

## 2019-05-20 DIAGNOSIS — E03.8 SUBCLINICAL HYPOTHYROIDISM: ICD-10-CM

## 2019-05-20 LAB — C PEPTIDE SERPL-MCNC: 2.5 NG/ML (ref 0.9–6.9)

## 2019-05-20 RX ORDER — LEVOTHYROXINE SODIUM 50 UG/1
50 TABLET ORAL DAILY
Qty: 60 TABLET | Refills: 0 | Status: SHIPPED | OUTPATIENT
Start: 2019-05-20 | End: 2019-06-13

## 2019-05-24 ENCOUNTER — TELEPHONE (OUTPATIENT)
Dept: ENDOCRINOLOGY | Facility: CLINIC | Age: 54
End: 2019-05-24

## 2019-05-24 NOTE — TELEPHONE ENCOUNTER
Received letter from [facility] stating Phentermine has been approved for coverage as long as there are no changes in patient's prescription coverage plan.  Effective 5/17/19 - 8/17/2019.   Spoke with patient.  Patient states she is aware of approval and has already filled the Rxn.  Archana Arizmendi CMA on 5/24/2019 at 11:48 AM

## 2019-05-25 DIAGNOSIS — I10 HTN, GOAL BELOW 140/90: ICD-10-CM

## 2019-05-27 RX ORDER — LOSARTAN POTASSIUM 50 MG/1
TABLET ORAL
Qty: 0.1 TABLET | Refills: 0 | OUTPATIENT
Start: 2019-05-27

## 2019-05-27 NOTE — RESULT ENCOUNTER NOTE
Rayna,  Your cholesterol numbers are a little elevated but everything else looks pretty goo - your glucose was normal and measures for insulin resistance were also normal so no indication of high insulin levels.    Hopefully, the phentermine will help you with the weight loss.  Here's a copy of the results for your records.  I'll see you again in 3 months.  Kyra Helms NP  Endocrinology

## 2019-05-28 ENCOUNTER — MYC REFILL (OUTPATIENT)
Dept: FAMILY MEDICINE | Facility: CLINIC | Age: 54
End: 2019-05-28

## 2019-05-28 DIAGNOSIS — I10 HTN, GOAL BELOW 140/90: ICD-10-CM

## 2019-05-28 RX ORDER — LOSARTAN POTASSIUM 50 MG/1
50 TABLET ORAL DAILY
Qty: 90 TABLET | Refills: 1 | Status: CANCELLED | OUTPATIENT
Start: 2019-05-28

## 2019-05-28 NOTE — TELEPHONE ENCOUNTER
"Requested Prescriptions   Pending Prescriptions Disp Refills     losartan (COZAAR) 50 MG tablet [Pharmacy Med Name: LOSARTAN POTASSIUM 50 MG TAB] 90 tablet 1     Sig: TAKE 1 TABLET BY MOUTH EVERY DAY       Angiotensin-II Receptors Failed - 5/25/2019  9:38 AM        Failed - Recent (12 mo) or future (30 days) visit within the authorizing provider's specialty     Patient had office visit in the last 12 months or has a visit in the next 30 days with authorizing provider or within the authorizing provider's specialty.  See \"Patient Info\" tab in inbasket, or \"Choose Columns\" in Meds & Orders section of the refill encounter.              Passed - Blood pressure under 140/90 in past 12 months     BP Readings from Last 3 Encounters:   05/17/19 135/81   04/15/19 122/86   03/11/19 126/82                 Passed - Medication is active on med list        Passed - Patient is age 18 or older        Passed - No active pregnancy on record        Passed - Normal serum creatinine on file in past 12 months     Recent Labs   Lab Test 05/17/19  0930   CR 0.66             Passed - Normal serum potassium on file in past 12 months     Recent Labs   Lab Test 05/17/19  0930   POTASSIUM 3.9                    Passed - No positive pregnancy test in past 12 months        Refused Prescriptions:                       Disp   Refills    losartan (COZAAR) 50 MG tablet [Pharmacy M*0.1 ta*0        Sig: TAKE 1 TABLET BY MOUTH EVERY DAY  Refused By: GILLES SMITH  Reason for Refusal: Patient no longer under provider care      "

## 2019-05-29 NOTE — TELEPHONE ENCOUNTER
"Requested Prescriptions   Pending Prescriptions Disp Refills     losartan (COZAAR) 50 MG tablet 90 tablet 1     Sig: Take 1 tablet (50 mg) by mouth daily  Last Written Prescription Date:  8/13/2018  Last Fill Quantity: 90 tablet,  # refills: 1   Last Office Visit: 4/15/2019   Future Office Visit:    Next 5 appointments (look out 90 days)    Aug 23, 2019  7:30 AM CDT  (Arrive by 7:20 AM)  Return Visit with MIRZA Hays CNP  [facility] [address] [facility] [address]  [phone]              Angiotensin-II Receptors Failed - 5/28/2019  4:52 PM        Failed - Recent (12 mo) or future (30 days) visit within the authorizing provider's specialty     Patient had office visit in the last 12 months or has a visit in the next 30 days with authorizing provider or within the authorizing provider's specialty.  See \"Patient Info\" tab in inbasket, or \"Choose Columns\" in Meds & Orders section of the refill encounter.            Passed - Blood pressure under 140/90 in past 12 months     BP Readings from Last 3 Encounters:   05/17/19 135/81   04/15/19 122/86   03/11/19 126/82           Passed - Medication is active on med list        Passed - Patient is age 18 or older        Passed - No active pregnancy on record        Passed - Normal serum creatinine on file in past 12 months     Recent Labs   Lab Test 05/17/19  0930   CR 0.66           Passed - Normal serum potassium on file in past 12 months     Recent Labs   Lab Test 05/17/19  0930   POTASSIUM 3.9            Passed - No positive pregnancy test in past 12 months          "

## 2019-05-29 NOTE — TELEPHONE ENCOUNTER
"Requested Prescriptions   Pending Prescriptions Disp Refills     losartan (COZAAR) 50 MG tablet 90 tablet 1     Sig: Take 1 tablet (50 mg) by mouth daily  Last Written Prescription Date:  8/13/2018  Last Fill Quantity: 90 tablet,  # refills: 1   Last Office Visit: 4/15/2019   Future Office Visit:    Next 5 appointments (look out 90 days)    Aug 23, 2019  7:30 AM CDT  (Arrive by 7:20 AM)  Return Visit with MIRZA Hays CNP  [facility]) [address] [facility] [address]  [phone]              Angiotensin-II Receptors Failed - 5/28/2019  2:20 PM        Failed - Recent (12 mo) or future (30 days) visit within the authorizing provider's specialty     Patient had office visit in the last 12 months or has a visit in the next 30 days with authorizing provider or within the authorizing provider's specialty.  See \"Patient Info\" tab in inbasket, or \"Choose Columns\" in Meds & Orders section of the refill encounter.            Passed - Blood pressure under 140/90 in past 12 months     BP Readings from Last 3 Encounters:   05/17/19 135/81   04/15/19 122/86   03/11/19 126/82           Passed - Medication is active on med list        Passed - Patient is age 18 or older        Passed - No active pregnancy on record        Passed - Normal serum creatinine on file in past 12 months     Recent Labs   Lab Test 05/17/19  0930   CR 0.66           Passed - Normal serum potassium on file in past 12 months     Recent Labs   Lab Test 05/17/19  0930   POTASSIUM 3.9            Passed - No positive pregnancy test in past 12 months          "

## 2019-05-31 RX ORDER — LOSARTAN POTASSIUM 50 MG/1
50 TABLET ORAL DAILY
Qty: 90 TABLET | Refills: 0 | Status: SHIPPED | OUTPATIENT
Start: 2019-05-31 | End: 2019-09-06

## 2019-06-08 DIAGNOSIS — E03.8 SUBCLINICAL HYPOTHYROIDISM: ICD-10-CM

## 2019-06-08 NOTE — TELEPHONE ENCOUNTER
"Requested Prescriptions   Pending Prescriptions Disp Refills     levothyroxine (SYNTHROID/LEVOTHROID) 25 MCG tablet [Pharmacy Med Name: LEVOTHYROXINE 25 MCG TABLET] 90 tablet 0     Sig: TAKE 1 TABLET (25 MCG) BY MOUTH DAILY       Thyroid Protocol Failed - 6/8/2019  8:47 AM        Failed - Normal TSH on file in past 12 months     Recent Labs   Lab Test 05/17/19  0803   TSH 6.46*              Passed - Patient is 12 years or older        Passed - Recent (12 mo) or future (30 days) visit within the authorizing provider's specialty     Patient had office visit in the last 12 months or has a visit in the next 30 days with authorizing provider or within the authorizing provider's specialty.  See \"Patient Info\" tab in inbasket, or \"Choose Columns\" in Meds & Orders section of the refill encounter.              Passed - Medication is active on med list        Passed - No active pregnancy on record     If patient is pregnant or has had a positive pregnancy test, please check TSH.          Passed - No positive pregnancy test in past 12 months     If patient is pregnant or has had a positive pregnancy test, please check TSH.            "

## 2019-06-11 RX ORDER — LEVOTHYROXINE SODIUM 25 UG/1
25 TABLET ORAL DAILY
Qty: 30 TABLET | Refills: 0 | Status: SHIPPED | OUTPATIENT
Start: 2019-06-11 | End: 2019-06-13 | Stop reason: DRUGHIGH

## 2019-06-11 NOTE — TELEPHONE ENCOUNTER
Medication is being filled for a one time refill only as patient is due for follow up TSH from medication dose adjustment.  30 day refill given.  Please call and assist with scheduling appointment prior to next refill   Junie PAZ RN - Triage  [facility]

## 2019-06-12 DIAGNOSIS — E03.8 SUBCLINICAL HYPOTHYROIDISM: ICD-10-CM

## 2019-06-12 LAB — TSH SERPL DL<=0.005 MIU/L-ACNC: 2.33 MU/L (ref 0.4–4)

## 2019-06-12 PROCEDURE — 36415 COLL VENOUS BLD VENIPUNCTURE: CPT | Performed by: FAMILY MEDICINE

## 2019-06-12 PROCEDURE — 84443 ASSAY THYROID STIM HORMONE: CPT | Performed by: FAMILY MEDICINE

## 2019-06-13 DIAGNOSIS — E03.8 SUBCLINICAL HYPOTHYROIDISM: ICD-10-CM

## 2019-06-13 RX ORDER — LEVOTHYROXINE SODIUM 50 UG/1
50 TABLET ORAL DAILY
Qty: 90 TABLET | Refills: 0 | Status: SHIPPED | OUTPATIENT
Start: 2019-06-13 | End: 2019-10-14

## 2019-08-20 DIAGNOSIS — E66.09 CLASS 1 OBESITY DUE TO EXCESS CALORIES WITH SERIOUS COMORBIDITY AND BODY MASS INDEX (BMI) OF 34.0 TO 34.9 IN ADULT: ICD-10-CM

## 2019-08-20 DIAGNOSIS — J30.9 CHRONIC ALLERGIC RHINITIS: ICD-10-CM

## 2019-08-20 DIAGNOSIS — J45.20 MILD INTERMITTENT ASTHMA WITHOUT COMPLICATION: ICD-10-CM

## 2019-08-20 DIAGNOSIS — E66.811 CLASS 1 OBESITY DUE TO EXCESS CALORIES WITH SERIOUS COMORBIDITY AND BODY MASS INDEX (BMI) OF 34.0 TO 34.9 IN ADULT: ICD-10-CM

## 2019-08-20 RX ORDER — PHENTERMINE HYDROCHLORIDE 37.5 MG/1
TABLET ORAL
Qty: 32 TABLET | Refills: 2 | Status: SHIPPED | OUTPATIENT
Start: 2019-08-20 | End: 2019-08-23

## 2019-08-20 RX ORDER — MONTELUKAST SODIUM 10 MG/1
1 TABLET ORAL AT BEDTIME
Qty: 90 TABLET | Refills: 1 | Status: SHIPPED | OUTPATIENT
Start: 2019-08-20 | End: 2019-10-29

## 2019-08-20 NOTE — TELEPHONE ENCOUNTER
Requested Prescriptions   Pending Prescriptions Disp Refills     phentermine (ADIPEX-P) 37.5 MG tablet [Pharmacy Med Name: PHENTERMINE 37.5 MG TABLET] 32 tablet 2     Sig: TAKE 1 TABLET BY MOUTH EVERY MORNING BEFORE BREAKFAST       There is no refill protocol information for this order            Last Written Prescription Date:  5/17/19  Last Fill Quantity: 32 tablet,   # refills: 2  Last Office Visit: 5/17/2019 (ROBERT Helms)  Future Office visit:    Next 5 appointments (look out 90 days)    Aug 23, 2019  7:30 AM CDT  (Arrive by 7:20 AM)  Return Visit with MIRZA Hays [facility] ([facility]) [address]  [facility] [address] [phone]           Routing refill request to provider for review/approval because:  Drug not on the [facility], [facility] or [facility] refill protocol or controlled substance

## 2019-08-20 NOTE — TELEPHONE ENCOUNTER
"Last Written Prescription Date:  8/20/18  Last Fill Quantity: 90 tablet,  # refills: 1   Last office visit: 4/15/2019 with prescribing provider:  Bossman   Future Office Visit:   Next 5 appointments (look out 90 days)    Aug 23, 2019  7:30 AM CDT  (Arrive by 7:20 AM)  Return Visit with MIRZA Hays CNP  [facility] [address]  [facility] [id number] [phone]         Requested Prescriptions   Pending Prescriptions Disp Refills     montelukast (SINGULAIR) 10 MG tablet [Pharmacy Med Name: MONTELUKAST SOD 10 MG TABLET] 90 tablet 1     Sig: TAKE 1 TABLET (10 MG) BY MOUTH AT BEDTIME       Leukotriene Inhibitors Protocol Passed - 8/20/2019  7:59 AM        Passed - Patient is age 12 or older     If patient is under 16, ok to refill using age based dosing.           Passed - Asthma control assessment score within normal limits in last 6 months     Please review ACT score.           Passed - Medication is active on med list        Passed - Recent (6 mo) or future (30 days) visit within the authorizing provider's specialty     Patient had office visit in the last 6 months or has a visit in the next 30 days with authorizing provider or within the authorizing provider's specialty.  See \"Patient Info\" tab in inbasket, or \"Choose Columns\" in Meds & Orders section of the refill encounter.            ACT Total Scores 8/20/2018 2/21/2019   ACT TOTAL SCORE (Goal Greater than or Equal to 20) 15 25   In the past 12 months, how many times did you visit the emergency room for your asthma without being admitted to the hospital? 1 0   In the past 12 months, how many times were you hospitalized overnight because of your asthma? 0 0       "

## 2019-08-21 ENCOUNTER — TELEPHONE (OUTPATIENT)
Dept: ENDOCRINOLOGY | Facility: CLINIC | Age: 54
End: 2019-08-21

## 2019-08-21 ENCOUNTER — MYC MEDICAL ADVICE (OUTPATIENT)
Dept: FAMILY MEDICINE | Facility: CLINIC | Age: 54
End: 2019-08-21

## 2019-08-21 NOTE — TELEPHONE ENCOUNTER
Fax from [facility], phentermine PA  19, SHAJI GRAMAJO MD ACCIDENTALLY SENT PHENTERMINE REFILL YESTERDAY, FABRICIO PARKER IS PRESCRIBER    **ROUTED TO , PLEASE ADVISE IF ONGOING RX? ROUTE TO PA POOL IF NEEDED, INFORM PHARMACY IF THERAPY COMPLETED    PT HAS APPOINTMENT --PLEASE DISCUSS AT VISIT IF OKAY    Pharmacy Benefit Information:    Provider: LS  Phone #: [phone]  BIN: [id number]  ID#: [id number]  Name: [facility]  GRP: [id number]  PCN: [id number]  Medication/SIG: phentermine 37.5  Pharmacy: [facility]    Received letter from [facility] [place] stating Phentermine has been approved for coverage as long as there are no changes in patient's prescription coverage plan.  Effective 19 - 2019.   Spoke with patient.  Patient states she is aware of approval and has already filled the Rxn.  Archana Arizmendi CMA on 2019 at 11:48 AM    Next 5 appointments (look out 90 days)    Aug 23, 2019  7:30 AM CDT  (Arrive by 7:20 AM)  Return Visit with MIRZA Hays [facility] ([facility]) [address]  [facility] [address] [phone]          Sandra Plascencia RN, BSN  Message handled by Nurse Triage.

## 2019-08-22 NOTE — TELEPHONE ENCOUNTER
Obstetrics and gynecology   [place] [address] [place] [address]   Phone:[phone]      Sent info to patient    Nichole Bermudez/MARGE  [place]---[facility]

## 2019-08-23 ENCOUNTER — OFFICE VISIT (OUTPATIENT)
Dept: ENDOCRINOLOGY | Facility: CLINIC | Age: 54
End: 2019-08-23
Payer: COMMERCIAL

## 2019-08-23 VITALS
WEIGHT: 181 LBS | SYSTOLIC BLOOD PRESSURE: 110 MMHG | RESPIRATION RATE: 12 BRPM | BODY MASS INDEX: 30.12 KG/M2 | DIASTOLIC BLOOD PRESSURE: 77 MMHG | HEART RATE: 76 BPM

## 2019-08-23 DIAGNOSIS — E66.09 CLASS 1 OBESITY DUE TO EXCESS CALORIES WITH SERIOUS COMORBIDITY AND BODY MASS INDEX (BMI) OF 34.0 TO 34.9 IN ADULT: ICD-10-CM

## 2019-08-23 DIAGNOSIS — E66.811 CLASS 1 OBESITY DUE TO EXCESS CALORIES WITH SERIOUS COMORBIDITY AND BODY MASS INDEX (BMI) OF 34.0 TO 34.9 IN ADULT: ICD-10-CM

## 2019-08-23 PROCEDURE — 99213 OFFICE O/P EST LOW 20 MIN: CPT | Performed by: CLINICAL NURSE SPECIALIST

## 2019-08-23 RX ORDER — PHENTERMINE HYDROCHLORIDE 37.5 MG/1
TABLET ORAL
Qty: 32 TABLET | Refills: 2 | Status: SHIPPED | OUTPATIENT
Start: 2019-08-23 | End: 2019-11-22

## 2019-08-23 NOTE — LETTER
8/23/2019         RE: Rayna Jackson  [address]  [facility] [address]        Dear Colleague,    Thank you for referring your patient, Rayna Jackson, to the [facility]. Please see a copy of my visit note below.    Name: Rayna Jackson  F/u for obesity (Last seen 5/17/2019).  HPI:  Rayna Jackson is a 54 year old female who presents for the evaluation/management of obestiy.  Has noted abnormal weight gain over the past 3 years - states she has gained 70 lbs.   Making healthy food choices - doesn't feel she overeats.  Unable to lose weight and has continued to gain.  Previously tried a program called [employer], also weight watchers, [place] [facility] was most effective - lost 20 lbs     Started Phentermine 37.5 mg daily May 2019.  Is tolerating phentermine well.  Also following a keto diet.  Has lost 26 lbs since starting phentermine + diet and exercise efforts.  Has started playing Bunchball ball again.  Diabetes:No but states her blood sugars are 'always' high-normal.  No GD with pregnancy but was monitored more closely because glucose levels were elevated but not in the diabetes range.  + FH of DM2 - father and MGGM.  Sleep Apnea/Snores:snores - was seen by specialist who did not feel sleep apnea eval was indicated, didn't believe she had sleep apnea  Hypertension or CAD:Yes: Controlled on Losartan  Hyperlipidemia:Yes: untreated.  Diet:  Healthy food choices, low carb, portion controlled  Exercise: Difficult due to joint pain, + OA  Subclinical Hypothyroidism.  Recently diagnosed.  Currently treated with levothyroxine 50 mcg/day.  FH + for thyroid disease - Mother, MGM, maternal aunt, sister.  Previous lymph node biopsy + for Lupus - was seen at [facility], no diagnosis of Lupus has been confirmed.  PMH/PSH:  Past Medical History:   Diagnosis Date     Torn meniscus 2013    left      Past Surgical History:   Procedure Laterality Date     appendicitis  1980     COLONOSCOPY N/A 6/30/2017    Procedure:  COLONOSCOPY;  COLONOSCOPY   ;  Surgeon: Brooks Villa MD;  Location: [facility]     CYSTECTOMY OVARIAN BENIGN  1980     KNEE SURGERY Left      Family Hx:  Family History   Problem Relation Age of Onset     Hypertension Mother      Thyroid Disease Mother      Hypertension Father      Diabetes Other         great grandmother     Thyroid Disease Sister      Thyroid Disease Maternal Aunt      Thyroid disease: Yes, mother, sister, aunt, MGM        DM2: Yes: father, MGGM         Autoimmune: DM1, SLE, RA, Vitiligo     Social Hx:  Social History     Socioeconomic History     Marital status:      Spouse name: Juan F     Number of children: 4     Years of education: Not on file     Highest education level: Not on file   Occupational History     Occupation: manager     Employer: [employer]   Social Needs     Financial resource strain: Not on file     Food insecurity:     Worry: Not on file     Inability: Not on file     Transportation needs:     Medical: Not on file     Non-medical: Not on file   Tobacco Use     Smoking status: Never Smoker     Smokeless tobacco: Never Used   Substance and Sexual Activity     Alcohol use: Yes     Alcohol/week: 0.0 oz     Comment: occ     Drug use: No     Sexual activity: Yes     Birth control/protection: IUD   Lifestyle     Physical activity:     Days per week: Not on file     Minutes per session: Not on file     Stress: Not on file   Relationships     Social connections:     Talks on phone: Not on file     Gets together: Not on file     Attends Hoahaoism service: Not on file     Active member of club or organization: Not on file     Attends meetings of clubs or organizations: Not on file     Relationship status: Not on file     Intimate partner violence:     Fear of current or ex partner: Not on file     Emotionally abused: Not on file     Physically abused: Not on file     Forced sexual activity: Not on file   Other Topics Concern     Parent/sibling w/ CABG, MI or angioplasty  before 65F 55M? Not Asked   Social History Narrative     Not on file          MEDICATIONS:  has a current medication list which includes the following prescription(s): albuterol, fexofenadine, flovent hfa, fluticasone, levonorgestrel, levothyroxine, losartan, montelukast, olopatadine, phentermine, and triamcinolone, and the following Facility-Administered Medications: methylprednisolone and ropivacaine.    ROS   ROS: 10 point ROS neg other than the symptoms noted above in the HPI.    GENERAL: + weight loss - intentional  HEENT: no dysphagia or neck pain or tenderness  CV: no chest pain, pressure or palpitations  LUNGS: no SOB   ABDOMEN: no diarrhea, constipation, abdominal pain    Physical Exam   VS: /77 (BP Location: Right arm, Patient Position: Chair, Cuff Size: Adult Large)   Pulse 76   Resp 12   Wt 82.1 kg (181 lb)   Breastfeeding? No   BMI 30.12 kg/m     GENERAL: AXOX3, NAD, well dressed, answering questions appropriately, appears stated age.  HEENT: no exopthalmous, no proptosis, no lig lag, no retraction  CV: RRR, no rubs, gallops, no murmurs  LUNGS: CTAB  EXTREMITIES: no edema  NEUROLOGY: CN grossly intact, no tremors  MSK: grossly intact    LABS:  !COMPREHENSIVE Latest Ref Rng & Units 5/17/2019   SODIUM 133 - 144 mmol/L 142   POTASSIUM 3.4 - 5.3 mmol/L 3.9   CHLORIDE 94 - 109 mmol/L 109   BUN 7 - 30 mg/dL 11   Creatinine 0.52 - 1.04 mg/dL 0.66   Glucose 70 - 99 mg/dL 87   ANION GAP 3 - 14 mmol/L 7   CALCIUM 8.5 - 10.1 mg/dL 9.3   ALBUMIN 3.4 - 5.0 g/dL 4.0     !COMPREHENSIVE Latest Ref Rng & Units 5/17/2019   AST 0 - 45 U/L 15   ALT 0 - 50 U/L 15     Component      Latest Ref Rng & Units 8/20/2018 3/11/2019   Hemoglobin A1C      0 - 5.6 % 4.9 5.4     !LIPID/HEPATIC Latest Ref Rng & Units 1/2/2018   CHOLESTEROL <200 mg/dL 212 (H)   TRIGLYCERIDES <150 mg/dL 154 (H)   HDL CHOLESTEROL >49 mg/dL 58   LDL CHOLESTEROL, CALCULATED <100 mg/dL 123 (H)   NON HDL CHOLESTEROL <130 mg/dL 154 (H)  "    Component      Latest Ref Rng & Units 3/11/2019   Vitamin D Deficiency screening      20 - 75 ug/L 23     !THYROID Latest Ref Rng & Units 6/12/2019   TSH 0.40 - 4.00 mU/L 2.33     Vital Signs 5/17/2019 8/23/2019   Weight (LB) 207 lb 14.4 oz 181 lb   Height     BMI (Calculated)  30.12     Waist circumference:  43.5\" (5/2019).  38.5\" (today)    All pertinent notes, labs, and images personally reviewed by me.     A/P  Ms.Jodi ROBERT Jackson is a 54 year old here for the evaluation of obestiy:    1. Obesity/abnormal weight gain-  BMI 34-35--> 30.12.  Obesity related comorbidity include HTN, OA, hyperlipidemia.    Obesity is associated with a significant increase in mortality and risk of many disorders, including diabetes mellitus, hypertension, dyslipidemia, heart disease, stroke, sleep apnea, cancer, and many others. Conversely, weight loss is associated with a reduction in obesity-associated morbidity.    Endocrine evaluation of obesity includes Diabetes/prediabetes, Cushing's and thyroid dysfunction.  The relevant work up for the diagnosis and management of obesity and various treatment options were discussed. Body Mass Index (BMI) has been a standard means for calculating risk for overweight and obesity. The new American Association of Clinical Endocrinology (AACE) algorithm recommends lifestyle modifications as the initial phase of treatment for all patients with the BMI equal or greater than 25 kg/m2. Lifestyle modifications includes use of medical nutrition therapy, exercise, tobacco cessation, adequate quality and quantity of sleep, limited consumption of alcohol and reduced stress through implementation of a structured, multidisciplinary program.    In patients with complications associated with obesity, graded interventions are recommended including pharmacological therapy such as phentermine, orlistat, lorcaserin and phentermine/topiramate ER, contrave ( buproprion/naltreone) and the use of very low calorie meal " replacement programs.    If medical intervention is insufficient, surgical therapy may be considered, especially in patients with BMI greater than or equal to 35 kg/m2 with multiple complications. Surgical treatments include lap-band, gastric sleeve or gastric bypass surgery.    I informed the pt that:  1.Weight loss medications can be taken to assist with weight reduction when combined with appropriate healthy lifestyle changes.  2.I discussed possible s/e, risks and benefits of weight loss medications.  3.All medications are FDA approved, however, some may be used ''off label'' for their weight loss benefits and some ''short term'' medications can be used for longer periods to achieve desired clinical outcomes.  4.All patients taking weight loss medications must be seen in clinic for refill authorization.    Counseling exercise ( V65.41)  Dietary counseling( V65.3)  Calculated BMI above the upper parameter and f/u plan was documented in the medical record.  Discussed indications, risks and benefits of all medications prescribed, and answered questions to patient's satisfaction.  Continue current low carb diet.  Started Phentermine 37.5 mg/day 5/2019.  Has lost 26 lbs since starting phentermine.  Continue phentermine 37.5 mg/day    Labs ordered today:   No orders of the defined types were placed in this encounter.    Radiology/Consults ordered today: None    More than 50% of the time spent with Ms. Jackson on counseling / coordinating her care.  Total face to face time was 15 minutes.      Follow-up:  3 months    Kyra Helms NP  Endocrinology  [facility]  CC: Mitra Keita   ____________________________________________________________        Again, thank you for allowing me to participate in the care of your patient.        Sincerely,        MIRZA Hays CNP

## 2019-08-23 NOTE — PROGRESS NOTES
Name: Rayna Jackson  F/u for obesity (Last seen 5/17/2019).  HPI:  Rayna Jackson is a 54 year old female who presents for the evaluation/management of obestiy.  Has noted abnormal weight gain over the past 3 years - states she has gained 70 lbs.   Making healthy food choices - doesn't feel she overeats.  Unable to lose weight and has continued to gain.  Previously tried a program called [facility], also weight watchers, [place] [facility] was most effective - lost 20 lbs     Started Phentermine 37.5 mg daily May 2019.  Is tolerating phentermine well.  Also following a keto diet.  Has lost 26 lbs since starting phentermine + diet and exercise efforts.  Has started playing [employer] ball again.  Diabetes:No but states her blood sugars are 'always' high-normal.  No GD with pregnancy but was monitored more closely because glucose levels were elevated but not in the diabetes range.  + FH of DM2 - father and MGGM.  Sleep Apnea/Snores:snores - was seen by specialist who did not feel sleep apnea eval was indicated, didn't believe she had sleep apnea  Hypertension or CAD:Yes: Controlled on Losartan  Hyperlipidemia:Yes: untreated.  Diet:  Healthy food choices, low carb, portion controlled  Exercise: Difficult due to joint pain, + OA  Subclinical Hypothyroidism.  Recently diagnosed.  Currently treated with levothyroxine 50 mcg/day.  FH + for thyroid disease - Mother, MGM, maternal aunt, sister.  Previous lymph node biopsy + for Lupus - was seen at [facility], no diagnosis of Lupus has been confirmed.  PMH/PSH:  Past Medical History:   Diagnosis Date     Torn meniscus 2013    left      Past Surgical History:   Procedure Laterality Date     appendicitis  1980     COLONOSCOPY N/A 6/30/2017    Procedure: COLONOSCOPY;  COLONOSCOPY   ;  Surgeon: Brooks Villa MD;  Location:  GI     CYSTECTOMY OVARIAN BENIGN  1980     KNEE SURGERY Left      Family Hx:  Family History   Problem Relation Age of Onset     Hypertension Mother      Thyroid Disease  Mother      Hypertension Father      Diabetes Other         great grandmother     Thyroid Disease Sister      Thyroid Disease Maternal Aunt      Thyroid disease: Yes, mother, sister, aunt, MGM        DM2: Yes: father, MGGM         Autoimmune: DM1, SLE, RA, Vitiligo     Social Hx:  Social History     Socioeconomic History     Marital status:      Spouse name: Juan F     Number of children: 4     Years of education: Not on file     Highest education level: Not on file   Occupational History     Occupation: manager     Employer: [employer]   Social Needs     Financial resource strain: Not on file     Food insecurity:     Worry: Not on file     Inability: Not on file     Transportation needs:     Medical: Not on file     Non-medical: Not on file   Tobacco Use     Smoking status: Never Smoker     Smokeless tobacco: Never Used   Substance and Sexual Activity     Alcohol use: Yes     Alcohol/week: 0.0 oz     Comment: occ     Drug use: No     Sexual activity: Yes     Birth control/protection: IUD   Lifestyle     Physical activity:     Days per week: Not on file     Minutes per session: Not on file     Stress: Not on file   Relationships     Social connections:     Talks on phone: Not on file     Gets together: Not on file     Attends Restorationist service: Not on file     Active member of club or organization: Not on file     Attends meetings of clubs or organizations: Not on file     Relationship status: Not on file     Intimate partner violence:     Fear of current or ex partner: Not on file     Emotionally abused: Not on file     Physically abused: Not on file     Forced sexual activity: Not on file   Other Topics Concern     Parent/sibling w/ CABG, MI or angioplasty before 65F 55M? Not Asked   Social History Narrative     Not on file          MEDICATIONS:  has a current medication list which includes the following prescription(s): albuterol, fexofenadine, flovent hfa, fluticasone, levonorgestrel, levothyroxine,  losartan, montelukast, olopatadine, phentermine, and triamcinolone, and the following Facility-Administered Medications: methylprednisolone and ropivacaine.    ROS   ROS: 10 point ROS neg other than the symptoms noted above in the HPI.    GENERAL: + weight loss - intentional  HEENT: no dysphagia or neck pain or tenderness  CV: no chest pain, pressure or palpitations  LUNGS: no SOB   ABDOMEN: no diarrhea, constipation, abdominal pain    Physical Exam   VS: /77 (BP Location: Right arm, Patient Position: Chair, Cuff Size: Adult Large)   Pulse 76   Resp 12   Wt 82.1 kg (181 lb)   Breastfeeding? No   BMI 30.12 kg/m    GENERAL: AXOX3, NAD, well dressed, answering questions appropriately, appears stated age.  HEENT: no exopthalmous, no proptosis, no lig lag, no retraction  CV: RRR, no rubs, gallops, no murmurs  LUNGS: CTAB  EXTREMITIES: no edema  NEUROLOGY: CN grossly intact, no tremors  MSK: grossly intact    LABS:  !COMPREHENSIVE Latest Ref Rng & Units 5/17/2019   SODIUM 133 - 144 mmol/L 142   POTASSIUM 3.4 - 5.3 mmol/L 3.9   CHLORIDE 94 - 109 mmol/L 109   BUN 7 - 30 mg/dL 11   Creatinine 0.52 - 1.04 mg/dL 0.66   Glucose 70 - 99 mg/dL 87   ANION GAP 3 - 14 mmol/L 7   CALCIUM 8.5 - 10.1 mg/dL 9.3   ALBUMIN 3.4 - 5.0 g/dL 4.0     !COMPREHENSIVE Latest Ref Rng & Units 5/17/2019   AST 0 - 45 U/L 15   ALT 0 - 50 U/L 15     Component      Latest Ref Rng & Units 8/20/2018 3/11/2019   Hemoglobin A1C      0 - 5.6 % 4.9 5.4     !LIPID/HEPATIC Latest Ref Rng & Units 1/2/2018   CHOLESTEROL <200 mg/dL 212 (H)   TRIGLYCERIDES <150 mg/dL 154 (H)   HDL CHOLESTEROL >49 mg/dL 58   LDL CHOLESTEROL, CALCULATED <100 mg/dL 123 (H)   NON HDL CHOLESTEROL <130 mg/dL 154 (H)     Component      Latest Ref Rng & Units 3/11/2019   Vitamin D Deficiency screening      20 - 75 ug/L 23     !THYROID Latest Ref Rng & Units 6/12/2019   TSH 0.40 - 4.00 mU/L 2.33     Vital Signs 5/17/2019 8/23/2019   Weight (LB) 207 lb 14.4 oz 181 lb   Height    "  BMI (Calculated)  30.12     Waist circumference:  43.5\" (5/2019).  38.5\" (today)    All pertinent notes, labs, and images personally reviewed by me.     A/P  Ms.Jodi ROBERT Jackson is a 54 year old here for the evaluation of obestiy:    1. Obesity/abnormal weight gain-  BMI 34-35--> 30.12.  Obesity related comorbidity include HTN, OA, hyperlipidemia.    Obesity is associated with a significant increase in mortality and risk of many disorders, including diabetes mellitus, hypertension, dyslipidemia, heart disease, stroke, sleep apnea, cancer, and many others. Conversely, weight loss is associated with a reduction in obesity-associated morbidity.    Endocrine evaluation of obesity includes Diabetes/prediabetes, Cushing's and thyroid dysfunction.  The relevant work up for the diagnosis and management of obesity and various treatment options were discussed. Body Mass Index (BMI) has been a standard means for calculating risk for overweight and obesity. The new American Association of Clinical Endocrinology (AACE) algorithm recommends lifestyle modifications as the initial phase of treatment for all patients with the BMI equal or greater than 25 kg/m2. Lifestyle modifications includes use of medical nutrition therapy, exercise, tobacco cessation, adequate quality and quantity of sleep, limited consumption of alcohol and reduced stress through implementation of a structured, multidisciplinary program.    In patients with complications associated with obesity, graded interventions are recommended including pharmacological therapy such as phentermine, orlistat, lorcaserin and phentermine/topiramate ER, contrave ( buproprion/naltreone) and the use of very low calorie meal replacement programs.    If medical intervention is insufficient, surgical therapy may be considered, especially in patients with BMI greater than or equal to 35 kg/m2 with multiple complications. Surgical treatments include lap-band, gastric sleeve or gastric " bypass surgery.    I informed the pt that:  1.Weight loss medications can be taken to assist with weight reduction when combined with appropriate healthy lifestyle changes.  2.I discussed possible s/e, risks and benefits of weight loss medications.  3.All medications are FDA approved, however, some may be used ''off label'' for their weight loss benefits and some ''short term'' medications can be used for longer periods to achieve desired clinical outcomes.  4.All patients taking weight loss medications must be seen in clinic for refill authorization.    Counseling exercise ( V65.41)  Dietary counseling( V65.3)  Calculated BMI above the upper parameter and f/u plan was documented in the medical record.  Discussed indications, risks and benefits of all medications prescribed, and answered questions to patient's satisfaction.  Continue current low carb diet.  Started Phentermine 37.5 mg/day 5/2019.  Has lost 26 lbs since starting phentermine.  Continue phentermine 37.5 mg/day    Labs ordered today:   No orders of the defined types were placed in this encounter.    Radiology/Consults ordered today: None    More than 50% of the time spent with Ms. Jackson on counseling / coordinating her care.  Total face to face time was 15 minutes.      Follow-up:  3 months    Kyra Helms NP  Endocrinology  [facility]  CC: Mitra Keita   ____________________________________________________________

## 2019-08-27 ENCOUNTER — MYC MEDICAL ADVICE (OUTPATIENT)
Dept: ENDOCRINOLOGY | Facility: CLINIC | Age: 54
End: 2019-08-27

## 2019-09-05 NOTE — TELEPHONE ENCOUNTER
PA team please assist   My insurance is not allowing a faxed pre authorization for phentermine.  They are requesting that you call them at [phone]. Sorry for the extra work.      Phone call to patient     She has had PAs on this medication in the past and it has not taken this long, apologized to patient for this and advised it was sent to PA team and will resend again today advising that they need to place a phone call as they will not accept fax PA     Sonali Varela, Registered Nurse   [facility]

## 2019-09-05 NOTE — TELEPHONE ENCOUNTER
Patient calling upset that she has not heard back on PA, she would like a call back on status. Ruth Behrens.

## 2019-09-06 DIAGNOSIS — I10 HTN, GOAL BELOW 140/90: ICD-10-CM

## 2019-09-06 NOTE — TELEPHONE ENCOUNTER
PRIOR AUTHORIZATION DENIED    Medication: Phentermine     Denial Date: 9/6/2019    Denial Rational: plan will only cover 3 months worth in 1 year    Appeal Information:

## 2019-09-06 NOTE — TELEPHONE ENCOUNTER
"Requested Prescriptions   Pending Prescriptions Disp Refills     losartan (COZAAR) 50 MG tablet [Pharmacy Med Name: LOSARTAN POTASSIUM 50 MG TAB]  Last Written Prescription Date:  5/31/2019  Last Fill Quantity: 90 tabs,  # refills: 0   Last office visit: 4/15/2019 with prescribing provider:  oBnnie   Future Office Visit:   Next 5 appointments (look out 90 days)    Nov 22, 2019 10:00 AM CST  Return Visit with MIRZA Hays CNP  [facility] [address] [facility] [id number] [phone]          90 tablet 0     Sig: TAKE 1 TABLET BY MOUTH EVERY DAY       Angiotensin-II Receptors Failed - 9/6/2019  1:07 PM        Failed - Recent (12 mo) or future (30 days) visit within the authorizing provider's specialty     Patient had office visit in the last 12 months or has a visit in the next 30 days with authorizing provider or within the authorizing provider's specialty.  See \"Patient Info\" tab in inbasket, or \"Choose Columns\" in Meds & Orders section of the refill encounter.              Passed - Last blood pressure under 140/90 in past 12 months     BP Readings from Last 3 Encounters:   08/23/19 110/77   05/17/19 135/81   04/15/19 122/86                 Passed - Medication is active on med list        Passed - Patient is age 18 or older        Passed - No active pregnancy on record        Passed - Normal serum creatinine on file in past 12 months     Recent Labs   Lab Test 05/17/19  0930   CR 0.66             Passed - Normal serum potassium on file in past 12 months     Recent Labs   Lab Test 05/17/19  0930   POTASSIUM 3.9                    Passed - No positive pregnancy test in past 12 months          "

## 2019-09-06 NOTE — TELEPHONE ENCOUNTER
Left message to call back.  Kyra, may we offer patient purchase out of pocket?  Or change to ?   Roby Mancera RN

## 2019-09-06 NOTE — TELEPHONE ENCOUNTER
PA Initiation    Medication: Phentermine   Insurance Company: [employer] - Phone [phone] Fax [phone]  Pharmacy Filling the Rx: [id number] - [place], [place] - [address]  Filling Pharmacy Phone:    Filling Pharmacy Fax:    Start Date: 9/6/2019

## 2019-09-09 NOTE — TELEPHONE ENCOUNTER
Please let her know and offer she pay out of pocket.  Depending on where she fills the Rx and with use of coupon ([facility] is one), it should be less than $20 per month.  Kyra Helms NP  Endocrinology

## 2019-09-09 NOTE — TELEPHONE ENCOUNTER
Patient calling and states her insurance told her the reason it was denied was that they did not get a phone call as requested.  She states insurance told her it would be covered if we called.  She states if it is covered it is $2 vs $20.  She is wanting to know if call was made and if so what date and time call made so she can tell her insurance.  Routing to PA pool to clarify.  See [employer] message with phone #.  Che Quiñones RN

## 2019-09-10 RX ORDER — LOSARTAN POTASSIUM 50 MG/1
TABLET ORAL
Qty: 90 TABLET | Refills: 0 | Status: SHIPPED | OUTPATIENT
Start: 2019-09-10 | End: 2019-10-29

## 2019-09-12 NOTE — TELEPHONE ENCOUNTER
This can be the frustrating thing when patients call the insurance company because Yes it is technically covered but she has already exhausted the 3 months that the insurance will cover. In the denial documentation there is a screen shot of the denial from the insurance company.

## 2019-10-04 ENCOUNTER — HEALTH MAINTENANCE LETTER (OUTPATIENT)
Age: 54
End: 2019-10-04

## 2019-10-14 DIAGNOSIS — E03.8 SUBCLINICAL HYPOTHYROIDISM: ICD-10-CM

## 2019-10-14 RX ORDER — LEVOTHYROXINE SODIUM 50 UG/1
TABLET ORAL
Qty: 90 TABLET | Refills: 1 | Status: SHIPPED | OUTPATIENT
Start: 2019-10-14 | End: 2020-03-23

## 2019-10-14 NOTE — TELEPHONE ENCOUNTER
Prescription approved per [facility] Refill Protocol.  Kedar Brewer, RN, BSN

## 2019-10-29 ENCOUNTER — OFFICE VISIT (OUTPATIENT)
Dept: FAMILY MEDICINE | Facility: CLINIC | Age: 54
End: 2019-10-29
Payer: COMMERCIAL

## 2019-10-29 VITALS
WEIGHT: 173 LBS | SYSTOLIC BLOOD PRESSURE: 124 MMHG | BODY MASS INDEX: 28.82 KG/M2 | DIASTOLIC BLOOD PRESSURE: 81 MMHG | HEART RATE: 78 BPM | TEMPERATURE: 98.3 F | RESPIRATION RATE: 16 BRPM | HEIGHT: 65 IN

## 2019-10-29 DIAGNOSIS — Z00.00 ROUTINE GENERAL MEDICAL EXAMINATION AT A HEALTH CARE FACILITY: Primary | ICD-10-CM

## 2019-10-29 DIAGNOSIS — J30.9 CHRONIC ALLERGIC RHINITIS: ICD-10-CM

## 2019-10-29 DIAGNOSIS — M16.12 OSTEOARTHRITIS OF LEFT HIP, UNSPECIFIED OSTEOARTHRITIS TYPE: ICD-10-CM

## 2019-10-29 DIAGNOSIS — I10 BENIGN ESSENTIAL HYPERTENSION: ICD-10-CM

## 2019-10-29 DIAGNOSIS — J45.20 MILD INTERMITTENT ASTHMA WITHOUT COMPLICATION: ICD-10-CM

## 2019-10-29 PROCEDURE — 99213 OFFICE O/P EST LOW 20 MIN: CPT | Mod: 25 | Performed by: FAMILY MEDICINE

## 2019-10-29 PROCEDURE — 99396 PREV VISIT EST AGE 40-64: CPT | Performed by: FAMILY MEDICINE

## 2019-10-29 RX ORDER — LOSARTAN POTASSIUM 50 MG/1
50 TABLET ORAL DAILY
Qty: 90 TABLET | Refills: 1 | Status: SHIPPED | OUTPATIENT
Start: 2019-10-29 | End: 2020-05-12

## 2019-10-29 RX ORDER — MONTELUKAST SODIUM 10 MG/1
1 TABLET ORAL AT BEDTIME
Qty: 90 TABLET | Refills: 1 | Status: SHIPPED | OUTPATIENT
Start: 2019-10-29 | End: 2020-05-12

## 2019-10-29 RX ORDER — MELOXICAM 7.5 MG/1
7.5 TABLET ORAL DAILY PRN
Qty: 30 TABLET | Refills: 1 | Status: SHIPPED | OUTPATIENT
Start: 2019-10-29 | End: 2020-01-23

## 2019-10-29 SDOH — SOCIAL STABILITY: SOCIAL NETWORK: ARE YOU MARRIED, WIDOWED, DIVORCED, SEPARATED, NEVER MARRIED, OR LIVING WITH A PARTNER?: MARRIED

## 2019-10-29 SDOH — SOCIAL STABILITY: SOCIAL NETWORK: HOW OFTEN DO YOU ATTENT MEETINGS OF THE CLUB OR ORGANIZATION YOU BELONG TO?: MORE THAN 4 TIMES PER YEAR

## 2019-10-29 SDOH — HEALTH STABILITY: PHYSICAL HEALTH: ON AVERAGE, HOW MANY DAYS PER WEEK DO YOU ENGAGE IN MODERATE TO STRENUOUS EXERCISE (LIKE A BRISK WALK)?: 0 DAYS

## 2019-10-29 SDOH — HEALTH STABILITY: MENTAL HEALTH: HOW OFTEN DO YOU HAVE A DRINK CONTAINING ALCOHOL?: 2-4 TIMES A MONTH

## 2019-10-29 SDOH — SOCIAL STABILITY: SOCIAL NETWORK: HOW OFTEN DO YOU GET TOGETHER WITH FRIENDS OR RELATIVES?: TWICE A WEEK

## 2019-10-29 SDOH — ECONOMIC STABILITY: TRANSPORTATION INSECURITY
IN THE PAST 12 MONTHS, HAS THE LACK OF TRANSPORTATION KEPT YOU FROM MEDICAL APPOINTMENTS OR FROM GETTING MEDICATIONS?: NO

## 2019-10-29 SDOH — ECONOMIC STABILITY: FOOD INSECURITY: WITHIN THE PAST 12 MONTHS, YOU WORRIED THAT YOUR FOOD WOULD RUN OUT BEFORE YOU GOT MONEY TO BUY MORE.: NEVER TRUE

## 2019-10-29 SDOH — SOCIAL STABILITY: SOCIAL NETWORK
IN A TYPICAL WEEK, HOW MANY TIMES DO YOU TALK ON THE PHONE WITH FAMILY, FRIENDS, OR NEIGHBORS?: MORE THAN THREE TIMES A WEEK

## 2019-10-29 SDOH — SOCIAL STABILITY: SOCIAL NETWORK
DO YOU BELONG TO ANY CLUBS OR ORGANIZATIONS SUCH AS CHURCH GROUPS UNIONS, FRATERNAL OR ATHLETIC GROUPS, OR SCHOOL GROUPS?: YES

## 2019-10-29 SDOH — HEALTH STABILITY: PHYSICAL HEALTH: ON AVERAGE, HOW MANY MINUTES DO YOU ENGAGE IN EXERCISE AT THIS LEVEL?: 0 MIN

## 2019-10-29 SDOH — ECONOMIC STABILITY: TRANSPORTATION INSECURITY
IN THE PAST 12 MONTHS, HAS LACK OF TRANSPORTATION KEPT YOU FROM MEETINGS, WORK, OR FROM GETTING THINGS NEEDED FOR DAILY LIVING?: NO

## 2019-10-29 SDOH — SOCIAL STABILITY: SOCIAL NETWORK: HOW OFTEN DO YOU ATTEND CHURCH OR RELIGIOUS SERVICES?: 1 TO 4 TIMES PER YEAR

## 2019-10-29 SDOH — ECONOMIC STABILITY: FOOD INSECURITY: WITHIN THE PAST 12 MONTHS, THE FOOD YOU BOUGHT JUST DIDN'T LAST AND YOU DIDN'T HAVE MONEY TO GET MORE.: NEVER TRUE

## 2019-10-29 SDOH — HEALTH STABILITY: MENTAL HEALTH: HOW MANY STANDARD DRINKS CONTAINING ALCOHOL DO YOU HAVE ON A TYPICAL DAY?: 1 OR 2

## 2019-10-29 SDOH — HEALTH STABILITY: MENTAL HEALTH
STRESS IS WHEN SOMEONE FEELS TENSE, NERVOUS, ANXIOUS, OR CAN'T SLEEP AT NIGHT BECAUSE THEIR MIND IS TROUBLED. HOW STRESSED ARE YOU?: NOT AT ALL

## 2019-10-29 SDOH — ECONOMIC STABILITY: INCOME INSECURITY: HOW HARD IS IT FOR YOU TO PAY FOR THE VERY BASICS LIKE FOOD, HOUSING, MEDICAL CARE, AND HEATING?: NOT HARD AT ALL

## 2019-10-29 SDOH — HEALTH STABILITY: MENTAL HEALTH: HOW OFTEN DO YOU HAVE 6 OR MORE DRINKS ON ONE OCCASION?: NEVER

## 2019-10-29 ASSESSMENT — ENCOUNTER SYMPTOMS
CHILLS: 0
CONSTIPATION: 0
DIZZINESS: 1
FEVER: 0
DIARRHEA: 0
HEMATOCHEZIA: 0
EYE PAIN: 0
NERVOUS/ANXIOUS: 0
ABDOMINAL PAIN: 0
HEMATURIA: 0
COUGH: 0

## 2019-10-29 ASSESSMENT — MIFFLIN-ST. JEOR: SCORE: 1395.6

## 2019-10-29 NOTE — PATIENT INSTRUCTIONS
Patient Education     Prevention Guidelines, Women Ages 50 to 64  Screening tests and vaccines are an important part of managing your health. A screening test is done to find possible disorders or diseases in people who don't have any symptoms. The goal is to find a disease early so lifestyle changes can be made and you can be watched more closely to reduce the risk of disease, or to detect it early enough to treat it most effectively. Screening tests are not considered diagnostic, but are used to determine if more testing is needed. Health counseling is essential, too. Below are guidelines for these, for women ages 50 to 64. Talk with your healthcare provider to make sure you re up to date on what you need.  Screening Who needs it How often   Type 2 diabetes or prediabetes All women beginning at age 45 and women without symptoms at any age who are overweight or obese and have 1 or more additional risk factors for diabetes. At  least every 3 years   Type 2 diabetes or prediabetes All women diagnosed with gestational diabetes Lifelong testing every 3 years   Type 2 diabetes All women with prediabetes Every year   Alcohol misuse All women in this age group At routine exams   Blood pressure All women in this age group Yearly checkup if your blood pressure is normal  Normal blood pressure is less than 120/80 mm Hg  If your blood pressure reading is higher than normal, follow the advice of your healthcare provider   Breast cancer All women at average risk in this age group Yearly mammogram should be done until age 54. At age 55, switch to mammograms every other year or choose to continue yearly mammograms.  All women should be familiar with the potential benefits and risks of breast cancer screening with mammograms.      Cervical cancer All women in this age group, except women who have had a complete hysterectomy Pap test every 3 years or Pap test with human papillomavirus (HPV) test every 5 years   Chlamydia Women at  increased risk for infection At routine exams   Colorectal cancer All women in this age group Flexible sigmoidoscopy every 5 years, or colonoscopy every 10 years, or double-contrast barium enema every 5 years; yearly fecal occult blood test or fecal immunochemical test; or a stool DNA test as often as your health care provider advises; talk with your health care provider about which tests are best for you   Depression All women in this age group At routine exams   Gonorrhea Sexually active women at increased risk for infection At routine exams   Hepatitis C Anyone at increased risk; 1 time for those born between 1945 and 1965 At routine exams   High cholesterol or triglycerides All women in this age group who are at risk for coronary artery disease At least every 5 years   HIV All women At routine exams   Lung cancer Adults age 55 to 80 who have smoked Yearly screening in smokers with 30 pack-year history of smoking or who quit within 15 years   Obesity All women in this age group At routine exams   Osteoporosis Women who are postmenopausal Ask your healthcare provider   Syphilis Women at increased risk for infection - talk with your healthcare provider At routine exams   Tuberculosis Women at increased risk for infection - talk with your healthcare provider Ask your healthcare provider   Vision All women in this age group Ask your healthcare provider   Vaccine Who needs it How often   Chickenpox (varicella) All women in this age group who have no record of this infection or vaccine 2 doses; the second dose should be given at least 4 weeks after the first dose   Hepatitis A Women at increased risk for infection - talk with your healthcare provider 2 doses given at least 6 months apart   Hepatitis B Women at increased risk for infection - talk with your healthcare provider 3 doses over 6 months; second dose should be given 1 month after the first dose; the third dose should be given at least 2 months after the second  dose and at least 4 months after the first dose   Haemophilus influenzaeType B (HIB) Women at increased risk for infection - talk with your healthcare provider 1 to 3 doses   Influenza (flu) All women in this age group Once a year   Measles, mumps, rubella (MMR) Women in this age group through their late 50s who have no record of these infections or vaccines 1 dose   Meningococcal Women at increased risk for infection - talk with your healthcare provider 1 or more doses   Pneumococcal conjugate vaccine (PCV13) and pneumococcal polysaccharide vaccine (PPSV23) Women at increased risk for infection - talk with your healthcare provider PCV13: 1 dose ages 19 to 65 (protects against 13 types of pneumococcal bacteria)  PPSV23: 1 to 2 doses through age 64, or 1 dose at 65 or older (protects against 23 types of pneumococcal bacteria)   Tetanus/diphtheria/pertussis (Td/Tdap) booster All women in this age group Td every 10 years, or a one-time dose of Tdap instead of a Td booster after age 18, then Td every 10 years   Zoster All women ages 60 and older 1 dose   Counseling Who needs it How often   BRCA gene mutation testing for breast and ovarian cancer susceptibility Women with increased risk for having gene mutation When your risk is known   Breast cancer and chemoprevention Women at high risk for breast cancer When your risk is known   Diet and exercise Women who are overweight or obese When diagnosed, and then at routine exams   Sexually transmitted infection prevention Women at increased risk for infection - talk with your healthcare provider At routine exams   Use of daily aspirin Women ages 55 and up in this age group who are at risk for cardiovascular health problems such as stroke When your risk is known   Use of tobacco and the health effects it can cause All women in this age group Every exam   1American Cancer Society  Date Last Reviewed: 1/26/2016 2000-2018 [employer]. [address],  [place] [address]. All rights reserved. This information is not intended as a substitute for professional medical care. Always follow your healthcare professional's instructions.

## 2019-10-29 NOTE — PROGRESS NOTES
SUBJECTIVE:   CC: Rayna Jackson is an 54 year old woman who presents for preventive health visit.     Healthy Habits:     Getting at least 3 servings of Calcium per day:  NO    Bi-annual eye exam:  Yes    Dental care twice a year:  Yes    Sleep apnea or symptoms of sleep apnea:  None    Diet:  Carbohydrate counting    Frequency of exercise:  None    Taking medications regularly:  Yes    Medication side effects:  None    PHQ-2 Total Score: 0    Additional concerns today:  No    1. Left hip pain- reports she is starting to have pain again and wonders if it is time for another steroid injection.    starting to have some discomfort especially at night when rolling in bed.     2. HTN: does not really check her BP at home. Reports compliance with meds. Has noted a couple of dizzy episodes. Unsure if related to her BP.     3. Asthma- no recent flare ups. Using flovent and singular daily         weight loss- continues to take phentermine. Due to follow up in a few weeks   Wt Readings from Last 4 Encounters:   10/29/19 78.5 kg (173 lb)   08/23/19 82.1 kg (181 lb)   05/17/19 94.3 kg (207 lb 14.4 oz)   04/15/19 93 kg (205 lb)         Today's PHQ-2 Score:   PHQ-2 ( 1999 Pf[employer]) 10/29/2019   Q1: Little interest or pleasure in doing things 0   Q2: Feeling down, depressed or hopeless 0   PHQ-2 Score 0   Q1: Little interest or pleasure in doing things Not at all   Q2: Feeling down, depressed or hopeless Not at all   PHQ-2 Score 0       Abuse: Current or Past(Physical, Sexual or Emotional)- No  Do you feel safe in your environment? Yes    Social History     Tobacco Use     Smoking status: Never Smoker     Smokeless tobacco: Never Used   Substance Use Topics     Alcohol use: Yes     Alcohol/week: 0.0 standard drinks     Frequency: 2-4 times a month     Drinks per session: 1 or 2     Binge frequency: Never     Comment: occ         Alcohol Use 10/29/2019   Prescreen: >3 drinks/day or >7 drinks/week? No       Reviewed orders with  "patient.  Reviewed health maintenance and updated orders accordingly - Yes  Labs reviewed in [facility]    Mammogram Screening: Patient over age 50, mutual decision to screen reflected in health maintenance.    Pertinent mammograms are reviewed under the imaging tab.  History of abnormal Pap smear: NO - age 30-65 PAP every 5 years with negative HPV co-testing recommended  PAP / HPV Latest Ref Rng & Units 5/1/2016   PAP Negative Negative     Reviewed and updated as needed this visit by clinical staff  Tobacco  Allergies  Meds  Problems  Med Hx  Surg Hx  Fam Hx  Soc Hx          Reviewed and updated as needed this visit by Provider  Tobacco  Allergies  Meds  Problems  Med Hx  Surg Hx  Fam Hx            Review of Systems   Constitutional: Negative for chills and fever.   HENT: Negative for congestion and ear pain.    Eyes: Negative for pain.   Respiratory: Negative for cough.    Cardiovascular: Negative for chest pain.   Gastrointestinal: Negative for abdominal pain, constipation, diarrhea and hematochezia.   Genitourinary: Negative for hematuria.   Neurological: Positive for dizziness.   Psychiatric/Behavioral: The patient is not nervous/anxious.           OBJECTIVE:   /81 (BP Location: Right arm, Patient Position: Chair, Cuff Size: Adult Regular)   Pulse 78   Temp 98.3  F (36.8  C) (Oral)   Resp 16   Ht 1.651 m (5' 5\")   Wt 78.5 kg (173 lb)   Breastfeeding? No   BMI 28.79 kg/m    Physical Exam  GENERAL APPEARANCE: healthy, alert and no distress  EYES: Eyes grossly normal to inspection, PERRL and conjunctivae and sclerae normal  HENT: ear canals and TM's normal, nose and mouth without ulcers or lesions, oropharynx clear and oral mucous membranes moist  NECK: no adenopathy, no asymmetry, masses, or scars and thyroid normal to palpation  RESP: lungs clear to auscultation - no rales, rhonchi or wheezes  CV: regular rate and rhythm, normal S1 S2, no S3 or S4, no murmur, click or rub, no peripheral " "edema and peripheral pulses strong  ABDOMEN: soft, nontender, and bowel sounds normal  MS: no musculoskeletal defects are noted and gait is age appropriate without ataxia  SKIN: no suspicious lesions or rashes  NEURO: Normal strength and tone, sensory exam grossly normal, mentation intact and speech normal  PSYCH: mentation appears normal and affect normal/bright    Diagnostic Test Results:  Labs reviewed in Epic  none     ASSESSMENT/PLAN:   1. Routine general medical examination at a health care facility  - **Comprehensive metabolic panel FUTURE anytime; Future  - Lipid panel reflex to direct LDL Fasting; Future    2. Osteoarthritis of left hip, unspecified osteoarthritis type  - mild flare up currently. Will start on trial of mobic for now if worsening will schedule for steroid injection   - meloxicam (MOBIC) 7.5 MG tablet; Take 1 tablet (7.5 mg) by mouth daily as needed for moderate pain (severe pain)  Dispense: 30 tablet; Refill: 1    3. Benign essential hypertension  - BP stable. Patient to keep track of BP. If continues to loose weight and BP starts going lower can consider holding to avoid dizziness.   - [facility] BP Flowsheet  - losartan (COZAAR) 50 MG tablet; Take 1 tablet (50 mg) by mouth daily  Dispense: 90 tablet; Refill: 1    4. Chronic allergic rhinitis  - stable   - montelukast (SINGULAIR) 10 MG tablet; Take 1 tablet (10 mg) by mouth At Bedtime  Dispense: 90 tablet; Refill: 1    5. Mild intermittent asthma without complication  - stable   - montelukast (SINGULAIR) 10 MG tablet; Take 1 tablet (10 mg) by mouth At Bedtime  Dispense: 90 tablet; Refill: 1    COUNSELING:  Reviewed preventive health counseling, as reflected in patient instructions       Regular exercise       Healthy diet/nutrition    Estimated body mass index is 28.79 kg/m  as calculated from the following:    Height as of this encounter: 1.651 m (5' 5\").    Weight as of this encounter: 78.5 kg (173 lb).    Weight management plan: Discussed " healthy diet and exercise guidelines     reports that she has never smoked. She has never used smokeless tobacco.      Counseling Resources:  ATP IV Guidelines  Pooled Cohorts Equation Calculator  Breast Cancer Risk Calculator  FRAX Risk Assessment  ICSI Preventive Guidelines  Dietary Guidelines for Americans, 2010  USDA's MyPlate  ASA Prophylaxis  Lung CA Screening    Mitra Keita MD  [facility]

## 2019-10-30 ASSESSMENT — ASTHMA QUESTIONNAIRES: ACT_TOTALSCORE: 21

## 2019-11-09 DIAGNOSIS — J30.9 CHRONIC ALLERGIC RHINITIS: ICD-10-CM

## 2019-11-09 NOTE — TELEPHONE ENCOUNTER
"Requested Prescriptions   Pending Prescriptions Disp Refills     fluticasone (FLONASE) 50 MCG/ACT nasal spray [Pharmacy Med Name: FLUTICASONE PROP 50 MCG SPRAY]  Last Written Prescription Date:  8/20/2018  Last Fill Quantity: 16 mL,  # refills: 3   Last office visit: 10/29/2019 with prescribing provider:  Bossman   Future Office Visit:   Next 5 appointments (look out 90 days)    Nov 22, 2019 10:00 AM CST  Return Visit with MIRZA Hays CNP  [facility] [address]  [facility] [address]  [phone]          16 mL 3     Sig: SPRAY 2 SPRAYS INTO BOTH NOSTRILS DAILY       Inhaled Steroids Protocol Passed - 11/9/2019  7:52 AM        Passed - Patient is age 12 or older        Passed - Asthma control assessment score within normal limits in last 6 months     Please review ACT score.           Passed - Medication is active on med list        Passed - Recent (6 mo) or future (30 days) visit within the authorizing provider's specialty     Patient had office visit in the last 6 months or has a visit in the next 30 days with authorizing provider or within the authorizing provider's specialty.  See \"Patient Info\" tab in inbasket, or \"Choose Columns\" in Meds & Orders section of the refill encounter.              "

## 2019-11-11 RX ORDER — FLUTICASONE PROPIONATE 50 MCG
SPRAY, SUSPENSION (ML) NASAL
Qty: 16 ML | Refills: 1 | Status: SHIPPED | OUTPATIENT
Start: 2019-11-11 | End: 2020-01-22

## 2019-11-15 ENCOUNTER — OFFICE VISIT (OUTPATIENT)
Dept: OBGYN | Facility: CLINIC | Age: 54
End: 2019-11-15
Payer: COMMERCIAL

## 2019-11-15 VITALS
DIASTOLIC BLOOD PRESSURE: 89 MMHG | WEIGHT: 173 LBS | HEART RATE: 74 BPM | SYSTOLIC BLOOD PRESSURE: 137 MMHG | HEIGHT: 65 IN | BODY MASS INDEX: 28.82 KG/M2

## 2019-11-15 DIAGNOSIS — Z12.4 PAP SMEAR FOR CERVICAL CANCER SCREENING: ICD-10-CM

## 2019-11-15 DIAGNOSIS — Z01.419 ENCNTR FOR GYN EXAM (GENERAL) (ROUTINE) W/O ABN FINDINGS: ICD-10-CM

## 2019-11-15 DIAGNOSIS — Z12.31 ENCOUNTER FOR SCREENING MAMMOGRAM FOR BREAST CANCER: Primary | ICD-10-CM

## 2019-11-15 PROCEDURE — 99386 PREV VISIT NEW AGE 40-64: CPT | Performed by: OBSTETRICS & GYNECOLOGY

## 2019-11-15 PROCEDURE — G0145 SCR C/V CYTO,THINLAYER,RESCR: HCPCS | Performed by: OBSTETRICS & GYNECOLOGY

## 2019-11-15 PROCEDURE — 87624 HPV HI-RISK TYP POOLED RSLT: CPT | Performed by: OBSTETRICS & GYNECOLOGY

## 2019-11-15 ASSESSMENT — MIFFLIN-ST. JEOR: SCORE: 1395.6

## 2019-11-15 NOTE — NURSING NOTE
"Chief Complaint   Patient presents with     Annual Visit     Breast exam and pap only.        Initial /89   Pulse 74   Ht 1.651 m (5' 5\")   Wt 78.5 kg (173 lb)   Breastfeeding No   BMI 28.79 kg/m   Estimated body mass index is 28.79 kg/m  as calculated from the following:    Height as of this encounter: 1.651 m (5' 5\").    Weight as of this encounter: 78.5 kg (173 lb).  BP completed using cuff size: regular    Questioned patient about current smoking habits.  Pt. has never smoked.          The following HM Due: mammogram and pap smear      The following patient reported/Care Every where data was sent to:  P ABSTRACT QUALITY INITIATIVES [[id number]]  Sadia Johnson LPN               "

## 2019-11-15 NOTE — PROGRESS NOTES
Rayna is a 54 year old  who presents for annual exam.   Postmenopausal, possibly. Currently with Mirena IUD in place for perimenopausal bleeding, which was placed in 2016.  She is having no menopausal symptoms. No vaginal bleeding noted.     Besides routine health maintenance, she has no other health concerns today .  GYNECOLOGIC HISTORY:  She is sexually active with 1 male partner(s).  History sexually transmitted infections:No STD history  Estrogen replacement therapy: No    History of abnormal Pap smear: NO - age 30-65 PAP every 5 years with negative HPV co-testing recommended  Family history of breast CA: No  Family history of uterine/ovarian CA: No      HEALTH MAINTENANCE:  Reviewed.    HISTORY:  OB History    Para Term  AB Living   2 2 2 0 0 2   SAB TAB Ectopic Multiple Live Births   0 0 0 0 0      # Outcome Date GA Lbr Alonso/2nd Weight Sex Delivery Anes PTL Lv   2 Term      Vag-Spont      1 Term      Vag-Spont        Past Medical History:   Diagnosis Date     Torn meniscus 2013    left      Past Surgical History:   Procedure Laterality Date     appendicitis       COLONOSCOPY N/A 2017    Procedure: COLONOSCOPY;  COLONOSCOPY   ;  Surgeon: Brooks Villa MD;  Location: [facility]     CYSTECTOMY OVARIAN BENIGN       KNEE SURGERY Left      Family History   Problem Relation Age of Onset     Hypertension Mother      Thyroid Disease Mother      Hypertension Father      Diabetes Other         great grandmother     Thyroid Disease Sister      Thyroid Disease Maternal Aunt      Social History     Socioeconomic History     Marital status:      Spouse name: Juan F     Number of children: 4     Years of education: None     Highest education level: Master's degree (e.g., MA, MS, [place], MEd, MSW, [employer])   Occupational History     Occupation: manager     Employer: [employer]   Social Needs     Financial resource strain: Not hard at all     Food insecurity:     Worry: Never true      Inability: Never true     Transportation needs:     Medical: No     Non-medical: No   Tobacco Use     Smoking status: Never Smoker     Smokeless tobacco: Never Used   Substance and Sexual Activity     Alcohol use: Yes     Alcohol/week: 0.0 standard drinks     Frequency: 2-4 times a month     Drinks per session: 1 or 2     Binge frequency: Never     Comment: occ     Drug use: No     Sexual activity: Yes     Birth control/protection: I.U.D.   Lifestyle     Physical activity:     Days per week: 0 days     Minutes per session: 0 min     Stress: Not at all   Relationships     Social connections:     Talks on phone: More than three times a week     Gets together: Twice a week     Attends Religion service: 1 to 4 times per year     Active member of club or organization: Yes     Attends meetings of clubs or organizations: More than 4 times per year     Relationship status:      Intimate partner violence:     Fear of current or ex partner: None     Emotionally abused: None     Physically abused: None     Forced sexual activity: None   Other Topics Concern     Parent/sibling w/ CABG, MI or angioplasty before 65F 55M? Not Asked   Social History Narrative     None       Current Outpatient Medications:      albuterol (PROAIR HFA/PROVENTIL HFA/VENTOLIN HFA) 108 (90 Base) MCG/ACT inhaler, Inhale 2 puffs into the lungs as needed for shortness of breath / dyspnea or wheezing, Disp: 2 Inhaler, Rfl: 3     fexofenadine (ALLEGRA) 180 MG tablet, Take 1 tablet (180 mg) by mouth daily Take 180 mg by mouth, Disp: 90 tablet, Rfl: 1     FLOVENT  MCG/ACT inhaler, TAKE 2 PUFFS BY MOUTH TWICE A DAY, Disp: 36 Inhaler, Rfl: 0     fluticasone (FLONASE) 50 MCG/ACT nasal spray, SPRAY 2 SPRAYS INTO BOTH NOSTRILS DAILY, Disp: 16 mL, Rfl: 1     levonorgestrel (MIRENA) 20 MCG/24HR IUD, 1 each by Intrauterine route once, Disp: , Rfl:      levothyroxine (SYNTHROID/LEVOTHROID) 50 MCG tablet, TAKE 1 TABLET BY MOUTH EVERY DAY, Disp: 90 tablet,  "Rfl: 1     losartan (COZAAR) 50 MG tablet, Take 1 tablet (50 mg) by mouth daily, Disp: 90 tablet, Rfl: 1     meloxicam (MOBIC) 7.5 MG tablet, Take 1 tablet (7.5 mg) by mouth daily as needed for moderate pain (severe pain), Disp: 30 tablet, Rfl: 1     montelukast (SINGULAIR) 10 MG tablet, Take 1 tablet (10 mg) by mouth At Bedtime, Disp: 90 tablet, Rfl: 1     olopatadine (PATADAY) 0.2 % ophthalmic solution, prn, Disp: , Rfl:      phentermine (ADIPEX-P) 37.5 MG tablet, TAKE 1 TABLET BY MOUTH EVERY MORNING BEFORE BREAKFAST, Disp: 32 tablet, Rfl: 2     triamcinolone (KENALOG) 0.1 % cream, Apply topically 2 times daily, Disp: , Rfl:     Current Facility-Administered Medications:      methylPREDNISolone (DEPO-MEDROL) injection 40 mg, 40 mg, , , Moura, Siatta Yulia, DO, 40 mg at 04/25/19 1558     ropivacaine (NAROPIN) injection 3 mL, 3 mL, , , Zeny, Dilia Yulia, DO, 3 mL at 04/25/19 1558  Allergies   Allergen Reactions     Lisinopril Cough     Amlodipine Swelling     Aspirin Other (See Comments)     Throat swells     Codeine Unknown     headache       Past medical, surgical, social and family history were reviewed and updated in EPIC.    ROS:  12 point review of systems negative other than symptoms noted below.      EXAM:  /89   Pulse 74   Ht 1.651 m (5' 5\")   Wt 78.5 kg (173 lb)   Breastfeeding No   BMI 28.79 kg/m     BMI: Body mass index is 28.79 kg/m .  Constitutional: healthy, alert and no distress  Head: Normocephalic. No masses, lesions, tenderness or abnormalities  Neck: Neck supple. Trachea midline. No adenopathy. Thyroid symmetric, normal size.   Cardiovascular: RRR.   Respiratory: Negative.   Breast: No nodularity, asymmetry or nipple discharge bilaterally.  Gastrointestinal: Abdomen soft, non-tender, non-distended. No masses, organomegaly  Vulva:  No external lesions, normal female hair distribution, no inguinal adenopathy.    Urethra:  Midline, non-tender, well supported, no discharge  Vagina:  " Atrophic, no abnormal discharge, no lesions  Cervix: no lesions, no discharge I used a small speculum and due to her anxiety with the exam, I could only visualize the anterior lip of the cervix and could not see the IUD strings. Pap was obtained.  Uterus:  anteverted, smooth contour, without enlargement, mobile, and without tenderness  Ovaries:  No masses appreciated, non-tender, mobile  Rectal Exam: deferred  Musculoskeletal: extremities normal  Skin: no suspicious lesions or rashes  Psychiatric: Affect appropriate, cooperative, mentation appears normal.     COUNSELING:   Reviewed preventive health counseling, as reflected in patient instructions       Regular exercise       Healthy diet/nutrition   reports that she has never smoked. She has never used smokeless tobacco.        FRAX Risk Assessment  ASSESSMENT:  54 year old  with satisfactory annual exam  (Z12.31) Encounter for screening mammogram for breast cancer  (primary encounter diagnosis)  Comment:   Plan: MA Screening Digital Bilateral        Discussed yearly vs every other year screening for her--she is opting for yearly screening. She needs a yearly breast exam in addition to mammogram for screening. Discussed.    (Z01.419) Encntr for gyn exam (general) (routine) w/o abn findings  Comment:   Plan: Mirena is scheduled for removal in 2 years, but ok to stay for 7 years if she prefers. At that point, it is highly unlikely that she would need it given her age, so it could be removed.    Court Locke MD

## 2019-11-19 LAB
COPATH REPORT: NORMAL
PAP: NORMAL

## 2019-11-20 LAB
FINAL DIAGNOSIS: NORMAL
HPV HR 12 DNA CVX QL NAA+PROBE: NEGATIVE
HPV16 DNA SPEC QL NAA+PROBE: NEGATIVE
HPV18 DNA SPEC QL NAA+PROBE: NEGATIVE
SPECIMEN DESCRIPTION: NORMAL
SPECIMEN SOURCE CVX/VAG CYTO: NORMAL

## 2019-11-22 ENCOUNTER — OFFICE VISIT (OUTPATIENT)
Dept: ENDOCRINOLOGY | Facility: CLINIC | Age: 54
End: 2019-11-22
Payer: COMMERCIAL

## 2019-11-22 VITALS
SYSTOLIC BLOOD PRESSURE: 108 MMHG | HEIGHT: 65 IN | BODY MASS INDEX: 28.67 KG/M2 | DIASTOLIC BLOOD PRESSURE: 84 MMHG | WEIGHT: 172.1 LBS

## 2019-11-22 DIAGNOSIS — E66.09 CLASS 1 OBESITY DUE TO EXCESS CALORIES WITH SERIOUS COMORBIDITY AND BODY MASS INDEX (BMI) OF 34.0 TO 34.9 IN ADULT: ICD-10-CM

## 2019-11-22 DIAGNOSIS — E66.811 CLASS 1 OBESITY DUE TO EXCESS CALORIES WITH SERIOUS COMORBIDITY AND BODY MASS INDEX (BMI) OF 34.0 TO 34.9 IN ADULT: ICD-10-CM

## 2019-11-22 PROCEDURE — 99213 OFFICE O/P EST LOW 20 MIN: CPT | Performed by: CLINICAL NURSE SPECIALIST

## 2019-11-22 RX ORDER — PHENTERMINE HYDROCHLORIDE 37.5 MG/1
TABLET ORAL
Qty: 32 TABLET | Refills: 4 | Status: SHIPPED | OUTPATIENT
Start: 2019-11-22 | End: 2020-04-03

## 2019-11-22 RX ORDER — PHENTERMINE HYDROCHLORIDE 37.5 MG/1
TABLET ORAL
Qty: 32 TABLET | Refills: 3 | Status: SHIPPED | OUTPATIENT
Start: 2019-11-22 | End: 2019-11-22

## 2019-11-22 ASSESSMENT — MIFFLIN-ST. JEOR: SCORE: 1391.52

## 2019-11-22 NOTE — PATIENT INSTRUCTIONS
"    You have lost a total of 35 lbs since 5/2019.  You have lost 7\" from your waist.  Continue phentermine   Follow up  Friday 4/3 at 8 am.    Kyra Helms NP  Endocrinology      "

## 2019-11-22 NOTE — LETTER
11/22/2019         RE: Rayna Jackson  [address]  [facility] [address]        Dear Colleague,    Thank you for referring your patient, Rayna Jackson, to the [facility]. Please see a copy of my visit note below.    Name: Rayna Jackson  F/u for obesity (Last seen 8/23/2019).  HPI:  Rayna Jackson is a 54 year old female who presents for the management of obestiy.  Has noted abnormal weight gain over the past 3 years - states she has gained 70 lbs.   Making healthy food choices - doesn't feel she overeats.  Unable to lose weight and has continued to gain.  Previously tried a program called [employer], also weight watchers, [place] [facility] was most effective - lost 20 lbs     Started Phentermine 37.5 mg daily May 2019.  Is tolerating phentermine well.  Also following a keto diet.  Has lost 26 lbs since starting phentermine + diet and exercise efforts.  Has started playing [employer] ball again.  Diabetes:No but states her blood sugars are 'always' high-normal.  No GD with pregnancy but was monitored more closely because glucose levels were elevated but not in the diabetes range.  + FH of DM2 - father and MGGM.  Sleep Apnea/Snores:snores - was seen by specialist who did not feel sleep apnea eval was indicated, didn't believe she had sleep apnea  Hypertension or CAD:Yes: Controlled on Losartan  Hyperlipidemia:Yes: untreated.  Diet:  Healthy food choices, low carb, portion controlled  Exercise: Difficult due to joint pain, + OA  Subclinical Hypothyroidism.  Recently diagnosed.  Currently treated with levothyroxine 50 mcg/day.  FH + for thyroid disease - Mother, MGM, maternal aunt, sister.  Previous lymph node biopsy + for Lupus - was seen at [place], no diagnosis of Lupus has been confirmed.  PMH/PSH:  Past Medical History:   Diagnosis Date     Torn meniscus 2013    left      Past Surgical History:   Procedure Laterality Date     appendicitis  1980     COLONOSCOPY N/A 6/30/2017    Procedure: COLONOSCOPY;   COLONOSCOPY   ;  Surgeon: Brooks Villa MD;  Location: [facility]     CYSTECTOMY OVARIAN BENIGN  1980     KNEE SURGERY Left      Family Hx:  Family History   Problem Relation Age of Onset     Hypertension Mother      Thyroid Disease Mother      Hypertension Father      Diabetes Other         great grandmother     Thyroid Disease Sister      Thyroid Disease Maternal Aunt      Thyroid disease: Yes, mother, sister, aunt, MGM        DM2: Yes: father, MGGM         Autoimmune: DM1, SLE, RA, Vitiligo     Social Hx:  Social History     Socioeconomic History     Marital status:      Spouse name: Juan F     Number of children: 4     Years of education: Not on file     Highest education level: Master's degree (e.g., MA, MS, Adry, MEd, MSW, [employer])   Occupational History     Occupation: manager     Employer: [employer]   Social Needs     Financial resource strain: Not hard at all     Food insecurity:     Worry: Never true     Inability: Never true     Transportation needs:     Medical: No     Non-medical: No   Tobacco Use     Smoking status: Never Smoker     Smokeless tobacco: Never Used   Substance and Sexual Activity     Alcohol use: Yes     Alcohol/week: 0.0 standard drinks     Frequency: 2-4 times a month     Drinks per session: 1 or 2     Binge frequency: Never     Comment: occ     Drug use: No     Sexual activity: Yes     Birth control/protection: I.U.D.   Lifestyle     Physical activity:     Days per week: 0 days     Minutes per session: 0 min     Stress: Not at all   Relationships     Social connections:     Talks on phone: More than three times a week     Gets together: Twice a week     Attends Judaism service: 1 to 4 times per year     Active member of club or organization: Yes     Attends meetings of clubs or organizations: More than 4 times per year     Relationship status:      Intimate partner violence:     Fear of current or ex partner: Not on file     Emotionally abused: Not on file      "Physically abused: Not on file     Forced sexual activity: Not on file   Other Topics Concern     Parent/sibling w/ CABG, MI or angioplasty before 65F 55M? Not Asked   Social History Narrative     Not on file          MEDICATIONS:  has a current medication list which includes the following prescription(s): albuterol, fexofenadine, flovent hfa, fluticasone, levonorgestrel, levothyroxine, losartan, meloxicam, montelukast, olopatadine, phentermine, and triamcinolone, and the following Facility-Administered Medications: methylprednisolone and ropivacaine.    ROS   ROS: 10 point ROS neg other than the symptoms noted above in the HPI.    GENERAL: + weight loss - intentional  HEENT: no dysphagia or neck pain or tenderness  CV: no chest pain, pressure or palpitations  LUNGS: no SOB   ABDOMEN: no diarrhea, constipation, abdominal pain    Physical Exam   VS: /84   Ht 1.651 m (5' 5\")   Wt 78.1 kg (172 lb 1.6 oz)   BMI 28.64 kg/m     GENERAL: AXOX3, NAD, well dressed, answering questions appropriately, appears stated age.  HEENT: no exopthalmous, no proptosis, no lig lag, no retraction  CV: RRR, no rubs, gallops, no murmurs  LUNGS: CTAB  EXTREMITIES: no edema  NEUROLOGY: CN grossly intact, no tremors  MSK: grossly intact    LABS:  !COMPREHENSIVE Latest Ref Rng & Units 5/17/2019   SODIUM 133 - 144 mmol/L 142   POTASSIUM 3.4 - 5.3 mmol/L 3.9   CHLORIDE 94 - 109 mmol/L 109   BUN 7 - 30 mg/dL 11   Creatinine 0.52 - 1.04 mg/dL 0.66   Glucose 70 - 99 mg/dL 87   ANION GAP 3 - 14 mmol/L 7   CALCIUM 8.5 - 10.1 mg/dL 9.3   ALBUMIN 3.4 - 5.0 g/dL 4.0     !COMPREHENSIVE Latest Ref Rng & Units 5/17/2019   AST 0 - 45 U/L 15   ALT 0 - 50 U/L 15     Component      Latest Ref Rng & Units 8/20/2018 3/11/2019   Hemoglobin A1C      0 - 5.6 % 4.9 5.4     !LIPID/HEPATIC Latest Ref Rng & Units 1/2/2018   CHOLESTEROL <200 mg/dL 212 (H)   TRIGLYCERIDES <150 mg/dL 154 (H)   HDL CHOLESTEROL >49 mg/dL 58   LDL CHOLESTEROL, CALCULATED <100 mg/dL " "123 (H)   NON HDL CHOLESTEROL <130 mg/dL 154 (H)     Component      Latest Ref Rng & Units 3/11/2019   Vitamin D Deficiency screening      20 - 75 ug/L 23     !THYROID Latest Ref Rng & Units 6/12/2019   TSH 0.40 - 4.00 mU/L 2.33     Vital Signs 8/23/2019 10/29/2019 11/15/2019 11/22/2019   Weight (LB) 181 lb 173 lb 173 lb 172 lb 1.6 oz   Height  5' 5\" 5' 5\" 5' 5\"   BMI (Calculated)  28.79 28.79 28.64     Waist circumference:  43.5\" (5/2019).  38.5\" (8/2019).  36.5\" (today)    All pertinent notes, labs, and images personally reviewed by me.     A/P  Ms.Jodi ROBERT Jackson is a 54 year old here for the management of obestiy:    1. Obesity/abnormal weight gain-  BMI 34-35--> 28.64.  Obesity related comorbidity include HTN, OA, hyperlipidemia.    Obesity is associated with a significant increase in mortality and risk of many disorders, including diabetes mellitus, hypertension, dyslipidemia, heart disease, stroke, sleep apnea, cancer, and many others. Conversely, weight loss is associated with a reduction in obesity-associated morbidity.    Endocrine evaluation of obesity includes Diabetes/prediabetes, Cushing's and thyroid dysfunction.  The relevant work up for the diagnosis and management of obesity and various treatment options were discussed. Body Mass Index (BMI) has been a standard means for calculating risk for overweight and obesity. The new American Association of Clinical Endocrinology (AACE) algorithm recommends lifestyle modifications as the initial phase of treatment for all patients with the BMI equal or greater than 25 kg/m2. Lifestyle modifications includes use of medical nutrition therapy, exercise, tobacco cessation, adequate quality and quantity of sleep, limited consumption of alcohol and reduced stress through implementation of a structured, multidisciplinary program.    In patients with complications associated with obesity, graded interventions are recommended including pharmacological therapy such as " "phentermine, orlistat, lorcaserin and phentermine/topiramate ER, contrave ( buproprion/naltreone) and the use of very low calorie meal replacement programs.    If medical intervention is insufficient, surgical therapy may be considered, especially in patients with BMI greater than or equal to 35 kg/m2 with multiple complications. Surgical treatments include lap-band, gastric sleeve or gastric bypass surgery.    I informed the pt that:  1.Weight loss medications can be taken to assist with weight reduction when combined with appropriate healthy lifestyle changes.  2.I discussed possible s/e, risks and benefits of weight loss medications.  3.All medications are FDA approved, however, some may be used ''off label'' for their weight loss benefits and some ''short term'' medications can be used for longer periods to achieve desired clinical outcomes.  4.All patients taking weight loss medications must be seen in clinic for refill authorization.    Counseling exercise ( V65.41)  Dietary counseling( V65.3)  Calculated BMI above the upper parameter and f/u plan was documented in the medical record.  Discussed indications, risks and benefits of all medications prescribed, and answered questions to patient's satisfaction.  Continue current low carb diet.  Started Phentermine 37.5 mg/day 5/2019.  Has lost 35 lbs since starting phentermine, 207 lbs (5/2019) --> 172 lbs (today).  Has lost 7\" from her waist since 5/2019.  Continue phentermine 37.5 mg/day    Labs ordered today:   No orders of the defined types were placed in this encounter.    Radiology/Consults ordered today: None    More than 50% of the time spent with Ms. Jackson on counseling / coordinating her care and discussing the above plan of care. The patient indicates understanding of the above issues and agrees with the plan set forth.  Total face to face time was 15 minutes.      Follow-up:  3 months    Kyra Helms NP  Endocrinology  [facility]  CC: " Mitra Keita   ____________________________________________________________      Again, thank you for allowing me to participate in the care of your patient.        Sincerely,        MIRZA Hays CNP

## 2019-11-22 NOTE — PROGRESS NOTES
Name: Rayna Jackson  F/u for obesity (Last seen 8/23/2019).  HPI:  Rayna Jackson is a 54 year old female who presents for the management of obestiy.  Has noted abnormal weight gain over the past 3 years - states she has gained 70 lbs.   Making healthy food choices - doesn't feel she overeats.  Unable to lose weight and has continued to gain.  Previously tried a program called [employer], also weight watchers, [place] [facility] was most effective - lost 20 lbs     Started Phentermine 37.5 mg daily May 2019.  Is tolerating phentermine well.  Also following a keto diet.  Has lost 26 lbs since starting phentermine + diet and exercise efforts.  Has started playing [employer] ball again.  Diabetes:No but states her blood sugars are 'always' high-normal.  No GD with pregnancy but was monitored more closely because glucose levels were elevated but not in the diabetes range.  + FH of DM2 - father and MGGM.  Sleep Apnea/Snores:snores - was seen by specialist who did not feel sleep apnea eval was indicated, didn't believe she had sleep apnea  Hypertension or CAD:Yes: Controlled on Losartan  Hyperlipidemia:Yes: untreated.  Diet:  Healthy food choices, low carb, portion controlled  Exercise: Difficult due to joint pain, + OA  Subclinical Hypothyroidism.  Recently diagnosed.  Currently treated with levothyroxine 50 mcg/day.  FH + for thyroid disease - Mother, MGM, maternal aunt, sister.  Previous lymph node biopsy + for Lupus - was seen at [facility], no diagnosis of Lupus has been confirmed.  PMH/PSH:  Past Medical History:   Diagnosis Date     Torn meniscus 2013    left      Past Surgical History:   Procedure Laterality Date     appendicitis  1980     COLONOSCOPY N/A 6/30/2017    Procedure: COLONOSCOPY;  COLONOSCOPY   ;  Surgeon: Brooks Villa MD;  Location:  GI     CYSTECTOMY OVARIAN BENIGN  1980     KNEE SURGERY Left      Family Hx:  Family History   Problem Relation Age of Onset     Hypertension Mother      Thyroid Disease Mother       Hypertension Father      Diabetes Other         great grandmother     Thyroid Disease Sister      Thyroid Disease Maternal Aunt      Thyroid disease: Yes, mother, sister, aunt, MGM        DM2: Yes: father, MGGM         Autoimmune: DM1, SLE, RA, Vitiligo     Social Hx:  Social History     Socioeconomic History     Marital status:      Spouse name: Juan F     Number of children: 4     Years of education: Not on file     Highest education level: Master's degree (e.g., MA, MS, Adry, MEd, MSW, [employer])   Occupational History     Occupation: manager     Employer: [employer]   Social Needs     Financial resource strain: Not hard at all     Food insecurity:     Worry: Never true     Inability: Never true     Transportation needs:     Medical: No     Non-medical: No   Tobacco Use     Smoking status: Never Smoker     Smokeless tobacco: Never Used   Substance and Sexual Activity     Alcohol use: Yes     Alcohol/week: 0.0 standard drinks     Frequency: 2-4 times a month     Drinks per session: 1 or 2     Binge frequency: Never     Comment: occ     Drug use: No     Sexual activity: Yes     Birth control/protection: I.U.D.   Lifestyle     Physical activity:     Days per week: 0 days     Minutes per session: 0 min     Stress: Not at all   Relationships     Social connections:     Talks on phone: More than three times a week     Gets together: Twice a week     Attends Buddhist service: 1 to 4 times per year     Active member of club or organization: Yes     Attends meetings of clubs or organizations: More than 4 times per year     Relationship status:      Intimate partner violence:     Fear of current or ex partner: Not on file     Emotionally abused: Not on file     Physically abused: Not on file     Forced sexual activity: Not on file   Other Topics Concern     Parent/sibling w/ CABG, MI or angioplasty before 65F 55M? Not Asked   Social History Narrative     Not on file          MEDICATIONS:  has a current  "medication list which includes the following prescription(s): albuterol, fexofenadine, flovent hfa, fluticasone, levonorgestrel, levothyroxine, losartan, meloxicam, montelukast, olopatadine, phentermine, and triamcinolone, and the following Facility-Administered Medications: methylprednisolone and ropivacaine.    ROS   ROS: 10 point ROS neg other than the symptoms noted above in the HPI.    GENERAL: + weight loss - intentional  HEENT: no dysphagia or neck pain or tenderness  CV: no chest pain, pressure or palpitations  LUNGS: no SOB   ABDOMEN: no diarrhea, constipation, abdominal pain    Physical Exam   VS: /84   Ht 1.651 m (5' 5\")   Wt 78.1 kg (172 lb 1.6 oz)   BMI 28.64 kg/m    GENERAL: AXOX3, NAD, well dressed, answering questions appropriately, appears stated age.  HEENT: no exopthalmous, no proptosis, no lig lag, no retraction  CV: RRR, no rubs, gallops, no murmurs  LUNGS: CTAB  EXTREMITIES: no edema  NEUROLOGY: CN grossly intact, no tremors  MSK: grossly intact    LABS:  !COMPREHENSIVE Latest Ref Rng & Units 5/17/2019   SODIUM 133 - 144 mmol/L 142   POTASSIUM 3.4 - 5.3 mmol/L 3.9   CHLORIDE 94 - 109 mmol/L 109   BUN 7 - 30 mg/dL 11   Creatinine 0.52 - 1.04 mg/dL 0.66   Glucose 70 - 99 mg/dL 87   ANION GAP 3 - 14 mmol/L 7   CALCIUM 8.5 - 10.1 mg/dL 9.3   ALBUMIN 3.4 - 5.0 g/dL 4.0     !COMPREHENSIVE Latest Ref Rng & Units 5/17/2019   AST 0 - 45 U/L 15   ALT 0 - 50 U/L 15     Component      Latest Ref Rng & Units 8/20/2018 3/11/2019   Hemoglobin A1C      0 - 5.6 % 4.9 5.4     !LIPID/HEPATIC Latest Ref Rng & Units 1/2/2018   CHOLESTEROL <200 mg/dL 212 (H)   TRIGLYCERIDES <150 mg/dL 154 (H)   HDL CHOLESTEROL >49 mg/dL 58   LDL CHOLESTEROL, CALCULATED <100 mg/dL 123 (H)   NON HDL CHOLESTEROL <130 mg/dL 154 (H)     Component      Latest Ref Rng & Units 3/11/2019   Vitamin D Deficiency screening      20 - 75 ug/L 23     !THYROID Latest Ref Rng & Units 6/12/2019   TSH 0.40 - 4.00 mU/L 2.33     Vital Signs " "8/23/2019 10/29/2019 11/15/2019 11/22/2019   Weight (LB) 181 lb 173 lb 173 lb 172 lb 1.6 oz   Height  5' 5\" 5' 5\" 5' 5\"   BMI (Calculated)  28.79 28.79 28.64     Waist circumference:  43.5\" (5/2019).  38.5\" (8/2019).  36.5\" (today)    All pertinent notes, labs, and images personally reviewed by me.     A/P  Ms.Jodi ROBERT Jackson is a 54 year old here for the management of obestiy:    1. Obesity/abnormal weight gain-  BMI 34-35--> 28.64.  Obesity related comorbidity include HTN, OA, hyperlipidemia.    Obesity is associated with a significant increase in mortality and risk of many disorders, including diabetes mellitus, hypertension, dyslipidemia, heart disease, stroke, sleep apnea, cancer, and many others. Conversely, weight loss is associated with a reduction in obesity-associated morbidity.    Endocrine evaluation of obesity includes Diabetes/prediabetes, Cushing's and thyroid dysfunction.  The relevant work up for the diagnosis and management of obesity and various treatment options were discussed. Body Mass Index (BMI) has been a standard means for calculating risk for overweight and obesity. The new American Association of Clinical Endocrinology (AACE) algorithm recommends lifestyle modifications as the initial phase of treatment for all patients with the BMI equal or greater than 25 kg/m2. Lifestyle modifications includes use of medical nutrition therapy, exercise, tobacco cessation, adequate quality and quantity of sleep, limited consumption of alcohol and reduced stress through implementation of a structured, multidisciplinary program.    In patients with complications associated with obesity, graded interventions are recommended including pharmacological therapy such as phentermine, orlistat, lorcaserin and phentermine/topiramate ER, contrave ( buproprion/naltreone) and the use of very low calorie meal replacement programs.    If medical intervention is insufficient, surgical therapy may be considered, especially " "in patients with BMI greater than or equal to 35 kg/m2 with multiple complications. Surgical treatments include lap-band, gastric sleeve or gastric bypass surgery.    I informed the pt that:  1.Weight loss medications can be taken to assist with weight reduction when combined with appropriate healthy lifestyle changes.  2.I discussed possible s/e, risks and benefits of weight loss medications.  3.All medications are FDA approved, however, some may be used ''off label'' for their weight loss benefits and some ''short term'' medications can be used for longer periods to achieve desired clinical outcomes.  4.All patients taking weight loss medications must be seen in clinic for refill authorization.    Counseling exercise ( V65.41)  Dietary counseling( V65.3)  Calculated BMI above the upper parameter and f/u plan was documented in the medical record.  Discussed indications, risks and benefits of all medications prescribed, and answered questions to patient's satisfaction.  Continue current low carb diet.  Started Phentermine 37.5 mg/day 5/2019.  Has lost 35 lbs since starting phentermine, 207 lbs (5/2019) --> 172 lbs (today).  Has lost 7\" from her waist since 5/2019.  Continue phentermine 37.5 mg/day    Labs ordered today:   No orders of the defined types were placed in this encounter.    Radiology/Consults ordered today: None    More than 50% of the time spent with Ms. Jackson on counseling / coordinating her care and discussing the above plan of care. The patient indicates understanding of the above issues and agrees with the plan set forth.  Total face to face time was 15 minutes.      Follow-up:  3 months    Kyra Helms NP  Endocrinology  [facility]  CC: Mitra Keita   ____________________________________________________________    "

## 2020-01-22 DIAGNOSIS — J30.9 CHRONIC ALLERGIC RHINITIS: ICD-10-CM

## 2020-01-22 RX ORDER — FLUTICASONE PROPIONATE 50 MCG
SPRAY, SUSPENSION (ML) NASAL
Qty: 16 ML | Refills: 1 | Status: SHIPPED | OUTPATIENT
Start: 2020-01-22 | End: 2020-02-20

## 2020-01-22 NOTE — TELEPHONE ENCOUNTER
"Requested Prescriptions   Pending Prescriptions Disp Refills     fluticasone (FLONASE) 50 MCG/ACT nasal spray [Pharmacy Med Name: FLUTICASONE PROP 50 MCG SPRAY] 16 mL 1     Sig: INSTILL 2 SPRAYS INTO BOTH NOSTRILS DAILY       Inhaled Steroids Protocol Passed - 1/22/2020 11:34 AM        Passed - Patient is age 12 or older        Passed - Asthma control assessment score within normal limits in last 6 months     Please review ACT score.           Passed - Medication is active on med list        Passed - Recent (6 mo) or future (30 days) visit within the authorizing provider's specialty     Patient had office visit in the last 6 months or has a visit in the next 30 days with authorizing provider or within the authorizing provider's specialty.  See \"Patient Info\" tab in inbasket, or \"Choose Columns\" in Meds & Orders section of the refill encounter.            Prescription approved per [facility] Refill Protocol.      Roula Clayton RN, BSN, PHN    "

## 2020-01-23 DIAGNOSIS — M16.12 OSTEOARTHRITIS OF LEFT HIP, UNSPECIFIED OSTEOARTHRITIS TYPE: ICD-10-CM

## 2020-01-23 RX ORDER — MELOXICAM 7.5 MG/1
7.5 TABLET ORAL DAILY PRN
Qty: 30 TABLET | Refills: 1 | Status: SHIPPED | OUTPATIENT
Start: 2020-01-23 | End: 2020-05-22

## 2020-01-23 NOTE — TELEPHONE ENCOUNTER
"Routing refill request to provider for review/approval because:  Labs not current:  CBC    Mobic  Last Written Prescription Date:  10/29/2019  Last Fill Quantity: 30,  # refills: 1   Last office visit: 10/29/2019 with prescribing provider:  Bossman   Future Office Visit:   Next 5 appointments (look out 90 days)    Apr 03, 2020  8:00 AM CDT  Return Visit with MIRZA Hays CNP  [facility] [address] [facility] [address] [phone]           Requested Prescriptions   Pending Prescriptions Disp Refills     meloxicam (MOBIC) 7.5 MG tablet [Pharmacy Med Name: MELOXICAM 7.5 MG TABLET] 30 tablet 1     Sig: TAKE 1 TABLET (7.5 MG) BY MOUTH DAILY AS NEEDED FOR MODERATE PAIN (SEVERE PAIN)       NSAID Medications Failed - 1/23/2020  2:03 AM        Failed - Normal CBC on file in past 12 months     Recent Labs   Lab Test 09/08/16  1706   WBC 8.1   RBC 4.39   HGB 14.6   HCT 42.7                    Passed - Blood pressure under 140/90 in past 12 months     BP Readings from Last 3 Encounters:   11/22/19 108/84   11/15/19 137/89   10/29/19 124/81                 Passed - Normal ALT on file in past 12 months     Recent Labs   Lab Test 05/17/19  0930   ALT 15             Passed - Normal AST on file in past 12 months     Recent Labs   Lab Test 05/17/19  0930   AST 15             Passed - Recent (12 mo) or future (30 days) visit within the authorizing provider's specialty     Patient has had an office visit with the authorizing provider or a provider within the authorizing providers department within the previous 12 mos or has a future within next 30 days. See \"Patient Info\" tab in inbasket, or \"Choose Columns\" in Meds & Orders section of the refill encounter.              Passed - Patient is age 6-64 years        Passed - Medication is active on med list        Passed - No active pregnancy on record        Passed - Normal serum creatinine on " file in past 12 months     Recent Labs   Lab Test 05/17/19  0930   CR 0.66             Passed - No positive pregnancy test in past 12 months        Marilyn Mello RN on 1/23/2020 at 9:40 AM

## 2020-02-08 ENCOUNTER — HEALTH MAINTENANCE LETTER (OUTPATIENT)
Age: 55
End: 2020-02-08

## 2020-02-09 DIAGNOSIS — J45.20 MILD INTERMITTENT ASTHMA WITHOUT COMPLICATION: ICD-10-CM

## 2020-02-10 RX ORDER — DEXAMETHASONE 4 MG/1
TABLET ORAL
Qty: 12 G | Refills: 3 | Status: SHIPPED | OUTPATIENT
Start: 2020-02-10 | End: 2020-05-26

## 2020-02-10 NOTE — TELEPHONE ENCOUNTER
"Requested Prescriptions   Pending Prescriptions Disp Refills     FLOVENT  MCG/ACT inhaler [Pharmacy Med Name: FLOVENT  MCG INHALER] 36 Inhaler 1     Sig: TAKE 2 PUFFS BY MOUTH TWICE A DAY   Last Written Prescription Date:  5/10/2019  Last Fill Quantity: 36,  # refills: 0   Last office visit: 10/29/2019 with prescribing provider   Future Office Visit:   Next 5 appointments (look out 90 days)    Apr 03, 2020  8:00 AM CDT  [facility] Visit with MIRZA [facility]ays CNP  [facility] [address]  [facility] [address]  [phone]             Inhaled Steroids Protocol Passed - 2/9/2020  3:45 PM        Passed - Patient is age 12 or older        Passed - Asthma control assessment score within normal limits in last 6 months     Please review ACT score.           Passed - Medication is active on med list        Passed - Recent (6 mo) or future (30 days) visit within the authorizing provider's specialty     Patient had office visit in the last 6 months or has a visit in the next 30 days with authorizing provider or within the authorizing provider's specialty.  See \"Patient Info\" tab in inbasket, or \"Choose Columns\" in Meds & Orders section of the refill encounter.            Prescription approved per [facility] Refill Protocol.  Ramona Millan RN on 2/10/2020 at 12:57 PM    "

## 2020-02-19 DIAGNOSIS — J30.9 CHRONIC ALLERGIC RHINITIS: ICD-10-CM

## 2020-02-20 RX ORDER — FLUTICASONE PROPIONATE 50 MCG
SPRAY, SUSPENSION (ML) NASAL
Qty: 16 ML | Refills: 1 | Status: SHIPPED | OUTPATIENT
Start: 2020-02-20 | End: 2020-04-16

## 2020-02-20 NOTE — TELEPHONE ENCOUNTER
"Requested Prescriptions   Signed Prescriptions Disp Refills    fluticasone 50 MCG/ACT NA nasal spray 16 mL 1     Sig: INSTILL 2 SPRAYS INTO BOTH NOSTRILS DAILY       Inhaled Steroids Protocol Passed - 2/19/2020  1:59 PM        Passed - Patient is age 12 or older        Passed - Asthma control assessment score within normal limits in last 6 months     Please review ACT score.           Passed - Medication is active on med list        Passed - Recent (6 mo) or future (30 days) visit within the authorizing provider's specialty     Patient had office visit in the last 6 months or has a visit in the next 30 days with authorizing provider or within the authorizing provider's specialty.  See \"Patient Info\" tab in inbasket, or \"Choose Columns\" in Meds & Orders section of the refill encounter.              "

## 2020-02-24 ENCOUNTER — TELEPHONE (OUTPATIENT)
Dept: ENDOCRINOLOGY | Facility: CLINIC | Age: 55
End: 2020-02-24

## 2020-02-24 NOTE — TELEPHONE ENCOUNTER
Fax from [facility], phentermine needs PA, see last PA 8/27/19, insurance only covers 3 months per year, routed to , please confirm, inform [facility] if PA not needed now    Next 5 appointments (look out 90 days)    Apr 03, 2020  8:00 AM CDT  Return Visit with MIRZA Hays CNP  [facility] ([facility] [address] [facility] [id number] [phone]        Pharmacy Benefit Information:    Provider: CORINA  Phone #: [phone]  ID#: [id number]  BIN: [id number]  PCN: [id number]  Medication/SIG: phentermine 37.5  Pharmacy: [facility] SHANITA Plascencia, RN, BSN  Message handled by Nurse Triage.

## 2020-02-26 NOTE — TELEPHONE ENCOUNTER
Upcoming appt 4/3/20.  Can pay cash if has current RX.  Fax to [facility] [phone] to cancel PA request.  Che Quiñones RN

## 2020-02-26 NOTE — TELEPHONE ENCOUNTER
Is there anything I need to do?  I would say no PA needed at this time.  Appears that she is due for follow up anyway as she was last seen in November - maybe she is already scheduled.   You can close this encounter if there's nothing else needed other than to make sure she is scheduled for follow up.  If she has one remaining refill of Phentermine, she could pay cash - with coupon, it should only be around $20.  Kyra Helms NP  Endocrinology

## 2020-03-18 DIAGNOSIS — J30.9 CHRONIC ALLERGIC RHINITIS: ICD-10-CM

## 2020-03-18 RX ORDER — FLUTICASONE PROPIONATE 50 MCG
SPRAY, SUSPENSION (ML) NASAL
Qty: 16 ML | Refills: 1 | OUTPATIENT
Start: 2020-03-18

## 2020-03-20 DIAGNOSIS — E03.8 SUBCLINICAL HYPOTHYROIDISM: ICD-10-CM

## 2020-03-23 RX ORDER — LEVOTHYROXINE SODIUM 50 UG/1
TABLET ORAL
Qty: 90 TABLET | Refills: 0 | Status: SHIPPED | OUTPATIENT
Start: 2020-03-23 | End: 2020-06-05

## 2020-03-29 DIAGNOSIS — J45.20 MILD INTERMITTENT ASTHMA WITHOUT COMPLICATION: ICD-10-CM

## 2020-03-30 RX ORDER — DEXAMETHASONE 4 MG/1
TABLET ORAL
Start: 2020-03-30

## 2020-03-30 NOTE — TELEPHONE ENCOUNTER
Pending Prescriptions:                       Disp   Refills    FLOVENT  MCG/ACT inhaler [Pharmacy*36 Inh*1            Sig: TAKE 2 PUFFS BY MOUTH TWICE A DAY    Left message on [employer] Voicemail refills sent 2/10/20, please check file.  Roby Mancera RN

## 2020-04-03 ENCOUNTER — MYC MEDICAL ADVICE (OUTPATIENT)
Dept: FAMILY MEDICINE | Facility: CLINIC | Age: 55
End: 2020-04-03

## 2020-04-03 ENCOUNTER — TELEPHONE (OUTPATIENT)
Dept: ENDOCRINOLOGY | Facility: CLINIC | Age: 55
End: 2020-04-03

## 2020-04-03 ENCOUNTER — VIRTUAL VISIT (OUTPATIENT)
Dept: ENDOCRINOLOGY | Facility: CLINIC | Age: 55
End: 2020-04-03
Payer: COMMERCIAL

## 2020-04-03 VITALS — WEIGHT: 169.7 LBS | BODY MASS INDEX: 28.24 KG/M2

## 2020-04-03 DIAGNOSIS — E66.811 CLASS 1 OBESITY DUE TO EXCESS CALORIES WITH SERIOUS COMORBIDITY AND BODY MASS INDEX (BMI) OF 34.0 TO 34.9 IN ADULT: Primary | ICD-10-CM

## 2020-04-03 DIAGNOSIS — E66.09 CLASS 1 OBESITY DUE TO EXCESS CALORIES WITH SERIOUS COMORBIDITY AND BODY MASS INDEX (BMI) OF 34.0 TO 34.9 IN ADULT: Primary | ICD-10-CM

## 2020-04-03 PROCEDURE — 99213 OFFICE O/P EST LOW 20 MIN: CPT | Mod: TEL | Performed by: CLINICAL NURSE SPECIALIST

## 2020-04-03 RX ORDER — TOPIRAMATE 25 MG/1
25 TABLET, FILM COATED ORAL 2 TIMES DAILY
Qty: 180 TABLET | Refills: 1 | Status: SHIPPED | OUTPATIENT
Start: 2020-04-03 | End: 2020-08-05

## 2020-04-03 RX ORDER — PHENTERMINE HYDROCHLORIDE 37.5 MG/1
TABLET ORAL
Qty: 32 TABLET | Refills: 3 | Status: SHIPPED | OUTPATIENT
Start: 2020-04-03 | End: 2020-08-05

## 2020-04-03 NOTE — TELEPHONE ENCOUNTER
[employer] message sent to patient may pay out of pocket for phentermine. Call if questions.   Roby Mancera RN

## 2020-04-03 NOTE — TELEPHONE ENCOUNTER
PRIOR AUTHORIZATION DENIED    Medication: phentermine (ADIPEX-P) 37.5 MG tablet - DENIED     Denial Date: 4/3/2020    Denial Rational: Coverage only considered if patient has not received 3 months of therapy with the requested drug within the past year    Appeal Information: Eligible for appeal within 180 days of this notice AND letter of medical necessity

## 2020-04-03 NOTE — PROGRESS NOTES
"Subjective     Rayna Jackson is a 55 year old female who is being evaluated via a billable telephone visit.      The patient has been notified of following:     \"This telephone visit will be conducted via a call between you and your physician/provider. We have found that certain health care needs can be provided without the need for a physical exam.  This service lets us provide the care you need with a short phone conversation.  If a prescription is necessary we can send it directly to your pharmacy.  If lab work is needed we can place an order for that and you can then stop by our lab to have the test done at a later time.    If during the course of the call the physician/provider feels a telephone visit is not appropriate, you will not be charged for this service.\"     Rayna Jackson complains of   Chief Complaint   Patient presents with     Weight Problem     Name: Rayna Jackson  F/u for obesity (Last seen 11/22/2019).  HPI:  Rayna Jackson is a 55 year old female under care for the management of obestiy.  Has noted abnormal weight gain over the past 3 years - states she has gained 70 lbs.   Making healthy food choices - doesn't feel she overeats.  Unable to lose weight and has continued to gain.  Previously tried a program called [employer], also weight watchers, [place] [facility] was most effective - lost 20 lbs     Started Phentermine 37.5 mg daily May 2019.  Is tolerating phentermine well.  Also following a keto diet.  Has lost 38 lbs since starting phentermine + diet and exercise efforts.  Was playing pickle ball again - everything on hold due to COVID-19.  Has only lost 1# in the past two months.  Following meal plan.  Exercise has been limited.  Not doing intermittent fasting.  Diabetes:No but states her blood sugars are 'always' high-normal.  No GD with pregnancy but was monitored more closely because glucose levels were elevated but not in the diabetes range.  + FH of DM2 - father and MGGM.  Sleep Apnea/Snores:snores " - was seen by specialist who did not feel sleep apnea eval was indicated, didn't believe she had sleep apnea  Hypertension or CAD:Yes: Controlled on Losartan  Hyperlipidemia:Yes: untreated.  Diet:  Healthy food choices, low carb, portion controlled  Exercise: Difficult due to joint pain, + OA.  Has noted arthritis improved with weight lose.  Subclinical Hypothyroidism.  Currently treated with levothyroxine 50 mcg/day.  FH + for thyroid disease - Mother, MGM, maternal aunt, sister.  Previous lymph node biopsy + for Lupus - was seen at [place], no diagnosis of Lupus has been confirmed.  PMH/PSH:  Past Medical History:   Diagnosis Date     Torn meniscus 2013    left      Past Surgical History:   Procedure Laterality Date     appendicitis  1980     COLONOSCOPY N/A 6/30/2017    Procedure: COLONOSCOPY;  COLONOSCOPY   ;  Surgeon: Brooks Villa MD;  Location:  GI     CYSTECTOMY OVARIAN BENIGN  1980     KNEE SURGERY Left      Family Hx:  Family History   Problem Relation Age of Onset     Hypertension Mother      Thyroid Disease Mother      Hypertension Father      Diabetes Other         great grandmother     Thyroid Disease Sister      Thyroid Disease Maternal Aunt      Thyroid disease: Yes, mother, sister, aunt, MGM        DM2: Yes: father, MGGM         Autoimmune: DM1, SLE, RA, Vitiligo     Social Hx:  Social History     Socioeconomic History     Marital status:      Spouse name: Juan F     Number of children: 4     Years of education: Not on file     Highest education level: Master's degree (e.g., MA, MS, dAry, MEd, MSW, [employer])   Occupational History     Occupation: manager     Employer: [employer]   Social Needs     Financial resource strain: Not hard at all     Food insecurity     Worry: Never true     Inability: Never true     Transportation needs     Medical: No     Non-medical: No   Tobacco Use     Smoking status: Never Smoker     Smokeless tobacco: Never Used   Substance and Sexual Activity      Alcohol use: Yes     Alcohol/week: 0.0 standard drinks     Frequency: 2-4 times a month     Drinks per session: 1 or 2     Binge frequency: Never     Comment: occ     Drug use: No     Sexual activity: Yes     Birth control/protection: I.U.D.   Lifestyle     Physical activity     Days per week: 0 days     Minutes per session: 0 min     Stress: Not at all   Relationships     Social connections     Talks on phone: More than three times a week     Gets together: Twice a week     Attends Hinduism service: 1 to 4 times per year     Active member of club or organization: Yes     Attends meetings of clubs or organizations: More than 4 times per year     Relationship status:      Intimate partner violence     Fear of current or ex partner: Not on file     Emotionally abused: Not on file     Physically abused: Not on file     Forced sexual activity: Not on file   Other Topics Concern     Parent/sibling w/ CABG, MI or angioplasty before 65F 55M? Not Asked   Social History Narrative     Not on file          MEDICATIONS:  has a current medication list which includes the following prescription(s): albuterol, fexofenadine, flovent hfa, fluticasone, levonorgestrel, levothyroxine, losartan, meloxicam, montelukast, phentermine, topiramate, triamcinolone, and olopatadine, and the following Facility-Administered Medications: methylprednisolone and ropivacaine.    ALLERGIES  Lisinopril; Amlodipine; Aspirin; and Codeine    ROS   ROS: 10 point ROS neg other than the symptoms noted above in the HPI.    Objective   Reported vitals:  There were no vitals taken for this visit.   Psych: Alert and oriented times 3; coherent speech, normal   rate and volume, able to articulate logical thoughts, able   to abstract reason, no tangential thoughts, no hallucinations   or delusions  Her affect is pleasant and cooperative.    LABS:  !COMPREHENSIVE Latest Ref Rng & Units 5/17/2019   SODIUM 133 - 144 mmol/L 142   POTASSIUM 3.4 - 5.3 mmol/L 3.9  "  CHLORIDE 94 - 109 mmol/L 109   BUN 7 - 30 mg/dL 11   Creatinine 0.52 - 1.04 mg/dL 0.66   Glucose 70 - 99 mg/dL 87   ANION GAP 3 - 14 mmol/L 7   CALCIUM 8.5 - 10.1 mg/dL 9.3   ALBUMIN 3.4 - 5.0 g/dL 4.0     !COMPREHENSIVE Latest Ref Rng & Units 5/17/2019   AST 0 - 45 U/L 15   ALT 0 - 50 U/L 15     Component      Latest Ref Rng & Units 8/20/2018 3/11/2019   Hemoglobin A1C      0 - 5.6 % 4.9 5.4     !LIPID/HEPATIC Latest Ref Rng & Units 1/2/2018   CHOLESTEROL <200 mg/dL 212 (H)   TRIGLYCERIDES <150 mg/dL 154 (H)   HDL CHOLESTEROL >49 mg/dL 58   LDL CHOLESTEROL, CALCULATED <100 mg/dL 123 (H)   NON HDL CHOLESTEROL <130 mg/dL 154 (H)     Component      Latest Ref Rng & Units 3/11/2019   Vitamin D Deficiency screening      20 - 75 ug/L 23     !THYROID Latest Ref Rng & Units 6/12/2019   TSH 0.40 - 4.00 mU/L 2.33     Vital Signs 8/23/2019 10/29/2019 11/15/2019 11/22/2019 4/3/2020   Weight (LB) 181 lb 173 lb 173 lb 172 lb 1.6 oz 169 lb - reported weight taken on home scale   Height  5' 5\" 5' 5\"     BMI (Calculated)  28.79 28.79 28.64      Waist circumference:  43.5\" (5/2019).  38.5\" (8/2019).  36.5\" (11/2019)    All pertinent notes, labs, and images personally reviewed by me.     A/P:    1. Obesity/abnormal weight gain-  BMI 34-35--> 28  Obesity related comorbidity include HTN, OA, hyperlipidemia.    Obesity is associated with a significant increase in mortality and risk of many disorders, including diabetes mellitus, hypertension, dyslipidemia, heart disease, stroke, sleep apnea, cancer, and many others. Conversely, weight loss is associated with a reduction in obesity-associated morbidity.    Endocrine evaluation of obesity includes Diabetes/prediabetes, Cushing's and thyroid dysfunction.  The relevant work up for the diagnosis and management of obesity and various treatment options were discussed. Body Mass Index (BMI) has been a standard means for calculating risk for overweight and obesity. The new American Association " of Clinical Endocrinology (AACE) algorithm recommends lifestyle modifications as the initial phase of treatment for all patients with the BMI equal or greater than 25 kg/m2. Lifestyle modifications includes use of medical nutrition therapy, exercise, tobacco cessation, adequate quality and quantity of sleep, limited consumption of alcohol and reduced stress through implementation of a structured, multidisciplinary program.    In patients with complications associated with obesity, graded interventions are recommended including pharmacological therapy such as phentermine, orlistat, lorcaserin and phentermine/topiramate ER, contrave ( buproprion/naltreone) and the use of very low calorie meal replacement programs.    If medical intervention is insufficient, surgical therapy may be considered, especially in patients with BMI greater than or equal to 35 kg/m2 with multiple complications. Surgical treatments include lap-band, gastric sleeve or gastric bypass surgery.    I informed the pt that:  1.Weight loss medications can be taken to assist with weight reduction when combined with appropriate healthy lifestyle changes.  2.I discussed possible s/e, risks and benefits of weight loss medications.  3.All medications are FDA approved, however, some may be used ''off label'' for their weight loss benefits and some ''short term'' medications can be used for longer periods to achieve desired clinical outcomes.  4.All patients taking weight loss medications must be seen in clinic for refill authorization.    Counseling exercise ( V65.41)  Dietary counseling( V65.3)  Calculated BMI above the upper parameter and f/u plan was documented in the medical record.  Discussed indications, risks and benefits of all medications prescribed, and answered questions to patient's satisfaction.  Continue current low carb diet.  Started Phentermine 37.5 mg/day 5/2019.  Has lost 38 lbs since starting phentermine, 207 lbs (5/2019) --> 169 lbs  "(today).  Has lost 7\" from her waist since 5/2019.  Continue phentermine 37.5 mg/day  Start Topiramate 25 mg bid.  Increase to 50 mg bid as tolerated/needed.  Resume intermittent fasting.  Consider alternate exercise - walking out doors, exercise apps or programs online (such as [employer] runner).    Labs ordered today:   No orders of the defined types were placed in this encounter.    Radiology/Consults ordered today: None      Follow-up:  3-4 months    Kyra Helms NP  Endocrinology  [facility]  CC: Mitra Keita     Phone call duration:  15 minutes.  Call start time: 8:10 am; call end time: 8:25 am.  ____________________________________________________________  "

## 2020-04-16 DIAGNOSIS — J30.9 CHRONIC ALLERGIC RHINITIS: ICD-10-CM

## 2020-04-16 RX ORDER — FLUTICASONE PROPIONATE 50 MCG
SPRAY, SUSPENSION (ML) NASAL
Qty: 16 ML | Refills: 1 | Status: SHIPPED | OUTPATIENT
Start: 2020-04-16 | End: 2020-05-12

## 2020-04-16 NOTE — TELEPHONE ENCOUNTER
Prescription approved per [facility] Refill Protocol.    Cyndi Toro RN, [facility] Triage

## 2020-05-12 ENCOUNTER — VIRTUAL VISIT (OUTPATIENT)
Dept: FAMILY MEDICINE | Facility: CLINIC | Age: 55
End: 2020-05-12
Payer: COMMERCIAL

## 2020-05-12 DIAGNOSIS — J45.20 MILD INTERMITTENT ASTHMA WITHOUT COMPLICATION: ICD-10-CM

## 2020-05-12 DIAGNOSIS — J30.9 CHRONIC ALLERGIC RHINITIS: Primary | ICD-10-CM

## 2020-05-12 DIAGNOSIS — E03.8 SUBCLINICAL HYPOTHYROIDISM: ICD-10-CM

## 2020-05-12 DIAGNOSIS — J30.9 CHRONIC ALLERGIC RHINITIS: ICD-10-CM

## 2020-05-12 DIAGNOSIS — I10 BENIGN ESSENTIAL HYPERTENSION: ICD-10-CM

## 2020-05-12 PROCEDURE — 99214 OFFICE O/P EST MOD 30 MIN: CPT | Mod: 95 | Performed by: FAMILY MEDICINE

## 2020-05-12 RX ORDER — MONTELUKAST SODIUM 10 MG/1
1 TABLET ORAL AT BEDTIME
Qty: 90 TABLET | Refills: 1 | Status: SHIPPED | OUTPATIENT
Start: 2020-05-12 | End: 2020-10-09

## 2020-05-12 RX ORDER — LOSARTAN POTASSIUM 50 MG/1
50 TABLET ORAL DAILY
Qty: 90 TABLET | Refills: 1 | Status: SHIPPED | OUTPATIENT
Start: 2020-05-12 | End: 2020-11-20

## 2020-05-12 RX ORDER — FLUTICASONE PROPIONATE 50 MCG
SPRAY, SUSPENSION (ML) NASAL
Qty: 16 ML | Refills: 0 | Status: SHIPPED | OUTPATIENT
Start: 2020-05-12 | End: 2020-06-08

## 2020-05-12 NOTE — PROGRESS NOTES
"Rayna Jackson is a 55 year old female who is being evaluated via a billable video visit.      The patient has been notified of following:     \"This video visit will be conducted via a call between you and your physician/provider. We have found that certain health care needs can be provided without the need for an in-person physical exam.  This service lets us provide the care you need with a video conversation.  If a prescription is necessary we can send it directly to your pharmacy.  If lab work is needed we can place an order for that and you can then stop by our lab to have the test done at a later time.    Video visits are billed at different rates depending on your insurance coverage.  Please reach out to your insurance provider with any questions.    If during the course of the call the physician/provider feels a video visit is not appropriate, you will not be charged for this service.\"    Patient has given verbal consent for Video visit? Yes    How would you like to obtain your AVS? [place]    Patient would like the video invitation sent by: Text to cell phone: [phone]    Will anyone else be joining your video visit? No        Subjective     Rayna Jackson is a 55 year old female who presents today via video visit for the following health issues:    HPI  Asthma Follow-Up    Was ACT completed today?    Yes    ACT Total Scores 5/12/2020   ACT TOTAL SCORE (Goal Greater than or Equal to 20) 21   In the past 12 months, how many times did you visit the emergency room for your asthma without being admitted to the hospital? 0   In the past 12 months, how many times were you hospitalized overnight because of your asthma? 0       How many days per week do you miss taking your asthma controller medication?  0    Please describe any recent triggers for your asthma: pollens and mold    Have you had any Emergency Room Visits, Urgent Care Visits, or Hospital Admissions since your last office visit?  Yes  Number of ER or " Urgent Care visits for asthma: 1         Video Start Time: 10:53 am     Hypertension Follow-up      Do you check your blood pressure regularly outside of the clinic? No     Are you following a low salt diet? Yes    Are your blood pressures ever more than 140 on the top number (systolic) OR more   than 90 on the bottom number (diastolic), for example 140/90? No      Patient Active Problem List   Diagnosis     Benign essential hypertension     Osteoarthritis of both knees, unspecified osteoarthritis type     Bilateral edema of lower extremity     Osteoarthritis of both feet, unspecified osteoarthritis type     IUD (intrauterine device) in place     Chronic nonintractable headache, unspecified headache type     HTN, goal below 140/90     Allergic rhinitis     Asthma     Environmental allergies     Moderate persistent asthma without complication     Subclinical hypothyroidism     Hip pain, left     Past Surgical History:   Procedure Laterality Date     appendicitis  1980     COLONOSCOPY N/A 6/30/2017    Procedure: COLONOSCOPY;  COLONOSCOPY   ;  Surgeon: Brooks Villa MD;  Location:  GI     CYSTECTOMY OVARIAN BENIGN  1980     KNEE SURGERY Left        Social History     Tobacco Use     Smoking status: Never Smoker     Smokeless tobacco: Never Used   Substance Use Topics     Alcohol use: Yes     Alcohol/week: 0.0 standard drinks     Frequency: 2-4 times a month     Drinks per session: 1 or 2     Binge frequency: Never     Comment: occ     Family History   Problem Relation Age of Onset     Hypertension Mother      Thyroid Disease Mother      Hypertension Father      Diabetes Other         great grandmother     Thyroid Disease Sister      Thyroid Disease Maternal Aunt            Reviewed and updated as needed this visit by Provider         Review of Systems   Constitutional, HEENT, cardiovascular, pulmonary, GI, , musculoskeletal, neuro, skin, endocrine and psych systems are negative, except as otherwise noted.     "  Objective    There were no vitals taken for this visit.  Estimated body mass index is 28.24 kg/m  as calculated from the following:    Height as of 11/22/19: 1.651 m (5' 5\").    Weight as of 4/3/20: 77 kg (169 lb 11.2 oz).  Physical Exam     GENERAL: Healthy, alert and no distress  EYES: Eyes grossly normal to inspection.  No discharge or erythema, or obvious scleral/conjunctival abnormalities.  RESP: No audible wheeze, cough, or visible cyanosis.  No visible retractions or increased work of breathing.    PSYCH: Mentation appears normal, affect normal/bright, judgement and insight intact, normal speech and appearance well-groomed.      Diagnostic Test Results:  Labs reviewed in Epic  none         Assessment & Plan     (J30.9) Chronic allergic rhinitis  (primary encounter diagnosis)  Comment: stable   Plan: montelukast (SINGULAIR) 10 MG tablet      (J45.20) Mild intermittent asthma without complication  Comment: stable. No recent flare ups.   Plan: montelukast (SINGULAIR) 10 MG tablet      (I10) Benign essential hypertension  Comment: stable   Plan: losartan (COZAAR) 50 MG tablet      (E03.9) Subclinical hypothyroidism  Comment: will need labs in June  Plan: **TSH with free T4 reflex FUTURE anytime          See Patient Instructions    Return in about 3 months (around 8/12/2020) for medication recheck.    Mitra Keita MD  [facility] [employer]      Video-Visit Details    Type of service:  Video Visit    Video End Time:11:03 AM    Originating Location (pt. Location): Home    Distant Location (provider location):  [facility]     Platform used for Video Visit: Doximity    Return in about 3 months (around 8/12/2020) for medication recheck.       Mitra Keita MD        "

## 2020-05-12 NOTE — TELEPHONE ENCOUNTER
Prescription approved per [facility] Refill Protocol.  Sandra Plascencia RN, BSN  Message handled by Nurse Triage.

## 2020-05-13 ASSESSMENT — ASTHMA QUESTIONNAIRES: ACT_TOTALSCORE: 21

## 2020-05-21 DIAGNOSIS — M16.12 OSTEOARTHRITIS OF LEFT HIP, UNSPECIFIED OSTEOARTHRITIS TYPE: ICD-10-CM

## 2020-05-22 RX ORDER — MELOXICAM 7.5 MG/1
7.5 TABLET ORAL DAILY PRN
Qty: 30 TABLET | Refills: 1 | Status: SHIPPED | OUTPATIENT
Start: 2020-05-22 | End: 2020-07-23

## 2020-05-22 NOTE — TELEPHONE ENCOUNTER
Routing refill request to provider for review/approval because:  Labs not current:  ALT, AST, CBC, CR    Merly Walters RN on 5/22/2020 at 10:34 AM

## 2020-06-05 DIAGNOSIS — E03.8 SUBCLINICAL HYPOTHYROIDISM: ICD-10-CM

## 2020-06-05 RX ORDER — LEVOTHYROXINE SODIUM 50 UG/1
TABLET ORAL
Qty: 30 TABLET | Refills: 0 | Status: SHIPPED | OUTPATIENT
Start: 2020-06-05 | End: 2020-07-03

## 2020-06-05 NOTE — TELEPHONE ENCOUNTER
Medication is being filled for 1 time refill only due to:  Patient needs to be seen because needs lab this month.     Pt has appt scheduled  Cassi Rosa RN, BSN

## 2020-07-02 DIAGNOSIS — E03.8 SUBCLINICAL HYPOTHYROIDISM: ICD-10-CM

## 2020-07-02 PROCEDURE — 84443 ASSAY THYROID STIM HORMONE: CPT | Performed by: FAMILY MEDICINE

## 2020-07-02 PROCEDURE — 36415 COLL VENOUS BLD VENIPUNCTURE: CPT | Performed by: FAMILY MEDICINE

## 2020-07-03 LAB — TSH SERPL DL<=0.005 MIU/L-ACNC: 1.37 MU/L (ref 0.4–4)

## 2020-07-22 DIAGNOSIS — M16.12 OSTEOARTHRITIS OF LEFT HIP, UNSPECIFIED OSTEOARTHRITIS TYPE: ICD-10-CM

## 2020-07-23 RX ORDER — MELOXICAM 7.5 MG/1
7.5 TABLET ORAL DAILY PRN
Qty: 30 TABLET | Refills: 1 | Status: SHIPPED | OUTPATIENT
Start: 2020-07-23 | End: 2020-11-30

## 2020-07-23 NOTE — TELEPHONE ENCOUNTER
Routing refill request to provider for review/approval because:  Labs not current:  Alt/ast/cbc/cr  Cassi Rosa RN, BSN

## 2020-07-30 ENCOUNTER — VIRTUAL VISIT (OUTPATIENT)
Dept: ENDOCRINOLOGY | Facility: CLINIC | Age: 55
End: 2020-07-30
Payer: COMMERCIAL

## 2020-07-30 DIAGNOSIS — E66.811 CLASS 1 OBESITY DUE TO EXCESS CALORIES WITH SERIOUS COMORBIDITY AND BODY MASS INDEX (BMI) OF 34.0 TO 34.9 IN ADULT: ICD-10-CM

## 2020-07-30 DIAGNOSIS — E66.09 OBESITY DUE TO EXCESS CALORIES WITHOUT SERIOUS COMORBIDITY, UNSPECIFIED CLASSIFICATION: Primary | ICD-10-CM

## 2020-07-30 DIAGNOSIS — E66.09 CLASS 1 OBESITY DUE TO EXCESS CALORIES WITH SERIOUS COMORBIDITY AND BODY MASS INDEX (BMI) OF 34.0 TO 34.9 IN ADULT: ICD-10-CM

## 2020-07-30 PROCEDURE — 99213 OFFICE O/P EST LOW 20 MIN: CPT | Mod: 95 | Performed by: CLINICAL NURSE SPECIALIST

## 2020-07-30 RX ORDER — TOPIRAMATE 25 MG/1
TABLET, FILM COATED ORAL
Qty: 360 TABLET | Refills: 1 | Status: SHIPPED | OUTPATIENT
Start: 2020-07-30 | End: 2020-11-20

## 2020-07-30 NOTE — LETTER
"    7/30/2020         RE: Rayna Jackson  [address]  [facility] [address]        Dear Colleague,    Thank you for referring your patient, Rayna Jackson, to the [facility]. Please see a copy of my visit note below.    Rayna Jacskon is a 55 year old female who is being evaluated via a billable video visit due to the COVID-19 pandemic.      The patient has been notified of following:     \"This video visit will be conducted via a call between you and your physician/provider. We have found that certain health care needs can be provided without the need for an in-person physical exam.  This service lets us provide the care you need with a video conversation.  If a prescription is necessary we can send it directly to your pharmacy.  If lab work is needed we can place an order for that and you can then stop by our lab to have the test done at a later time.    Video visits are billed at different rates depending on your insurance coverage.  Please reach out to your insurance provider with any questions.    If during the course of the call the physician/provider feels a video visit is not appropriate, you will not be charged for this service.\"    Patient has given verbal consent for Video visit? Yes  How would you like to obtain your AVS? MyChart  If you are dropped from the video visit, the video invite should be resent to: Text to cell phone: [phone]  Will anyone else be joining your video visit? No        Video-Visit Details    Type of service:  Video Visit    Video Start Time: 7:34 am  Video End Time: 7:43 am    Originating Location (pt. Location): Home    Distant Location (provider location):  Home    Platform used for Video Visit: [facility]    Name: Rayna Jackson  F/u for obesity (Last seen 11/22/2019).  HPI:  Rayna Jackson is a 55 year old female who presents for the management of obestiy.  Has noted abnormal weight gain over the past 3 years - states she has gained 70 lbs.   Making healthy " food choices - doesn't feel she overeats.  Unable to lose weight and has continued to gain.  Previously tried a program called [employer], also weight watchers, [place] [facility] was most effective - lost 20 lbs     Started Phentermine 37.5 mg daily May 2019.  Is tolerating phentermine well.  Topiramate 25 mg bid later added.  Also following a keto diet - has been eating some carbs recently.  Has lost 39 lbs since starting phentermine, Topiramate, + diet and exercise efforts.  Has started playing Beatrobo ball again.  Diabetes:No but states her blood sugars are 'always' high-normal.  No GD with pregnancy but was monitored more closely because glucose levels were elevated but not in the diabetes range.  + FH of DM2 - father and MGGM.  Sleep Apnea/Snores:snores - was seen by specialist who did not feel sleep apnea eval was indicated, didn't believe she had sleep apnea  Hypertension or CAD:Yes: Controlled on Losartan  Hyperlipidemia:Yes: untreated.  Diet:  Healthy food choices, low carb, portion controlled  Exercise: Difficult due to joint pain, + OA  Subclinical Hypothyroidism.  Recently diagnosed.  Currently treated with levothyroxine 50 mcg/day.  FH + for thyroid disease - Mother, MGM, maternal aunt, sister.  Previous lymph node biopsy + for Lupus - was seen at [place], no diagnosis of Lupus has been confirmed.  PMH/PSH:  Past Medical History:   Diagnosis Date     Torn meniscus 2013    left      Past Surgical History:   Procedure Laterality Date     appendicitis  1980     COLONOSCOPY N/A 6/30/2017    Procedure: COLONOSCOPY;  COLONOSCOPY   ;  Surgeon: Brooks Villa MD;  Location:  GI     CYSTECTOMY OVARIAN BENIGN  1980     KNEE SURGERY Left      Family Hx:  Family History   Problem Relation Age of Onset     Hypertension Mother      Thyroid Disease Mother      Hypertension Father      Diabetes Other         great grandmother     Thyroid Disease Sister      Thyroid Disease Maternal Aunt      Thyroid disease: Yes, mother,  sister, aunt, MGM        DM2: Yes: father, MGGM         Autoimmune: DM1, SLE, RA, Vitiligo     Social Hx:  Social History     Socioeconomic History     Marital status:      Spouse name: Juan F     Number of children: 4     Years of education: Not on file     Highest education level: Master's degree (e.g., MA, MS, [employer]ry, MEd, MSW, [employer])   Occupational History     Occupation: manager     Employer: [employer]   Social Needs     Financial resource strain: Not hard at all     Food insecurity     Worry: Never true     Inability: Never true     Transportation needs     Medical: No     Non-medical: No   Tobacco Use     Smoking status: Never Smoker     Smokeless tobacco: Never Used   Substance and Sexual Activity     Alcohol use: Yes     Alcohol/week: 0.0 standard drinks     Frequency: 2-4 times a month     Drinks per session: 1 or 2     Binge frequency: Never     Comment: occ     Drug use: No     Sexual activity: Yes     Birth control/protection: I.U.D.   Lifestyle     Physical activity     Days per week: 0 days     Minutes per session: 0 min     Stress: Not at all   Relationships     Social connections     Talks on phone: More than three times a week     Gets together: Twice a week     Attends Yazidism service: 1 to 4 times per year     Active member of club or organization: Yes     Attends meetings of clubs or organizations: More than 4 times per year     Relationship status:      Intimate partner violence     Fear of current or ex partner: Not on file     Emotionally abused: Not on file     Physically abused: Not on file     Forced sexual activity: Not on file   Other Topics Concern     Parent/sibling w/ CABG, MI or angioplasty before 65F 55M? Not Asked   Social History Narrative     Not on file          MEDICATIONS:  has a current medication list which includes the following prescription(s): albuterol, fexofenadine, flovent hfa, fluticasone, levonorgestrel, levothyroxine, losartan, meloxicam,  "montelukast, phentermine, topiramate, and triamcinolone, and the following Facility-Administered Medications: methylprednisolone and ropivacaine.    ROS   ROS: 10 point ROS neg other than the symptoms noted above in the HPI.    Physical Exam   VS: There were no vitals taken for this visit.  GENERAL: AXOX3, NAD, well dressed, answering questions appropriately, appears stated age.  HEENT: no exopthalmous, no proptosis, no lig lag, no retraction  CV:   LUNGS: Normal respiratory effort  EXTREMITIES:   NEUROLOGY:   MSK:     LABS:  !COMPREHENSIVE Latest Ref Rng & Units 5/17/2019   SODIUM 133 - 144 mmol/L 142   POTASSIUM 3.4 - 5.3 mmol/L 3.9   CHLORIDE 94 - 109 mmol/L 109   BUN 7 - 30 mg/dL 11   Creatinine 0.52 - 1.04 mg/dL 0.66   Glucose 70 - 99 mg/dL 87   ANION GAP 3 - 14 mmol/L 7   CALCIUM 8.5 - 10.1 mg/dL 9.3   ALBUMIN 3.4 - 5.0 g/dL 4.0     !COMPREHENSIVE Latest Ref Rng & Units 5/17/2019   AST 0 - 45 U/L 15   ALT 0 - 50 U/L 15     Component      Latest Ref Rng & Units 8/20/2018 3/11/2019   Hemoglobin A1C      0 - 5.6 % 4.9 5.4     !LIPID/HEPATIC Latest Ref Rng & Units 1/2/2018   CHOLESTEROL <200 mg/dL 212 (H)   TRIGLYCERIDES <150 mg/dL 154 (H)   HDL CHOLESTEROL >49 mg/dL 58   LDL CHOLESTEROL, CALCULATED <100 mg/dL 123 (H)   NON HDL CHOLESTEROL <130 mg/dL 154 (H)     Component      Latest Ref Rng & Units 3/11/2019   Vitamin D Deficiency screening      20 - 75 ug/L 23     ENDO THYROID LABS-UMP Latest Ref Rng & Units 7/2/2020   TSH 0.40 - 4.00 mU/L 1.37       Vital Signs 8/23/2019 10/29/2019 11/15/2019 11/22/2019   Weight (LB) 181 lb 173 lb 173 lb 172 lb 1.6 oz   Height  5' 5\" 5' 5\" 5' 5\"   BMI (Calculated)  28.79 28.79 28.64   Reported weight on home scale: 168 lbs  Waist circumference:  43.5\" (5/2019).  38.5\" (8/2019).  36.5\" (11/2019)    All pertinent notes, labs, and images personally reviewed by me.     A/P  Ms.Rayna Jackosn is a 55 year old evaluated via video visit for the management of obestiy:    1. " Obesity/abnormal weight gain-  BMI 34-35--> 28.64.  Obesity related comorbidity include HTN, OA, hyperlipidemia.    Obesity is associated with a significant increase in mortality and risk of many disorders, including diabetes mellitus, hypertension, dyslipidemia, heart disease, stroke, sleep apnea, cancer, and many others. Conversely, weight loss is associated with a reduction in obesity-associated morbidity.    Endocrine evaluation of obesity includes Diabetes/prediabetes, Cushing's and thyroid dysfunction.  The relevant work up for the diagnosis and management of obesity and various treatment options were discussed. Body Mass Index (BMI) has been a standard means for calculating risk for overweight and obesity. The new American Association of Clinical Endocrinology (AACE) algorithm recommends lifestyle modifications as the initial phase of treatment for all patients with the BMI equal or greater than 25 kg/m2. Lifestyle modifications includes use of medical nutrition therapy, exercise, tobacco cessation, adequate quality and quantity of sleep, limited consumption of alcohol and reduced stress through implementation of a structured, multidisciplinary program.    In patients with complications associated with obesity, graded interventions are recommended including pharmacological therapy such as phentermine, orlistat, lorcaserin and phentermine/topiramate ER, contrave ( buproprion/naltreone) and the use of very low calorie meal replacement programs.    If medical intervention is insufficient, surgical therapy may be considered, especially in patients with BMI greater than or equal to 35 kg/m2 with multiple complications. Surgical treatments include lap-band, gastric sleeve or gastric bypass surgery.    I informed the pt that:  1.Weight loss medications can be taken to assist with weight reduction when combined with appropriate healthy lifestyle changes.  2.I discussed possible s/e, risks and benefits of weight loss  Yes "medications.  3.All medications are FDA approved, however, some may be used ''off label'' for their weight loss benefits and some ''short term'' medications can be used for longer periods to achieve desired clinical outcomes.  4.All patients taking weight loss medications must be seen in clinic for refill authorization.    Counseling exercise ( V65.41)  Dietary counseling( V65.3)  Calculated BMI above the upper parameter and f/u plan was documented in the medical record.  Discussed indications, risks and benefits of all medications prescribed, and answered questions to patient's satisfaction.  Continue current low carb diet.  Started Phentermine 37.5 mg/day 5/2019.  Topiramate 25 mg bid later added.  Has lost 39 lbs since starting phentermine, 207 lbs (5/2019) --> 168 lbs (today).  Had lost 7\" from her waist since 5/2019.  Continue phentermine 37.5 mg/day  Increase topiramate to 25 mg q am, 50 mg q pm then 50 mg bid as needed.    Labs ordered today:   No orders of the defined types were placed in this encounter.    Radiology/Consults ordered today: None      Follow-up:  3-4 months    Kyra Helms NP  Endocrinology  [facility]  CC: Mitra Keita   ____________________________________________________________      Again, thank you for allowing me to participate in the care of your patient.        Sincerely,        MIRZA Hays CNP    "

## 2020-07-30 NOTE — PROGRESS NOTES
"Rayna Jackson is a 55 year old female who is being evaluated via a billable video visit due to the COVID-19 pandemic.      The patient has been notified of following:     \"This video visit will be conducted via a call between you and your physician/provider. We have found that certain health care needs can be provided without the need for an in-person physical exam.  This service lets us provide the care you need with a video conversation.  If a prescription is necessary we can send it directly to your pharmacy.  If lab work is needed we can place an order for that and you can then stop by our lab to have the test done at a later time.    Video visits are billed at different rates depending on your insurance coverage.  Please reach out to your insurance provider with any questions.    If during the course of the call the physician/provider feels a video visit is not appropriate, you will not be charged for this service.\"    Patient has given verbal consent for Video visit? Yes  How would you like to obtain your AVS? MyChart  If you are dropped from the video visit, the video invite should be resent to: Text to cell phone: [phone]  Will anyone else be joining your video visit? No        Video-Visit Details    Type of service:  Video Visit    Video Start Time: 7:34 am  Video End Time: 7:43 am    Originating Location (pt. Location): Home    Distant Location (provider location):  Home    Platform used for Video Visit: [employer]    Name: Rayna Jackson  F/u for obesity (Last seen 11/22/2019).  HPI:  Rayna Jackson is a 55 year old female who presents for the management of obestiy.  Has noted abnormal weight gain over the past 3 years - states she has gained 70 lbs.   Making healthy food choices - doesn't feel she overeats.  Unable to lose weight and has continued to gain.  Previously tried a program called [facility], also weight watchers, [place] [facility] was most effective - lost 20 lbs     Started Phentermine 37.5 mg daily May " 2019.  Is tolerating phentermine well.  Topiramate 25 mg bid later added.  Also following a keto diet - has been eating some carbs recently.  Has lost 39 lbs since starting phentermine, Topiramate, + diet and exercise efforts.  Has started playing pickle ball again.  Diabetes:No but states her blood sugars are 'always' high-normal.  No GD with pregnancy but was monitored more closely because glucose levels were elevated but not in the diabetes range.  + FH of DM2 - father and MGGM.  Sleep Apnea/Snores:snores - was seen by specialist who did not feel sleep apnea eval was indicated, didn't believe she had sleep apnea  Hypertension or CAD:Yes: Controlled on Losartan  Hyperlipidemia:Yes: untreated.  Diet:  Healthy food choices, low carb, portion controlled  Exercise: Difficult due to joint pain, + OA  Subclinical Hypothyroidism.  Recently diagnosed.  Currently treated with levothyroxine 50 mcg/day.  FH + for thyroid disease - Mother, MGM, maternal aunt, sister.  Previous lymph node biopsy + for Lupus - was seen at [place], no diagnosis of Lupus has been confirmed.  PMH/PSH:  Past Medical History:   Diagnosis Date     Torn meniscus 2013    left      Past Surgical History:   Procedure Laterality Date     appendicitis  1980     COLONOSCOPY N/A 6/30/2017    Procedure: COLONOSCOPY;  COLONOSCOPY   ;  Surgeon: Brooks Villa MD;  Location:  GI     CYSTECTOMY OVARIAN BENIGN  1980     KNEE SURGERY Left      Family Hx:  Family History   Problem Relation Age of Onset     Hypertension Mother      Thyroid Disease Mother      Hypertension Father      Diabetes Other         great grandmother     Thyroid Disease Sister      Thyroid Disease Maternal Aunt      Thyroid disease: Yes, mother, sister, aunt, MGM        DM2: Yes: father, MGGM         Autoimmune: DM1, SLE, RA, Vitiligo     Social Hx:  Social History     Socioeconomic History     Marital status:      Spouse name: Juan F     Number of children: 4     Years of education:  Not on file     Highest education level: Master's degree (e.g., MA, MS, [employer], MEd, MSW, [employer])   Occupational History     Occupation: manager     Employer: [employer]   Social Needs     Financial resource strain: Not hard at all     Food insecurity     Worry: Never true     Inability: Never true     Transportation needs     Medical: No     Non-medical: No   Tobacco Use     Smoking status: Never Smoker     Smokeless tobacco: Never Used   Substance and Sexual Activity     Alcohol use: Yes     Alcohol/week: 0.0 standard drinks     Frequency: 2-4 times a month     Drinks per session: 1 or 2     Binge frequency: Never     Comment: occ     Drug use: No     Sexual activity: Yes     Birth control/protection: I.U.D.   Lifestyle     Physical activity     Days per week: 0 days     Minutes per session: 0 min     Stress: Not at all   Relationships     Social connections     Talks on phone: More than three times a week     Gets together: Twice a week     Attends Mosque service: 1 to 4 times per year     Active member of club or organization: Yes     Attends meetings of clubs or organizations: More than 4 times per year     Relationship status:      Intimate partner violence     Fear of current or ex partner: Not on file     Emotionally abused: Not on file     Physically abused: Not on file     Forced sexual activity: Not on file   Other Topics Concern     Parent/sibling w/ CABG, MI or angioplasty before 65F 55M? Not Asked   Social History Narrative     Not on file          MEDICATIONS:  has a current medication list which includes the following prescription(s): albuterol, fexofenadine, flovent hfa, fluticasone, levonorgestrel, levothyroxine, losartan, meloxicam, montelukast, phentermine, topiramate, and triamcinolone, and the following Facility-Administered Medications: methylprednisolone and ropivacaine.    ROS   ROS: 10 point ROS neg other than the symptoms noted above in the HPI.    Physical Exam   VS: There  "were no vitals taken for this visit.  GENERAL: AXOX3, NAD, well dressed, answering questions appropriately, appears stated age.  HEENT: no exopthalmous, no proptosis, no lig lag, no retraction  CV:   LUNGS: Normal respiratory effort  EXTREMITIES:   NEUROLOGY:   MSK:     LABS:  !COMPREHENSIVE Latest Ref Rng & Units 5/17/2019   SODIUM 133 - 144 mmol/L 142   POTASSIUM 3.4 - 5.3 mmol/L 3.9   CHLORIDE 94 - 109 mmol/L 109   BUN 7 - 30 mg/dL 11   Creatinine 0.52 - 1.04 mg/dL 0.66   Glucose 70 - 99 mg/dL 87   ANION GAP 3 - 14 mmol/L 7   CALCIUM 8.5 - 10.1 mg/dL 9.3   ALBUMIN 3.4 - 5.0 g/dL 4.0     !COMPREHENSIVE Latest Ref Rng & Units 5/17/2019   AST 0 - 45 U/L 15   ALT 0 - 50 U/L 15     Component      Latest Ref Rng & Units 8/20/2018 3/11/2019   Hemoglobin A1C      0 - 5.6 % 4.9 5.4     !LIPID/HEPATIC Latest Ref Rng & Units 1/2/2018   CHOLESTEROL <200 mg/dL 212 (H)   TRIGLYCERIDES <150 mg/dL 154 (H)   HDL CHOLESTEROL >49 mg/dL 58   LDL CHOLESTEROL, CALCULATED <100 mg/dL 123 (H)   NON HDL CHOLESTEROL <130 mg/dL 154 (H)     Component      Latest Ref Rng & Units 3/11/2019   Vitamin D Deficiency screening      20 - 75 ug/L 23     ENDO THYROID LABS-UMP Latest Ref Rng & Units 7/2/2020   TSH 0.40 - 4.00 mU/L 1.37       Vital Signs 8/23/2019 10/29/2019 11/15/2019 11/22/2019   Weight (LB) 181 lb 173 lb 173 lb 172 lb 1.6 oz   Height  5' 5\" 5' 5\" 5' 5\"   BMI (Calculated)  28.79 28.79 28.64   Reported weight on home scale: 168 lbs  Waist circumference:  43.5\" (5/2019).  38.5\" (8/2019).  36.5\" (11/2019)    All pertinent notes, labs, and images personally reviewed by me.     A/P  Ms.Jodi ROBERT Jackson is a 55 year old evaluated via video visit for the management of obestiy:    1. Obesity/abnormal weight gain-  BMI 34-35--> 28.64.  Obesity related comorbidity include HTN, OA, hyperlipidemia.    Obesity is associated with a significant increase in mortality and risk of many disorders, including diabetes mellitus, hypertension, dyslipidemia, " heart disease, stroke, sleep apnea, cancer, and many others. Conversely, weight loss is associated with a reduction in obesity-associated morbidity.    Endocrine evaluation of obesity includes Diabetes/prediabetes, Cushing's and thyroid dysfunction.  The relevant work up for the diagnosis and management of obesity and various treatment options were discussed. Body Mass Index (BMI) has been a standard means for calculating risk for overweight and obesity. The new American Association of Clinical Endocrinology (AACE) algorithm recommends lifestyle modifications as the initial phase of treatment for all patients with the BMI equal or greater than 25 kg/m2. Lifestyle modifications includes use of medical nutrition therapy, exercise, tobacco cessation, adequate quality and quantity of sleep, limited consumption of alcohol and reduced stress through implementation of a structured, multidisciplinary program.    In patients with complications associated with obesity, graded interventions are recommended including pharmacological therapy such as phentermine, orlistat, lorcaserin and phentermine/topiramate ER, contrave ( buproprion/naltreone) and the use of very low calorie meal replacement programs.    If medical intervention is insufficient, surgical therapy may be considered, especially in patients with BMI greater than or equal to 35 kg/m2 with multiple complications. Surgical treatments include lap-band, gastric sleeve or gastric bypass surgery.    I informed the pt that:  1.Weight loss medications can be taken to assist with weight reduction when combined with appropriate healthy lifestyle changes.  2.I discussed possible s/e, risks and benefits of weight loss medications.  3.All medications are FDA approved, however, some may be used ''off label'' for their weight loss benefits and some ''short term'' medications can be used for longer periods to achieve desired clinical outcomes.  4.All patients taking weight loss  "medications must be seen in clinic for refill authorization.    Counseling exercise ( V65.41)  Dietary counseling( V65.3)  Calculated BMI above the upper parameter and f/u plan was documented in the medical record.  Discussed indications, risks and benefits of all medications prescribed, and answered questions to patient's satisfaction.  Continue current low carb diet.  Started Phentermine 37.5 mg/day 5/2019.  Topiramate 25 mg bid later added.  Has lost 39 lbs since starting phentermine, 207 lbs (5/2019) --> 168 lbs (today).  Had lost 7\" from her waist since 5/2019.  Continue phentermine 37.5 mg/day  Increase topiramate to 25 mg q am, 50 mg q pm then 50 mg bid as needed.    Labs ordered today:   No orders of the defined types were placed in this encounter.    Radiology/Consults ordered today: None      Follow-up:  3-4 months    Kyra Helms NP  Endocrinology  [facility]  CC: Mitra Keita   ____________________________________________________________  "

## 2020-08-05 RX ORDER — PHENTERMINE HYDROCHLORIDE 37.5 MG/1
TABLET ORAL
Qty: 32 TABLET | Refills: 3 | Status: SHIPPED | OUTPATIENT
Start: 2020-08-05 | End: 2020-11-20

## 2020-09-19 DIAGNOSIS — E03.8 SUBCLINICAL HYPOTHYROIDISM: ICD-10-CM

## 2020-09-21 RX ORDER — LEVOTHYROXINE SODIUM 50 UG/1
TABLET ORAL
Qty: 90 TABLET | Refills: 3 | Status: SHIPPED | OUTPATIENT
Start: 2020-09-21 | End: 2020-11-20

## 2020-10-28 ENCOUNTER — VIRTUAL VISIT (OUTPATIENT)
Dept: FAMILY MEDICINE | Facility: OTHER | Age: 55
End: 2020-10-28
Payer: COMMERCIAL

## 2020-10-28 PROCEDURE — 99421 OL DIG E/M SVC 5-10 MIN: CPT | Performed by: PHYSICIAN ASSISTANT

## 2020-10-28 NOTE — PROGRESS NOTES
"Date: 10/28/2020 09:26:48  Clinician: Vida Nettles  Clinician NPI: [id number]  Patient: Rayna Jackson  Patient : 1965  Patient Address: [address] [place] [address]  Patient Phone: [phone]  Visit Protocol: URI  Patient Summary:  Rayna is a 55 year old ( : 1965 ) female who initiated a [facility] Visit for COVID-19 (Coronavirus) evaluation and screening. When asked the question \"Please sign me up to receive news, health information and promotions from [facility].\", Rayna responded \"Yes\".    Rayna states her symptoms started 1-2 days ago.   Her symptoms consist of a cough, nasal congestion, chills, malaise, and a sore throat. She is experiencing mild difficulty breathing with activities but can speak normally in full sentences.   Symptom details     Nasal secretions: The color of her mucus is white.    Cough: Rayna coughs a few times an hour and her cough is not more bothersome at night. Phlegm comes into her throat when she coughs. She believes her cough is caused by post-nasal drip. The color of the phlegm is yellow.     Sore throat: Rayna reports having mild throat pain (1-3 on a 10 point pain scale), does not have exudate on her tonsils, and can swallow liquids. She is not sure if the lymph nodes in her neck are enlarged. A rash has not appeared on the skin since the sore throat started.      Rayna denies having ear pain, headache, wheezing, fever, nausea, facial pain or pressure, myalgias, teeth pain, ageusia, diarrhea, anosmia, vomiting, and rhinitis. She also denies having recent facial or sinus surgery in the past 60 days and taking antibiotic medication in the past month.   Precipitating events  Within the past week, Rayna has not been exposed to someone with strep throat. She has not recently been exposed to someone with influenza. Rayna has been in close contact with the following high risk individuals: children under the age of 5, immunocompromised people, adults 65 or older, " and people with asthma, heart disease or diabetes.   Pertinent COVID-19 (Coronavirus) information  Rayna does not work or volunteer as healthcare worker or a . In the past 14 days, Rayna has not worked or volunteered at a healthcare facility or group living setting.   In the past 14 days, she also has not lived in a congregate living setting.   Rayna has not had a close contact with a laboratory-confirmed COVID-19 patient within 14 days of symptom onset.    Since December 2019, Rayna has not been diagnosed with lab-confirmed COVID-19 test and has not had upper respiratory infection or influenza-like illness.   Pertinent medical history  Rayna does not get yeast infections when she takes antibiotics.   Rayna does not need a return to work/school note.   Weight: 170 lbs   Rayna does not smoke or use smokeless tobacco.   Weight: 170 lbs    MEDICATIONS: Flovent HFA inhalation, losartan oral, levothyroxine oral, montelukast oral, phentermine oral, topiramate oral, Synthroid oral, ALLERGIES: aspirin  Clinician Response:  Dear Rayna,   Your symptoms show that you may have coronavirus (COVID-19). This illness can cause fever, cough and trouble breathing. Many people get a mild case and get better on their own. Some people can get very sick.  What should I do?  We would like to test you for this virus.   1. Please call [phone] to schedule your visit. Explain that you were referred by [facility] to have a COVID-19 test. Be ready to share your [facility].  The following will serve as your written order for this COVID Test, ordered by me, for the indication of suspected COVID [Z20.828]: The test will be ordered in [facility], our electronic health record, after you are scheduled. It will show as ordered and authorized by Dada Maier MD.  Order: COVID-19 (Coronavirus) PCR for SYMPTOMATIC testing from [facility].   2. When it's time for your COVID test:  Stay at least 6 feet away from others. (If someone will drive you  "to your test, stay in the backseat, as far away from the  as you can.)   Cover your mouth and nose with a mask, tissue or washcloth.  Go straight to the testing site. Don't make any stops on the way there or back.      3.Starting now: Stay home and away from others (self-isolate) until:   You've had no fever---and no medicine that reduces fever---for one full day (24 hours). And...   Your other symptoms have gotten better. For example, your cough or breathing has improved. And...   At least 10 days have passed since your symptoms started.       During this time, don't leave the house except for testing or medical care.   Stay in your own room, even for meals. Use your own bathroom if you can.   Stay away from others in your home. No hugging, kissing or shaking hands. No visitors.  Don't go to work, school or anywhere else.    Clean \"high touch\" surfaces often (doorknobs, counters, handles, etc.). Use a household cleaning spray or wipes. You'll find a full list of  on the EPA website: www.epa.gov/pesticide-registration/list-n-disinfectants-use-against-sars-cov-2.   Cover your mouth and nose with a mask, tissue or washcloth to avoid spreading germs.  Wash your hands and face often. Use soap and water.  Caregivers in these groups are at risk for severe illness due to COVID-19:  o People 65 years and older  o People who live in a nursing home or long-term care facility  o People with chronic disease (lung, heart, cancer, diabetes, kidney, liver, immunologic)  o People who have a weakened immune system, including those who:   Are in cancer treatment  Take medicine that weakens the immune system, such as corticosteroids  Had a bone marrow or organ transplant  Have an immune deficiency  Have poorly controlled HIV or AIDS  Are obese (body mass index of 40 or higher)  Smoke regularly   o Caregivers should wear gloves while washing dishes, handling laundry and cleaning bedrooms and bathrooms.  o Use caution when " washing and drying laundry: Don't shake dirty laundry, and use the warmest water setting that you can.  o For more tips, go to www.cdc.gov/coronavirus/2019-ncov/downloads/10Things.pdf.    4.Sign up for Macey Swain. We know it's scary to hear that you might have COVID-19. We want to track your symptoms to make sure you're okay over the next 2 weeks. Please look for an email from Macey Swain---this is a free, online program that we'll use to keep in touch. To sign up, follow the link in the email. Learn more at [email or web address]  How can I take care of myself?   Get lots of rest. Drink extra fluids (unless a doctor has told you not to).   Take Tylenol (acetaminophen) for fever or pain. If you have liver or kidney problems, ask your family doctor if it's okay to take Tylenol.   Adults can take either:    650 mg (two 325 mg pills) every 4 to 6 hours, or...   1,000 mg (two 500 mg pills) every 8 hours as needed.    Note: Don't take more than 3,000 mg in one day. Acetaminophen is found in many medicines (both prescribed and over-the-counter medicines). Read all labels to be sure you don't take too much.   For children, check the Tylenol bottle for the right dose. The dose is based on the child's age or weight.    If you have other health problems (like cancer, heart failure, an organ transplant or severe kidney disease): Call your specialty clinic if you don't feel better in the next 2 days.       Know when to call 911. Emergency warning signs include:    Trouble breathing or shortness of breath Pain or pressure in the chest that doesn't go away Feeling confused like you haven't felt before, or not being able to wake up Bluish-colored lips or face.  Where can I get more information?    [facility] -- About COVID-19: [email or web address]   CDC -- What to Do If You're Sick: www.cdc.gov/coronavirus/2019-ncov/about/steps-when-sick.html   CDC -- Ending Home Isolation:  www.cdc.gov/coronavirus/2019-ncov/hcp/disposition-in-home-patients.html   [facility] -- Caring for Someone: www.cdc.gov/coronavirus/2019-ncov/if-you-are-sick/care-for-someone.html   [facility] -- Interim Guidance for Hospital Discharge to Home: www.[facility].[place].us/diseases/coronavirus/hcp/hospdischarge.pdf   [facility] clinical trials (COVID-19 research studies): clinicalaffairs.[facility]-clinical-trials    Below are the COVID-19 hotlines at the [place] Department of Health ([facility]). Interpreters are available.    For health questions: Call 245-276-6643 or 1-234.291.5457 (7 a.m. to 7 p.m.) For questions about schools and childcare: Call 175-807-8997 or 1-716.608.8863 (7 a.m. to 7 p.m.)    Diagnosis: Contact with and (suspected) exposure to other viral communicable diseases  Diagnosis ICD: Z20.828

## 2020-10-29 ENCOUNTER — MYC REFILL (OUTPATIENT)
Dept: FAMILY MEDICINE | Facility: CLINIC | Age: 55
End: 2020-10-29

## 2020-10-29 DIAGNOSIS — J30.9 CHRONIC ALLERGIC RHINITIS: ICD-10-CM

## 2020-10-29 RX ORDER — FLUTICASONE PROPIONATE 50 MCG
SPRAY, SUSPENSION (ML) NASAL
Qty: 16 G | Refills: 5 | Status: SHIPPED | OUTPATIENT
Start: 2020-10-29 | End: 2022-01-31

## 2020-11-07 ENCOUNTER — HEALTH MAINTENANCE LETTER (OUTPATIENT)
Age: 55
End: 2020-11-07

## 2020-11-17 ENCOUNTER — TELEPHONE (OUTPATIENT)
Dept: ENDOCRINOLOGY | Facility: CLINIC | Age: 55
End: 2020-11-17

## 2020-11-17 ENCOUNTER — MYC MEDICAL ADVICE (OUTPATIENT)
Dept: ENDOCRINOLOGY | Facility: CLINIC | Age: 55
End: 2020-11-17

## 2020-11-17 NOTE — TELEPHONE ENCOUNTER
The following [employer] message sent to the patient:    Curtis Way,    I left you a voicemail but wanted to send you a [employer] message as well regarding your upcoming appointment scheduled in Endocrinology.  You are scheduled to see Kyra Helms NP, this Friday, November 20th at 7:00 a.m.  Unfortunately we are not seeing patients in clinic at this time as we are trying to minimize patient exposure and keep our patients healthy while also practicing social distancing due to COVID19.  We would be happy to do your visit by video via [employer] or Smartphone as scheduled for 11/20/2020 at 7:00 a.m.      If you log in to your [employer], you will see that [employer] video visit instructions have been sent to you.  We would be happy to do a video visit via your smartphone if you prefer and I will plan to discuss how to connect virtually when I call you to prepare for the appointment.  I will call you up to 15 minutes prior to your scheduled appointment time to confirm you are ready for the appointment and answer any questions you may have about connecting online.  If this works for you, no need to call us back.  If not, please call us to discuss alternatives for your appointment.       Thank you,  Archana Arizmendi M.A.  [facility]  [phone] [facility]

## 2020-11-20 ENCOUNTER — VIRTUAL VISIT (OUTPATIENT)
Dept: ENDOCRINOLOGY | Facility: CLINIC | Age: 55
End: 2020-11-20
Payer: COMMERCIAL

## 2020-11-20 ENCOUNTER — TELEPHONE (OUTPATIENT)
Dept: ENDOCRINOLOGY | Facility: CLINIC | Age: 55
End: 2020-11-20

## 2020-11-20 DIAGNOSIS — E66.09 CLASS 1 OBESITY DUE TO EXCESS CALORIES WITH SERIOUS COMORBIDITY AND BODY MASS INDEX (BMI) OF 34.0 TO 34.9 IN ADULT: Primary | ICD-10-CM

## 2020-11-20 DIAGNOSIS — E03.9 HYPOTHYROIDISM, UNSPECIFIED TYPE: ICD-10-CM

## 2020-11-20 DIAGNOSIS — E66.811 CLASS 1 OBESITY DUE TO EXCESS CALORIES WITH SERIOUS COMORBIDITY AND BODY MASS INDEX (BMI) OF 34.0 TO 34.9 IN ADULT: Primary | ICD-10-CM

## 2020-11-20 PROCEDURE — 99213 OFFICE O/P EST LOW 20 MIN: CPT | Mod: 95 | Performed by: CLINICAL NURSE SPECIALIST

## 2020-11-20 RX ORDER — PHENTERMINE HYDROCHLORIDE 37.5 MG/1
TABLET ORAL
Qty: 32 TABLET | Refills: 3 | Status: SHIPPED | OUTPATIENT
Start: 2020-11-20 | End: 2021-02-04

## 2020-11-20 RX ORDER — TOPIRAMATE 50 MG/1
TABLET, FILM COATED ORAL
Qty: 360 TABLET | Refills: 1 | Status: SHIPPED | OUTPATIENT
Start: 2020-11-20 | End: 2021-02-04

## 2020-11-20 RX ORDER — LEVOTHYROXINE SODIUM 50 UG/1
50 TABLET ORAL DAILY
Qty: 90 TABLET | Refills: 3 | Status: SHIPPED | OUTPATIENT
Start: 2020-11-20 | End: 2021-12-20

## 2020-11-20 NOTE — PROGRESS NOTES
"Rayna Jackson is a 55 year old female who is being evaluated via a billable video visit.      The patient has been notified of following:     \"This video visit will be conducted via a call between you and your physician/provider. We have found that certain health care needs can be provided without the need for an in-person physical exam.  This service lets us provide the care you need with a video conversation.  If a prescription is necessary we can send it directly to your pharmacy.  If lab work is needed we can place an order for that and you can then stop by our lab to have the test done at a later time.    Video visits are billed at different rates depending on your insurance coverage.  Please reach out to your insurance provider with any questions.    If during the course of the call the physician/provider feels a video visit is not appropriate, you will not be charged for this service.\"    Patient has given verbal consent for Video visit? Yes  How would you like to obtain your AVS? MyChart  If you are dropped from the video visit, the video invite should be resent to: Text to cell phone: [phone]  Will anyone else be joining your video visit? No        Video-Visit Details    Type of service:  Video Visit    Video Start Time: 7:08 AM  Video End Time: 7:23 AM    Originating Location (pt. Location): Home    Distant Location (provider location):  [facility]     Platform used for Video Visit: Shelby    Name: Rayna Jackson  F/u for obesity (Last seen 7/30/2020).  HPI:  Rayna Jackson is a 55 year old female who presents for the management of obestiy.  Has noted abnormal weight gain over the past 3 years - states she has gained 70 lbs.   Making healthy food choices - doesn't feel she overeats.  Unable to lose weight and has continued to gain.  Previously tried a program called [facility], also weight watchers, [place] [facility] was most effective - lost 20 lbs     Currently treated with " Phentermine 37.5 mg daily and Topiramate 50 mg bid.  Is tolerating phentermine and Topiramate well.  Also following a keto diet - has been eating more carbs recently.  Has lost 39 lbs since starting phentermine, Topiramate, + diet and exercise efforts, 207-->168 lbs, has gained 5 lbs since her last visit --> 173 lbs.  Thinks the recent weight gain is likely related to not adhering to her diet as closely as before.  Diabetes:No but states her blood sugars are 'always' high-normal.  No GD with pregnancy but was monitored more closely because glucose levels were elevated but not in the diabetes range.  + FH of DM2 - father and MGGM.  Sleep Apnea/Snores:snores - was seen by specialist who did not feel sleep apnea eval was indicated, didn't believe she had sleep apnea  Hypertension or CAD:Yes: Controlled on Losartan  Hyperlipidemia:Yes: untreated.  Diet:  Healthy food choices, low carb, portion controlled  Exercise: Difficult due to joint pain, + OA  #2. Hypothyroidism.  Currently treated with levothyroxine 50 mcg/day.  FH + for thyroid disease - Mother, MGM, maternal aunt, sister.    Previous lymph node biopsy + for Lupus - was seen at [facility], no diagnosis of Lupus has been confirmed.  PMH/PSH:  Past Medical History:   Diagnosis Date     Torn meniscus 2013    left      Past Surgical History:   Procedure Laterality Date     appendicitis  1980     COLONOSCOPY N/A 6/30/2017    Procedure: COLONOSCOPY;  COLONOSCOPY   ;  Surgeon: Brooks Villa MD;  Location:  GI     CYSTECTOMY OVARIAN BENIGN  1980     KNEE SURGERY Left      Family Hx:  Family History   Problem Relation Age of Onset     Hypertension Mother      Thyroid Disease Mother      Hypertension Father      Diabetes Other         great grandmother     Thyroid Disease Sister      Thyroid Disease Maternal Aunt      Thyroid disease: Yes, mother, sister, aunt, MGM        DM2: Yes: father, MGGM         Autoimmune: DM1, SLE, RA, Vitiligo     Social Hx:  Social History      Socioeconomic History     Marital status:      Spouse name: Juan F     Number of children: 4     Years of education: Not on file     Highest education level: Master's degree (e.g., MA, MS, [place], MEd, MSW, [employer])   Occupational History     Occupation: manager     Employer: [employer]   Social Needs     Financial resource strain: Not hard at all     Food insecurity     Worry: Never true     Inability: Never true     Transportation needs     Medical: No     Non-medical: No   Tobacco Use     Smoking status: Never Smoker     Smokeless tobacco: Never Used   Substance and Sexual Activity     Alcohol use: Yes     Alcohol/week: 0.0 standard drinks     Frequency: 2-4 times a month     Drinks per session: 1 or 2     Binge frequency: Never     Comment: occ     Drug use: No     Sexual activity: Yes     Birth control/protection: I.U.D.   Lifestyle     Physical activity     Days per week: 0 days     Minutes per session: 0 min     Stress: Not at all   Relationships     Social connections     Talks on phone: More than three times a week     Gets together: Twice a week     Attends Sabianist service: 1 to 4 times per year     Active member of club or organization: Yes     Attends meetings of clubs or organizations: More than 4 times per year     Relationship status:      Intimate partner violence     Fear of current or ex partner: Not on file     Emotionally abused: Not on file     Physically abused: Not on file     Forced sexual activity: Not on file   Other Topics Concern     Parent/sibling w/ CABG, MI or angioplasty before 65F 55M? Not Asked   Social History Narrative     Not on file          MEDICATIONS:  has a current medication list which includes the following prescription(s): albuterol, fexofenadine, flovent hfa, fluticasone, levonorgestrel, levothyroxine, losartan, meloxicam, montelukast, phentermine, topiramate, and triamcinolone, and the following Facility-Administered Medications: methylprednisolone  and ropivacaine.    ROS   ROS: 10 point ROS neg other than the symptoms noted above in the HPI.    PE  VS: There were no vitals taken for this visit.  GENERAL: AXOX3, NAD, well dressed, answering questions appropriately, appears stated age.  HEENT: no exopthalmous, no proptosis, no lig lag, no retraction  CV:   LUNGS: Normal respiratory effort  EXTREMITIES:   NEUROLOGY:   MSK:     LABS:  !COMPREHENSIVE Latest Ref Rng & Units 5/17/2019   SODIUM 133 - 144 mmol/L 142   POTASSIUM 3.4 - 5.3 mmol/L 3.9   CHLORIDE 94 - 109 mmol/L 109   BUN 7 - 30 mg/dL 11   Creatinine 0.52 - 1.04 mg/dL 0.66   Glucose 70 - 99 mg/dL 87   ANION GAP 3 - 14 mmol/L 7   CALCIUM 8.5 - 10.1 mg/dL 9.3   ALBUMIN 3.4 - 5.0 g/dL 4.0     !COMPREHENSIVE Latest Ref Rng & Units 5/17/2019   AST 0 - 45 U/L 15   ALT 0 - 50 U/L 15     Component      Latest Ref Rng & Units 8/20/2018 3/11/2019   Hemoglobin A1C      0 - 5.6 % 4.9 5.4     !LIPID/HEPATIC Latest Ref Rng & Units 1/2/2018   CHOLESTEROL <200 mg/dL 212 (H)   TRIGLYCERIDES <150 mg/dL 154 (H)   HDL CHOLESTEROL >49 mg/dL 58   LDL CHOLESTEROL, CALCULATED <100 mg/dL 123 (H)   NON HDL CHOLESTEROL <130 mg/dL 154 (H)     Component      Latest Ref Rng & Units 3/11/2019   Vitamin D Deficiency screening      20 - 75 ug/L 23     ENDO THYROID LABS-UMP Latest Ref Rng & Units 7/2/2020   TSH 0.40 - 4.00 mU/L 1.37     All pertinent notes, labs, and images personally reviewed by me.     A/P  Ms.Jodi ROBERT Jackson is a 55 year old evaluated via video visit for the management of obesity and hypothyroidism:    1. Obesity/abnormal weight gain-  BMI 34-35--> 28.64.  Obesity related comorbidity include HTN, OA, hyperlipidemia.    Obesity is associated with a significant increase in mortality and risk of many disorders, including diabetes mellitus, hypertension, dyslipidemia, heart disease, stroke, sleep apnea, cancer, and many others. Conversely, weight loss is associated with a reduction in obesity-associated morbidity.     Endocrine evaluation of obesity includes Diabetes/prediabetes, Cushing's and thyroid dysfunction.  The relevant work up for the diagnosis and management of obesity and various treatment options were discussed. Body Mass Index (BMI) has been a standard means for calculating risk for overweight and obesity. The new American Association of Clinical Endocrinology (AACE) algorithm recommends lifestyle modifications as the initial phase of treatment for all patients with the BMI equal or greater than 25 kg/m2. Lifestyle modifications includes use of medical nutrition therapy, exercise, tobacco cessation, adequate quality and quantity of sleep, limited consumption of alcohol and reduced stress through implementation of a structured, multidisciplinary program.    In patients with complications associated with obesity, graded interventions are recommended including pharmacological therapy such as phentermine, orlistat, lorcaserin and phentermine/topiramate ER, contrave ( buproprion/naltreone) and the use of very low calorie meal replacement programs.    If medical intervention is insufficient, surgical therapy may be considered, especially in patients with BMI greater than or equal to 35 kg/m2 with multiple complications. Surgical treatments include lap-band, gastric sleeve or gastric bypass surgery.    I informed the pt that:  1.Weight loss medications can be taken to assist with weight reduction when combined with appropriate healthy lifestyle changes.  2.I discussed possible s/e, risks and benefits of weight loss medications.  3.All medications are FDA approved, however, some may be used ''off label'' for their weight loss benefits and some ''short term'' medications can be used for longer periods to achieve desired clinical outcomes.  4.All patients taking weight loss medications must be seen in clinic for refill authorization.    Counseling exercise ( V65.41)  Dietary counseling( V65.3)  Calculated BMI above the upper  "parameter and f/u plan was documented in the medical record.  Discussed indications, risks and benefits of all medications prescribed, and answered questions to patient's satisfaction.  Currently treated with Phentermine 37.5 mg daily and Topiramate 50 mg bid.  Had lost 39 lbs, 207 lbs --> 168 lbs (7/2020) and 7\" from her waist but recently gained 5 lbs back --> 173 lbs.  Continue phentermine 37.5 mg/day  Increase topiramate to 75 mg bid.  Increase further to 100 mg bid if needed.  Watch her diet more carefully, following a low carb diet.  Encourage regular exercise.    2.  Hypothyroidism.  Currently treated with levothyroxine 50 mcg/day.  Clinically and biochemically euthyroid.  Continue levothyroxine 50 mcg/day.  Monitor TFT's annually.      Labs ordered today:   No orders of the defined types were placed in this encounter.    Radiology/Consults ordered today: None      Follow-up:  3-4 months with Dr. Dhara Helms NP  Endocrinology  [facility]  CC: Mitra Keita   ____________________________________________________________  "

## 2020-11-20 NOTE — LETTER
"    11/20/2020         RE: Rayna Jackson  [address]  [facility] [address]        Dear Colleague,    Thank you for referring your patient, Rayna Jackson, to the [facility]. Please see a copy of my visit note below.    Rayna Jackson is a 55 year old female who is being evaluated via a billable video visit.      The patient has been notified of following:     \"This video visit will be conducted via a call between you and your physician/provider. We have found that certain health care needs can be provided without the need for an in-person physical exam.  This service lets us provide the care you need with a video conversation.  If a prescription is necessary we can send it directly to your pharmacy.  If lab work is needed we can place an order for that and you can then stop by our lab to have the test done at a later time.    Video visits are billed at different rates depending on your insurance coverage.  Please reach out to your insurance provider with any questions.    If during the course of the call the physician/provider feels a video visit is not appropriate, you will not be charged for this service.\"    Patient has given verbal consent for Video visit? Yes  How would you like to obtain your AVS? MyChart  If you are dropped from the video visit, the video invite should be resent to: Text to cell phone: [phone]  Will anyone else be joining your video visit? No        Video-Visit Details    Type of service:  Video Visit    Video Start Time: 7:08 AM  Video End Time: 7:23 AM    Originating Location (pt. Location): Home    Distant Location (provider location):  [facility]     Platform used for Video Visit: Doximity    Name: Rayna Jackson  F/u for obesity (Last seen 7/30/2020).  HPI:  Rayna Jackson is a 55 year old female who presents for the management of obestiy.  Has noted abnormal weight gain over the past 3 years - states she has gained 70 lbs.   " Making healthy food choices - doesn't feel she overeats.  Unable to lose weight and has continued to gain.  Previously tried a program called [employer], also weight watchers, [place] [facility] was most effective - lost 20 lbs     Currently treated with Phentermine 37.5 mg daily and Topiramate 50 mg bid.  Is tolerating phentermine and Topiramate well.  Also following a keto diet - has been eating more carbs recently.  Has lost 39 lbs since starting phentermine, Topiramate, + diet and exercise efforts, 207-->168 lbs, has gained 5 lbs since her last visit --> 173 lbs.  Thinks the recent weight gain is likely related to not adhering to her diet as closely as before.  Diabetes:No but states her blood sugars are 'always' high-normal.  No GD with pregnancy but was monitored more closely because glucose levels were elevated but not in the diabetes range.  + FH of DM2 - father and MGGM.  Sleep Apnea/Snores:snores - was seen by specialist who did not feel sleep apnea eval was indicated, didn't believe she had sleep apnea  Hypertension or CAD:Yes: Controlled on Losartan  Hyperlipidemia:Yes: untreated.  Diet:  Healthy food choices, low carb, portion controlled  Exercise: Difficult due to joint pain, + OA  #2. Hypothyroidism.  Currently treated with levothyroxine 50 mcg/day.  FH + for thyroid disease - Mother, MGM, maternal aunt, sister.    Previous lymph node biopsy + for Lupus - was seen at [place], no diagnosis of Lupus has been confirmed.  PMH/PSH:  Past Medical History:   Diagnosis Date     Torn meniscus 2013    left      Past Surgical History:   Procedure Laterality Date     appendicitis  1980     COLONOSCOPY N/A 6/30/2017    Procedure: COLONOSCOPY;  COLONOSCOPY   ;  Surgeon: Brooks Villa MD;  Location:  GI     CYSTECTOMY OVARIAN BENIGN  1980     KNEE SURGERY Left      Family Hx:  Family History   Problem Relation Age of Onset     Hypertension Mother      Thyroid Disease Mother      Hypertension Father      Diabetes Other          great grandmother     Thyroid Disease Sister      Thyroid Disease Maternal Aunt      Thyroid disease: Yes, mother, sister, aunt, MGM        DM2: Yes: father, MGGM         Autoimmune: DM1, SLE, RA, Vitiligo     Social Hx:  Social History     Socioeconomic History     Marital status:      Spouse name: Juan F     Number of children: 4     Years of education: Not on file     Highest education level: Master's degree (e.g., MA, MS, Adry, MEd, MSW, [employer])   Occupational History     Occupation: manager     Employer: [employer]   Social Needs     Financial resource strain: Not hard at all     Food insecurity     Worry: Never true     Inability: Never true     Transportation needs     Medical: No     Non-medical: No   Tobacco Use     Smoking status: Never Smoker     Smokeless tobacco: Never Used   Substance and Sexual Activity     Alcohol use: Yes     Alcohol/week: 0.0 standard drinks     Frequency: 2-4 times a month     Drinks per session: 1 or 2     Binge frequency: Never     Comment: occ     Drug use: No     Sexual activity: Yes     Birth control/protection: I.U.D.   Lifestyle     Physical activity     Days per week: 0 days     Minutes per session: 0 min     Stress: Not at all   Relationships     Social connections     Talks on phone: More than three times a week     Gets together: Twice a week     Attends Bahai service: 1 to 4 times per year     Active member of club or organization: Yes     Attends meetings of clubs or organizations: More than 4 times per year     Relationship status:      Intimate partner violence     Fear of current or ex partner: Not on file     Emotionally abused: Not on file     Physically abused: Not on file     Forced sexual activity: Not on file   Other Topics Concern     Parent/sibling w/ CABG, MI or angioplasty before 65F 55M? Not Asked   Social History Narrative     Not on file          MEDICATIONS:  has a current medication list which includes the following  prescription(s): albuterol, fexofenadine, flovent hfa, fluticasone, levonorgestrel, levothyroxine, losartan, meloxicam, montelukast, phentermine, topiramate, and triamcinolone, and the following Facility-Administered Medications: methylprednisolone and ropivacaine.    ROS   ROS: 10 point ROS neg other than the symptoms noted above in the HPI.    PE  VS: There were no vitals taken for this visit.  GENERAL: AXOX3, NAD, well dressed, answering questions appropriately, appears stated age.  HEENT: no exopthalmous, no proptosis, no lig lag, no retraction  CV:   LUNGS: Normal respiratory effort  EXTREMITIES:   NEUROLOGY:   MSK:     LABS:  !COMPREHENSIVE Latest Ref Rng & Units 5/17/2019   SODIUM 133 - 144 mmol/L 142   POTASSIUM 3.4 - 5.3 mmol/L 3.9   CHLORIDE 94 - 109 mmol/L 109   BUN 7 - 30 mg/dL 11   Creatinine 0.52 - 1.04 mg/dL 0.66   Glucose 70 - 99 mg/dL 87   ANION GAP 3 - 14 mmol/L 7   CALCIUM 8.5 - 10.1 mg/dL 9.3   ALBUMIN 3.4 - 5.0 g/dL 4.0     !COMPREHENSIVE Latest Ref Rng & Units 5/17/2019   AST 0 - 45 U/L 15   ALT 0 - 50 U/L 15     Component      Latest Ref Rng & Units 8/20/2018 3/11/2019   Hemoglobin A1C      0 - 5.6 % 4.9 5.4     !LIPID/HEPATIC Latest Ref Rng & Units 1/2/2018   CHOLESTEROL <200 mg/dL 212 (H)   TRIGLYCERIDES <150 mg/dL 154 (H)   HDL CHOLESTEROL >49 mg/dL 58   LDL CHOLESTEROL, CALCULATED <100 mg/dL 123 (H)   NON HDL CHOLESTEROL <130 mg/dL 154 (H)     Component      Latest Ref Rng & Units 3/11/2019   Vitamin D Deficiency screening      20 - 75 ug/L 23     ENDO THYROID LABS-UMP Latest Ref Rng & Units 7/2/2020   TSH 0.40 - 4.00 mU/L 1.37     All pertinent notes, labs, and images personally reviewed by me.     A/P  Ms.Jodi ROBERT Jackson is a 55 year old evaluated via video visit for the management of obesity and hypothyroidism:    1. Obesity/abnormal weight gain-  BMI 34-35--> 28.64.  Obesity related comorbidity include HTN, OA, hyperlipidemia.    Obesity is associated with a significant increase in  mortality and risk of many disorders, including diabetes mellitus, hypertension, dyslipidemia, heart disease, stroke, sleep apnea, cancer, and many others. Conversely, weight loss is associated with a reduction in obesity-associated morbidity.    Endocrine evaluation of obesity includes Diabetes/prediabetes, Cushing's and thyroid dysfunction.  The relevant work up for the diagnosis and management of obesity and various treatment options were discussed. Body Mass Index (BMI) has been a standard means for calculating risk for overweight and obesity. The new American Association of Clinical Endocrinology (AACE) algorithm recommends lifestyle modifications as the initial phase of treatment for all patients with the BMI equal or greater than 25 kg/m2. Lifestyle modifications includes use of medical nutrition therapy, exercise, tobacco cessation, adequate quality and quantity of sleep, limited consumption of alcohol and reduced stress through implementation of a structured, multidisciplinary program.    In patients with complications associated with obesity, graded interventions are recommended including pharmacological therapy such as phentermine, orlistat, lorcaserin and phentermine/topiramate ER, contrave ( buproprion/naltreone) and the use of very low calorie meal replacement programs.    If medical intervention is insufficient, surgical therapy may be considered, especially in patients with BMI greater than or equal to 35 kg/m2 with multiple complications. Surgical treatments include lap-band, gastric sleeve or gastric bypass surgery.    I informed the pt that:  1.Weight loss medications can be taken to assist with weight reduction when combined with appropriate healthy lifestyle changes.  2.I discussed possible s/e, risks and benefits of weight loss medications.  3.All medications are FDA approved, however, some may be used ''off label'' for their weight loss benefits and some ''short term'' medications can be used  "for longer periods to achieve desired clinical outcomes.  4.All patients taking weight loss medications must be seen in clinic for refill authorization.    Counseling exercise ( V65.41)  Dietary counseling( V65.3)  Calculated BMI above the upper parameter and f/u plan was documented in the medical record.  Discussed indications, risks and benefits of all medications prescribed, and answered questions to patient's satisfaction.  Currently treated with Phentermine 37.5 mg daily and Topiramate 50 mg bid.  Had lost 39 lbs, 207 lbs --> 168 lbs (7/2020) and 7\" from her waist but recently gained 5 lbs back --> 173 lbs.  Continue phentermine 37.5 mg/day  Increase topiramate to 75 mg bid.  Increase further to 100 mg bid if needed.  Watch her diet more carefully, following a low carb diet.  Encourage regular exercise.    2.  Hypothyroidism.  Currently treated with levothyroxine 50 mcg/day.  Clinically and biochemically euthyroid.  Continue levothyroxine 50 mcg/day.  Monitor TFT's annually.      Labs ordered today:   No orders of the defined types were placed in this encounter.    Radiology/Consults ordered today: None      Follow-up:  3-4 months with Dr. Dhara Helms NP  Endocrinology  [facility]  CC: Mitra Keita   ____________________________________________________________      Again, thank you for allowing me to participate in the care of your patient.        Sincerely,        MIRZA Hays CNP    "

## 2020-11-24 ENCOUNTER — TELEPHONE (OUTPATIENT)
Dept: FAMILY MEDICINE | Facility: CLINIC | Age: 55
End: 2020-11-24

## 2020-11-24 NOTE — TELEPHONE ENCOUNTER
Pharmacy Benefit Information:    Provider: LS  Fax from [facility] informing phentermine needs PA  IMedication/SIG: phentermine  Pharmacy: ? rx printed, associated pharmacy says [facility], fax came from [facility]?    Routed to LS, please advise RX CHANGE OR PA, is pt paying cash? Ok to disregard?  Sandra Plascencia RN, BSN  Message handled by CLINIC NURSE.

## 2020-11-25 NOTE — TELEPHONE ENCOUNTER
Patient returned call, she has been paying cash for this and no PA needed at this time.    Kody CASTELLANO RN, BSN

## 2020-11-25 NOTE — TELEPHONE ENCOUNTER
Please call - clarify the PA request - since she has been on the phentermine all along, I believe she has just been paying cash so no PA should be needed.    If she wanted the PA, route to PA department with the following medical justification:  Obesity, BMI 28-29, with cormorbidity: HTN, OA, hyperlipidemia.  Tried and failed therapy with diet and exercise for > 6 months.    Kyra Helms NP  Endocrinology

## 2020-11-28 DIAGNOSIS — M16.12 OSTEOARTHRITIS OF LEFT HIP, UNSPECIFIED OSTEOARTHRITIS TYPE: ICD-10-CM

## 2020-11-30 RX ORDER — MELOXICAM 7.5 MG/1
7.5 TABLET ORAL DAILY PRN
Qty: 30 TABLET | Refills: 1 | Status: SHIPPED | OUTPATIENT
Start: 2020-11-30 | End: 2022-04-14

## 2020-11-30 NOTE — TELEPHONE ENCOUNTER
Routing refill request to provider for review/approval because:  Labs not current:  CBC and CMP  Failing bp  Allergy listed    Che Quiñones RN

## 2021-01-04 DIAGNOSIS — J30.9 CHRONIC ALLERGIC RHINITIS: ICD-10-CM

## 2021-01-04 DIAGNOSIS — J45.20 MILD INTERMITTENT ASTHMA WITHOUT COMPLICATION: ICD-10-CM

## 2021-01-05 NOTE — TELEPHONE ENCOUNTER
Routing refill request to provider for review/approval because:  Patient needs to be seen because:  Needs med check  Failing ACT    Che Quiñones RN

## 2021-01-06 RX ORDER — MONTELUKAST SODIUM 10 MG/1
TABLET ORAL
Qty: 90 TABLET | Refills: 0 | Status: SHIPPED | OUTPATIENT
Start: 2021-01-06 | End: 2021-02-19

## 2021-01-15 ENCOUNTER — HEALTH MAINTENANCE LETTER (OUTPATIENT)
Age: 56
End: 2021-01-15

## 2021-01-25 ENCOUNTER — MYC MEDICAL ADVICE (OUTPATIENT)
Dept: ENDOCRINOLOGY | Facility: CLINIC | Age: 56
End: 2021-01-25

## 2021-02-01 ENCOUNTER — TELEPHONE (OUTPATIENT)
Dept: FAMILY MEDICINE | Facility: CLINIC | Age: 56
End: 2021-02-01

## 2021-02-01 NOTE — TELEPHONE ENCOUNTER
Patient informed and agrees to call [facility] and keep scheduled visit with PCP on 2/19  Roby Mancera RN

## 2021-02-01 NOTE — TELEPHONE ENCOUNTER
Left message to call back. Is scheduled to see PCP 2/19. MyChart appointment note includes left shoulder pain, difficulty with movement.   If wants shoulder eval by an ortho provider sooner, BERNABE suggests [facility] - [phone].    Do keep scheduled office visit with BERNABE on the 19th whether or not [facility] eval.    Roby Mancera, RN - [phone]

## 2021-02-03 ENCOUNTER — OFFICE VISIT (OUTPATIENT)
Dept: ORTHOPEDICS | Facility: CLINIC | Age: 56
End: 2021-02-03
Payer: COMMERCIAL

## 2021-02-03 ENCOUNTER — ANCILLARY PROCEDURE (OUTPATIENT)
Dept: GENERAL RADIOLOGY | Facility: CLINIC | Age: 56
End: 2021-02-03
Attending: FAMILY MEDICINE
Payer: COMMERCIAL

## 2021-02-03 VITALS
WEIGHT: 172 LBS | BODY MASS INDEX: 28.66 KG/M2 | HEIGHT: 65 IN | SYSTOLIC BLOOD PRESSURE: 110 MMHG | DIASTOLIC BLOOD PRESSURE: 62 MMHG

## 2021-02-03 DIAGNOSIS — M75.102 ROTATOR CUFF SYNDROME OF LEFT SHOULDER: ICD-10-CM

## 2021-02-03 DIAGNOSIS — M25.512 PAIN IN JOINT OF LEFT SHOULDER: Primary | ICD-10-CM

## 2021-02-03 DIAGNOSIS — M25.519 PAIN IN JOINT, SHOULDER REGION: ICD-10-CM

## 2021-02-03 PROCEDURE — 73030 X-RAY EXAM OF SHOULDER: CPT | Mod: LT | Performed by: RADIOLOGY

## 2021-02-03 PROCEDURE — 99204 OFFICE O/P NEW MOD 45 MIN: CPT | Performed by: FAMILY MEDICINE

## 2021-02-03 RX ORDER — CYCLOBENZAPRINE HCL 10 MG
10 TABLET ORAL
Qty: 30 TABLET | Refills: 0 | Status: SHIPPED | OUTPATIENT
Start: 2021-02-03 | End: 2022-04-14

## 2021-02-03 ASSESSMENT — MIFFLIN-ST. JEOR: SCORE: 1386.07

## 2021-02-03 NOTE — LETTER
2/3/2021         RE: Rayna Jackson  [address]  [facility] [address]        Dear Colleague,    Thank you for referring your patient, Rayna Jackson, to the [facility] [place]. Please see a copy of my visit note below.    ASSESSMENT & PLAN  Patient Instructions     1. Pain in joint of left shoulder    2. Rotator cuff syndrome of left shoulder      -Patient has left shoulder pain due to rotator cuff tendinitis and possibly cartilage injuries  -Patient will start formal physical therapy and home exercise program  -Patient will stop Meloxicam.  Patient will take 3-4 tablets of advil every 6 hours as needed.  She may take Tylenol 500mg-1000mg every 8 hours as well  -Patient will start Flexeril at bedtime for night time pain  -Patient will follow up in 3-4 weeks if pain does not improve.  To consider an intra-articular cortisone injection if indicated.  -Call direct clinic number [[phone]] at any time with questions or concerns.    Albert Yeo MD [facility]  [facility]          -----    SUBJECTIVE  Rayna Jackson is a/an 55 year old Right handed female who is seen in consultation at the request of  Mitra Wei M.D. for evaluation of left shoulder pain. The patient is seen by themselves.     Onset: a couple month(s) ago. Reports insidious onset without acute precipitating event.  Location of Pain: left generalized shoulder pain that travels down the arm; states some catching deep in the shoulder, like a meniscus tear  Rating of Pain at worst: 10/10  Rating of Pain Currently: 5/10  Worsened by: reaching behind back, sleeping, lifting out to the side, or up over head.   Better with: Advil, ice,   Treatments tried: ice, heat, Tylenol and ibuprofen, meloxicam  Associated symptoms: locking or catching  Orthopedic history: NO  Relevant surgical history: NO  Social history: social history: works at [employer], Clinical  Therapist    Past Medical History:   Diagnosis Date     Torn meniscus 2013    left      Social History     Socioeconomic History     Marital status:      Spouse name: Juan F     Number of children: 4     Years of education: Not on file     Highest education level: Master's degree (e.g., MA, MS, Adry, MEd, MSW, [employer])   Occupational History     Occupation: manager     Employer: [employer]   Social Needs     Financial resource strain: Not hard at all     Food insecurity     Worry: Never true     Inability: Never true     Transportation needs     Medical: No     Non-medical: No   Tobacco Use     Smoking status: Never Smoker     Smokeless tobacco: Never Used   Substance and Sexual Activity     Alcohol use: Yes     Alcohol/week: 0.0 standard drinks     Frequency: 2-4 times a month     Drinks per session: 1 or 2     Binge frequency: Never     Comment: occ     Drug use: No     Sexual activity: Yes     Birth control/protection: I.U.D.   Lifestyle     Physical activity     Days per week: 0 days     Minutes per session: 0 min     Stress: Not at all   Relationships     Social connections     Talks on phone: More than three times a week     Gets together: Twice a week     Attends Jainism service: 1 to 4 times per year     Active member of club or organization: Yes     Attends meetings of clubs or organizations: More than 4 times per year     Relationship status:      Intimate partner violence     Fear of current or ex partner: Not on file     Emotionally abused: Not on file     Physically abused: Not on file     Forced sexual activity: Not on file   Other Topics Concern     Parent/sibling w/ CABG, MI or angioplasty before 65F 55M? Not Asked   Social History Narrative     Not on file         Patient's past medical, surgical, social, and family histories were reviewed today and no changes are noted.    REVIEW OF SYSTEMS:  10 point ROS is negative other than symptoms noted above in HPI, Past Medical History or  "as stated below  Constitutional: NEGATIVE for fever, chills, change in weight  Skin: NEGATIVE for worrisome rashes, moles or lesions  GI/: NEGATIVE for bowel or bladder changes  Neuro: NEGATIVE for weakness, dizziness or paresthesias    OBJECTIVE:  /62   Ht 1.651 m (5' 5\")   Wt 78 kg (172 lb)   BMI 28.62 kg/m     General: healthy, alert and in no distress  HEENT: no scleral icterus or conjunctival erythema  Skin: no suspicious lesions or rash. No jaundice.  CV: regular rhythm by palpation  Resp: normal respiratory effort without conversational dyspnea   Psych: normal mood and affect  Gait: normal steady gait with appropriate coordination and balance  Neuro: normal light touch sensory exam of the bilateral upper extremities.    MSK:  LEFT SHOULDER  Inspection:    no swelling, bruising, discoloration, or obvious deformity or asymmetry  Palpation:    Tender about the anterior capsule, proximal biceps tendon, supraspinatus insertion and infraspinatus insertion. Remainder of bony and tendinous landmarks are nontender.  Active Range of Motion:     Abduction 1050, FF 1350, , IR SI joint.      Scapular dyskinesis absaent  Strength:    Scapular plane abduction painful, grossly intact,  ER grossly intact, IR grossly intact, biceps grossly intact, triceps grossly intact  Special Tests:    Positive: Neer's, supraspinatus (empty can) and crossed arm adduction    Negative: Holden', drop arm/painful arc, [place]'s, Speed's and Yergason's    Independent visualization of the below image:  No results found for this or any previous visit (from the past 24 hour(s)).    X-rays of the left shoulder taken office today show no acute fracture, dislocation or osseous abnormalities.        Albert Yeo MD [facility]        Again, thank you for allowing me to participate in the care of your patient.        Sincerely,        Albert Yeo, MD    "

## 2021-02-03 NOTE — PATIENT INSTRUCTIONS
1. Pain in joint of left shoulder    2. Rotator cuff syndrome of left shoulder      -Patient has left shoulder pain due to rotator cuff tendinitis and possibly cartilage injuries  -Patient will start formal physical therapy and home exercise program  -Patient will stop Meloxicam.  Patient will take 3-4 tablets of advil every 6 hours as needed.  She may take Tylenol 500mg-1000mg every 8 hours as well  -Patient will start Flexeril at bedtime for night time pain  -Patient will follow up in 3-4 weeks if pain does not improve.  To consider an intra-articular cortisone injection if indicated.  -Call direct clinic number [[phone]] at any time with questions or concerns.    Albert Yeo MD CAQSM  [facility]  [facility] [place]

## 2021-02-03 NOTE — PROGRESS NOTES
ASSESSMENT & PLAN  Patient Instructions     1. Pain in joint of left shoulder    2. Rotator cuff syndrome of left shoulder      -Patient has left shoulder pain due to rotator cuff tendinitis and possibly cartilage injuries  -Patient will start formal physical therapy and home exercise program  -Patient will stop Meloxicam.  Patient will take 3-4 tablets of advil every 6 hours as needed.  She may take Tylenol 500mg-1000mg every 8 hours as well  -Patient will start Flexeril at bedtime for night time pain  -Patient will follow up in 3-4 weeks if pain does not improve.  To consider an intra-articular cortisone injection if indicated.  -Call direct clinic number [[phone]] at any time with questions or concerns.    Albert Yeo MD [facility]  [facility]          -----    SUBJECTIVE  Rayna Jackson is a/an 55 year old Right handed female who is seen in consultation at the request of  Mitra Wei M.D. for evaluation of left shoulder pain. The patient is seen by themselves.     Onset: a couple month(s) ago. Reports insidious onset without acute precipitating event.  Location of Pain: left generalized shoulder pain that travels down the arm; states some catching deep in the shoulder, like a meniscus tear  Rating of Pain at worst: 10/10  Rating of Pain Currently: 5/10  Worsened by: reaching behind back, sleeping, lifting out to the side, or up over head.   Better with: Advil, ice,   Treatments tried: ice, heat, Tylenol and ibuprofen, meloxicam  Associated symptoms: locking or catching  Orthopedic history: NO  Relevant surgical history: NO  Social history: social history: works at Manager, Clinical Therapist    Past Medical History:   Diagnosis Date     Torn meniscus 2013    left      Social History     Socioeconomic History     Marital status:      Spouse name: Juan F     Number of children: 4     Years of education: Not on file     Highest education  level: Master's degree (e.g., MA, MS, Adry, MEd, MSW, [employer])   Occupational History     Occupation: manager     Employer: [employer]   Social Needs     Financial resource strain: Not hard at all     Food insecurity     Worry: Never true     Inability: Never true     Transportation needs     Medical: No     Non-medical: No   Tobacco Use     Smoking status: Never Smoker     Smokeless tobacco: Never Used   Substance and Sexual Activity     Alcohol use: Yes     Alcohol/week: 0.0 standard drinks     Frequency: 2-4 times a month     Drinks per session: 1 or 2     Binge frequency: Never     Comment: occ     Drug use: No     Sexual activity: Yes     Birth control/protection: I.U.D.   Lifestyle     Physical activity     Days per week: 0 days     Minutes per session: 0 min     Stress: Not at all   Relationships     Social connections     Talks on phone: More than three times a week     Gets together: Twice a week     Attends Orthodoxy service: 1 to 4 times per year     Active member of club or organization: Yes     Attends meetings of clubs or organizations: More than 4 times per year     Relationship status:      Intimate partner violence     Fear of current or ex partner: Not on file     Emotionally abused: Not on file     Physically abused: Not on file     Forced sexual activity: Not on file   Other Topics Concern     Parent/sibling w/ CABG, MI or angioplasty before 65F 55M? Not Asked   Social History Narrative     Not on file         Patient's past medical, surgical, social, and family histories were reviewed today and no changes are noted.    REVIEW OF SYSTEMS:  10 point ROS is negative other than symptoms noted above in HPI, Past Medical History or as stated below  Constitutional: NEGATIVE for fever, chills, change in weight  Skin: NEGATIVE for worrisome rashes, moles or lesions  GI/: NEGATIVE for bowel or bladder changes  Neuro: NEGATIVE for weakness, dizziness or paresthesias    OBJECTIVE:  /62    "Ht 1.651 m (5' 5\")   Wt 78 kg (172 lb)   BMI 28.62 kg/m     General: healthy, alert and in no distress  HEENT: no scleral icterus or conjunctival erythema  Skin: no suspicious lesions or rash. No jaundice.  CV: regular rhythm by palpation  Resp: normal respiratory effort without conversational dyspnea   Psych: normal mood and affect  Gait: normal steady gait with appropriate coordination and balance  Neuro: normal light touch sensory exam of the bilateral upper extremities.    MSK:  LEFT SHOULDER  Inspection:    no swelling, bruising, discoloration, or obvious deformity or asymmetry  Palpation:    Tender about the anterior capsule, proximal biceps tendon, supraspinatus insertion and infraspinatus insertion. Remainder of bony and tendinous landmarks are nontender.  Active Range of Motion:     Abduction 1050, FF 1350, , IR SI joint.      Scapular dyskinesis absaent  Strength:    Scapular plane abduction painful, grossly intact,  ER grossly intact, IR grossly intact, biceps grossly intact, triceps grossly intact  Special Tests:    Positive: Neer's, supraspinatus (empty can) and crossed arm adduction    Negative: Holden', drop arm/painful arc, [place]'s, Speed's and Yergason's    Independent visualization of the below image:  No results found for this or any previous visit (from the past 24 hour(s)).    X-rays of the left shoulder taken office today show no acute fracture, dislocation or osseous abnormalities.        Albert Yeo MD [facility]    "

## 2021-02-04 ENCOUNTER — TELEPHONE (OUTPATIENT)
Dept: ENDOCRINOLOGY | Facility: CLINIC | Age: 56
End: 2021-02-04

## 2021-02-04 ENCOUNTER — VIRTUAL VISIT (OUTPATIENT)
Dept: ENDOCRINOLOGY | Facility: CLINIC | Age: 56
End: 2021-02-04
Payer: COMMERCIAL

## 2021-02-04 DIAGNOSIS — E66.811 CLASS 1 OBESITY DUE TO EXCESS CALORIES WITH SERIOUS COMORBIDITY AND BODY MASS INDEX (BMI) OF 34.0 TO 34.9 IN ADULT: ICD-10-CM

## 2021-02-04 DIAGNOSIS — E03.9 HYPOTHYROIDISM, UNSPECIFIED TYPE: ICD-10-CM

## 2021-02-04 DIAGNOSIS — E66.09 CLASS 1 OBESITY DUE TO EXCESS CALORIES WITH SERIOUS COMORBIDITY AND BODY MASS INDEX (BMI) OF 34.0 TO 34.9 IN ADULT: ICD-10-CM

## 2021-02-04 DIAGNOSIS — E66.09 OBESITY DUE TO EXCESS CALORIES WITHOUT SERIOUS COMORBIDITY, UNSPECIFIED CLASSIFICATION: Primary | ICD-10-CM

## 2021-02-04 PROCEDURE — 99214 OFFICE O/P EST MOD 30 MIN: CPT | Mod: 95 | Performed by: INTERNAL MEDICINE

## 2021-02-04 RX ORDER — TOPIRAMATE 50 MG/1
50 TABLET, FILM COATED ORAL 2 TIMES DAILY
Qty: 180 TABLET | Refills: 0 | Status: SHIPPED | OUTPATIENT
Start: 2021-02-04 | End: 2022-04-14

## 2021-02-04 RX ORDER — PHENTERMINE HYDROCHLORIDE 37.5 MG/1
TABLET ORAL
Qty: 32 TABLET | Refills: 3 | Status: CANCELLED | OUTPATIENT
Start: 2021-02-04

## 2021-02-04 RX ORDER — TOPIRAMATE 50 MG/1
50 TABLET, FILM COATED ORAL 2 TIMES DAILY
Qty: 180 TABLET | Refills: 0 | Status: SHIPPED | OUTPATIENT
Start: 2021-02-04 | End: 2021-02-04

## 2021-02-04 RX ORDER — PHENTERMINE HYDROCHLORIDE 15 MG/1
15 CAPSULE ORAL EVERY MORNING
Qty: 31 CAPSULE | Refills: 2 | Status: SHIPPED | OUTPATIENT
Start: 2021-02-04 | End: 2021-02-19

## 2021-02-04 NOTE — PROGRESS NOTES
"THIS IS A VIDEO VISIT:    Phone call visit/virtual visit encounter:  New pt to me.  DINORA form Kyra Helms.    Name of patient: Rayna Jackson    Date of encounter: 2/4/2021    Time of start of video visit: 10:03    Video started: 10:12    Video ended: 10:26    Time visit video ended: 10:32    Provider location: working from home/ [facility]    Patient location: patients home.    Mode of transmission: video/ Doximity    Verbal consent: obtained before starting visit. Pt is agreeable.      The patient has been notified of following:      \"This VIDEO visit will be conducted via a call between you and your physician/provider. We have found that certain health care needs can be provided without the need for a physical exam.  This service lets us provide the care you need with a short phone conversation.  If a prescription is necessary we can send it directly to your pharmacy.  If lab work is needed we can place an order for that and you can then stop by our lab to have the test done at a later time.     With new updates with corona virus patient might be billed as clinic visit.     If during the course of the call the physician/provider feels a telephone visit is not appropriate, you will not be charged for this service.\"      Past medical history, social history, family history, allergy and medications were reviewed and updated as appropriate.  Reviewed pertinent labs, notes, imaging studies personally.    Name: Rayna Jackson  F/u for obesity.  HPI:  Rayna Jackson is a 56 year old female who presents for the management of obestiy.   has a past medical history of Torn meniscus (2013).      1. Obesity:  Has noted abnormal weight gain over the past 3 years - states she has gained 70 lbs.   Making healthy food choices - doesn't feel she overeats.  Unable to lose weight and has continued to gain.  Growing up was active.  Weight gain started after college.  FH of obesity. (Dad and Dads side is heavy, brother is " heavy)    Previously tried a program called [employer], also weight watchers, [place] [facility] was most effective - lost 20 lbs.    Currently treated with Phentermine 37.5 mg daily ( on it X 5/2019) and Topiramate 50 mg bid ( started it in 4/2020).    Currently treated with Phentermine 37.5 mg daily and Topiramate 100 mg bid. ( On 100 mg BID X 3 months back)  Is tolerating phentermine and Topiramate well.    Has lost 39 lbs since starting phentermine, Topiramate, + diet and exercise efforts, 207-->168 lbs.  Current weight at home scale: 169 lbs   It appears that weight is stable and no significant weight loss in last 3 months ( she gianed some during holidays and was able to loose in Jan 2021)  Doing keto diet. On it X 1 year.  Diabetes:No but states her blood sugars are 'always' high-normal.  No GD with pregnancy but was monitored more closely because glucose levels were elevated but not in the diabetes range.  + FH of DM2 - father and MGGM.  Sleep Apnea/Snores:snores - was seen by specialist who did not feel sleep apnea eval was indicated, didn't believe she had sleep apnea  Hypertension or CAD:Yes: Controlled on Losartan  Hyperlipidemia:Yes: untreated.  Diet: Doing Keto diet.  Healthy food choices, low carb, portion controlled  Exercise: Difficult due to joint pain, + OA  #2. Hypothyroidism: Currently treated with levothyroxine 50 mcg/day.  FH + for thyroid disease - Mother, MGM, maternal aunt, sister.    Previous lymph node biopsy + for Lupus - was seen at [facility], no diagnosis of Lupus has been confirmed.  PMH/PSH:  Past Medical History:   Diagnosis Date     Torn meniscus 2013    left      Past Surgical History:   Procedure Laterality Date     appendicitis  1980     COLONOSCOPY N/A 6/30/2017    Procedure: COLONOSCOPY;  COLONOSCOPY   ;  Surgeon: Brooks Villa MD;  Location:  GI     CYSTECTOMY OVARIAN BENIGN  1980     KNEE SURGERY Left      Family Hx:  Family History   Problem Relation Age of Onset     Hypertension  Mother      Thyroid Disease Mother      Hypertension Father      Diabetes Other         great grandmother     Thyroid Disease Sister      Thyroid Disease Maternal Aunt      Thyroid disease: Yes, mother, sister, aunt, MGM        DM2: Yes: father, MGGM         Autoimmune: DM1, SLE, RA, Vitiligo     Social Hx:  Social History     Socioeconomic History     Marital status:      Spouse name: Juan F     Number of children: 4     Years of education: Not on file     Highest education level: Master's degree (e.g., MA, MS, Adry, MEd, MSW, [employer])   Occupational History     Occupation: manager     Employer: [employer]   Social Needs     Financial resource strain: Not hard at all     Food insecurity     Worry: Never true     Inability: Never true     Transportation needs     Medical: No     Non-medical: No   Tobacco Use     Smoking status: Never Smoker     Smokeless tobacco: Never Used   Substance and Sexual Activity     Alcohol use: Yes     Alcohol/week: 0.0 standard drinks     Frequency: 2-4 times a month     Drinks per session: 1 or 2     Binge frequency: Never     Comment: occ     Drug use: No     Sexual activity: Yes     Birth control/protection: I.U.D.   Lifestyle     Physical activity     Days per week: 0 days     Minutes per session: 0 min     Stress: Not at all   Relationships     Social connections     Talks on phone: More than three times a week     Gets together: Twice a week     Attends Rastafari service: 1 to 4 times per year     Active member of club or organization: Yes     Attends meetings of clubs or organizations: More than 4 times per year     Relationship status:      Intimate partner violence     Fear of current or ex partner: Not on file     Emotionally abused: Not on file     Physically abused: Not on file     Forced sexual activity: Not on file   Other Topics Concern     Parent/sibling w/ CABG, MI or angioplasty before 65F 55M? Not Asked   Social History Narrative     Not on file           MEDICATIONS:  has a current medication list which includes the following prescription(s): albuterol, cyclobenzaprine, fexofenadine, flovent hfa, fluticasone, levonorgestrel, levothyroxine, losartan, meloxicam, montelukast, phentermine, topiramate, and triamcinolone, and the following Facility-Administered Medications: methylprednisolone and ropivacaine.    ROS   ROS: 10 point ROS neg other than the symptoms noted above in the HPI.    PE  VS: There were no vitals taken for this visit.  GENERAL: healthy, alert and no distress  EYES: Eyes grossly normal to inspection, conjunctivae and sclerae normal  ENT: no nose swelling, nasal discharge.  Thyroid: no apparent thyroid nodules  RESP: no audible wheeze, cough, or visible cyanosis.  No visible retractions or increased work of breathing.  Able to speak fully in complete sentences.  ABDO: not evaluated.  EXTREMITIES: no hand tremors.  NEURO: Cranial nerves grossly intact, mentation intact and speech normal  SKIN: No apparent skin lesions, rash or edema seen   PSYCH: mentation appears normal, affect normal/bright, judgement and insight intact, normal speech and appearance well-groomed    LABS:  !COMPREHENSIVE Latest Ref Rng & Units 5/17/2019   SODIUM 133 - 144 mmol/L 142   POTASSIUM 3.4 - 5.3 mmol/L 3.9   CHLORIDE 94 - 109 mmol/L 109   BUN 7 - 30 mg/dL 11   Creatinine 0.52 - 1.04 mg/dL 0.66   Glucose 70 - 99 mg/dL 87   ANION GAP 3 - 14 mmol/L 7   CALCIUM 8.5 - 10.1 mg/dL 9.3   ALBUMIN 3.4 - 5.0 g/dL 4.0     !COMPREHENSIVE Latest Ref Rng & Units 5/17/2019   AST 0 - 45 U/L 15   ALT 0 - 50 U/L 15     Component      Latest Ref Rng & Units 8/20/2018 3/11/2019   Hemoglobin A1C      0 - 5.6 % 4.9 5.4     !LIPID/HEPATIC Latest Ref Rng & Units 1/2/2018   CHOLESTEROL <200 mg/dL 212 (H)   TRIGLYCERIDES <150 mg/dL 154 (H)   HDL CHOLESTEROL >49 mg/dL 58   LDL CHOLESTEROL, CALCULATED <100 mg/dL 123 (H)   NON HDL CHOLESTEROL <130 mg/dL 154 (H)     Component      Latest Ref Rng & Units  "3/11/2019   Vitamin D Deficiency screening      20 - 75 ug/L 23     ENDO THYROID LABS-P Latest Ref Rng & Units 7/2/2020   TSH 0.40 - 4.00 mU/L 1.37     All pertinent notes, labs, and images personally reviewed by me.     A/P  Ms.Jodi ROBERT Jackson is a 56 year old evaluated via video visit for the management of obesity and hypothyroidism:    1. Obesity/abnormal weight gain:  No recent weight or BMI to review.  Obesity related comorbidity include HTN, OA, hyperlipidemia.  Currently treated with Phentermine 37.5 mg daily and Topiramate 100 mg bid.  Had lost 39 lbs, 207 lbs --> 168 lbs (7/2020) and 7\" from her waist with phentermine earlier.  No significant weight loss in last 3 months. (She gained some weight during holiday season and was able to lose 8 in January 2021.  In general weight appears stable.  She is on phentermine since 2019 and topiramate since 4/2020.  Plan:  Discussed diagnosis, pathophysiology, management and treatment options of condition with pt.  Discussed weight loss medication in general and he had indicated for short-term.  As there is no significant weight loss and the fact that she is on this medication for more than 1 year plan to slowly reduce the dose.  Decrease Phentermine to 15 mg/day.  Decrease Topamax to 50 mg twice a day.  Of note patient also has history of migraine which appears fairly well controlled.  Follow up in 3 months.  The patient is advised to Make better food choices: reduce carbs, Reduce portion size, weight loss and exercise 3-4 times a week.      2.  Hypothyroidism:  Currently treated with levothyroxine 50 mcg/day.  Clinically and biochemically euthyroid.  Plan:  Discussed diagnosis, pathophysiology, management and treatment options of condition with pt.  Continue levothyroxine 50 mcg/day.  Monitor TFT's annually (due in 7/2021-needs to be ordered)    Discussed s/s of hypothyroidism and hyperthyroidism to watch for.  The patient indicates understanding of these issues and " agrees with the plan.      Obesity is associated with a significant increase in mortality and risk of many disorders, including diabetes mellitus, hypertension, dyslipidemia, heart disease, stroke, sleep apnea, cancer, and many others. Conversely, weight loss is associated with a reduction in obesity-associated morbidity.    Endocrine evaluation of obesity includes Diabetes/prediabetes, Cushing's and thyroid dysfunction.  The relevant work up for the diagnosis and management of obesity and various treatment options were discussed. Body Mass Index (BMI) has been a standard means for calculating risk for overweight and obesity. The new American Association of Clinical Endocrinology (AACE) algorithm recommends lifestyle modifications as the initial phase of treatment for all patients with the BMI equal or greater than 25 kg/m2. Lifestyle modifications includes use of medical nutrition therapy, exercise, tobacco cessation, adequate quality and quantity of sleep, limited consumption of alcohol and reduced stress through implementation of a structured, multidisciplinary program.    In patients with complications associated with obesity, graded interventions are recommended including pharmacological therapy such as phentermine, orlistat, lorcaserin and phentermine/topiramate ER, contrave ( buproprion/naltreone) and the use of very low calorie meal replacement programs.    If medical intervention is insufficient, surgical therapy may be considered, especially in patients with BMI greater than or equal to 35 kg/m2 with multiple complications. Surgical treatments include lap-band, gastric sleeve or gastric bypass surgery.    I informed the pt that:  1.Weight loss medications can be taken to assist with weight reduction when combined with appropriate healthy lifestyle changes.  2.I discussed possible s/e, risks and benefits of weight loss medications.  3.All medications are FDA approved, however, some may be used ''off label''  for their weight loss benefits and some ''short term'' medications can be used for longer periods to achieve desired clinical outcomes.  4.All patients taking weight loss medications must be seen in clinic for refill authorization.    Counseling exercise ( V65.41)  Dietary counseling( V65.3)  Calculated BMI above the upper parameter and f/u plan was documented in the medical record.  Discussed indications, risks and benefits of all medications prescribed, and answered questions to patient's satisfaction.      Follow-up:  3 months    Alice Jules MD  Endocrinology   [facility]  February 4, 2021  CC: Mitra Keita

## 2021-02-04 NOTE — TELEPHONE ENCOUNTER
[facility] calling. Needing clarification of dosing for Topiramate     Sig - Route: Take 1 tablet (50 mg) by mouth 2 times daily 1.5-2 tabs bid -   Please call [phone]

## 2021-02-04 NOTE — PATIENT INSTRUCTIONS
[facility]   Dr Jules, Endocrinology Department      [facility]   [address]  [place] [address]  Appointment [phone]  Fax: [phone]  [place]: Monday and Tuesday         [facility]   [address]  [place] [address]  Appointment [phone]  Fax: [phone]  [place]: Wednesday and Thursday            Please check the cost coverage and copay with insurance before recommended tests, services and medications (especially if new medications are prescribed).     If ordered, please get blood work done 1 week prior to your next appointment so they will be available to Dr. Jules at your visit.    Decrease Phentermine to 15 mg/day.  Decrease Topamax to 50 mg twice a day.  Follow up in 3 months.

## 2021-02-04 NOTE — LETTER
"    2/4/2021         RE: Rayna Jackson  [id number] [facility] [id number]        Dear Colleague,    Thank you for referring your patient, Rayna Jackson, to the [facility]. Please see a copy of my visit note below.    THIS IS A VIDEO VISIT:    Phone call visit/virtual visit encounter:  New pt to me.  DINORA form Kyra Helms.    Name of patient: Rayna Jackson    Date of encounter: 2/4/2021    Time of start of video visit: 10:03    Video started: 10:12    Video ended: 10:26    Time visit video ended: 10:32    Provider location: working from home/ [facility]    Patient location: patients home.    Mode of transmission: video/ Doximity    Verbal consent: obtained before starting visit. Pt is agreeable.      The patient has been notified of following:      \"This VIDEO visit will be conducted via a call between you and your physician/provider. We have found that certain health care needs can be provided without the need for a physical exam.  This service lets us provide the care you need with a short phone conversation.  If a prescription is necessary we can send it directly to your pharmacy.  If lab work is needed we can place an order for that and you can then stop by our lab to have the test done at a later time.     With new updates with corona virus patient might be billed as clinic visit.     If during the course of the call the physician/provider feels a telephone visit is not appropriate, you will not be charged for this service.\"      Past medical history, social history, family history, allergy and medications were reviewed and updated as appropriate.  Reviewed pertinent labs, notes, imaging studies personally.    Name: Rayna Jackson  F/u for obesity.  HPI:  Rayna Jackson is a 56 year old female who presents for the management of obestiy.   has a past medical history of Torn meniscus (2013).      1. Obesity:  Has noted abnormal weight gain over the past 3 years - states she has " gained 70 lbs.   Making healthy food choices - doesn't feel she overeats.  Unable to lose weight and has continued to gain.  Growing up was active.  Weight gain started after college.  FH of obesity. (Dad and Dads side is heavy, brother is heavy)    Previously tried a program called [employer], also weight watchers, [place] [facility] was most effective - lost 20 lbs.    Currently treated with Phentermine 37.5 mg daily ( on it X 5/2019) and Topiramate 50 mg bid ( started it in 4/2020).    Currently treated with Phentermine 37.5 mg daily and Topiramate 100 mg bid. ( On 100 mg BID X 3 months back)  Is tolerating phentermine and Topiramate well.    Has lost 39 lbs since starting phentermine, Topiramate, + diet and exercise efforts, 207-->168 lbs.  Current weight at home scale: 169 lbs   It appears that weight is stable and no significant weight loss in last 3 months ( she gianed some during holidays and was able to loose in Jan 2021)  Doing keto diet. On it X 1 year.  Diabetes:No but states her blood sugars are 'always' high-normal.  No GD with pregnancy but was monitored more closely because glucose levels were elevated but not in the diabetes range.  + FH of DM2 - father and MGGM.  Sleep Apnea/Snores:snores - was seen by specialist who did not feel sleep apnea eval was indicated, didn't believe she had sleep apnea  Hypertension or CAD:Yes: Controlled on Losartan  Hyperlipidemia:Yes: untreated.  Diet: Doing Keto diet.  Healthy food choices, low carb, portion controlled  Exercise: Difficult due to joint pain, + OA  #2. Hypothyroidism: Currently treated with levothyroxine 50 mcg/day.  FH + for thyroid disease - Mother, MGM, maternal aunt, sister.    Previous lymph node biopsy + for Lupus - was seen at [place], no diagnosis of Lupus has been confirmed.  PMH/PSH:  Past Medical History:   Diagnosis Date     Torn meniscus 2013    left      Past Surgical History:   Procedure Laterality Date     appendicitis  1980     COLONOSCOPY N/A  6/30/2017    Procedure: COLONOSCOPY;  COLONOSCOPY   ;  Surgeon: Brooks Villa MD;  Location: [facility]     CYSTECTOMY OVARIAN BENIGN  1980     KNEE SURGERY Left      Family Hx:  Family History   Problem Relation Age of Onset     Hypertension Mother      Thyroid Disease Mother      Hypertension Father      Diabetes Other         great grandmother     Thyroid Disease Sister      Thyroid Disease Maternal Aunt      Thyroid disease: Yes, mother, sister, aunt, MGM        DM2: Yes: father, MGGM         Autoimmune: DM1, SLE, RA, Vitiligo     Social Hx:  Social History     Socioeconomic History     Marital status:      Spouse name: Juan F     Number of children: 4     Years of education: Not on file     Highest education level: Master's degree (e.g., MA, MS, [place], MEd, MSW, [employer])   Occupational History     Occupation: manager     Employer: [employer]   Social Needs     Financial resource strain: Not hard at all     Food insecurity     Worry: Never true     Inability: Never true     Transportation needs     Medical: No     Non-medical: No   Tobacco Use     Smoking status: Never Smoker     Smokeless tobacco: Never Used   Substance and Sexual Activity     Alcohol use: Yes     Alcohol/week: 0.0 standard drinks     Frequency: 2-4 times a month     Drinks per session: 1 or 2     Binge frequency: Never     Comment: occ     Drug use: No     Sexual activity: Yes     Birth control/protection: I.U.D.   Lifestyle     Physical activity     Days per week: 0 days     Minutes per session: 0 min     Stress: Not at all   Relationships     Social connections     Talks on phone: More than three times a week     Gets together: Twice a week     Attends Gnosticism service: 1 to 4 times per year     Active member of club or organization: Yes     Attends meetings of clubs or organizations: More than 4 times per year     Relationship status:      Intimate partner violence     Fear of current or ex partner: Not on file      Emotionally abused: Not on file     Physically abused: Not on file     Forced sexual activity: Not on file   Other Topics Concern     Parent/sibling w/ CABG, MI or angioplasty before 65F 55M? Not Asked   Social History Narrative     Not on file          MEDICATIONS:  has a current medication list which includes the following prescription(s): albuterol, cyclobenzaprine, fexofenadine, flovent hfa, fluticasone, levonorgestrel, levothyroxine, losartan, meloxicam, montelukast, phentermine, topiramate, and triamcinolone, and the following Facility-Administered Medications: methylprednisolone and ropivacaine.    ROS   ROS: 10 point ROS neg other than the symptoms noted above in the HPI.    PE  VS: There were no vitals taken for this visit.  GENERAL: healthy, alert and no distress  EYES: Eyes grossly normal to inspection, conjunctivae and sclerae normal  ENT: no nose swelling, nasal discharge.  Thyroid: no apparent thyroid nodules  RESP: no audible wheeze, cough, or visible cyanosis.  No visible retractions or increased work of breathing.  Able to speak fully in complete sentences.  ABDO: not evaluated.  EXTREMITIES: no hand tremors.  NEURO: Cranial nerves grossly intact, mentation intact and speech normal  SKIN: No apparent skin lesions, rash or edema seen   PSYCH: mentation appears normal, affect normal/bright, judgement and insight intact, normal speech and appearance well-groomed    LABS:  !COMPREHENSIVE Latest Ref Rng & Units 5/17/2019   SODIUM 133 - 144 mmol/L 142   POTASSIUM 3.4 - 5.3 mmol/L 3.9   CHLORIDE 94 - 109 mmol/L 109   BUN 7 - 30 mg/dL 11   Creatinine 0.52 - 1.04 mg/dL 0.66   Glucose 70 - 99 mg/dL 87   ANION GAP 3 - 14 mmol/L 7   CALCIUM 8.5 - 10.1 mg/dL 9.3   ALBUMIN 3.4 - 5.0 g/dL 4.0     !COMPREHENSIVE Latest Ref Rng & Units 5/17/2019   AST 0 - 45 U/L 15   ALT 0 - 50 U/L 15     Component      Latest Ref Rng & Units 8/20/2018 3/11/2019   Hemoglobin A1C      0 - 5.6 % 4.9 5.4     !LIPID/HEPATIC Latest Ref  "Rng & Units 1/2/2018   CHOLESTEROL <200 mg/dL 212 (H)   TRIGLYCERIDES <150 mg/dL 154 (H)   HDL CHOLESTEROL >49 mg/dL 58   LDL CHOLESTEROL, CALCULATED <100 mg/dL 123 (H)   NON HDL CHOLESTEROL <130 mg/dL 154 (H)     Component      Latest Ref Rng & Units 3/11/2019   Vitamin D Deficiency screening      20 - 75 ug/L 23     ENDO THYROID LABS-[facility] Latest Ref Rng & Units 7/2/2020   TSH 0.40 - 4.00 mU/L 1.37     All pertinent notes, labs, and images personally reviewed by me.     A/P  Ms.Jodi ROBERT Jackson is a 56 year old evaluated via video visit for the management of obesity and hypothyroidism:    1. Obesity/abnormal weight gain:  No recent weight or BMI to review.  Obesity related comorbidity include HTN, OA, hyperlipidemia.  Currently treated with Phentermine 37.5 mg daily and Topiramate 100 mg bid.  Had lost 39 lbs, 207 lbs --> 168 lbs (7/2020) and 7\" from her waist with phentermine earlier.  No significant weight loss in last 3 months. (She gained some weight during holiday season and was able to lose 8 in January 2021.  In general weight appears stable.  She is on phentermine since 2019 and topiramate since 4/2020.  Plan:  Discussed diagnosis, pathophysiology, management and treatment options of condition with pt.  Discussed weight loss medication in general and he had indicated for short-term.  As there is no significant weight loss and the fact that she is on this medication for more than 1 year plan to slowly reduce the dose.  Decrease Phentermine to 15 mg/day.  Decrease Topamax to 50 mg twice a day.  Of note patient also has history of migraine which appears fairly well controlled.  Follow up in 3 months.  The patient is advised to Make better food choices: reduce carbs, Reduce portion size, weight loss and exercise 3-4 times a week.      2.  Hypothyroidism:  Currently treated with levothyroxine 50 mcg/day.  Clinically and biochemically euthyroid.  Plan:  Discussed diagnosis, pathophysiology, management and treatment " options of condition with pt.  Continue levothyroxine 50 mcg/day.  Monitor TFT's annually (due in 7/2021-needs to be ordered)    Discussed s/s of hypothyroidism and hyperthyroidism to watch for.  The patient indicates understanding of these issues and agrees with the plan.      Obesity is associated with a significant increase in mortality and risk of many disorders, including diabetes mellitus, hypertension, dyslipidemia, heart disease, stroke, sleep apnea, cancer, and many others. Conversely, weight loss is associated with a reduction in obesity-associated morbidity.    Endocrine evaluation of obesity includes Diabetes/prediabetes, Cushing's and thyroid dysfunction.  The relevant work up for the diagnosis and management of obesity and various treatment options were discussed. Body Mass Index (BMI) has been a standard means for calculating risk for overweight and obesity. The new American Association of Clinical Endocrinology (AACE) algorithm recommends lifestyle modifications as the initial phase of treatment for all patients with the BMI equal or greater than 25 kg/m2. Lifestyle modifications includes use of medical nutrition therapy, exercise, tobacco cessation, adequate quality and quantity of sleep, limited consumption of alcohol and reduced stress through implementation of a structured, multidisciplinary program.    In patients with complications associated with obesity, graded interventions are recommended including pharmacological therapy such as phentermine, orlistat, lorcaserin and phentermine/topiramate ER, contrave ( buproprion/naltreone) and the use of very low calorie meal replacement programs.    If medical intervention is insufficient, surgical therapy may be considered, especially in patients with BMI greater than or equal to 35 kg/m2 with multiple complications. Surgical treatments include lap-band, gastric sleeve or gastric bypass surgery.    I informed the pt that:  1.Weight loss medications can  be taken to assist with weight reduction when combined with appropriate healthy lifestyle changes.  2.I discussed possible s/e, risks and benefits of weight loss medications.  3.All medications are FDA approved, however, some may be used ''off label'' for their weight loss benefits and some ''short term'' medications can be used for longer periods to achieve desired clinical outcomes.  4.All patients taking weight loss medications must be seen in clinic for refill authorization.    Counseling exercise ( V65.41)  Dietary counseling( V65.3)  Calculated BMI above the upper parameter and f/u plan was documented in the medical record.  Discussed indications, risks and benefits of all medications prescribed, and answered questions to patient's satisfaction.      Follow-up:  3 months    Alice Jules MD  Endocrinology   [facility]  February 4, 2021  CC: Mitra Keita       Again, thank you for allowing me to participate in the care of your patient.        Sincerely,        Alice Jules MD

## 2021-02-15 ASSESSMENT — ENCOUNTER SYMPTOMS
HEADACHES: 0
HEARTBURN: 0
FREQUENCY: 0
CHILLS: 0
MYALGIAS: 1
FEVER: 0
PARESTHESIAS: 0
SORE THROAT: 0
DIZZINESS: 0
DIARRHEA: 0
CONSTIPATION: 0
ARTHRALGIAS: 1
NERVOUS/ANXIOUS: 0
DYSURIA: 0
HEMATURIA: 0
JOINT SWELLING: 0
COUGH: 0
WEAKNESS: 0
BREAST MASS: 0
NAUSEA: 0
EYE PAIN: 0
ABDOMINAL PAIN: 0
HEMATOCHEZIA: 0
PALPITATIONS: 0
SHORTNESS OF BREATH: 0

## 2021-02-17 ENCOUNTER — THERAPY VISIT (OUTPATIENT)
Dept: PHYSICAL THERAPY | Facility: CLINIC | Age: 56
End: 2021-02-17
Attending: FAMILY MEDICINE
Payer: COMMERCIAL

## 2021-02-17 DIAGNOSIS — M25.512 CHRONIC LEFT SHOULDER PAIN: ICD-10-CM

## 2021-02-17 DIAGNOSIS — M75.102 ROTATOR CUFF SYNDROME OF LEFT SHOULDER: ICD-10-CM

## 2021-02-17 DIAGNOSIS — G89.29 CHRONIC LEFT SHOULDER PAIN: ICD-10-CM

## 2021-02-17 PROCEDURE — 97110 THERAPEUTIC EXERCISES: CPT | Mod: GP | Performed by: PHYSICAL THERAPIST

## 2021-02-17 PROCEDURE — 97161 PT EVAL LOW COMPLEX 20 MIN: CPT | Mod: GP | Performed by: PHYSICAL THERAPIST

## 2021-02-17 NOTE — PROGRESS NOTES
[facility] Physical Therapy Initial Evaluation  2/17/2021     Precautions/Restrictions/MD instructions: Evaluate and Treat for L shoulder pain    Therapist Assessment: Rayna Jackson is a 56 year old female patient presenting to Physical Therapy with L shoulder pain. Patient demonstrates painful flexion and abduction arcs of motion, and positive signs of impingement. Signs and symptoms are consistent with likely rotator cuff irritation due to impingement. These impairments limit their ability to reach behind back and overhead, as well as lifting her grandchildren. Skilled PT services are necessary in order to reduce impairments and improve independent function.    Subjective History    Injury/Condition Details:  Presenting Complaint L shoulder/upper arm pain   Onset Timing/Date MD referral from 2/3/2021   Mechanism No specific mechanism noted.     Symptom Behavior Details    Primary Symptoms Constant symptoms; worsen with activity, pain (Location: front of arm, occasionally down back/side of arm, Quality: Aching/Throbbing), catching/locking   Worst Pain 10/10    Symptom Provocators Reaching behind back, lifting overhead, sleeping, laying on L shoulder    Best Pain 3-4/10    Symptom Relievers Advil, icing   Time of day dependent? Worse at night   Recent symptom change? symptoms worsening     Prior Testing/Intervention for current condition:  Prior Tests  x-ray on 2/3/21  IMPRESSION: Minimal AC arthrosis. Otherwise unremarkable. No fracture identified.     MADINA WILSON MD   Prior Treatment none     Lifestyle & General Medical History:   / Rehab   Usual physical activities  (within past year) Computer work, helping care for grandchildren   Orthopaedic History  None   Medication  Anti-inflammatory, HTN, thyroid medication    Notable medical history See Epic Chart; pt reports she was diagnosed with a form of Lupus years ago   Patient goals Decrease pain, improve sleep   Patient  Reported Health good     Red Flags: (Bold when present) - reviewed the following and denies  Malaise, unexplained weight loss, night pain, fever    OBJECTIVE    Observation/Posture: rounded shoulders and mild forward head posture    Palpation: Mildly TTP of general anterior L shoulder    Cervical Screen: No notable deficits    Shoulder: * if reproductive of patient's primary complaint   PROM L PROM R AROM L AROM R MMT L MMT R   Flex   145 170 4* 4+   Abd   80* 160 4* 4+   IR   20* 60 4+ 4+   ER   40* 110 4* 4+   Ext/IR   Belt line T7     Lower trap         Middle trap           Scapulothoraic Rhythm: mild protrusion of medial and inferior borders of B scapulae at rest    Special tests:   L R   Impingement     Neer's     Hawkin's-Edmundo     Cross-over test     Painful Arc of Motion + -   Internal impingement test + -   Pain with resisted ER + -   Pull Test     Labral     Granby's     Crank     Instability     Apprehension (anterior)     Relocation (anterior)     Anterior Load & Shift     Posterior Load & Shift     Multi-Directional Instability      Sulcus     Biceps      Speed's + -   Rotator Cuff Tear     Drop Arm - -   Belly Press     Lift off        ASSESSMENT/PLAN  Patient is a 56 year old female with left side shoulder complaints.    Patient has the following significant findings with corresponding treatment plan.                Diagnosis 1:  L shoulder pain; signs and symptoms consistent with likely rotator cuff tendinitis due to impingement syndrome    Pain -  hot/cold therapy, US, electric stimulation, manual therapy, splint/taping/bracing/orthotics, self management, education and home program  Decreased ROM/flexibility - manual therapy, therapeutic exercise and home program  Decreased strength - therapeutic exercise, therapeutic activities and home program  Impaired muscle performance - neuro re-education and home program  Decreased function - therapeutic activities and home program  Impaired posture -  neuro re-education and home program    Therapy Evaluation Codes:   1) History comprised of:   Personal factors that impact the plan of care:      None.    Comorbidity factors that impact the plan of care are:      Asthma, High blood pressure and Osteoarthritis.     Medications impacting care: Anti-inflammatory, High blood pressure and thyroid medication.  2) Examination of Body Systems comprised of:   Body structures and functions that impact the plan of care:      Shoulder.   Activity limitations that impact the plan of care are:      Bathing, Cooking, Driving, Dressing, Lifting, Working, Sleeping and Laying down.  3) Clinical presentation characteristics are:   Stable/Uncomplicated.  4) Decision-Making    Low complexity using standardized patient assessment instrument and/or measureable assessment of functional outcome.  Cumulative Therapy Evaluation is: Low complexity.    Previous and current functional limitations:  (See Goal Flow Sheet for this information)    Short term and Long term goals: (See Goal Flow Sheet for this information)     Communication ability:  Patient appears to be able to clearly communicate and understand verbal and written communication and follow directions correctly.  Treatment Explanation - The following has been discussed with the patient:   RX ordered/plan of care  Anticipated outcomes  Possible risks and side effects  This patient would benefit from PT intervention to resume normal activities.   Rehab potential is good.    Frequency:  1 X week, once daily  Duration:  for 2 weeks, tapering to 2x/month for 2 months  Discharge Plan:  Achieve all LTG.  Independent in home treatment program.  Reach maximal therapeutic benefit.    Please refer to the daily flowsheet for treatment today, total treatment time and time spent performing 1:1 timed codes.

## 2021-02-19 ENCOUNTER — OFFICE VISIT (OUTPATIENT)
Dept: FAMILY MEDICINE | Facility: CLINIC | Age: 56
End: 2021-02-19
Payer: COMMERCIAL

## 2021-02-19 VITALS
WEIGHT: 174.5 LBS | BODY MASS INDEX: 29.04 KG/M2 | DIASTOLIC BLOOD PRESSURE: 86 MMHG | TEMPERATURE: 97.8 F | HEART RATE: 87 BPM | OXYGEN SATURATION: 99 % | SYSTOLIC BLOOD PRESSURE: 125 MMHG | RESPIRATION RATE: 14 BRPM

## 2021-02-19 DIAGNOSIS — Z12.31 ENCOUNTER FOR SCREENING MAMMOGRAM FOR BREAST CANCER: ICD-10-CM

## 2021-02-19 DIAGNOSIS — I10 BENIGN ESSENTIAL HYPERTENSION: ICD-10-CM

## 2021-02-19 DIAGNOSIS — Z00.00 ROUTINE GENERAL MEDICAL EXAMINATION AT A HEALTH CARE FACILITY: Primary | ICD-10-CM

## 2021-02-19 DIAGNOSIS — Z11.59 NEED FOR HEPATITIS C SCREENING TEST: ICD-10-CM

## 2021-02-19 DIAGNOSIS — J30.9 CHRONIC ALLERGIC RHINITIS: ICD-10-CM

## 2021-02-19 DIAGNOSIS — J45.20 MILD INTERMITTENT ASTHMA WITHOUT COMPLICATION: ICD-10-CM

## 2021-02-19 LAB
ALBUMIN SERPL-MCNC: 4.3 G/DL (ref 3.4–5)
ALP SERPL-CCNC: 84 U/L (ref 40–150)
ALT SERPL W P-5'-P-CCNC: 15 U/L (ref 0–50)
ANION GAP SERPL CALCULATED.3IONS-SCNC: 7 MMOL/L (ref 3–14)
AST SERPL W P-5'-P-CCNC: 13 U/L (ref 0–45)
BASOPHILS # BLD AUTO: 0 10E9/L (ref 0–0.2)
BASOPHILS NFR BLD AUTO: 0.5 %
BILIRUB SERPL-MCNC: 0.9 MG/DL (ref 0.2–1.3)
BUN SERPL-MCNC: 20 MG/DL (ref 7–30)
CALCIUM SERPL-MCNC: 9.6 MG/DL (ref 8.5–10.1)
CHLORIDE SERPL-SCNC: 110 MMOL/L (ref 94–109)
CHOLEST SERPL-MCNC: 240 MG/DL
CO2 SERPL-SCNC: 24 MMOL/L (ref 20–32)
CREAT SERPL-MCNC: 0.75 MG/DL (ref 0.52–1.04)
DIFFERENTIAL METHOD BLD: ABNORMAL
EOSINOPHIL # BLD AUTO: 0.1 10E9/L (ref 0–0.7)
EOSINOPHIL NFR BLD AUTO: 2.4 %
ERYTHROCYTE [DISTWIDTH] IN BLOOD BY AUTOMATED COUNT: 12.9 % (ref 10–15)
GFR SERPL CREATININE-BSD FRML MDRD: 90 ML/MIN/{1.73_M2}
GLUCOSE SERPL-MCNC: 84 MG/DL (ref 70–99)
HCT VFR BLD AUTO: 46.3 % (ref 35–47)
HCV AB SERPL QL IA: NONREACTIVE
HDLC SERPL-MCNC: 66 MG/DL
HGB BLD-MCNC: 15.3 G/DL (ref 11.7–15.7)
LDLC SERPL CALC-MCNC: 152 MG/DL
LYMPHOCYTES # BLD AUTO: 0.7 10E9/L (ref 0.8–5.3)
LYMPHOCYTES NFR BLD AUTO: 16.5 %
MCH RBC QN AUTO: 31.2 PG (ref 26.5–33)
MCHC RBC AUTO-ENTMCNC: 33 G/DL (ref 31.5–36.5)
MCV RBC AUTO: 95 FL (ref 78–100)
MONOCYTES # BLD AUTO: 0.3 10E9/L (ref 0–1.3)
MONOCYTES NFR BLD AUTO: 7.3 %
NEUTROPHILS # BLD AUTO: 3 10E9/L (ref 1.6–8.3)
NEUTROPHILS NFR BLD AUTO: 73.3 %
NONHDLC SERPL-MCNC: 174 MG/DL
PLATELET # BLD AUTO: 254 10E9/L (ref 150–450)
POTASSIUM SERPL-SCNC: 3.7 MMOL/L (ref 3.4–5.3)
PROT SERPL-MCNC: 7.9 G/DL (ref 6.8–8.8)
RBC # BLD AUTO: 4.9 10E12/L (ref 3.8–5.2)
SODIUM SERPL-SCNC: 141 MMOL/L (ref 133–144)
TRIGL SERPL-MCNC: 109 MG/DL
WBC # BLD AUTO: 4.1 10E9/L (ref 4–11)

## 2021-02-19 PROCEDURE — 80053 COMPREHEN METABOLIC PANEL: CPT | Performed by: FAMILY MEDICINE

## 2021-02-19 PROCEDURE — 86803 HEPATITIS C AB TEST: CPT | Performed by: FAMILY MEDICINE

## 2021-02-19 PROCEDURE — 36415 COLL VENOUS BLD VENIPUNCTURE: CPT | Performed by: FAMILY MEDICINE

## 2021-02-19 PROCEDURE — 85025 COMPLETE CBC W/AUTO DIFF WBC: CPT | Performed by: FAMILY MEDICINE

## 2021-02-19 PROCEDURE — 80061 LIPID PANEL: CPT | Performed by: FAMILY MEDICINE

## 2021-02-19 PROCEDURE — 99396 PREV VISIT EST AGE 40-64: CPT | Performed by: FAMILY MEDICINE

## 2021-02-19 RX ORDER — LOSARTAN POTASSIUM 50 MG/1
50 TABLET ORAL DAILY
Qty: 90 TABLET | Refills: 3 | Status: SHIPPED | OUTPATIENT
Start: 2021-02-19 | End: 2022-04-14

## 2021-02-19 RX ORDER — MONTELUKAST SODIUM 10 MG/1
TABLET ORAL
Qty: 90 TABLET | Refills: 3 | Status: SHIPPED | OUTPATIENT
Start: 2021-02-19 | End: 2022-04-14

## 2021-02-19 ASSESSMENT — ENCOUNTER SYMPTOMS
NERVOUS/ANXIOUS: 0
PARESTHESIAS: 0
JOINT SWELLING: 0
WEAKNESS: 0
NAUSEA: 0
DIZZINESS: 0
BREAST MASS: 0
ABDOMINAL PAIN: 0
COUGH: 0
ARTHRALGIAS: 1
HEADACHES: 0
EYE PAIN: 0
FEVER: 0
DYSURIA: 0
CONSTIPATION: 0
FREQUENCY: 0
SHORTNESS OF BREATH: 0
DIARRHEA: 0
MYALGIAS: 1
HEMATOCHEZIA: 0
HEMATURIA: 0
SORE THROAT: 0
HEARTBURN: 0
CHILLS: 0
PALPITATIONS: 0

## 2021-02-19 NOTE — PATIENT INSTRUCTIONS
Follow up with Mohan in 3 months  GYN in November for mirena removal       Patient Education     Prevention Guidelines, Women Ages 50 to 64  Screening tests and vaccines are an important part of managing your health. A screening test is done to find possible disorders or diseases in people who don't have any symptoms. The goal is to find a disease early so lifestyle changes can be made and you can be watched more closely to reduce the risk of disease, or to detect it early enough to treat it most effectively. Screening tests are not considered diagnostic, but are used to determine if more testing is needed. Health counseling is essential, too. Below are guidelines for these, for women ages 50 to 64. Keep in mind that screening advice varies among expert groups. Talk with your healthcare provider about which tests are best for you and to make sure you re up to date on what you need.  Screening Who needs it How often   Type 2 diabetes or prediabetes All women beginning at age 45 and women without symptoms at any age who are overweight or obese and have 1 or more additional risk factors for diabetes. At  least every 3 years   Type 2 diabetes or prediabetes All women diagnosed with gestational diabetes Lifelong testing at least every 3 years   Type 2 diabetes All women with prediabetes Every year   Unhealthy alcohol use All women in this age group At routine exams   Blood pressure All women in this age group Yearly checkup if your blood pressure is normal  Normal blood pressure is less than 120/80 mm Hg  If your blood pressure reading is higher than normal, follow the advice of your healthcare provider   Breast cancer All women at average risk in this age group Yearly mammogram should be done until age 54. At age 55, you can switch to every other year or choose to continue yearly.  All women should know how their breasts normally look and feel and know the possible benefits and risks of breast cancer screening with  mammograms.   Cervical cancer All women in this age group, except women who have had a complete hysterectomy Pap test every 3 years or Pap test with human papillomavirus (HPV) test every 5 years   Chlamydia Women who are sexually active and at increased risk for infection At yearly routine exams   Colorectal cancer All women at average risk in this age group Multiple tests are available and are used at different times. Possible tests include:    Flexible sigmoidoscopy every 5 years, or    Colonoscopy every 10 years, or    CT colonography (virtual colonoscopy) every 5 years, or    Yearly fecal occult blood test, or    Yearly fecal immunochemical test every year, or    Stool DNA test, every 3 years  If you choose a test other than a colonoscopy and have an abnormal test result, you will need to follow up with a colonoscopy. Screening advice varies among expert groups. Talk with your healthcare provider about which tests are best for you.  Some people should be screened using a different schedule because of their personal or family health history. Talk with your healthcare provider about your health history.   Depression All women in this age group At routine exams   Gonorrhea Sexually active women at increased risk for infection At yearly routine exams   Hepatitis C Anyone at increased risk; 1 time for those born between 1945 and 1965 At routine exams   High cholesterol or triglycerides All women in this age group who are at risk for coronary artery disease At least every 5 years; talk with your healthcare provider about your risk   HIV All women At least once during your lifetime; yearly if at high risk   Lung cancer Women between the ages of 55 to 74 who are in fairly good health and are at higher risk for lung cancer       Currently smoke or have  quit within past 15 years       30-pack-year smoking history  , Eligibility criteria and age limit (possibly up to age 80) may vary across major organizations Yearly lung  cancer screening with a low-dose CT scan (LDCT) Talk with your healthcare provider for more information.   Obesity All women in this age group At yearly routine exams   Osteoporosis Women who are postmenopausal Talk with your healthcare provider   Syphilis Women at increased risk for infection At routine exams; talk with your healthcare provider   Tuberculosis Women at increased risk for infection Talk with your healthcare provider   Vision All women in this age group Talk with your healthcare provider   Vaccine Who needs it How often   Chickenpox (varicella) All women in this age group who have no record of this infection or vaccine 2 doses; the second dose should be given at least 4 weeks after the first dose   Hepatitis A Women at increased risk for infection 2 or 3 doses (depending on the vaccine) given at least 6 months apart; talk with your healthcare provider   Hepatitis B Women at increased risk for infection 2 or 3 doses (depending on the vaccine) ; second dose should be given 1 month after the first dose; if a third dose, it should be given at least 2 months after the second dose and at least 4 months after the first dose; talk with your healthcare provider   Haemophilus influenzae Type B (HIB) Women at increased risk for infection 1 or 3 doses; talk with your healthcare provider   Influenza (flu) All women in this age group Once a year   Measles, mumps, rubella (MMR) Women in this age group born in 1957 or later who have no record of these infections or vaccines 1 or 2doses   Meningococcal Women at increased risk for infection 1 or more doses; talk with your healthcare provider   Pneumococcal conjugate vaccine (PCV13) and pneumococcal polysaccharide vaccine (PPSV23) Women at increased risk for infection PCV13: 1 dose ages 19 to 64 (protects against 13 types of pneumococcal bacteria)  PPSV23: 1 or 2 doses through age 64(protects against 23 types of pneumococcal bacteria)  Talk with your healthcare provider    Tetanus/diphtheria/pertussis (Td/Tdap) booster All women in this age group Td every 10 years, or a 1-time dose of Tdap instead of a Td booster after age 18, then Td every 10 years   Zoster (Shingles) All women ages 50 and older 2 vaccines are available:       Recombinant zoster vaccine (RZV; Shingrix) is recommended as the preferred shingles vaccine. It's given in a series of 2 doses The 2nd dose is given 2 to 6 months after the first. This is given even if you've had shingles before or or had a previous Zoster live vaccine (ZVL; Zostavax).    Zoster live vaccine (ZVL; Zostavax) may be given to healthy adults over age 60. It's given in one dose      Counseling Who needs it How often   BRCA gene mutation testing for breast and ovarian cancer susceptibility Women with increased risk for having gene mutation When your risk is known; talk with your healthcare provider   Breast cancer and chemoprevention Women at high risk for breast cancer When your risk is known; talk with your healthcare provider   Diet and exercise Women who are overweight or obese When diagnosed, and then at routine exams   Sexually transmitted infection prevention Women at increased risk for infection At routine exams; talk with your healthcare provider   Use of daily aspirin Women ages 50 and up who are at high risk for cardiovascular health problems and not at increased risk for bleeding as identified by their healthcare provider When your risk is known; talk with your healthcare provider   Use of tobacco and the health effects it can cause All women in this age group Every exam   [employer] last reviewed this educational content on 6/1/2020 2000-2020 [employer]. [address] [place] [address], [place] [address]. All rights reserved. This information is not intended as a substitute for professional medical care. Always follow your healthcare professional's instructions.

## 2021-02-19 NOTE — PROGRESS NOTES
SUBJECTIVE:   CC: Rayna Jackson is an 56 year old woman who presents for preventive health visit.     Patient has been advised of split billing requirements and indicates understanding: Yes      Answers for HPI/ROS submitted by the patient on 2/15/2021   Annual Exam:  Frequency of exercise:: None  Getting at least 3 servings of Calcium per day:: Yes  Diet:: Other  Taking medications regularly:: Yes  Medication side effects:: None  Bi-annual eye exam:: Yes  Dental care twice a year:: Yes  Sleep apnea or symptoms of sleep apnea:: None  abdominal pain: No  Blood in stool: No  Blood in urine: No  chest pain: No  chills: No  congestion: No  constipation: No  cough: No  diarrhea: No  dizziness: No  ear pain: No  eye pain: No  nervous/anxious: No  fever: No  frequency: No  genital sores: No  headaches: No  hearing loss: No  heartburn: No  arthralgias: Yes  joint swelling: No  peripheral edema: No  mood changes: No  myalgias: Yes  nausea: No  dysuria: No  palpitations: No  Skin sensation changes: No  sore throat: No  urgency: No  rash: No  shortness of breath: No  visual disturbance: No  weakness: No  pelvic pain: No  vaginal bleeding: No  vaginal discharge: No  tenderness: No  breast mass: No  breast discharge: No  Additional concerns today:: No      HPI  Ability to successfully perform activities of daily living: Yes, no assistance needed  Home safety:  none identified   Hearing impairment: no      Today's PHQ-2 Score:   PHQ-2 ( 1999 [employer]) 2/15/2021   Q1: Little interest or pleasure in doing things 0   Q2: Feeling down, depressed or hopeless 0   PHQ-2 Score 0   Q1: Little interest or pleasure in doing things Not at all   Q2: Feeling down, depressed or hopeless Not at all   PHQ-2 Score 0       Abuse: Current or Past (Physical, Sexual or Emotional) - No  Do you feel safe in your environment? Yes    Have you ever done Advance Care Planning? (For example, a Health Directive, POLST, or a discussion with a medical provider or  your loved ones about your wishes): NO     Social History     Tobacco Use     Smoking status: Never Smoker     Smokeless tobacco: Never Used   Substance Use Topics     Alcohol use: Yes     Alcohol/week: 0.0 standard drinks     Frequency: 2-4 times a month     Drinks per session: 1 or 2     Binge frequency: Never     Comment: occ     If you drink alcohol do you typically have >3 drinks per day or >7 drinks per week? No    No flowsheet data found.      Reviewed orders with patient.  Reviewed health maintenance and updated orders accordingly - Yes  Labs reviewed in [facility]    Breast CA Risk Screening:  No flowsheet data found.  No flowsheet data found.  click delete button to remove this line now  Mammogram Screening: Recommended mammography every 1-2 years with patient discussion and risk factor consideration  Pertinent mammograms are reviewed under the imaging tab.    History of abnormal Pap smear: NO - age 30-65 PAP every 5 years with negative HPV co-testing recommended  PAP / HPV Latest Ref Rng & Units 11/15/2019 5/1/2016   PAP - NIL Negative   HPV 16 DNA NEG:Negative Negative -   HPV 18 DNA NEG:Negative Negative -   OTHER HR HPV NEG:Negative Negative -     Reviewed and updated as needed this visit by clinical staff  Tobacco  Allergies  Meds              Reviewed and updated as needed this visit by Provider    Meds                 Review of Systems  Review of Systems   Constitutional: Negative for chills and fever.   HENT: Negative for congestion, ear pain, hearing loss and sore throat.   Eyes: Negative for pain and visual disturbance.   Respiratory: Negative for cough and shortness of breath.   Cardiovascular: Negative for chest pain, palpitations and peripheral edema.   Gastrointestinal: Negative for abdominal pain, constipation, diarrhea, heartburn, hematochezia and nausea.   Breasts: Negative for tenderness, breast mass and discharge.   Genitourinary: Negative for dysuria, frequency, genital sores, hematuria,  pelvic pain, urgency, vaginal bleeding and vaginal discharge.   Musculoskeletal: Positive for arthralgias and myalgias. Negative for joint swelling.   Skin: Negative for rash.   Neurological: Negative for dizziness, weakness, headaches and paresthesias.   Psychiatric/Behavioral: Negative for mood changes. The patient is not nervous/anxious       OBJECTIVE:   /86 (BP Location: Right arm, Patient Position: Sitting, Cuff Size: Adult Regular)   Pulse 87   Temp 97.8  F (36.6  C) (Oral)   Resp 14   Wt 79.2 kg (174 lb 8 oz)   SpO2 99%   BMI 29.04 kg/m    Physical Exam    GENERAL: healthy, alert and no distress  EYES: Eyes grossly normal to inspection, PERRL and conjunctivae and sclerae normal  HENT: ear canals and TM's normal, nose and mouth without ulcers or lesions  NECK: no adenopathy, no asymmetry, masses, or scars and thyroid normal to palpation  RESP: lungs clear to auscultation - no rales, rhonchi or wheezes  CV: regular rate and rhythm, normal S1 S2, no S3 or S4, no murmur, click or rub, no peripheral edema and peripheral pulses strong  ABDOMEN: soft, nontender,  and bowel sounds normal  MS: no gross musculoskeletal defects noted, no edema  SKIN: no suspicious lesions or rashes  NEURO: Normal strength and tone, mentation intact and speech normal  PSYCH: mentation appears normal, affect normal/bright  Diagnostic Test Results:  Labs reviewed in Epic  none     ASSESSMENT/PLAN:   1. Routine general medical examination at a health care facility  - Comprehensive metabolic panel (BMP + Alb, Alk Phos, ALT, AST, Total. Bili, TP)  - Lipid panel reflex to direct LDL Fasting  - CBC with platelets and differential    2. Benign essential hypertension  -stable   - losartan (COZAAR) 50 MG tablet; Take 1 tablet (50 mg) by mouth daily  Dispense: 90 tablet; Refill: 3    3. Mild intermittent asthma without complication  -stable   - montelukast (SINGULAIR) 10 MG tablet; TAKE 1 TABLET BY MOUTH EVERYDAY AT BEDTIME  Dispense:  "90 tablet; Refill: 3    4. Chronic allergic rhinitis  -stable   - montelukast (SINGULAIR) 10 MG tablet; TAKE 1 TABLET BY MOUTH EVERYDAY AT BEDTIME  Dispense: 90 tablet; Refill: 3    5. Encounter for screening mammogram for breast cancer  - MA SCREENING DIGITAL BILAT - Future  (s+30); Future    6. Need for hepatitis C screening test  - Hepatitis C Screen Reflex to HCV RNA Quant and Genotype    Patient has been advised of split billing requirements and indicates understanding: Yes  COUNSELING:  Reviewed preventive health counseling, as reflected in patient instructions       Regular exercise       Healthy diet/nutrition    Estimated body mass index is 29.04 kg/m  as calculated from the following:    Height as of 2/3/21: 1.651 m (5' 5\").    Weight as of this encounter: 79.2 kg (174 lb 8 oz).    Weight management plan: Discussed healthy diet and exercise guidelines    She reports that she has never smoked. She has never used smokeless tobacco.      Counseling Resources:  ATP IV Guidelines  Pooled Cohorts Equation Calculator  Breast Cancer Risk Calculator  BRCA-Related Cancer Risk Assessment: FHS-7 Tool  FRAX Risk Assessment  ICSI Preventive Guidelines  Dietary Guidelines for Americans, 2010  USDA's MyPlate  ASA Prophylaxis  Lung CA Screening    Mitra Keita MD  [facility]  "

## 2021-02-19 NOTE — PROGRESS NOTES
"    Assessment & Plan     Routine general medical examination at a health care facility  - Comprehensive metabolic panel (BMP + Alb, Alk Phos, ALT, AST, Total. Bili, TP)  - Lipid panel reflex to direct LDL Fasting  - CBC with platelets and differential    Benign essential hypertension  -stable   - losartan (COZAAR) 50 MG tablet; Take 1 tablet (50 mg) by mouth daily    Mild intermittent asthma without complication  -stable. Has not been needing her inhalers   - montelukast (SINGULAIR) 10 MG tablet; TAKE 1 TABLET BY MOUTH EVERYDAY AT BEDTIME    Chronic allergic rhinitis  -stable   - montelukast (SINGULAIR) 10 MG tablet; TAKE 1 TABLET BY MOUTH EVERYDAY AT BEDTIME    Encounter for screening mammogram for breast cancer  - MA SCREENING DIGITAL BILAT - Future  (s+30); Future    Need for hepatitis C screening test  - Hepatitis C Screen Reflex to HCV RNA Quant and Genotype        {Provider  Link to [facility] Help Grid :146816}     BMI:   Estimated body mass index is 29.04 kg/m  as calculated from the following:    Height as of 2/3/21: 1.651 m (5' 5\").    Weight as of this encounter: 79.2 kg (174 lb 8 oz).   Weight management plan: Discussed healthy diet and exercise guidelines    See Patient Instructions    Return in about 1 year (around 2/19/2022) for Physical Exam, in person, .    Mitra Keita MD  [facility] [address] is a 56 year old who presents for the following health issues     Healthy Habits:     Getting at least 3 servings of Calcium per day:  Yes    Bi-annual eye exam:  Yes    Dental care twice a year:  Yes    Sleep apnea or symptoms of sleep apnea:  None    Diet:  Other    Frequency of exercise:  None    Taking medications regularly:  Yes    Medication side effects:  None    PHQ-2 Total Score: 0    Additional concerns today:  No     Answers for HPI/ROS submitted by the patient on 2/15/2021   Annual Exam:  Frequency of exercise:: None  Getting at " least 3 servings of Calcium per day:: Yes  Diet:: Other  Taking medications regularly:: Yes  Medication side effects:: None  Bi-annual eye exam:: Yes  Dental care twice a year:: Yes  Sleep apnea or symptoms of sleep apnea:: None  abdominal pain: No  Blood in stool: No  Blood in urine: No  chest pain: No  chills: No  congestion: No  constipation: No  cough: No  diarrhea: No  dizziness: No  ear pain: No  eye pain: No  nervous/anxious: No  fever: No  frequency: No  genital sores: No  headaches: No  hearing loss: No  heartburn: No  arthralgias: Yes  joint swelling: No  peripheral edema: No  mood changes: No  myalgias: Yes  nausea: No  dysuria: No  palpitations: No  Skin sensation changes: No  sore throat: No  urgency: No  rash: No  shortness of breath: No  visual disturbance: No  weakness: No  pelvic pain: No  vaginal bleeding: No  vaginal discharge: No  tenderness: No  breast mass: No  breast discharge: No  Additional concerns today:: No    Medication Followup of  All medications    Taking Medication as prescribed: yes    Side Effects:  None    Medication Helping Symptoms:  yes         Review of Systems   Constitutional: Negative for chills and fever.   HENT: Negative for congestion, ear pain, hearing loss and sore throat.    Eyes: Negative for pain and visual disturbance.   Respiratory: Negative for cough and shortness of breath.    Cardiovascular: Negative for chest pain, palpitations and peripheral edema.   Gastrointestinal: Negative for abdominal pain, constipation, diarrhea, heartburn, hematochezia and nausea.   Breasts:  Negative for tenderness, breast mass and discharge.   Genitourinary: Negative for dysuria, frequency, genital sores, hematuria, pelvic pain, urgency, vaginal bleeding and vaginal discharge.   Musculoskeletal: Positive for arthralgias and myalgias. Negative for joint swelling.   Skin: Negative for rash.   Neurological: Negative for dizziness, weakness, headaches and paresthesias.    Psychiatric/Behavioral: Negative for mood changes. The patient is not nervous/anxious.          Objective    /86 (BP Location: Right arm, Patient Position: Sitting, Cuff Size: Adult Regular)   Pulse 87   Temp 97.8  F (36.6  C) (Oral)   Resp 14   Wt 79.2 kg (174 lb 8 oz)   SpO2 99%   BMI 29.04 kg/m    Body mass index is 29.04 kg/m .  Physical Exam   GENERAL: healthy, alert and no distress  EYES: Eyes grossly normal to inspection, PERRL and conjunctivae and sclerae normal  HENT: ear canals and TM's normal, nose and mouth without ulcers or lesions  NECK: no adenopathy, no asymmetry, masses, or scars and thyroid normal to palpation  RESP: lungs clear to auscultation - no rales, rhonchi or wheezes  CV: regular rate and rhythm, normal S1 S2, no S3 or S4, no murmur, click or rub, no peripheral edema and peripheral pulses strong  ABDOMEN: soft, nontender,  and bowel sounds normal  MS: no gross musculoskeletal defects noted, no edema  SKIN: no suspicious lesions or rashes  NEURO: Normal strength and tone, mentation intact and speech normal  PSYCH: mentation appears normal, affect normal/bright

## 2021-02-23 ASSESSMENT — ASTHMA QUESTIONNAIRES: ACT_TOTALSCORE: 25

## 2021-02-24 ENCOUNTER — THERAPY VISIT (OUTPATIENT)
Dept: PHYSICAL THERAPY | Facility: CLINIC | Age: 56
End: 2021-02-24
Attending: FAMILY MEDICINE
Payer: COMMERCIAL

## 2021-02-24 DIAGNOSIS — G89.29 CHRONIC LEFT SHOULDER PAIN: ICD-10-CM

## 2021-02-24 DIAGNOSIS — M75.102 ROTATOR CUFF SYNDROME OF LEFT SHOULDER: ICD-10-CM

## 2021-02-24 DIAGNOSIS — M25.512 CHRONIC LEFT SHOULDER PAIN: ICD-10-CM

## 2021-02-24 PROCEDURE — 97110 THERAPEUTIC EXERCISES: CPT | Mod: GP | Performed by: PHYSICAL THERAPIST

## 2021-02-24 PROCEDURE — 97112 NEUROMUSCULAR REEDUCATION: CPT | Mod: GP | Performed by: PHYSICAL THERAPIST

## 2021-02-25 ENCOUNTER — ANCILLARY PROCEDURE (OUTPATIENT)
Dept: MAMMOGRAPHY | Facility: CLINIC | Age: 56
End: 2021-02-25
Payer: COMMERCIAL

## 2021-02-25 DIAGNOSIS — Z12.31 ENCOUNTER FOR SCREENING MAMMOGRAM FOR BREAST CANCER: ICD-10-CM

## 2021-02-25 PROCEDURE — 77063 BREAST TOMOSYNTHESIS BI: CPT | Mod: TC | Performed by: RADIOLOGY

## 2021-02-25 PROCEDURE — 77067 SCR MAMMO BI INCL CAD: CPT | Mod: TC | Performed by: RADIOLOGY

## 2021-04-01 PROBLEM — G89.29 CHRONIC LEFT SHOULDER PAIN: Status: RESOLVED | Noted: 2021-02-17 | Resolved: 2021-04-01

## 2021-04-01 PROBLEM — M75.102 ROTATOR CUFF SYNDROME OF LEFT SHOULDER: Status: RESOLVED | Noted: 2021-02-17 | Resolved: 2021-04-01

## 2021-04-01 PROBLEM — M25.512 CHRONIC LEFT SHOULDER PAIN: Status: RESOLVED | Noted: 2021-02-17 | Resolved: 2021-04-01

## 2021-09-01 ENCOUNTER — E-VISIT (OUTPATIENT)
Dept: FAMILY MEDICINE | Facility: CLINIC | Age: 56
End: 2021-09-01
Payer: COMMERCIAL

## 2021-09-01 DIAGNOSIS — Z20.822 EXPOSURE TO COVID-19 VIRUS: Primary | ICD-10-CM

## 2021-09-01 PROCEDURE — 99421 OL DIG E/M SVC 5-10 MIN: CPT | Performed by: FAMILY MEDICINE

## 2021-09-01 NOTE — TELEPHONE ENCOUNTER
Provider E-Visit time total (minutes): 5 minutes    Mitra Mcgregor MD, [facility]

## 2021-09-11 ENCOUNTER — HEALTH MAINTENANCE LETTER (OUTPATIENT)
Age: 56
End: 2021-09-11

## 2021-10-13 ENCOUNTER — MYC MEDICAL ADVICE (OUTPATIENT)
Dept: FAMILY MEDICINE | Facility: CLINIC | Age: 56
End: 2021-10-13

## 2021-10-27 ENCOUNTER — MYC MEDICAL ADVICE (OUTPATIENT)
Dept: FAMILY MEDICINE | Facility: CLINIC | Age: 56
End: 2021-10-27

## 2021-11-05 ENCOUNTER — OFFICE VISIT (OUTPATIENT)
Dept: URGENT CARE | Facility: URGENT CARE | Age: 56
End: 2021-11-05
Payer: COMMERCIAL

## 2021-11-05 VITALS
DIASTOLIC BLOOD PRESSURE: 86 MMHG | RESPIRATION RATE: 16 BRPM | HEART RATE: 67 BPM | SYSTOLIC BLOOD PRESSURE: 132 MMHG | TEMPERATURE: 98.8 F | OXYGEN SATURATION: 98 %

## 2021-11-05 DIAGNOSIS — R10.84 ABDOMINAL PAIN, GENERALIZED: ICD-10-CM

## 2021-11-05 DIAGNOSIS — R53.83 FATIGUE, UNSPECIFIED TYPE: Primary | ICD-10-CM

## 2021-11-05 DIAGNOSIS — N39.0 URINARY TRACT INFECTION WITHOUT HEMATURIA, SITE UNSPECIFIED: ICD-10-CM

## 2021-11-05 LAB
ALBUMIN SERPL-MCNC: 4.4 G/DL (ref 3.4–5)
ALBUMIN UR-MCNC: NEGATIVE MG/DL
ALP SERPL-CCNC: 85 U/L (ref 40–150)
ALT SERPL W P-5'-P-CCNC: 15 U/L (ref 0–50)
ANION GAP SERPL CALCULATED.3IONS-SCNC: 10 MMOL/L (ref 3–14)
APPEARANCE UR: CLEAR
AST SERPL W P-5'-P-CCNC: 15 U/L (ref 0–45)
BACTERIA #/AREA URNS HPF: ABNORMAL /HPF
BASOPHILS # BLD AUTO: 0 10E3/UL (ref 0–0.2)
BASOPHILS NFR BLD AUTO: 0 %
BILIRUB SERPL-MCNC: 0.7 MG/DL (ref 0.2–1.3)
BILIRUB UR QL STRIP: NEGATIVE
BUN SERPL-MCNC: 18 MG/DL (ref 7–30)
CALCIUM SERPL-MCNC: 9.1 MG/DL (ref 8.5–10.1)
CHLORIDE BLD-SCNC: 109 MMOL/L (ref 94–109)
CO2 SERPL-SCNC: 21 MMOL/L (ref 20–32)
COLOR UR AUTO: YELLOW
CREAT SERPL-MCNC: 0.76 MG/DL (ref 0.52–1.04)
DEPRECATED CALCIDIOL+CALCIFEROL SERPL-MC: 24 UG/L (ref 20–75)
EOSINOPHIL # BLD AUTO: 0.1 10E3/UL (ref 0–0.7)
EOSINOPHIL NFR BLD AUTO: 1 %
ERYTHROCYTE [DISTWIDTH] IN BLOOD BY AUTOMATED COUNT: 12.4 % (ref 10–15)
GFR SERPL CREATININE-BSD FRML MDRD: 89 ML/MIN/1.73M2
GLUCOSE BLD-MCNC: 88 MG/DL (ref 70–99)
GLUCOSE UR STRIP-MCNC: NEGATIVE MG/DL
HCT VFR BLD AUTO: 45.6 % (ref 35–47)
HGB BLD-MCNC: 15 G/DL (ref 11.7–15.7)
HGB UR QL STRIP: ABNORMAL
KETONES UR STRIP-MCNC: NEGATIVE MG/DL
LEUKOCYTE ESTERASE UR QL STRIP: ABNORMAL
LYMPHOCYTES # BLD AUTO: 1.3 10E3/UL (ref 0.8–5.3)
LYMPHOCYTES NFR BLD AUTO: 22 %
MCH RBC QN AUTO: 31.4 PG (ref 26.5–33)
MCHC RBC AUTO-ENTMCNC: 32.9 G/DL (ref 31.5–36.5)
MCV RBC AUTO: 96 FL (ref 78–100)
MONOCYTES # BLD AUTO: 0.5 10E3/UL (ref 0–1.3)
MONOCYTES NFR BLD AUTO: 8 %
MUCOUS THREADS #/AREA URNS LPF: PRESENT /LPF
NEUTROPHILS # BLD AUTO: 4.1 10E3/UL (ref 1.6–8.3)
NEUTROPHILS NFR BLD AUTO: 68 %
NITRATE UR QL: NEGATIVE
PH UR STRIP: 5.5 [PH] (ref 5–7)
PLATELET # BLD AUTO: 287 10E3/UL (ref 150–450)
POTASSIUM BLD-SCNC: 3.8 MMOL/L (ref 3.4–5.3)
PROT SERPL-MCNC: 7.8 G/DL (ref 6.8–8.8)
RBC # BLD AUTO: 4.77 10E6/UL (ref 3.8–5.2)
RBC #/AREA URNS AUTO: ABNORMAL /HPF
SODIUM SERPL-SCNC: 140 MMOL/L (ref 133–144)
SP GR UR STRIP: 1.02 (ref 1–1.03)
SQUAMOUS #/AREA URNS AUTO: ABNORMAL /LPF
TSH SERPL DL<=0.005 MIU/L-ACNC: 1.86 MU/L (ref 0.4–4)
UROBILINOGEN UR STRIP-ACNC: 0.2 E.U./DL
WBC # BLD AUTO: 6 10E3/UL (ref 4–11)
WBC #/AREA URNS AUTO: ABNORMAL /HPF

## 2021-11-05 PROCEDURE — 36415 COLL VENOUS BLD VENIPUNCTURE: CPT | Performed by: FAMILY MEDICINE

## 2021-11-05 PROCEDURE — 99214 OFFICE O/P EST MOD 30 MIN: CPT | Performed by: FAMILY MEDICINE

## 2021-11-05 PROCEDURE — 80050 GENERAL HEALTH PANEL: CPT | Performed by: FAMILY MEDICINE

## 2021-11-05 PROCEDURE — 82306 VITAMIN D 25 HYDROXY: CPT | Performed by: FAMILY MEDICINE

## 2021-11-05 PROCEDURE — 87086 URINE CULTURE/COLONY COUNT: CPT | Performed by: FAMILY MEDICINE

## 2021-11-05 PROCEDURE — 81001 URINALYSIS AUTO W/SCOPE: CPT | Performed by: FAMILY MEDICINE

## 2021-11-05 RX ORDER — SULFAMETHOXAZOLE/TRIMETHOPRIM 800-160 MG
1 TABLET ORAL 2 TIMES DAILY
Qty: 6 TABLET | Refills: 0 | Status: SHIPPED | OUTPATIENT
Start: 2021-11-05 | End: 2021-11-08

## 2021-11-05 NOTE — PROGRESS NOTES
SUBJECTIVE  HPI:  Rayna Jackson is a 56 year old female who presents with the CC of abdominal pain, diarrhea, fatigue and sinus symptoms    Fatigue for a week, feels similar to when had mono when younger.  Had mild sore throat but has never tested positive for strep and does not feel that has strep throat, no fever.  Has thyroid dysfunction, this was due to lupus.  Last TSH was normal.  Did have low vit d before.    Had sinus infection earlier this week but symptoms improved now.    Abdominal cramping started today.  Denies any dysuria.  Pain is in lower abdominal area.  Has IUD, is due to be replaced next week.    Completed Moderna COVID vaccination in April.    Past Medical History:   Diagnosis Date     Torn meniscus 2013    left      Current Outpatient Medications   Medication Sig Dispense Refill     albuterol (PROAIR HFA/PROVENTIL HFA/VENTOLIN HFA) 108 (90 Base) MCG/ACT inhaler Inhale 2 puffs into the lungs as needed for shortness of breath / dyspnea or wheezing 2 Inhaler 3     cyclobenzaprine (FLEXERIL) 10 MG tablet Take 1 tablet (10 mg) by mouth nightly as needed for muscle spasms 30 tablet 0     fexofenadine (ALLEGRA) 180 MG tablet Take 1 tablet (180 mg) by mouth daily Take 180 mg by mouth 90 tablet 1     FLOVENT  MCG/ACT inhaler TAKE 2 PUFFS BY MOUTH TWICE A DAY 36 Inhaler 2     fluticasone (FLONASE) 50 MCG/ACT nasal spray INSTILL 2 SPRAYS INTO BOTH NOSTRILS DAILY 16 g 5     levonorgestrel (MIRENA) 20 MCG/24HR IUD 1 each by Intrauterine route once       levothyroxine (SYNTHROID/LEVOTHROID) 50 MCG tablet Take 1 tablet (50 mcg) by mouth daily 90 tablet 3     losartan (COZAAR) 50 MG tablet Take 1 tablet (50 mg) by mouth daily 90 tablet 3     meloxicam (MOBIC) 7.5 MG tablet TAKE 1 TABLET (7.5 MG) BY MOUTH DAILY AS NEEDED FOR MODERATE PAIN (SEVERE PAIN) 30 tablet 1     montelukast (SINGULAIR) 10 MG tablet TAKE 1 TABLET BY MOUTH EVERYDAY AT BEDTIME 90 tablet 3     topiramate (TOPAMAX) 50 MG tablet Take 1  tablet (50 mg) by mouth 2 times daily 180 tablet 0     triamcinolone (KENALOG) 0.1 % cream Apply topically 2 times daily       Social History     Tobacco Use     Smoking status: Never Smoker     Smokeless tobacco: Never Used   Substance Use Topics     Alcohol use: Yes     Alcohol/week: 0.0 standard drinks     Comment: occ       ROS:  Review of systems negative except as stated above.    OBJECTIVE:  /86   Pulse 67   Temp 98.8  F (37.1  C) (Oral)   Resp 16   SpO2 98%   Breastfeeding No   GENERAL APPEARANCE: healthy, alert and no distress  PSYCH: mentation appears normal and affect normal/bright    Results for orders placed or performed in visit on 11/05/21   UA Macro with Reflex to Micro and Culture - lab collect     Status: Abnormal    Specimen: Urine, Midstream   Result Value Ref Range    Color Urine Yellow Colorless, Straw, Light Yellow, Yellow    Appearance Urine Clear Clear    Glucose Urine Negative Negative mg/dL    Bilirubin Urine Negative Negative    Ketones Urine Negative Negative mg/dL    Specific Gravity Urine 1.020 1.003 - 1.035    Blood Urine Trace (A) Negative    pH Urine 5.5 5.0 - 7.0    Protein Albumin Urine Negative Negative mg/dL    Urobilinogen Urine 0.2 0.2, 1.0 E.U./dL    Nitrite Urine Negative Negative    Leukocyte Esterase Urine Large (A) Negative   CBC with platelets and differential     Status: None   Result Value Ref Range    WBC Count 6.0 4.0 - 11.0 10e3/uL    RBC Count 4.77 3.80 - 5.20 10e6/uL    Hemoglobin 15.0 11.7 - 15.7 g/dL    Hematocrit 45.6 35.0 - 47.0 %    MCV 96 78 - 100 fL    MCH 31.4 26.5 - 33.0 pg    MCHC 32.9 31.5 - 36.5 g/dL    RDW 12.4 10.0 - 15.0 %    Platelet Count 287 150 - 450 10e3/uL    % Neutrophils 68 %    % Lymphocytes 22 %    % Monocytes 8 %    % Eosinophils 1 %    % Basophils 0 %    Absolute Neutrophils 4.1 1.6 - 8.3 10e3/uL    Absolute Lymphocytes 1.3 0.8 - 5.3 10e3/uL    Absolute Monocytes 0.5 0.0 - 1.3 10e3/uL    Absolute Eosinophils 0.1 0.0 - 0.7  10e3/uL    Absolute Basophils 0.0 0.0 - 0.2 10e3/uL   Urine Microscopic Exam     Status: Abnormal   Result Value Ref Range    Bacteria Urine Few (A) None Seen /HPF    RBC Urine 0-2 0-2 /HPF /HPF    WBC Urine 10-25 (A) 0-5 /HPF /HPF    Squamous Epithelials Urine Few (A) None Seen /LPF    Mucus Urine Present (A) None Seen /LPF   CBC with platelets and differential     Status: None    Narrative    The following orders were created for panel order CBC with platelets and differential.  Procedure                               Abnormality         Status                     ---------                               -----------         ------                     CBC with platelets and d...[[id number]]                      Final result                 Please view results for these tests on the individual orders.       ASSESSMENT/PLAN:  (R53.83) Fatigue, unspecified type  (primary encounter diagnosis)  Plan: CBC with platelets and differential, TSH with         free T4 reflex, Vitamin D Deficiency,         Comprehensive metabolic panel (BMP + Alb, Alk         Phos, ALT, AST, Total. Bili, TP)            (R10.84) Abdominal pain, generalized  Comment: lower  Plan: CBC with platelets and differential,         Comprehensive metabolic panel (BMP + Alb, Alk         Phos, ALT, AST, Total. Bili, TP), UA Macro with        Reflex to Micro and Culture - lab collect,         Urine Microscopic Exam, Urine Culture, Urine         Culture Aerobic Bacterial - lab collect            (N39.0) Urinary tract infection without hematuria, site unspecified  Plan: sulfamethoxazole-trimethoprim (BACTRIM DS)         800-160 MG tablet            Reviewed initial labs, empiric treatment for presumptive UTI with RX Bactrim DS given due to abnormal urinalysis.  Will follow up on urine culture and adjust medication if needed.  Encourage to drink plenty of fluids.    Reviewed fatigue and obtained labs - TSH, vit D, comprehensive panel for possible electrolyte or  endocrine etiology.  Reviewed that can have a mono re-activation, recommend rest.  Okay for tylenol for discomfort.    Follow up with primary provider if no improvement of symptoms in 1-2 weeks    Gutierrez Rodriguez MD  November 5, 2021 1:39 PM

## 2021-11-06 LAB — BACTERIA UR CULT: NO GROWTH

## 2021-12-18 DIAGNOSIS — E03.9 HYPOTHYROIDISM, UNSPECIFIED TYPE: ICD-10-CM

## 2021-12-20 RX ORDER — LEVOTHYROXINE SODIUM 50 UG/1
TABLET ORAL
Qty: 90 TABLET | Refills: 0 | Status: SHIPPED | OUTPATIENT
Start: 2021-12-20 | End: 2022-03-22

## 2022-01-20 ENCOUNTER — LAB (OUTPATIENT)
Dept: LAB | Facility: CLINIC | Age: 57
End: 2022-01-20
Attending: FAMILY MEDICINE
Payer: COMMERCIAL

## 2022-01-20 DIAGNOSIS — Z20.822 CLOSE EXPOSURE TO 2019 NOVEL CORONAVIRUS: ICD-10-CM

## 2022-01-20 PROCEDURE — U0005 INFEC AGEN DETEC AMPLI PROBE: HCPCS

## 2022-01-20 PROCEDURE — U0003 INFECTIOUS AGENT DETECTION BY NUCLEIC ACID (DNA OR RNA); SEVERE ACUTE RESPIRATORY SYNDROME CORONAVIRUS 2 (SARS-COV-2) (CORONAVIRUS DISEASE [COVID-19]), AMPLIFIED PROBE TECHNIQUE, MAKING USE OF HIGH THROUGHPUT TECHNOLOGIES AS DESCRIBED BY CMS-2020-01-R: HCPCS

## 2022-01-21 LAB — SARS-COV-2 RNA RESP QL NAA+PROBE: NEGATIVE

## 2022-01-28 DIAGNOSIS — J30.9 CHRONIC ALLERGIC RHINITIS: ICD-10-CM

## 2022-01-31 RX ORDER — FLUTICASONE PROPIONATE 50 MCG
SPRAY, SUSPENSION (ML) NASAL
Qty: 16 G | Refills: 0 | Status: SHIPPED | OUTPATIENT
Start: 2022-01-31 | End: 2022-02-22

## 2022-02-21 DIAGNOSIS — J30.9 CHRONIC ALLERGIC RHINITIS: ICD-10-CM

## 2022-02-21 DIAGNOSIS — J45.20 MILD INTERMITTENT ASTHMA WITHOUT COMPLICATION: ICD-10-CM

## 2022-02-22 RX ORDER — FLUTICASONE PROPIONATE 50 MCG
SPRAY, SUSPENSION (ML) NASAL
Qty: 16 ML | Refills: 0 | Status: SHIPPED | OUTPATIENT
Start: 2022-02-22 | End: 2022-04-14

## 2022-02-22 RX ORDER — DEXAMETHASONE 4 MG/1
TABLET ORAL
Qty: 12 G | Refills: 0 | Status: SHIPPED | OUTPATIENT
Start: 2022-02-22 | End: 2022-04-14

## 2022-02-22 RX ORDER — ALBUTEROL SULFATE 90 UG/1
2 AEROSOL, METERED RESPIRATORY (INHALATION)
Qty: 18 G | Refills: 0 | Status: SHIPPED | OUTPATIENT
Start: 2022-02-22 | End: 2022-04-14

## 2022-02-22 NOTE — TELEPHONE ENCOUNTER
Medication is being filled for 1 time refill only due to:  Patient needs to be seen because it has been more than one year since last visit.    Routing to MA/TC pool. The Pt is due for a visit with PCP  Cassi OLIVEIRA RN, BSN

## 2022-02-23 ASSESSMENT — ASTHMA QUESTIONNAIRES: ACT_TOTALSCORE: 25

## 2022-03-21 DIAGNOSIS — E03.9 HYPOTHYROIDISM, UNSPECIFIED TYPE: ICD-10-CM

## 2022-03-22 RX ORDER — LEVOTHYROXINE SODIUM 50 UG/1
TABLET ORAL
Qty: 90 TABLET | Refills: 0 | Status: SHIPPED | OUTPATIENT
Start: 2022-03-22 | End: 2022-04-18

## 2022-04-08 SDOH — ECONOMIC STABILITY: INCOME INSECURITY: HOW HARD IS IT FOR YOU TO PAY FOR THE VERY BASICS LIKE FOOD, HOUSING, MEDICAL CARE, AND HEATING?: NOT HARD AT ALL

## 2022-04-08 SDOH — HEALTH STABILITY: PHYSICAL HEALTH: ON AVERAGE, HOW MANY MINUTES DO YOU ENGAGE IN EXERCISE AT THIS LEVEL?: 30 MIN

## 2022-04-08 SDOH — ECONOMIC STABILITY: INCOME INSECURITY: IN THE LAST 12 MONTHS, WAS THERE A TIME WHEN YOU WERE NOT ABLE TO PAY THE MORTGAGE OR RENT ON TIME?: YES

## 2022-04-08 SDOH — ECONOMIC STABILITY: FOOD INSECURITY: WITHIN THE PAST 12 MONTHS, THE FOOD YOU BOUGHT JUST DIDN'T LAST AND YOU DIDN'T HAVE MONEY TO GET MORE.: NEVER TRUE

## 2022-04-08 SDOH — HEALTH STABILITY: PHYSICAL HEALTH: ON AVERAGE, HOW MANY DAYS PER WEEK DO YOU ENGAGE IN MODERATE TO STRENUOUS EXERCISE (LIKE A BRISK WALK)?: 1 DAY

## 2022-04-08 SDOH — ECONOMIC STABILITY: FOOD INSECURITY: WITHIN THE PAST 12 MONTHS, YOU WORRIED THAT YOUR FOOD WOULD RUN OUT BEFORE YOU GOT MONEY TO BUY MORE.: NEVER TRUE

## 2022-04-08 ASSESSMENT — ENCOUNTER SYMPTOMS
ARTHRALGIAS: 0
NAUSEA: 0
ABDOMINAL PAIN: 0
FEVER: 0
DYSURIA: 0
DIARRHEA: 0
COUGH: 0
PARESTHESIAS: 0
HEMATURIA: 0
FREQUENCY: 0
CHILLS: 0
BREAST MASS: 0
WEAKNESS: 0
CONSTIPATION: 0
DIZZINESS: 0
MYALGIAS: 0
HEARTBURN: 0
SHORTNESS OF BREATH: 0
HEMATOCHEZIA: 0
PALPITATIONS: 0
SORE THROAT: 0
NERVOUS/ANXIOUS: 0
EYE PAIN: 0
HEADACHES: 0
JOINT SWELLING: 0

## 2022-04-08 ASSESSMENT — LIFESTYLE VARIABLES
HOW OFTEN DO YOU HAVE SIX OR MORE DRINKS ON ONE OCCASION: NEVER
HOW MANY STANDARD DRINKS CONTAINING ALCOHOL DO YOU HAVE ON A TYPICAL DAY: 1 OR 2
HOW OFTEN DO YOU HAVE A DRINK CONTAINING ALCOHOL: MONTHLY OR LESS

## 2022-04-08 ASSESSMENT — SOCIAL DETERMINANTS OF HEALTH (SDOH)
DO YOU BELONG TO ANY CLUBS OR ORGANIZATIONS SUCH AS CHURCH GROUPS UNIONS, FRATERNAL OR ATHLETIC GROUPS, OR SCHOOL GROUPS?: YES
IN A TYPICAL WEEK, HOW MANY TIMES DO YOU TALK ON THE PHONE WITH FAMILY, FRIENDS, OR NEIGHBORS?: MORE THAN THREE TIMES A WEEK
HOW OFTEN DO YOU GET TOGETHER WITH FRIENDS OR RELATIVES?: THREE TIMES A WEEK
HOW OFTEN DO YOU ATTEND CHURCH OR RELIGIOUS SERVICES?: MORE THAN 4 TIMES PER YEAR

## 2022-04-14 ENCOUNTER — OFFICE VISIT (OUTPATIENT)
Dept: FAMILY MEDICINE | Facility: CLINIC | Age: 57
End: 2022-04-14
Payer: COMMERCIAL

## 2022-04-14 VITALS
HEIGHT: 65 IN | HEART RATE: 88 BPM | DIASTOLIC BLOOD PRESSURE: 86 MMHG | WEIGHT: 197.1 LBS | OXYGEN SATURATION: 97 % | SYSTOLIC BLOOD PRESSURE: 129 MMHG | TEMPERATURE: 97.7 F | BODY MASS INDEX: 32.84 KG/M2

## 2022-04-14 DIAGNOSIS — E66.09 CLASS 1 OBESITY DUE TO EXCESS CALORIES WITHOUT SERIOUS COMORBIDITY WITH BODY MASS INDEX (BMI) OF 32.0 TO 32.9 IN ADULT: ICD-10-CM

## 2022-04-14 DIAGNOSIS — E66.811 CLASS 1 OBESITY DUE TO EXCESS CALORIES WITHOUT SERIOUS COMORBIDITY WITH BODY MASS INDEX (BMI) OF 32.0 TO 32.9 IN ADULT: ICD-10-CM

## 2022-04-14 DIAGNOSIS — E03.9 HYPOTHYROIDISM, UNSPECIFIED TYPE: ICD-10-CM

## 2022-04-14 DIAGNOSIS — J30.9 CHRONIC ALLERGIC RHINITIS: ICD-10-CM

## 2022-04-14 DIAGNOSIS — M16.12 OSTEOARTHRITIS OF LEFT HIP, UNSPECIFIED OSTEOARTHRITIS TYPE: ICD-10-CM

## 2022-04-14 DIAGNOSIS — H60.543 DERMATITIS OF BOTH EAR CANALS: ICD-10-CM

## 2022-04-14 DIAGNOSIS — Z00.00 ROUTINE GENERAL MEDICAL EXAMINATION AT A HEALTH CARE FACILITY: Primary | ICD-10-CM

## 2022-04-14 DIAGNOSIS — I10 BENIGN ESSENTIAL HYPERTENSION: ICD-10-CM

## 2022-04-14 DIAGNOSIS — J45.20 MILD INTERMITTENT ASTHMA WITHOUT COMPLICATION: ICD-10-CM

## 2022-04-14 LAB
BASOPHILS # BLD AUTO: 0 10E3/UL (ref 0–0.2)
BASOPHILS NFR BLD AUTO: 1 %
EOSINOPHIL # BLD AUTO: 0.2 10E3/UL (ref 0–0.7)
EOSINOPHIL NFR BLD AUTO: 3 %
ERYTHROCYTE [DISTWIDTH] IN BLOOD BY AUTOMATED COUNT: 12.5 % (ref 10–15)
HCT VFR BLD AUTO: 45.1 % (ref 35–47)
HGB BLD-MCNC: 14.8 G/DL (ref 11.7–15.7)
LYMPHOCYTES # BLD AUTO: 1.5 10E3/UL (ref 0.8–5.3)
LYMPHOCYTES NFR BLD AUTO: 23 %
MCH RBC QN AUTO: 32 PG (ref 26.5–33)
MCHC RBC AUTO-ENTMCNC: 32.8 G/DL (ref 31.5–36.5)
MCV RBC AUTO: 98 FL (ref 78–100)
MONOCYTES # BLD AUTO: 0.4 10E3/UL (ref 0–1.3)
MONOCYTES NFR BLD AUTO: 7 %
NEUTROPHILS # BLD AUTO: 4.1 10E3/UL (ref 1.6–8.3)
NEUTROPHILS NFR BLD AUTO: 66 %
PLATELET # BLD AUTO: 295 10E3/UL (ref 150–450)
RBC # BLD AUTO: 4.62 10E6/UL (ref 3.8–5.2)
WBC # BLD AUTO: 6.2 10E3/UL (ref 4–11)

## 2022-04-14 PROCEDURE — 80061 LIPID PANEL: CPT | Performed by: FAMILY MEDICINE

## 2022-04-14 PROCEDURE — 80050 GENERAL HEALTH PANEL: CPT | Performed by: FAMILY MEDICINE

## 2022-04-14 PROCEDURE — 99396 PREV VISIT EST AGE 40-64: CPT | Performed by: FAMILY MEDICINE

## 2022-04-14 PROCEDURE — 36415 COLL VENOUS BLD VENIPUNCTURE: CPT | Performed by: FAMILY MEDICINE

## 2022-04-14 RX ORDER — FEXOFENADINE HCL 180 MG/1
180 TABLET ORAL DAILY
Qty: 90 TABLET | Refills: 1 | Status: SHIPPED | OUTPATIENT
Start: 2022-04-14

## 2022-04-14 RX ORDER — MONTELUKAST SODIUM 10 MG/1
TABLET ORAL
Qty: 90 TABLET | Refills: 3 | Status: SHIPPED | OUTPATIENT
Start: 2022-04-14 | End: 2023-02-02

## 2022-04-14 RX ORDER — MELOXICAM 7.5 MG/1
7.5 TABLET ORAL DAILY PRN
Qty: 90 TABLET | Refills: 1 | Status: SHIPPED | OUTPATIENT
Start: 2022-04-14 | End: 2022-10-19

## 2022-04-14 RX ORDER — LOSARTAN POTASSIUM 50 MG/1
50 TABLET ORAL DAILY
Qty: 90 TABLET | Refills: 3 | Status: SHIPPED | OUTPATIENT
Start: 2022-04-14 | End: 2023-02-02

## 2022-04-14 RX ORDER — FLUTICASONE PROPIONATE 50 MCG
SPRAY, SUSPENSION (ML) NASAL
Qty: 16 G | Refills: 3 | Status: SHIPPED | OUTPATIENT
Start: 2022-04-14 | End: 2023-05-17

## 2022-04-14 RX ORDER — LEVOTHYROXINE SODIUM 50 UG/1
50 TABLET ORAL DAILY
Qty: 90 TABLET | Refills: 0 | Status: CANCELLED | OUTPATIENT
Start: 2022-04-14

## 2022-04-14 RX ORDER — DEXAMETHASONE 4 MG/1
TABLET ORAL
Qty: 12 G | Refills: 1 | Status: SHIPPED | OUTPATIENT
Start: 2022-04-14 | End: 2023-05-17

## 2022-04-14 RX ORDER — TRIAMCINOLONE ACETONIDE 1 MG/G
CREAM TOPICAL 2 TIMES DAILY
Qty: 80 G | Refills: 1 | Status: SHIPPED | OUTPATIENT
Start: 2022-04-14 | End: 2023-05-17

## 2022-04-14 RX ORDER — ALBUTEROL SULFATE 90 UG/1
AEROSOL, METERED RESPIRATORY (INHALATION)
Qty: 18 G | Refills: 1 | Status: SHIPPED | OUTPATIENT
Start: 2022-04-14 | End: 2023-05-17

## 2022-04-14 ASSESSMENT — ENCOUNTER SYMPTOMS
FEVER: 0
DYSURIA: 0
SORE THROAT: 0
SHORTNESS OF BREATH: 0
HEADACHES: 0
EYE PAIN: 0
PALPITATIONS: 0
NAUSEA: 0
DIARRHEA: 0
HEARTBURN: 0
PARESTHESIAS: 0
ABDOMINAL PAIN: 0
MYALGIAS: 0
JOINT SWELLING: 0
NERVOUS/ANXIOUS: 0
WEAKNESS: 0
CONSTIPATION: 0
BREAST MASS: 0
CHILLS: 0
DIZZINESS: 0
ARTHRALGIAS: 0
COUGH: 0
HEMATURIA: 0
FREQUENCY: 0
HEMATOCHEZIA: 0

## 2022-04-14 NOTE — PATIENT INSTRUCTIONS
http://www.checkyourhealth.org/physical-activity/wow.php   Preventive Health Recommendations  Female Ages 50 - 64    Yearly exam: See your health care provider every year in order to  Review health changes.   Discuss preventive care.    Review your medicines if your doctor has prescribed any.    Get a Pap test every three years (unless you have an abnormal result and your provider advises testing more often).  If you get Pap tests with HPV test, you only need to test every 5 years, unless you have an abnormal result.   You do not need a Pap test if your uterus was removed (hysterectomy) and you have not had cancer.  You should be tested each year for STDs (sexually transmitted diseases) if you're at risk.   Have a mammogram every 1 to 2 years.  Have a colonoscopy at age 50, or have a yearly FIT test (stool test). These exams screen for colon cancer.    Have a cholesterol test every 5 years, or more often if advised.  Have a diabetes test (fasting glucose) every three years. If you are at risk for diabetes, you should have this test more often.   If you are at risk for osteoporosis (brittle bone disease), think about having a bone density scan (DEXA).    Shots: Get a flu shot each year. Get a tetanus shot every 10 years.    Nutrition:   Eat at least 5 servings of fruits and vegetables each day.  Eat whole-grain bread, whole-wheat pasta and brown rice instead of white grains and rice.  Get adequate Calcium and Vitamin D.     Lifestyle  Exercise at least 150 minutes a week (30 minutes a day, 5 days a week). This will help you control your weight and prevent disease.  Limit alcohol to one drink per day.  No smoking.   Wear sunscreen to prevent skin cancer.   See your dentist every six months for an exam and cleaning.  See your eye doctor every 1 to 2 years.

## 2022-04-14 NOTE — PROGRESS NOTES
SUBJECTIVE:   CC: Rayna Jackson is an 57 year old woman who presents for preventive health visit.         Healthy Habits:     Getting at least 3 servings of Calcium per day:  Yes    Bi-annual eye exam:  Yes    Dental care twice a year:  Yes    Sleep apnea or symptoms of sleep apnea:  None    Diet:  Regular (no restrictions)    Frequency of exercise:  1 day/week    Duration of exercise:  Less than 15 minutes    Taking medications regularly:  Yes    Medication side effects:  Not applicable and None    PHQ-2 Total Score: 0    Additional concerns today:  No      Wt Readings from Last 4 Encounters:   04/14/22 89.4 kg (197 lb 1.6 oz)   02/19/21 79.2 kg (174 lb 8 oz)   02/03/21 78 kg (172 lb)   04/03/20 77 kg (169 lb 11.2 oz)         Today's PHQ-2 Score:   PHQ-2 ( 1999 Pfizer) 4/8/2022   Q1: Little interest or pleasure in doing things 0   Q2: Feeling down, depressed or hopeless 0   PHQ-2 Score 0   PHQ-2 Total Score (12-17 Years)- Positive if 3 or more points; Administer PHQ-A if positive -   Q1: Little interest or pleasure in doing things Not at all   Q2: Feeling down, depressed or hopeless Not at all   PHQ-2 Score 0       Abuse: Current or Past (Physical, Sexual or Emotional) - No  Do you feel safe in your environment? Yes    Have you ever done Advance Care Planning? (For example, a Health Directive, POLST, or a discussion with a medical provider or your loved ones about your wishes): Yes, patient states has an Advance Care Planning document and will bring a copy to the clinic.    Social History     Tobacco Use     Smoking status: Never Smoker     Smokeless tobacco: Never Used   Substance Use Topics     Alcohol use: Yes     Comment: occ     If you drink alcohol do you typically have >3 drinks per day or >7 drinks per week? No    Alcohol Use 4/8/2022   Prescreen: >3 drinks/day or >7 drinks/week? No   Prescreen: >3 drinks/day or >7 drinks/week? -       Reviewed orders with patient.  Reviewed health maintenance and  updated orders accordingly - Yes  Labs reviewed in EPIC    Breast Cancer Screening:  Any new diagnosis of family breast, ovarian, or bowel cancer? No    Breast CA Risk Assessment (FHS-7) 2/15/2021   Do you have a family history of breast, colon, or ovarian cancer? No / Unknown       click delete button to remove this line now  Mammogram Screening: Recommended mammography every 1-2 years with patient discussion and risk factor consideration  Pertinent mammograms are reviewed under the imaging tab.    History of abnormal Pap smear: NO - age 30-65 PAP every 5 years with negative HPV co-testing recommended  PAP / HPV Latest Ref Rng & Units 11/15/2019 5/1/2016   PAP (Historical) - NIL Negative   HPV16 NEG:Negative Negative -   HPV18 NEG:Negative Negative -   HRHPV NEG:Negative Negative -     Reviewed and updated as needed this visit by clinical staff   Tobacco  Allergies    Med Hx  Surg Hx  Fam Hx  Soc Hx          Reviewed and updated as needed this visit by Provider                       Review of Systems   Constitutional: Negative for chills and fever.   HENT: Negative for congestion, ear pain, hearing loss and sore throat.    Eyes: Negative for pain and visual disturbance.   Respiratory: Negative for cough and shortness of breath.    Cardiovascular: Positive for peripheral edema. Negative for chest pain and palpitations.   Gastrointestinal: Negative for abdominal pain, constipation, diarrhea, heartburn, hematochezia and nausea.   Breasts:  Negative for tenderness, breast mass and discharge.   Genitourinary: Negative for dysuria, frequency, genital sores, hematuria, pelvic pain, urgency, vaginal bleeding and vaginal discharge.   Musculoskeletal: Negative for arthralgias, joint swelling and myalgias.   Skin: Negative for rash.   Neurological: Negative for dizziness, weakness, headaches and paresthesias.   Psychiatric/Behavioral: Negative for mood changes. The patient is not nervous/anxious.           OBJECTIVE:  "  /86   Pulse 88   Temp 97.7  F (36.5  C) (Oral)   Ht 1.651 m (5' 5\")   Wt 89.4 kg (197 lb 1.6 oz)   SpO2 97%   BMI 32.80 kg/m    Physical Exam  GENERAL: healthy, alert and no distress  EYES: Eyes grossly normal to inspection, PERRL and conjunctivae and sclerae normal  HENT: ear canals and TM's normal, nose and mouth without ulcers or lesions  NECK: no adenopathy, no asymmetry, masses, or scars and thyroid normal to palpation  RESP: lungs clear to auscultation - no rales, rhonchi or wheezes  CV: regular rate and rhythm, normal S1 S2, no S3 or S4, no murmur, click or rub, no peripheral edema and peripheral pulses strong  ABDOMEN: soft, nontender, no hepatosplenomegaly, no masses and bowel sounds normal  MS: no gross musculoskeletal defects noted, no edema  SKIN: no suspicious lesions or rashes  NEURO: Normal strength and tone, mentation intact and speech normal  PSYCH: mentation appears normal, affect normal/bright    Diagnostic Test Results:  Labs reviewed in Epic  none     ASSESSMENT/PLAN:   (Z00.00) Routine general medical examination at a health care facility  (primary encounter diagnosis)  Plan: CBC with platelets and differential, Lipid         panel reflex to direct LDL Fasting,         Comprehensive metabolic panel (BMP + Alb, Alk         Phos, ALT, AST, Total. Bili, TP)    (J30.9) Chronic allergic rhinitis  Plan: montelukast (SINGULAIR) 10 MG tablet,         fluticasone (FLONASE) 50 MCG/ACT nasal spray,         fexofenadine (ALLEGRA) 180 MG tablet      (J45.20) Mild intermittent asthma without complication  Comment: stable   Plan: montelukast (SINGULAIR) 10 MG tablet,         fluticasone (FLOVENT HFA) 110 MCG/ACT inhaler,         albuterol (PROAIR HFA/PROVENTIL HFA/VENTOLIN         HFA) 108 (90 Base) MCG/ACT inhaler      (M16.12) Osteoarthritis of left hip, unspecified osteoarthritis type  Comment: stable   Plan: meloxicam (MOBIC) 7.5 MG tablet      (I10) Benign essential hypertension  Comment: " "stable   Plan: losartan (COZAAR) 50 MG tablet      (E03.9) Hypothyroidism, unspecified type  Plan: TSH with free T4 reflex      (H60.543) Dermatitis of both ear canals  Comment: stable   Plan: triamcinolone (KENALOG) 0.1 % external cream      (E66.09,  Z68.32) Class 1 obesity due to excess calories without serious comorbidity with body mass index (BMI) of 32.0 to 32.9 in adult  Comment: recent weight gain since stopping keto. Diet and lifestyle changes discussed       Patient has been advised of split billing requirements and indicates understanding: Yes    COUNSELING:  Reviewed preventive health counseling, as reflected in patient instructions       Regular exercise       Healthy diet/nutrition    Estimated body mass index is 32.8 kg/m  as calculated from the following:    Height as of this encounter: 1.651 m (5' 5\").    Weight as of this encounter: 89.4 kg (197 lb 1.6 oz).    Weight management plan: Discussed healthy diet and exercise guidelines    She reports that she has never smoked. She has never used smokeless tobacco.      Counseling Resources:  ATP IV Guidelines  Pooled Cohorts Equation Calculator  Breast Cancer Risk Calculator  BRCA-Related Cancer Risk Assessment: FHS-7 Tool  FRAX Risk Assessment  ICSI Preventive Guidelines  Dietary Guidelines for Americans, 2010  USDA's MyPlate  ASA Prophylaxis  Lung CA Screening    Mitra Keita MD  [facility]"

## 2022-04-15 LAB
ALBUMIN SERPL-MCNC: 4.2 G/DL (ref 3.4–5)
ALP SERPL-CCNC: 88 U/L (ref 40–150)
ALT SERPL W P-5'-P-CCNC: 14 U/L (ref 0–50)
ANION GAP SERPL CALCULATED.3IONS-SCNC: 5 MMOL/L (ref 3–14)
AST SERPL W P-5'-P-CCNC: 15 U/L (ref 0–45)
BILIRUB SERPL-MCNC: 0.9 MG/DL (ref 0.2–1.3)
BUN SERPL-MCNC: 13 MG/DL (ref 7–30)
CALCIUM SERPL-MCNC: 9.3 MG/DL (ref 8.5–10.1)
CHLORIDE BLD-SCNC: 107 MMOL/L (ref 94–109)
CHOLEST SERPL-MCNC: 212 MG/DL
CO2 SERPL-SCNC: 27 MMOL/L (ref 20–32)
CREAT SERPL-MCNC: 0.7 MG/DL (ref 0.52–1.04)
FASTING STATUS PATIENT QL REPORTED: NO
GFR SERPL CREATININE-BSD FRML MDRD: >90 ML/MIN/1.73M2
GLUCOSE BLD-MCNC: 94 MG/DL (ref 70–99)
HDLC SERPL-MCNC: 48 MG/DL
LDLC SERPL CALC-MCNC: 127 MG/DL
NONHDLC SERPL-MCNC: 164 MG/DL
POTASSIUM BLD-SCNC: 4 MMOL/L (ref 3.4–5.3)
PROT SERPL-MCNC: 7.6 G/DL (ref 6.8–8.8)
SODIUM SERPL-SCNC: 139 MMOL/L (ref 133–144)
TRIGL SERPL-MCNC: 185 MG/DL
TSH SERPL DL<=0.005 MIU/L-ACNC: 2.11 MU/L (ref 0.4–4)

## 2022-04-18 DIAGNOSIS — E03.9 HYPOTHYROIDISM, UNSPECIFIED TYPE: ICD-10-CM

## 2022-04-18 RX ORDER — LEVOTHYROXINE SODIUM 50 UG/1
50 TABLET ORAL DAILY
Qty: 90 TABLET | Refills: 3 | Status: SHIPPED | OUTPATIENT
Start: 2022-04-18 | End: 2023-02-02

## 2022-04-23 ENCOUNTER — HEALTH MAINTENANCE LETTER (OUTPATIENT)
Age: 57
End: 2022-04-23

## 2022-04-29 ENCOUNTER — OFFICE VISIT (OUTPATIENT)
Dept: OBGYN | Facility: CLINIC | Age: 57
End: 2022-04-29
Payer: COMMERCIAL

## 2022-04-29 ENCOUNTER — TELEPHONE (OUTPATIENT)
Dept: OBGYN | Facility: CLINIC | Age: 57
End: 2022-04-29

## 2022-04-29 VITALS — BODY MASS INDEX: 32.82 KG/M2 | HEIGHT: 65 IN | WEIGHT: 197 LBS

## 2022-04-29 DIAGNOSIS — Z30.432 ENCOUNTER FOR IUD REMOVAL: Primary | ICD-10-CM

## 2022-04-29 PROCEDURE — 99213 OFFICE O/P EST LOW 20 MIN: CPT | Performed by: OBSTETRICS & GYNECOLOGY

## 2022-04-29 NOTE — PROGRESS NOTES
INDICATIONS:                                                      Rayna Jackson is a 57 year old female,, No LMP recorded. (Menstrual status: IUD). who presents today for  Mirena  IUD removal.  She has not had problems with the IUD. She requests removal of the IUD because the IUD effectiveness has , and she is hoping she is menopausal.      PROCEDURE:                                                      A speculum exam was performed and the cervix was visualized. The IUD string was not visualized. The patient is very uncomfortable with exams, and so we mutually agreed that further attempts at blind removal would not be prudent.    POST PROCEDURE:                                                      We reviewed that the next step would be hysteroscopic removal. We will schedule with sedation in the OR. Risks, benefits, and alternatives explained.    Court Locke MD

## 2022-04-29 NOTE — TELEPHONE ENCOUNTER
Court Locke MD Kamleiter, Jody L  Surgeon:COURT LOCKE   Assist:  No   Location: [facility]   Date/time preference:  per patient     Surgery:  hysteroscopic IUD removal   Length of Surgery:  15 mins   Diagnosis:  retained IUD   Anesthesia type:  MAC     Special instructions / equipment:  none   Am admit or same day: SAME DAY   Bowel prep: No   Pre op: PCP   Office visit with surgeon prior to surgery: No   Schedule postop visit: not needed     Thanks,   Court Locke MD

## 2022-04-29 NOTE — TELEPHONE ENCOUNTER
Type of surgery: hysteroscopic iud removal  Location of surgery: [facility]  Date and time of surgery: 5/11/22 @ 9:45 am  Surgeon: Dr. Locke  Pre-Op Appt Date: Patient advised to schedule.  Post-Op Appt Date:    Packet sent out: Yes  Pre-cert/Authorization completed:  No  Date: 4/29/22

## 2022-04-29 NOTE — NURSING NOTE
"Chief Complaint   Patient presents with     IUD     Mirena IUD replacement.       Initial Ht 1.651 m (5' 5\")   Wt 89.4 kg (197 lb)   Breastfeeding No   BMI 32.78 kg/m   Estimated body mass index is 32.78 kg/m  as calculated from the following:    Height as of this encounter: 1.651 m (5' 5\").    Weight as of this encounter: 89.4 kg (197 lb).  BP completed using cuff size: regular    Questioned patient about current smoking habits.  Pt. has never smoked.          The following HM Due: NONE      The following patient reported/Care Every where data was sent to:  P ABSTRACT QUALITY INITIATIVES [[id number]]  Sadia Johnson LPN               "

## 2022-05-02 DIAGNOSIS — Z01.812 ENCOUNTER FOR PREOPERATIVE SCREENING LABORATORY TESTING FOR COVID-19 VIRUS: Primary | ICD-10-CM

## 2022-05-02 DIAGNOSIS — Z11.52 ENCOUNTER FOR PREOPERATIVE SCREENING LABORATORY TESTING FOR COVID-19 VIRUS: Primary | ICD-10-CM

## 2022-05-05 ENCOUNTER — LAB (OUTPATIENT)
Dept: LAB | Facility: CLINIC | Age: 57
End: 2022-05-05
Payer: COMMERCIAL

## 2022-05-05 DIAGNOSIS — Z01.812 ENCOUNTER FOR PREOPERATIVE SCREENING LABORATORY TESTING FOR COVID-19 VIRUS: ICD-10-CM

## 2022-05-05 DIAGNOSIS — Z11.52 ENCOUNTER FOR PREOPERATIVE SCREENING LABORATORY TESTING FOR COVID-19 VIRUS: ICD-10-CM

## 2022-05-05 LAB — SARS-COV-2 RNA RESP QL NAA+PROBE: NEGATIVE

## 2022-05-05 PROCEDURE — U0005 INFEC AGEN DETEC AMPLI PROBE: HCPCS

## 2022-05-05 PROCEDURE — U0003 INFECTIOUS AGENT DETECTION BY NUCLEIC ACID (DNA OR RNA); SEVERE ACUTE RESPIRATORY SYNDROME CORONAVIRUS 2 (SARS-COV-2) (CORONAVIRUS DISEASE [COVID-19]), AMPLIFIED PROBE TECHNIQUE, MAKING USE OF HIGH THROUGHPUT TECHNOLOGIES AS DESCRIBED BY CMS-2020-01-R: HCPCS

## 2022-05-09 ENCOUNTER — OFFICE VISIT (OUTPATIENT)
Dept: FAMILY MEDICINE | Facility: CLINIC | Age: 57
End: 2022-05-09
Payer: COMMERCIAL

## 2022-05-09 VITALS
OXYGEN SATURATION: 99 % | HEART RATE: 81 BPM | SYSTOLIC BLOOD PRESSURE: 136 MMHG | TEMPERATURE: 98.1 F | HEIGHT: 65 IN | DIASTOLIC BLOOD PRESSURE: 83 MMHG | BODY MASS INDEX: 32.82 KG/M2 | WEIGHT: 197 LBS

## 2022-05-09 DIAGNOSIS — Z01.818 PREOP GENERAL PHYSICAL EXAM: Primary | ICD-10-CM

## 2022-05-09 DIAGNOSIS — T83.32XD INTRAUTERINE CONTRACEPTIVE DEVICE THREADS LOST, SUBSEQUENT ENCOUNTER: ICD-10-CM

## 2022-05-09 PROCEDURE — 99214 OFFICE O/P EST MOD 30 MIN: CPT | Performed by: FAMILY MEDICINE

## 2022-05-09 NOTE — PROGRESS NOTES
[facility]  [id number] [place] [id number]  Phone: [phone]  Primary Provider: Mitra Keita  Pre-op Performing Provider: MITRA KEITA      PREOPERATIVE EVALUATION:  Today's date: 2022    Rayna Jackson is a 57 year old female who presents for a preoperative evaluation.    Surgical Information:  Surgery/Procedure: Hysteroscopic IUD removal with sedation  Surgery Location: [facility]  Surgeon: Dr. Farias  Surgery Date: 2022  Time of Surgery: 9:45 AM  Where patient plans to recover: At home with family  Fax number for surgical facility:     Type of Anesthesia Anticipated: to be determined    Assessment & Plan     The proposed surgical procedure is considered INTERMEDIATE risk.    Preop general physical exam    Intrauterine contraceptive device threads lost, subsequent encounter           RECOMMENDATION:  APPROVAL GIVEN to proceed with proposed procedure, without further diagnostic evaluation.            Subjective     HPI related to upcoming procedure:     58 y/o female with IUD in place. She was seen recently for removal as effectiveness had  however the strings were not visualized. hysteroscopy with IUD removal is deemed the next best step in management.     Preop Questions 5/3/2022   1. Have you ever had a heart attack or stroke? No   2. Have you ever had surgery on your heart or blood vessels, such as a stent placement, a coronary artery bypass, or surgery on an artery in your head, neck, heart, or legs? No   3. Do you have chest pain with activity? No   4. Do you have a history of  heart failure? No   5. Do you currently have a cold, bronchitis or symptoms of other infection? No   6. Do you have a cough, shortness of breath, or wheezing? No   7. Do you or anyone in your family have previous history of blood clots? No   8. Do you or does anyone in your family have a serious bleeding problem such as prolonged  bleeding following surgeries or cuts? No   9. Have you ever had problems with anemia or been told to take iron pills? No   10. Have you had any abnormal blood loss such as black, tarry or bloody stools, or abnormal vaginal bleeding? No   11. Have you ever had a blood transfusion? No   12. Are you willing to have a blood transfusion if it is medically needed before, during, or after your surgery? NO    13. Have you or any of your relatives ever had problems with anesthesia? No   14. Do you have sleep apnea, excessive snoring or daytime drowsiness? No   15. Do you have any artifical heart valves or other implanted medical devices like a pacemaker, defibrillator, or continuous glucose monitor? No   16. Do you have artificial joints? No   17. Are you allergic to latex? No   18. Is there any chance that you may be pregnant? No       Health Care Directive:  Patient does not have a Health Care Directive or Living Will: NO    Preoperative Review of :   reviewed - no record of controlled substances prescribed.      Status of Chronic Conditions:  See problem list for active medical problems.  Problems all longstanding and stable, except as noted/documented.  See ROS for pertinent symptoms related to these conditions.      Review of Systems  Constitutional, neuro, ENT, endocrine, pulmonary, cardiac, gastrointestinal, genitourinary, musculoskeletal, integument and psychiatric systems are negative, except as otherwise noted.    Patient Active Problem List    Diagnosis Date Noted     Hip pain, left 05/10/2019     Priority: Medium     Subclinical hypothyroidism 04/15/2019     Priority: Medium     Environmental allergies 06/14/2018     Priority: Medium     Moderate persistent asthma without complication 06/14/2018     Priority: Medium     Allergic rhinitis 05/28/2018     Priority: Medium     Asthma 05/28/2018     Priority: Medium     HTN, goal below 140/90 01/02/2018     Priority: Medium     Chronic nonintractable headache,  unspecified headache type 11/17/2017     Priority: Medium     IUD (intrauterine device) in place 11/16/2017     Priority: Medium     Osteoarthritis of both feet, unspecified osteoarthritis type 06/02/2017     Priority: Medium     Benign essential hypertension 09/09/2016     Priority: Medium     Osteoarthritis of both knees, unspecified osteoarthritis type 09/09/2016     Priority: Medium     Left knee- severe patellofemoral chondromalacia and cartilage defect. On MRI 4/2012.        Bilateral edema of lower extremity 09/09/2016     Priority: Medium      Past Medical History:   Diagnosis Date     Arthritis      Hypertension      Torn meniscus 01/01/2013    left      Uncomplicated asthma      Past Surgical History:   Procedure Laterality Date     APPENDECTOMY       appendicitis  01/01/1980     BIOPSY  2017    Removed gland in back of neck     COLONOSCOPY N/A 06/30/2017    Procedure: COLONOSCOPY;  COLONOSCOPY   ;  Surgeon: Brooks Villa MD;  Location:  GI     CYSTECTOMY OVARIAN BENIGN  01/01/1980     KNEE SURGERY Left      Current Outpatient Medications   Medication Sig Dispense Refill     albuterol (PROAIR HFA/PROVENTIL HFA/VENTOLIN HFA) 108 (90 Base) MCG/ACT inhaler INHALE 2 PUFFS INTO THE LUNGS AS NEEDED FOR SHORTNESS OF BREATH / DYSPNEA OR WHEEZING 18 g 1     fexofenadine (ALLEGRA) 180 MG tablet Take 1 tablet (180 mg) by mouth daily Take 180 mg by mouth 90 tablet 1     fluticasone (FLONASE) 50 MCG/ACT nasal spray INSTILL 2 SPRAYS INTO BOTH NOSTRILS DAILY. 16 g 3     fluticasone (FLOVENT HFA) 110 MCG/ACT inhaler TAKE 2 PUFFS BY MOUTH TWICE A DAY 12 g 1     levonorgestrel (MIRENA) 20 MCG/24HR IUD 1 each by Intrauterine route once       levothyroxine (SYNTHROID/LEVOTHROID) 50 MCG tablet Take 1 tablet (50 mcg) by mouth daily 90 tablet 3     losartan (COZAAR) 50 MG tablet Take 1 tablet (50 mg) by mouth daily 90 tablet 3     meloxicam (MOBIC) 7.5 MG tablet Take 1 tablet (7.5 mg) by mouth daily as needed for moderate  "pain (severe pain) 90 tablet 1     montelukast (SINGULAIR) 10 MG tablet TAKE 1 TABLET BY MOUTH EVERYDAY AT BEDTIME 90 tablet 3     triamcinolone (KENALOG) 0.1 % external cream Apply topically 2 times daily 80 g 1       Allergies   Allergen Reactions     Lisinopril Cough     Amlodipine Swelling     Aspirin Other (See Comments)     Throat swells     Codeine Unknown     headache        Social History     Tobacco Use     Smoking status: Never Smoker     Smokeless tobacco: Never Used   Substance Use Topics     Alcohol use: Yes     Comment: occ     Family History   Problem Relation Age of Onset     Hypertension Mother      Thyroid Disease Mother      Coronary Artery Disease Mother      Hypertension Father      Diabetes Father      Coronary Artery Disease Father      Hyperlipidemia Father      Obesity Father      Diabetes Other         great grandmother     Thyroid Disease Sister      Thyroid Disease Maternal Aunt      Coronary Artery Disease Maternal Grandfather      Thyroid Disease Maternal Grandmother      Coronary Artery Disease Paternal Grandmother      Cerebrovascular Disease Paternal Grandmother      History   Drug Use No         Objective     /83 (BP Location: Right arm, Patient Position: Sitting, Cuff Size: Adult Large)   Pulse 81   Temp 98.1  F (36.7  C) (Temporal)   Ht 1.651 m (5' 5\")   Wt 89.4 kg (197 lb)   SpO2 99%   BMI 32.78 kg/m      Physical Exam    GENERAL APPEARANCE: healthy, alert and no distress     EYES: EOMI, PERRL     HENT: ear canals and TM's normal and nose and mouth without ulcers or lesions     NECK: no adenopathy, no asymmetry, masses, or scars and thyroid normal to palpation     RESP: lungs clear to auscultation - no rales, rhonchi or wheezes     CV: regular rates and rhythm, normal S1 S2, no S3 or S4 and no murmur, click or rub     ABDOMEN:  soft, nontender, no HSM or masses and bowel sounds normal     MS: extremities normal- no gross deformities noted, no evidence of inflammation " in joints, FROM in all extremities.     SKIN: no suspicious lesions or rashes     NEURO: Normal strength and tone, sensory exam grossly normal, mentation intact and speech normal     PSYCH: mentation appears normal. and affect normal/bright     LYMPHATICS: No cervical adenopathy    Recent Labs   Lab Test 04/14/22  1530 11/05/21  1305   HGB 14.8 15.0    287    140   POTASSIUM 4.0 3.8   CR 0.70 0.76        Diagnostics:  No labs were ordered during this visit.   No EKG required, no history of coronary heart disease, significant arrhythmia, peripheral arterial disease or other structural heart disease.    Revised Cardiac Risk Index (RCRI):  The patient has the following serious cardiovascular risks for perioperative complications:   - No serious cardiac risks = 0 points     RCRI Interpretation: 0 points: Class I (very low risk - 0.4% complication rate)           Signed Electronically by: Mitra Keita MD  Copy of this evaluation report is provided to requesting physician.

## 2022-05-09 NOTE — PATIENT INSTRUCTIONS
Preparing for Your Surgery  Getting started  A nurse will call you to review your health history and instructions. They will give you an arrival time based on your scheduled surgery time. Please be ready to share:    Your doctor's clinic name and phone number    Your medical, surgical and anesthesia history    A list of allergies and sensitivities    A list of medicines, including herbal treatments and over-the-counter drugs    Whether the patient has a legal guardian (ask how to send us the papers in advance)  Please tell us if you're pregnant--or if there's any chance you might be pregnant. Some surgeries may injure a fetus (unborn baby), so they require a pregnancy test. Surgeries that are safe for a fetus don't always need a test, and you can choose whether to have one.   If you have a child who's having surgery, please ask for a copy of Preparing for Your Child's Surgery.    Preparing for surgery    Within 30 days of surgery: Have a pre-op exam (sometimes called an H&P, or History and Physical). This can be done at a clinic or pre-operative center.  ? If you're having a , you may not need this exam. Talk to your care team.    At your pre-op exam, talk to your care team about all medicines you take. If you need to stop any medicines before surgery, ask when to start taking them again.  ? We do this for your safety. Many medicines can make you bleed too much during surgery. Some change how well surgery (anesthesia) drugs work.    Call your insurance company to let them know you're having surgery. (If you don't have insurance, call [phone].)    Call your clinic if there's any change in your health. This includes signs of a cold or flu (sore throat, runny nose, cough, rash, fever). It also includes a scrape or scratch near the surgery site.    If you have questions on the day of surgery, call your hospital or surgery center.  COVID testing  You may need to be tested for COVID-19 before having  surgery. If so, we will give you instructions.  Eating and drinking guidelines  For your safety: Unless your surgeon tells you otherwise, follow the guidelines below.    Eat and drink as usual until 8 hours before surgery. After that, no food or milk.    Drink clear liquids until 2 hours before surgery. These are liquids you can see through, like water, Gatorade and Propel Water. You may also have black coffee and tea (no cream or milk).    Nothing by mouth within 2 hours of surgery. This includes gum, candy and breath mints.    If you drink alcohol: Stop drinking it the night before surgery.    If your care team tells you to take medicine on the morning of surgery, it's okay to take it with a sip of water.  Preventing infection    Shower or bathe the night before and morning of your surgery. Follow the instructions your clinic gave you. (If no instructions, use regular soap.)    Don't shave or clip hair near your surgery site. We'll remove the hair if needed.    Don't smoke or vape the morning of surgery. You may chew nicotine gum up to 2 hours before surgery. A nicotine patch is okay.  ? Note: Some surgeries require you to completely quit smoking and nicotine. Check with your surgeon.    Your care team will make every effort to keep you safe from infection. We will:  ? Clean our hands often with soap and water (or an alcohol-based hand rub).  ? Clean the skin at your surgery site with a special soap that kills germs.  ? Give you a special gown to keep you warm. (Cold raises the risk of infection.)  ? Wear special hair covers, masks, gowns and gloves during surgery.  ? Give antibiotic medicine, if prescribed. Not all surgeries need antibiotics.  What to bring on the day of surgery    Photo ID and insurance card    Copy of your health care directive, if you have one    Glasses and hearing aides (bring cases)  ? You can't wear contacts during surgery    Inhaler and eye drops, if you use them (tell us about these when  you arrive)    CPAP machine or breathing device, if you use them    A few personal items, if spending the night    If you have . . .  ? A pacemaker, ICD (cardiac defibrillator) or other implant: Bring the ID card.  ? An implanted stimulator: Bring the remote control.  ? A legal guardian: Bring a copy of the certified (court-stamped) guardianship papers.  Please remove any jewelry, including body piercings. Leave jewelry and other valuables at home.  If you're going home the day of surgery    You must have a responsible adult drive you home. They should stay with you overnight as well.    If you don't have someone to stay with you, and you aren't safe to go home alone, we may keep you overnight. Insurance often won't pay for this.  After surgery  If it's hard to control your pain or you need more pain medicine, please call your surgeon's office.  Questions?   If you have any questions for your care team, list them here: _________________________________________________________________________________________________________________________________________________________________________ ____________________________________ ____________________________________ ____________________________________  For informational purposes only. Not to replace the advice of your health care provider. Copyright   2003, 2019 [facility]. All rights reserved. Clinically reviewed by Jolynn Hu MD. [employer] [id number] - REV 07/21.

## 2022-09-06 ENCOUNTER — LAB (OUTPATIENT)
Dept: URGENT CARE | Facility: URGENT CARE | Age: 57
End: 2022-09-06
Attending: FAMILY MEDICINE
Payer: COMMERCIAL

## 2022-09-06 DIAGNOSIS — Z20.822 SUSPECTED 2019 NOVEL CORONAVIRUS INFECTION: ICD-10-CM

## 2022-09-06 LAB — SARS-COV-2 RNA RESP QL NAA+PROBE: NEGATIVE

## 2022-09-06 PROCEDURE — U0003 INFECTIOUS AGENT DETECTION BY NUCLEIC ACID (DNA OR RNA); SEVERE ACUTE RESPIRATORY SYNDROME CORONAVIRUS 2 (SARS-COV-2) (CORONAVIRUS DISEASE [COVID-19]), AMPLIFIED PROBE TECHNIQUE, MAKING USE OF HIGH THROUGHPUT TECHNOLOGIES AS DESCRIBED BY CMS-2020-01-R: HCPCS

## 2022-09-06 PROCEDURE — U0005 INFEC AGEN DETEC AMPLI PROBE: HCPCS

## 2022-10-05 ENCOUNTER — OFFICE VISIT (OUTPATIENT)
Dept: URGENT CARE | Facility: URGENT CARE | Age: 57
End: 2022-10-05
Payer: COMMERCIAL

## 2022-10-05 VITALS
DIASTOLIC BLOOD PRESSURE: 79 MMHG | HEART RATE: 77 BPM | SYSTOLIC BLOOD PRESSURE: 130 MMHG | OXYGEN SATURATION: 99 % | BODY MASS INDEX: 32.62 KG/M2 | TEMPERATURE: 98.2 F | WEIGHT: 196 LBS

## 2022-10-05 DIAGNOSIS — T50.Z95A VACCINE REACTION, INITIAL ENCOUNTER: ICD-10-CM

## 2022-10-05 DIAGNOSIS — H57.89 IRRITATION OF LEFT EYE: Primary | ICD-10-CM

## 2022-10-05 PROCEDURE — 99213 OFFICE O/P EST LOW 20 MIN: CPT | Performed by: FAMILY MEDICINE

## 2022-10-05 RX ORDER — POLYMYXIN B SULFATE AND TRIMETHOPRIM 1; 10000 MG/ML; [USP'U]/ML
2 SOLUTION OPHTHALMIC 4 TIMES DAILY
Qty: 10 ML | Refills: 0 | Status: SHIPPED | OUTPATIENT
Start: 2022-10-05 | End: 2022-10-10

## 2022-10-05 NOTE — PATIENT INSTRUCTIONS
Naproxen (Aleve) 2 OTC tablets twice daily.    Tylenol 1000 mg every 6 hours while awake.    Start eye drops (Polytrim) if you notice crusted eyelashes in the morning or if you see thick, yellowish discharge from the left eye.

## 2022-10-06 NOTE — PROGRESS NOTES
ICD-10-CM    1. Irritation of left eye  H57.89 trimethoprim-polymyxin b (POLYTRIM) 55283-1.1 UNIT/ML-% ophthalmic solution   2. Vaccine reaction, initial encounter  T50.Z95A      Most symptoms related to inflammatory response post-COVID vaccine.  No evidence of DVT at this time.    PLAN:  Patient Instructions   Naproxen (Aleve) 2 OTC tablets twice daily.    Tylenol 1000 mg every 6 hours while awake.    Start eye drops (Polytrim) if you notice crusted eyelashes in the morning or if you see thick, yellowish discharge from the left eye.    SUBJECTIVE:  Rayna Jackson is a 57 year old female who presents to  today with multiple symptoms, including L eye irritation, what she thinks is a swollen vein on the top of her R foot, feeling achy and tired, all of which started after her COVID shot 2 days ago.  She has had vigorous reactions to past Moderna vaccine doses for COVID, but the eye issue is not one she's experienced before.  Has had some pain in lower back, but does not have any urinary symptoms.  No known fevers.  No chest pain or shortness of breath.    OBJECTIVE:  /79   Pulse 77   Temp 98.2  F (36.8  C) (Tympanic)   Wt 88.9 kg (196 lb)   SpO2 99%   BMI 32.62 kg/m    GEN: well-appearing, in NAD  EYES: outer portion of conjunctiva of L eye with mild injection.  No eyelid swelling.  No pain with eye movement.  EOMI.  PERRL.  No evidence of periorbital or orbital cellulitis.  EXT: L upper arm with erythematous patch about 7 cm diameter where COVID vaccine was given.  There is no induration or red streaking from this area.  No fluctuance to suggest abscess formation.  No calf tenderness. R foot with slightly swollen vein across lateral dorsum of foot, but no firmness to suggest superficial blood clot here.  No pitting edema and no erythema.

## 2022-10-11 ENCOUNTER — MYC MEDICAL ADVICE (OUTPATIENT)
Dept: FAMILY MEDICINE | Facility: CLINIC | Age: 57
End: 2022-10-11

## 2022-10-19 DIAGNOSIS — M16.12 OSTEOARTHRITIS OF LEFT HIP, UNSPECIFIED OSTEOARTHRITIS TYPE: ICD-10-CM

## 2022-10-19 RX ORDER — MELOXICAM 7.5 MG/1
7.5 TABLET ORAL DAILY PRN
Qty: 90 TABLET | Refills: 1 | Status: SHIPPED | OUTPATIENT
Start: 2022-10-19 | End: 2023-05-17

## 2022-10-19 NOTE — TELEPHONE ENCOUNTER
Routing refill request to provider for review/approval because:  Drug interaction warning    Yoli NICE RN, BSN, PHN  [facility]

## 2022-10-24 ASSESSMENT — ASTHMA QUESTIONNAIRES
QUESTION_2 LAST FOUR WEEKS HOW OFTEN HAVE YOU HAD SHORTNESS OF BREATH: ONCE OR TWICE A WEEK
QUESTION_3 LAST FOUR WEEKS HOW OFTEN DID YOUR ASTHMA SYMPTOMS (WHEEZING, COUGHING, SHORTNESS OF BREATH, CHEST TIGHTNESS OR PAIN) WAKE YOU UP AT NIGHT OR EARLIER THAN USUAL IN THE MORNING: NOT AT ALL
QUESTION_1 LAST FOUR WEEKS HOW MUCH OF THE TIME DID YOUR ASTHMA KEEP YOU FROM GETTING AS MUCH DONE AT WORK, SCHOOL OR AT HOME: NONE OF THE TIME
QUESTION_5 LAST FOUR WEEKS HOW WOULD YOU RATE YOUR ASTHMA CONTROL: WELL CONTROLLED
QUESTION_4 LAST FOUR WEEKS HOW OFTEN HAVE YOU USED YOUR RESCUE INHALER OR NEBULIZER MEDICATION (SUCH AS ALBUTEROL): NOT AT ALL
ACT_TOTALSCORE: 23
ACT_TOTALSCORE: 23

## 2022-10-25 ENCOUNTER — VIRTUAL VISIT (OUTPATIENT)
Dept: FAMILY MEDICINE | Facility: CLINIC | Age: 57
End: 2022-10-25
Payer: COMMERCIAL

## 2022-10-25 DIAGNOSIS — E66.09 CLASS 1 OBESITY DUE TO EXCESS CALORIES WITH SERIOUS COMORBIDITY AND BODY MASS INDEX (BMI) OF 34.0 TO 34.9 IN ADULT: ICD-10-CM

## 2022-10-25 DIAGNOSIS — E66.811 CLASS 1 OBESITY DUE TO EXCESS CALORIES WITH SERIOUS COMORBIDITY AND BODY MASS INDEX (BMI) OF 34.0 TO 34.9 IN ADULT: ICD-10-CM

## 2022-10-25 PROCEDURE — 99213 OFFICE O/P EST LOW 20 MIN: CPT | Mod: 95 | Performed by: FAMILY MEDICINE

## 2022-10-25 RX ORDER — PHENTERMINE HYDROCHLORIDE 37.5 MG/1
TABLET ORAL
Qty: 30 TABLET | Refills: 1 | Status: SHIPPED | OUTPATIENT
Start: 2022-10-25 | End: 2022-12-13

## 2022-10-25 NOTE — PROGRESS NOTES
"Rayna is a 57 year old who is being evaluated via a billable video visit.      How would you like to obtain your AVS? MyChart  If the video visit is dropped, the invitation should be resent by: Text to cell phone: [phone]  Will anyone else be joining your video visit? No      PT would like to discuss medication options for weight loss     Assessment & Plan     Class 1 obesity due to excess calories with serious comorbidity and body mass index (BMI) of 34.0 to 34.9 in adult  - restart on phentermine and recheck weight at next visit   - phentermine (ADIPEX-P) 37.5 MG tablet; TAKE 1 TABLET BY MOUTH EVERY MORNING BEFORE BREAKFAST           BMI:   Estimated body mass index is 32.62 kg/m  as calculated from the following:    Height as of 5/9/22: 1.651 m (5' 5\").    Weight as of 10/5/22: 88.9 kg (196 lb).         No follow-ups on file.    Mitra Keita MD  [facility]    Subjective   Rayna is a 57 year old , presenting for the following health issues:  No chief complaint on file.      History of Present Illness       Reason for visit:  Weight loss    She eats 2-3 servings of fruits and vegetables daily.She consumes 1 sweetened beverage(s) daily.She exercises with enough effort to increase her heart rate 9 or less minutes per day.  She exercises with enough effort to increase her heart rate 3 or less days per week.   She is taking medications regularly.     Per patient she has regained all of the weight she lost over the last 2 years in the last 5-6 months. She was on the keto diet but went off it and states that is when she gained most of the weight back. She has also noticed more inflammation since gaining weight.       Wt Readings from Last 4 Encounters:   10/05/22 88.9 kg (196 lb)   05/09/22 89.4 kg (197 lb)   04/29/22 89.4 kg (197 lb)   04/14/22 89.4 kg (197 lb 1.6 oz)         Review of Systems   Constitutional, HEENT, cardiovascular, pulmonary, GI, , musculoskeletal, neuro, " skin, endocrine and psych systems are negative, except as otherwise noted.      Objective           Vitals:  No vitals were obtained today due to virtual visit.    Physical Exam   GENERAL: Healthy, alert and no distress  EYES: Eyes grossly normal to inspection.  No discharge or erythema, or obvious scleral/conjunctival abnormalities.  RESP: No audible wheeze, cough, or visible cyanosis.  No visible retractions or increased work of breathing.    PSYCH: Mentation appears normal, affect normal/bright, judgement and insight intact, normal speech and appearance well-groomed.          Video-Visit Details    Video Start Time: 1:36 PM    Type of service:  Video Visit    Video End Time:1:46 pm     Originating Location (pt. Location): Home        Distant Location (provider location):  Off-site    Platform used for Video Visit: [employer]

## 2022-11-21 NOTE — PROGRESS NOTES
"ASSESSMENT & PLAN  Patient Instructions     1. Primary osteoarthritis of left hip    2. Trochanteric bursitis of left hip      -Patient has left hip pain and due to aggravation of arthritis and bursitis  -Patient tolerated left hip intra-articular and trochanteric bursa cortisone injection today without complications.  Patient was given postprocedure instructions  -Patient will follow up when pain returns  -Call direct clinic number [[phone]] at any time with questions or concerns.    Albert Yeo MD [facility] Orthopedics and Sports Medicine  [facility]          -----    SUBJECTIVE  Rayna Jackson is a/an 57 year old female who is seen in follow-up for left hip pain. She last saw  on 4/25/2019, had left hip intra-articular corticosteriod injection. The patient is seen by themselves.    Onset: 4 years(s) ago. Reports insidious onset without acute precipitating event. Notes recently pain returned ~2 months ago, slowly worsening   Location of Pain: left hip, \"deep\" lateral hip   Rating of Pain at worst: 10/10  Rating of Pain Currently: 5/10  Worsened by: constant pain, seemingly improved since last week   Better with: nothing   Treatments tried: Meloxicam (unsure if helping) corticosteroid injection (most recent date: 4/25/2019) that provided  3+ years(s) of relief  Quality: constant sharp, nagging  Associated symptoms: no distal numbness or tingling; denies swelling or warmth  Orthopedic history: NO  Relevant surgical history: YES - previous bilateral knee arthroscopies, multiple surgeries on both   Social history: social history: works as counselor manager     Past Medical History:   Diagnosis Date     Arthritis      Hypertension      Torn meniscus 01/01/2013    left      Uncomplicated asthma      Social History     Socioeconomic History     Marital status:      Spouse name: Juan F     Number of children: 4     Highest education level: Master's degree (e.g., MA, MS, Adry, MEd, " [employer])   Occupational History     Occupation: manager     Employer: [employer]   Tobacco Use     Smoking status: Never     Smokeless tobacco: Never   Vaping Use     Vaping Use: Never used   Substance and Sexual Activity     Alcohol use: Yes     Comment: occ     Drug use: No     Sexual activity: Yes     Partners: Male     Birth control/protection: I.U.D., Female Surgical   Other Topics Concern     Parent/sibling w/ CABG, MI or angioplasty before 65F 55M? Yes     Social Determinants of Health     Financial Resource Strain: Low Risk      Difficulty of Paying Living Expenses: Not hard at all   Food Insecurity: No Food Insecurity     Worried About Running Out of Food in the Last Year: Never true     Ran Out of Food in the Last Year: Never true   Transportation Needs: No Transportation Needs     Lack of Transportation (Medical): No     Lack of Transportation (Non-Medical): No   Physical Activity: Insufficiently Active     Days of Exercise per Week: 1 day     Minutes of Exercise per Session: 30 min   Stress: No Stress Concern Present     Feeling of Stress : Not at all   Social Connections: Socially Integrated     Frequency of Communication with Friends and Family: More than three times a week     Frequency of Social Gatherings with Friends and Family: Three times a week     Attends Islam Services: More than 4 times per year     Active Member of Clubs or Organizations: Yes     Marital Status:    Housing Stability: High Risk     Unable to Pay for Housing in the Last Year: Yes     Number of Places Lived in the Last Year: 1     Unstable Housing in the Last Year: No         Patient's past medical, surgical, social, and family histories were reviewed today and no changes are noted.    REVIEW OF SYSTEMS:  10 point ROS is negative other than symptoms noted above in HPI, Past Medical History or as stated below  Constitutional: NEGATIVE for fever, chills, change in weight  Skin: NEGATIVE for worrisome rashes,  "moles or lesions  GI/: NEGATIVE for bowel or bladder changes  Neuro: NEGATIVE for weakness, dizziness or paresthesias    OBJECTIVE:  /72   Ht 1.651 m (5' 5\")   Wt 89.8 kg (198 lb)   BMI 32.95 kg/m     General: healthy, alert and in no distress  HEENT: no scleral icterus or conjunctival erythema  Skin: no suspicious lesions or rash. No jaundice.  CV: no pedal edema  Resp: normal respiratory effort without conversational dyspnea   Psych: normal mood and affect  Gait: normal steady gait with appropriate coordination and balance  Neuro: Normal light sensory exam of lower extremity  MSK:  LEFT HIP  Inspection:    No obvious deformity or asymmetry, level pelvis  Palpation:    Tender about the anterior groin/joint line and greater trochanteric region. Otherwise all other landmarks are nontender.  Active Range of Motion:     Flexion limited by pain, IR limited by pain, ER  limited by pain  Strength:    Flexion painful, adduction grossly intact, abduction grossly intact  Special Tests:    Positive: anterior impingement (FADIR), posterior impingement (EX/AB/ER)    Negative: Logroll, resisted gluteus medius provocation, YASMINE    Independent visualization of the below image:  No results found for this or any previous visit (from the past 24 hour(s)).    XR LEFT HIP TWO-THREE VIEWS  4/15/2019 4:41 PM      HISTORY: Left hip pain.     COMPARISON: None.                                                                      IMPRESSION: Mild joint space narrowing and irregularity in the left  hip joint space with mild osteophyte formation corresponding to  osteoarthritis. No acute fracture. Pelvic calcifications and IUD  incidentally and partially visible.     MARILYNN HOWARD MD  Large Joint Injection/Arthocentesis: L hip joint    Date/Time: 11/23/2022 2:03 PM  Performed by: Yeo, Albert, MD  Authorized by: Yeo, Albert, MD     Indications:  Pain  Needle Size:  22 G  Guidance: ultrasound    Approach:  Anterior  Location:  " Hip      Site:  L hip joint  Medications:  40 mg methylPREDNISolone 40 MG/ML  Outcome:  Tolerated well, no immediate complications  Procedure discussed: discussed risks, benefits, and alternatives    Consent Given by:  Patient  Prep: patient was prepped and draped in usual sterile fashion    Large Joint Injection/Arthocentesis: L greater trochanteric bursa    Date/Time: 11/23/2022 2:05 PM  Performed by: Yeo, Albert, MD  Authorized by: Yeo, Albert, MD     Indications:  Pain  Needle Size:  22 G  Guidance: ultrasound    Approach:  Lateral  Location:  Hip      Site:  L greater trochanteric bursa  Medications:  40 mg methylPREDNISolone 40 MG/ML  Outcome:  Tolerated well, no immediate complications  Procedure discussed: discussed risks, benefits, and alternatives    Consent Given by:  Patient  Prep: patient was prepped and draped in usual sterile fashion        Patient's condition was thoroughly discussed with the patient and total time spent with patient, performing procedures of and documentation totaled 30 minutes.      Albert Yeo MD [facility] Sports and Orthopedic [facility]

## 2022-11-23 ENCOUNTER — OFFICE VISIT (OUTPATIENT)
Dept: ORTHOPEDICS | Facility: CLINIC | Age: 57
End: 2022-11-23
Payer: COMMERCIAL

## 2022-11-23 VITALS
DIASTOLIC BLOOD PRESSURE: 72 MMHG | WEIGHT: 198 LBS | SYSTOLIC BLOOD PRESSURE: 118 MMHG | BODY MASS INDEX: 32.99 KG/M2 | HEIGHT: 65 IN

## 2022-11-23 DIAGNOSIS — M70.62 TROCHANTERIC BURSITIS OF LEFT HIP: ICD-10-CM

## 2022-11-23 DIAGNOSIS — M16.12 PRIMARY OSTEOARTHRITIS OF LEFT HIP: Primary | ICD-10-CM

## 2022-11-23 PROCEDURE — 20611 DRAIN/INJ JOINT/BURSA W/US: CPT | Mod: LT | Performed by: FAMILY MEDICINE

## 2022-11-23 PROCEDURE — 99214 OFFICE O/P EST MOD 30 MIN: CPT | Mod: 25 | Performed by: FAMILY MEDICINE

## 2022-11-23 RX ORDER — METHYLPREDNISOLONE ACETATE 40 MG/ML
40 INJECTION, SUSPENSION INTRA-ARTICULAR; INTRALESIONAL; INTRAMUSCULAR; SOFT TISSUE
Status: DISCONTINUED | OUTPATIENT
Start: 2022-11-23 | End: 2023-08-09

## 2022-11-23 RX ADMIN — METHYLPREDNISOLONE ACETATE 40 MG: 40 INJECTION, SUSPENSION INTRA-ARTICULAR; INTRALESIONAL; INTRAMUSCULAR; SOFT TISSUE at 14:05

## 2022-11-23 RX ADMIN — METHYLPREDNISOLONE ACETATE 40 MG: 40 INJECTION, SUSPENSION INTRA-ARTICULAR; INTRALESIONAL; INTRAMUSCULAR; SOFT TISSUE at 14:03

## 2022-11-23 NOTE — LETTER
"    11/23/2022         RE: Rayna Jackson  [address] [id number]        Dear Colleague,    Thank you for referring your patient, Rayna Jackson, to the [facility] [place]. Please see a copy of my visit note below.    ASSESSMENT & PLAN  Patient Instructions     1. Primary osteoarthritis of left hip    2. Trochanteric bursitis of left hip      -Patient has left hip pain and due to aggravation of arthritis and bursitis  -Patient tolerated left hip intra-articular and trochanteric bursa cortisone injection today without complications.  Patient was given postprocedure instructions  -Patient will follow up when pain returns  -Call direct clinic number [[phone]] at any time with questions or concerns.    Albert Yeo MD [facility] Orthopedics and Sports Medicine  [facility] [place]          -----    SUBJECTIVE  Rayna Jackson is a/an 57 year old female who is seen in follow-up for left hip pain. She last saw  on 4/25/2019, had left hip intra-articular corticosteriod injection. The patient is seen by themselves.    Onset: 4 years(s) ago. Reports insidious onset without acute precipitating event. Notes recently pain returned ~2 months ago, slowly worsening   Location of Pain: left hip, \"deep\" lateral hip   Rating of Pain at worst: 10/10  Rating of Pain Currently: 5/10  Worsened by: constant pain, seemingly improved since last week   Better with: nothing   Treatments tried: Meloxicam (unsure if helping) corticosteroid injection (most recent date: 4/25/2019) that provided  3+ years(s) of relief  Quality: constant sharp, nagging  Associated symptoms: no distal numbness or tingling; denies swelling or warmth  Orthopedic history: NO  Relevant surgical history: YES - previous bilateral knee arthroscopies, multiple surgeries on both   Social history: social history: works as counselor manager     Past Medical History:   Diagnosis Date     Arthritis      " Hypertension      Torn meniscus 01/01/2013    left      Uncomplicated asthma      Social History     Socioeconomic History     Marital status:      Spouse name: Juan F     Number of children: 4     Highest education level: Master's degree (e.g., MA, MS, Adry, MEd, MSW, [employer])   Occupational History     Occupation: manager     Employer: [employer]   Tobacco Use     Smoking status: Never     Smokeless tobacco: Never   Vaping Use     Vaping Use: Never used   Substance and Sexual Activity     Alcohol use: Yes     Comment: occ     Drug use: No     Sexual activity: Yes     Partners: Male     Birth control/protection: I.U.D., Female Surgical   Other Topics Concern     Parent/sibling w/ CABG, MI or angioplasty before 65F 55M? Yes     Social Determinants of Health     Financial Resource Strain: Low Risk      Difficulty of Paying Living Expenses: Not hard at all   Food Insecurity: No Food Insecurity     Worried About Running Out of Food in the Last Year: Never true     Ran Out of Food in the Last Year: Never true   Transportation Needs: No Transportation Needs     Lack of Transportation (Medical): No     Lack of Transportation (Non-Medical): No   Physical Activity: Insufficiently Active     Days of Exercise per Week: 1 day     Minutes of Exercise per Session: 30 min   Stress: No Stress Concern Present     Feeling of Stress : Not at all   Social Connections: Socially Integrated     Frequency of Communication with Friends and Family: More than three times a week     Frequency of Social Gatherings with Friends and Family: Three times a week     Attends [employer] Services: More than 4 times per year     Active Member of Clubs or Organizations: Yes     Marital Status:    Housing Stability: High Risk     Unable to Pay for Housing in the Last Year: Yes     Number of Places Lived in the Last Year: 1     Unstable Housing in the Last Year: No         Patient's past medical, surgical, social, and family histories were  "reviewed today and no changes are noted.    REVIEW OF SYSTEMS:  10 point ROS is negative other than symptoms noted above in HPI, Past Medical History or as stated below  Constitutional: NEGATIVE for fever, chills, change in weight  Skin: NEGATIVE for worrisome rashes, moles or lesions  GI/: NEGATIVE for bowel or bladder changes  Neuro: NEGATIVE for weakness, dizziness or paresthesias    OBJECTIVE:  /72   Ht 1.651 m (5' 5\")   Wt 89.8 kg (198 lb)   BMI 32.95 kg/m     General: healthy, alert and in no distress  HEENT: no scleral icterus or conjunctival erythema  Skin: no suspicious lesions or rash. No jaundice.  CV: no pedal edema  Resp: normal respiratory effort without conversational dyspnea   Psych: normal mood and affect  Gait: normal steady gait with appropriate coordination and balance  Neuro: Normal light sensory exam of lower extremity  MSK:  LEFT HIP  Inspection:    No obvious deformity or asymmetry, level pelvis  Palpation:    Tender about the anterior groin/joint line and greater trochanteric region. Otherwise all other landmarks are nontender.  Active Range of Motion:     Flexion limited by pain, IR limited by pain, ER  limited by pain  Strength:    Flexion painful, adduction grossly intact, abduction grossly intact  Special Tests:    Positive: anterior impingement (FADIR), posterior impingement (EX/AB/ER)    Negative: Logroll, resisted gluteus medius provocation, YASMINE    Independent visualization of the below image:  No results found for this or any previous visit (from the past 24 hour(s)).    XR LEFT HIP TWO-THREE VIEWS  4/15/2019 4:41 PM      HISTORY: Left hip pain.     COMPARISON: None.                                                                      IMPRESSION: Mild joint space narrowing and irregularity in the left  hip joint space with mild osteophyte formation corresponding to  osteoarthritis. No acute fracture. Pelvic calcifications and IUD  incidentally and partially " visible.     MARILYNN HOWARD MD  Large Joint Injection/Arthocentesis: L hip joint    Date/Time: 11/23/2022 2:03 PM  Performed by: Yeo, Albert, MD  Authorized by: Yeo, Albert, MD     Indications:  Pain  Needle Size:  22 G  Guidance: ultrasound    Approach:  Anterior  Location:  Hip      Site:  L hip joint  Medications:  40 mg methylPREDNISolone 40 MG/ML  Outcome:  Tolerated well, no immediate complications  Procedure discussed: discussed risks, benefits, and alternatives    Consent Given by:  Patient  Prep: patient was prepped and draped in usual sterile fashion    Large Joint Injection/Arthocentesis: L greater trochanteric bursa    Date/Time: 11/23/2022 2:05 PM  Performed by: Yeo, Albert, MD  Authorized by: Yeo, Albert, MD     Indications:  Pain  Needle Size:  22 G  Guidance: ultrasound    Approach:  Lateral  Location:  Hip      Site:  L greater trochanteric bursa  Medications:  40 mg methylPREDNISolone 40 MG/ML  Outcome:  Tolerated well, no immediate complications  Procedure discussed: discussed risks, benefits, and alternatives    Consent Given by:  Patient  Prep: patient was prepped and draped in usual sterile fashion        Patient's condition was thoroughly discussed with the patient and total time spent with patient, performing procedures of and documentation totaled 30 minutes.      Albert Yeo MD [facility]        Again, thank you for allowing me to participate in the care of your patient.        Sincerely,        Albert Yeo, MD

## 2022-11-23 NOTE — PATIENT INSTRUCTIONS
1. Primary osteoarthritis of left hip    2. Trochanteric bursitis of left hip      -Patient has left hip pain and due to aggravation of arthritis and bursitis  -Patient tolerated left hip intra-articular and trochanteric bursa cortisone injection today without complications.  Patient was given postprocedure instructions  -Patient will follow up when pain returns  -Call direct clinic number [[phone]] at any time with questions or concerns.    Albert Yeo MD [facility]  [facility] [place]

## 2022-12-13 ENCOUNTER — VIRTUAL VISIT (OUTPATIENT)
Dept: FAMILY MEDICINE | Facility: CLINIC | Age: 57
End: 2022-12-13
Payer: COMMERCIAL

## 2022-12-13 DIAGNOSIS — E66.09 CLASS 1 OBESITY DUE TO EXCESS CALORIES WITH SERIOUS COMORBIDITY AND BODY MASS INDEX (BMI) OF 34.0 TO 34.9 IN ADULT: ICD-10-CM

## 2022-12-13 DIAGNOSIS — E66.811 CLASS 1 OBESITY DUE TO EXCESS CALORIES WITH SERIOUS COMORBIDITY AND BODY MASS INDEX (BMI) OF 34.0 TO 34.9 IN ADULT: ICD-10-CM

## 2022-12-13 PROCEDURE — 99213 OFFICE O/P EST LOW 20 MIN: CPT | Mod: 95 | Performed by: FAMILY MEDICINE

## 2022-12-13 RX ORDER — PHENTERMINE HYDROCHLORIDE 37.5 MG/1
TABLET ORAL
Qty: 30 TABLET | Refills: 0 | Status: SHIPPED | OUTPATIENT
Start: 2022-12-13 | End: 2023-02-02

## 2022-12-13 NOTE — PROGRESS NOTES
"Rayna is a 57 year old who is being evaluated via a billable video visit.      How would you like to obtain your AVS? MyChart  If the video visit is dropped, the invitation should be resent by: Send to e-mail at: [email or web address]  Will anyone else be joining your video visit? No          Assessment & Plan     Class 1 obesity due to excess calories with serious comorbidity and body mass index (BMI) of 34.0 to 34.9 in adult  - some weight loss since start on medicine. No current side effects. Continue with current regimen and work on diet and lifestyle changes.   - phentermine (ADIPEX-P) 37.5 MG tablet; TAKE 1 TABLET BY MOUTH EVERY MORNING BEFORE BREAKFAST           BMI:   Estimated body mass index is 32.95 kg/m  as calculated from the following:    Height as of 11/23/22: 1.651 m (5' 5\").    Weight as of 11/23/22: 89.8 kg (198 lb).     No follow-ups on file.    Mitra Keita MD  [facility]    Subjective   Rayna is a 57 year old, presenting for the following health issues:  No chief complaint on file.      History of Present Illness       Reason for visit:  Medication review regarding weight loss    She eats 2-3 servings of fruits and vegetables daily.She consumes 0 sweetened beverage(s) daily.She exercises with enough effort to increase her heart rate 9 or less minutes per day.  She exercises with enough effort to increase her heart rate 3 or less days per week.   She is taking medications regularly.       Medication Followup of phentermine (ADIPEX-P) 37.5 MG tablet    Taking Medication as prescribed: yes    Side Effects:  None    Medication Helping Symptoms:  yes    Weight today- 194.6 lbs        Review of Systems   Constitutional, HEENT, cardiovascular, pulmonary, GI, , musculoskeletal, neuro, skin, endocrine and psych systems are negative, except as otherwise noted.      Objective           Vitals:  No vitals were obtained today due to virtual visit.    Physical Exam "   GENERAL: Healthy, alert and no distress  EYES: Eyes grossly normal to inspection.  No discharge or erythema, or obvious scleral/conjunctival abnormalities.  RESP: No audible wheeze, cough, or visible cyanosis.  No visible retractions or increased work of breathing.    SKIN: Visible skin clear. No significant rash, abnormal pigmentation or lesions.  PSYCH: Mentation appears normal, affect normal/bright, judgement and insight intact, normal speech and appearance well-groomed.            Video-Visit Details    Video Start Time: 5:07 PM    Type of service:  Video Visit    Video End Time:5:15 PM    Originating Location (pt. Location): Home        Distant Location (provider location):  Off-site    Platform used for Video Visit: DermLink

## 2022-12-14 ENCOUNTER — TRANSFERRED RECORDS (OUTPATIENT)
Dept: HEALTH INFORMATION MANAGEMENT | Facility: CLINIC | Age: 57
End: 2022-12-14

## 2023-01-05 ENCOUNTER — OFFICE VISIT (OUTPATIENT)
Dept: URGENT CARE | Facility: URGENT CARE | Age: 58
End: 2023-01-05
Payer: COMMERCIAL

## 2023-01-05 VITALS
SYSTOLIC BLOOD PRESSURE: 108 MMHG | RESPIRATION RATE: 14 BRPM | OXYGEN SATURATION: 97 % | DIASTOLIC BLOOD PRESSURE: 75 MMHG | HEART RATE: 82 BPM | TEMPERATURE: 98.3 F

## 2023-01-05 DIAGNOSIS — J06.9 VIRAL URI WITH COUGH: Primary | ICD-10-CM

## 2023-01-05 LAB
FLUAV AG SPEC QL IA: NEGATIVE
FLUBV AG SPEC QL IA: NEGATIVE

## 2023-01-05 PROCEDURE — U0003 INFECTIOUS AGENT DETECTION BY NUCLEIC ACID (DNA OR RNA); SEVERE ACUTE RESPIRATORY SYNDROME CORONAVIRUS 2 (SARS-COV-2) (CORONAVIRUS DISEASE [COVID-19]), AMPLIFIED PROBE TECHNIQUE, MAKING USE OF HIGH THROUGHPUT TECHNOLOGIES AS DESCRIBED BY CMS-2020-01-R: HCPCS | Performed by: PHYSICIAN ASSISTANT

## 2023-01-05 PROCEDURE — 99213 OFFICE O/P EST LOW 20 MIN: CPT | Mod: CS | Performed by: PHYSICIAN ASSISTANT

## 2023-01-05 PROCEDURE — U0005 INFEC AGEN DETEC AMPLI PROBE: HCPCS | Performed by: PHYSICIAN ASSISTANT

## 2023-01-05 PROCEDURE — 87804 INFLUENZA ASSAY W/OPTIC: CPT | Performed by: PHYSICIAN ASSISTANT

## 2023-01-05 ASSESSMENT — ENCOUNTER SYMPTOMS
CHEST TIGHTNESS: 1
COUGH: 1
HEADACHES: 1
RHINORRHEA: 0
WHEEZING: 0
FEVER: 0
SORE THROAT: 1

## 2023-01-05 NOTE — PROGRESS NOTES
Assessment & Plan:        ICD-10-CM    1. Viral URI with cough  J06.9 Influenza A & B Antigen - Clinic Collect     Symptomatic COVID-19 Virus (Coronavirus) by PCR Nose            Plan/Clinical Decision Making:    Patient with URI symptoms for 3 days. Afebrile. Normal lung, throat and ear exam.   Negative flu, covid pcr pending.   Rest, fluids. Tylenol if needed.         Return if symptoms worsen or fail to improve, for in 3-5 days.     At the end of the encounter, I discussed results, diagnosis, medications. Discussed red flags for immediate return to clinic/ER, as well as indications for follow up if no improvement. Patient understood and agreed to plan. Patient was stable for discharge.        Crystal Suarez PA-C on 1/5/2023 at 5:30 PM          Subjective:     HPI:    Rayna is a 57 year old female who presents to clinic today for the following health issues:  Chief Complaint   Patient presents with     Cough     Cough, worse in morning and at night, heaviness in chest, spine hurts, she feels she is in a fog, nauseated fatigue started 3 days ago, never covid test, exposed to influenza     HPI    Patient complains of 3 day of illness. Started with nausea. Having headache, bad fatigue. Coughing in evenings and sometimes heaviness of chest. Hx of asthma,  No wheezing. Patient on Flovent and Singulair for asthma. Using albuterol bid with current symptoms. Hasn't felt like heaviness is asthma related. More due to cough.   Covid testing at home negative.     History obtained from the patient.    Review of Systems   Constitutional: Negative for fever.   HENT: Positive for congestion and sore throat. Negative for rhinorrhea.    Respiratory: Positive for cough and chest tightness. Negative for wheezing.    Neurological: Positive for headaches.         Patient Active Problem List   Diagnosis     Benign essential hypertension     Osteoarthritis of both knees, unspecified osteoarthritis type     Bilateral edema of lower  extremity     Osteoarthritis of both feet, unspecified osteoarthritis type     IUD (intrauterine device) in place     Chronic nonintractable headache, unspecified headache type     HTN, goal below 140/90     Allergic rhinitis     Asthma     Environmental allergies     Moderate persistent asthma without complication     Subclinical hypothyroidism     Hip pain, left        Past Medical History:   Diagnosis Date     Arthritis      Hypertension      Torn meniscus 01/01/2013    left      Uncomplicated asthma        Social History     Tobacco Use     Smoking status: Never     Smokeless tobacco: Never   Substance Use Topics     Alcohol use: Yes     Comment: occ             Objective:     Vitals:    01/05/23 1632   BP: 108/75   Pulse: 82   Resp: 14   Temp: 98.3  F (36.8  C)   SpO2: 97%         Physical Exam   EXAM:   Pleasant, alert, appropriate appearance. NAD.  Head Exam: Normocephalic, atraumatic.  Eye Exam:   non icteric/injection.    Ear Exam: TMs grey without bulging. Normal canals.  Normal pinna.  Nose Exam: Normal external nose.    OroPharynx Exam:  Moist mucous membranes. No erythema, pharynx without exudate or hypertrophy.  Neck/Thyroid Exam:  No LAD.    Chest/Respiratory Exam: CTAB.        Results:  Results for orders placed or performed in visit on 01/05/23   Influenza A & B Antigen - Clinic Collect     Status: Normal    Specimen: Nose; Swab   Result Value Ref Range    Influenza A antigen Negative Negative    Influenza B antigen Negative Negative    Narrative    Test results must be correlated with clinical data. If necessary, results should be confirmed by a molecular assay or viral culture.

## 2023-01-06 LAB — SARS-COV-2 RNA RESP QL NAA+PROBE: NEGATIVE

## 2023-01-17 ENCOUNTER — TRANSFERRED RECORDS (OUTPATIENT)
Dept: HEALTH INFORMATION MANAGEMENT | Facility: CLINIC | Age: 58
End: 2023-01-17

## 2023-01-24 ENCOUNTER — MYC MEDICAL ADVICE (OUTPATIENT)
Dept: FAMILY MEDICINE | Facility: CLINIC | Age: 58
End: 2023-01-24
Payer: COMMERCIAL

## 2023-02-02 ENCOUNTER — OFFICE VISIT (OUTPATIENT)
Dept: FAMILY MEDICINE | Facility: CLINIC | Age: 58
End: 2023-02-02
Payer: COMMERCIAL

## 2023-02-02 VITALS
BODY MASS INDEX: 33.15 KG/M2 | DIASTOLIC BLOOD PRESSURE: 80 MMHG | HEIGHT: 65 IN | WEIGHT: 199 LBS | OXYGEN SATURATION: 97 % | SYSTOLIC BLOOD PRESSURE: 123 MMHG | RESPIRATION RATE: 18 BRPM | HEART RATE: 75 BPM | TEMPERATURE: 97.4 F

## 2023-02-02 DIAGNOSIS — Z12.31 VISIT FOR SCREENING MAMMOGRAM: ICD-10-CM

## 2023-02-02 DIAGNOSIS — E03.9 HYPOTHYROIDISM, UNSPECIFIED TYPE: ICD-10-CM

## 2023-02-02 DIAGNOSIS — J45.20 MILD INTERMITTENT ASTHMA WITHOUT COMPLICATION: ICD-10-CM

## 2023-02-02 DIAGNOSIS — I10 BENIGN ESSENTIAL HYPERTENSION: ICD-10-CM

## 2023-02-02 DIAGNOSIS — J30.9 CHRONIC ALLERGIC RHINITIS: ICD-10-CM

## 2023-02-02 DIAGNOSIS — E66.09 CLASS 1 OBESITY DUE TO EXCESS CALORIES WITH SERIOUS COMORBIDITY AND BODY MASS INDEX (BMI) OF 34.0 TO 34.9 IN ADULT: ICD-10-CM

## 2023-02-02 DIAGNOSIS — E66.811 CLASS 1 OBESITY DUE TO EXCESS CALORIES WITH SERIOUS COMORBIDITY AND BODY MASS INDEX (BMI) OF 34.0 TO 34.9 IN ADULT: ICD-10-CM

## 2023-02-02 PROBLEM — R68.83 CHILLS (WITHOUT FEVER): Status: ACTIVE | Noted: 2020-02-09

## 2023-02-02 PROBLEM — J02.9 SORE THROAT: Status: ACTIVE | Noted: 2020-02-09

## 2023-02-02 PROBLEM — J04.0 LARYNGITIS: Status: ACTIVE | Noted: 2020-02-09

## 2023-02-02 PROBLEM — M79.10 MYALGIA: Status: ACTIVE | Noted: 2020-02-09

## 2023-02-02 PROCEDURE — 99214 OFFICE O/P EST MOD 30 MIN: CPT | Performed by: FAMILY MEDICINE

## 2023-02-02 RX ORDER — PHENTERMINE HYDROCHLORIDE 37.5 MG/1
TABLET ORAL
Qty: 30 TABLET | Refills: 2 | Status: SHIPPED | OUTPATIENT
Start: 2023-02-02 | End: 2023-02-16

## 2023-02-02 RX ORDER — LEVOTHYROXINE SODIUM 50 UG/1
50 TABLET ORAL DAILY
Qty: 90 TABLET | Refills: 3 | Status: SHIPPED | OUTPATIENT
Start: 2023-02-02 | End: 2023-04-21

## 2023-02-02 RX ORDER — LOSARTAN POTASSIUM 50 MG/1
50 TABLET ORAL DAILY
Qty: 90 TABLET | Refills: 0 | Status: SHIPPED | OUTPATIENT
Start: 2023-02-02 | End: 2023-05-17

## 2023-02-02 RX ORDER — MONTELUKAST SODIUM 10 MG/1
TABLET ORAL
Qty: 90 TABLET | Refills: 0 | Status: SHIPPED | OUTPATIENT
Start: 2023-02-02 | End: 2023-05-17

## 2023-02-02 RX ORDER — TOPIRAMATE 50 MG/1
50 TABLET, FILM COATED ORAL 2 TIMES DAILY
Qty: 180 TABLET | Refills: 0 | Status: SHIPPED | OUTPATIENT
Start: 2023-02-02 | End: 2023-05-17

## 2023-02-02 NOTE — PATIENT INSTRUCTIONS
Follow meals as discussed   Kerry Guy for follow up             [email or web address]

## 2023-02-02 NOTE — PROGRESS NOTES
"  Assessment & Plan     Class 1 obesity due to excess calories with serious comorbidity and body mass index (BMI) of 34.0 to 34.9 in adult  - will add Topamax to current regimen of phentermine as this has worked well in the past.   - phentermine (ADIPEX-P) 37.5 MG tablet; TAKE 1 TABLET BY MOUTH EVERY MORNING BEFORE BREAKFAST  - topiramate (TOPAMAX) 50 MG tablet; Take 1 tablet (50 mg) by mouth 2 times daily    Visit for screening mammogram  - MA SCREENING DIGITAL BILAT - Future  (s+30); Future    Hypothyroidism, unspecified type  - stable  - levothyroxine (SYNTHROID/LEVOTHROID) 50 MCG tablet; Take 1 tablet (50 mcg) by mouth daily    Benign essential hypertension  - stable   - losartan (COZAAR) 50 MG tablet; Take 1 tablet (50 mg) by mouth daily    Chronic allergic rhinitis  Stable   - montelukast (SINGULAIR) 10 MG tablet; TAKE 1 TABLET BY MOUTH EVERYDAY AT BEDTIME    Mild intermittent asthma without complication  Stable on current regimen. No recent flare   - montelukast (SINGULAIR) 10 MG tablet; TAKE 1 TABLET BY MOUTH EVERYDAY AT BEDTIME             BMI:   Estimated body mass index is 33.12 kg/m  as calculated from the following:    Height as of this encounter: 1.651 m (5' 5\").    Weight as of this encounter: 90.3 kg (199 lb).   Weight management plan: Discussed healthy diet and exercise guidelines    See Patient Instructions    No follow-ups on file.    Mitra Keita MD  [facility] [address] is a 57 year old, presenting for the following health issues:  Weight Loss    History of Present Illness       Reason for visit:  Weight    She eats 2-3 servings of fruits and vegetables daily.She consumes 0 sweetened beverage(s) daily.She exercises with enough effort to increase her heart rate 9 or less minutes per day.  She exercises with enough effort to increase her heart rate 3 or less days per week.   She is taking medications regularly.     Medication Followup of  " "Phentramine    Taking Medication as prescribed: NO    Side Effects:  None    Medication Helping Symptoms:  NO   breakfast- 2 boiled eggs  Lunch- salami, cheese, and carrots  Dinner- meat and vegetables , grapes/cutie orange     Drinks- hot tea unsweetened, diet soda, water- 4-16 oz glasses daily   No physical activity as of now     Wt Readings from Last 4 Encounters:   02/02/23 90.3 kg (199 lb)   11/23/22 89.8 kg (198 lb)   10/05/22 88.9 kg (196 lb)   05/09/22 89.4 kg (197 lb)         Review of Systems   Constitutional, HEENT, cardiovascular, pulmonary, GI, , musculoskeletal, neuro, skin, endocrine and psych systems are negative, except as otherwise noted.      Objective    /80 (BP Location: Left arm, Patient Position: Sitting, Cuff Size: Adult Large)   Pulse 75   Temp 97.4  F (36.3  C) (Temporal)   Resp 18   Ht 1.651 m (5' 5\")   Wt 90.3 kg (199 lb)   SpO2 97%   BMI 33.12 kg/m    Body mass index is 33.12 kg/m .  Physical Exam   GENERAL: healthy, alert and no distress  RESP: lungs clear to auscultation - no rales, rhonchi or wheezes  CV: regular rate and rhythm, normal S1 S2, no S3 or S4, no murmur, click or rub, no peripheral edema and peripheral pulses strong  PSYCH: mentation appears normal, affect normal/bright                "

## 2023-02-02 NOTE — LETTER
My Asthma Action Plan    Name: Rayna Jackson   YOB: 1965  Date: 2/2/2023   My doctor: Mitra Keita MD   My clinic: [facility]        My Rescue Medicine:   Albuterol inhaler (Proair/Ventolin/Proventil HFA)  2-4 puffs EVERY 4 HOURS as needed. Use a spacer if recommended by your provider.   My Asthma Severity:   Moderate Persistent  Know your asthma triggers: lawn chemicals like fertalizer.   pollens  mold          GREEN ZONE   Good Control    I feel good    No cough or wheeze    Can work, sleep and play without asthma symptoms       Take your asthma control medicine every day.     1. If exercise triggers your asthma, take your rescue medication    15 minutes before exercise or sports, and    During exercise if you have asthma symptoms  2. Spacer to use with inhaler: If you have a spacer, make sure to use it with your inhaler             YELLOW ZONE Getting Worse  I have ANY of these:    I do not feel good    Cough or wheeze    Chest feels tight    Wake up at night   1. Keep taking your Green Zone medications  2. Start taking your rescue medicine:    every 20 minutes for up to 1 hour. Then every 4 hours for 24-48 hours.  3. If you stay in the Yellow Zone for more than 12-24 hours, contact your doctor.  4. If you do not return to the Green Zone in 12-24 hours or you get worse, start taking your oral steroid medicine if prescribed by your provider.           RED ZONE Medical Alert - Get Help  I have ANY of these:    I feel awful    Medicine is not helping    Breathing getting harder    Trouble walking or talking    Nose opens wide to breathe       1. Take your rescue medicine NOW  2. If your provider has prescribed an oral steroid medicine, start taking it NOW  3. Call your doctor NOW  4. If you are still in the Red Zone after 20 minutes and you have not reached your doctor:    Take your rescue medicine again and    Call 911 or go to the emergency room right  away    See your regular doctor within 2 weeks of an Emergency Room or Urgent Care visit for follow-up treatment.          Annual Reminders:  Meet with Asthma Educator,  Flu Shot in the Fall, consider Pneumonia Vaccination for patients with asthma (aged 19 and older).    Pharmacy: [facility]ACY #[id number] [id number]    Electronically signed by Mitra Keita MD   Date: 02/02/23                    Asthma Triggers  How To Control Things That Make Your Asthma Worse    Triggers are things that make your asthma worse.  Look at the list below to help you find your triggers and   what you can do about them. You can help prevent asthma flare-ups by staying away from your triggers.      Trigger                                                          What you can do   Cigarette Smoke  Tobacco smoke can make asthma worse. Do not allow smoking in your home, car or around you.  Be sure no one smokes at a child s day care or school.  If you smoke, ask your health care provider for ways to help you quit.  Ask family members to quit too.  Ask your health care provider for a referral to Quit Plan to help you quit smoking, or call 5-596-802-PLAN.     Colds, Flu, Bronchitis  These are common triggers of asthma. Wash your hands often.  Don t touch your eyes, nose or mouth.  Get a flu shot every year.     Dust Mites  These are tiny bugs that live in cloth or carpet. They are too small to see. Wash sheets and blankets in hot water every week.   Encase pillows and mattress in dust mite proof covers.  Avoid having carpet if you can. If you have carpet, vacuum weekly.   Use a dust mask and HEPA vacuum.   Pollen and Outdoor Mold  Some people are allergic to trees, grass, or weed pollen, or molds. Try to keep your windows closed.  Limit time out doors when pollen count is high.   Ask you health care provider about taking medicine during allergy season.     Animal Dander  Some people are allergic to skin  flakes, urine or saliva from pets with fur or feathers. Keep pets with fur or feathers out of your home.    If you can t keep the pet outdoors, then keep the pet out of your bedroom.  Keep the bedroom door closed.  Keep pets off cloth furniture and away from stuffed toys.     Mice, Rats, and Cockroaches  Some people are allergic to the waste from these pests.   Cover food and garbage.  Clean up spills and food crumbs.  Store grease in the refrigerator.   Keep food out of the bedroom.   Indoor Mold  This can be a trigger if your home has high moisture. Fix leaking faucets, pipes, or other sources of water.   Clean moldy surfaces.  Dehumidify basement if it is damp and smelly.   Smoke, Strong Odors, and Sprays  These can reduce air quality. Stay away from strong odors and sprays, such as perfume, powder, hair spray, paints, smoke incense, paint, cleaning products, candles and new carpet.   Exercise or Sports  Some people with asthma have this trigger. Be active!  Ask your doctor about taking medicine before sports or exercise to prevent symptoms.    Warm up for 5-10 minutes before and after sports or exercise.     Other Triggers of Asthma  Cold air:  Cover your nose and mouth with a scarf.  Sometimes laughing or crying can be a trigger.  Some medicines and food can trigger asthma.

## 2023-02-03 ENCOUNTER — TELEPHONE (OUTPATIENT)
Dept: FAMILY MEDICINE | Facility: CLINIC | Age: 58
End: 2023-02-03
Payer: COMMERCIAL

## 2023-02-03 NOTE — TELEPHONE ENCOUNTER
PRIOR AUTHORIZATION DENIED    Medication: phentermine (ADIPEX-P) 37.5 MG tablet - EPA DENIED    Denial Date: 2/3/2023    Denial Rational:       Appeal Information:

## 2023-02-03 NOTE — TELEPHONE ENCOUNTER
Called pt, left message informing medication not covered, ok to pay cash, follow up with pharmacy, call only if questions  Sandra Plascencia RN, BSN  [facility]

## 2023-02-06 NOTE — PROGRESS NOTES
"ASSESSMENT & PLAN  Patient Instructions     1. Left hip pain    2. Primary osteoarthritis of left hip    3. Trochanteric bursitis of left hip      -Patient is following up for chronic left hip pain due to arthritis, bursitis  -Patient has a cortisone injection 2 half months ago which provided 2 months of relief  -Patient will get an MRI of the left hip for further evaluation.  - Your MRI has been ordered. You may call [phone] to schedule over the phone. Once you get notified on Appiphany of your results, send me a Appiphany message to discuss results and next of treatment options.  Please call my office 2 days after your MRI is complete to discuss results and next of treatment options.  -Call direct clinic number [[phone]] at any time with questions or concerns.    Albert Yeo MD [facility] Orthopedics and Sports Medicine  [facility]          -----    SUBJECTIVE:  Rayna Jackson is a 58 year old female who is seen in follow-up for left hip pain.They were last seen 11/23/2022     Since their last visit reports worsening pain. They indicate that their current pain level is 11/10. Pain is lateral hip \"deep\" in hip, radiating into anterior thigh. Denies numbness/tinglging. They have tried corticosteroid injection (most recent date: 11/23/2022) that provided 90% relief for 2 month(s) of relief. Taking Meloxicam and Voltaren Gel with no relief.     The patient is seen with their .    Patient's past medical, surgical, social, and family histories were reviewed today and no changes are noted.    REVIEW OF SYSTEMS:  Constitutional: NEGATIVE for fever, chills, change in weight  Skin: NEGATIVE for worrisome rashes, moles or lesions  GI/: NEGATIVE for bowel or bladder changes  Neuro: NEGATIVE for weakness, dizziness or paresthesias    OBJECTIVE:  /72   Ht 1.651 m (5' 5\")   Wt 90.3 kg (199 lb)   BMI 33.12 kg/m     General: healthy, alert and in no distress  HEENT: no scleral icterus or " conjunctival erythema  Skin: no suspicious lesions or rash. No jaundice.  CV: regular rhythm by palpation, no pedal edema  Resp: normal respiratory effort without conversational dyspnea   Psych: normal mood and affect  Gait: antalgic gait with appropriate coordination and balance  Neuro: normal light touch sensory exam of the extremities.    MSK:  LEFT HIP  Inspection:    No obvious deformity or asymmetry, level pelvis  Palpation:    Tender about the greater trochanteric region. Otherwise all other landmarks are nontender.  Active Range of Motion:     Flexion limited by pain, IR limited by pain, ER  limited by pain  Strength:    Flexion painful, adduction grossly intact, abduction painful  Special Tests:    Positive: anterior impingement (FADIR), posterior impingement (EX/AB/ER), resisted gluteus medius provocation    Negative: YASMINE Sena       Independent visualization of the below image:    Personal review of left hip and pelvis x-rays taken in office today show mild degenerative changes of the glenohumeral joint and small osteophyte on the inferior aspect of the femoral head.  Slightly increased progression of degenerative changes compared to previous x-ray performed in 2019.    Albert Yeo MD, [facility]

## 2023-02-08 ENCOUNTER — OFFICE VISIT (OUTPATIENT)
Dept: ORTHOPEDICS | Facility: CLINIC | Age: 58
End: 2023-02-08
Payer: COMMERCIAL

## 2023-02-08 ENCOUNTER — ANCILLARY PROCEDURE (OUTPATIENT)
Dept: GENERAL RADIOLOGY | Facility: CLINIC | Age: 58
End: 2023-02-08
Attending: FAMILY MEDICINE
Payer: COMMERCIAL

## 2023-02-08 VITALS
WEIGHT: 199 LBS | SYSTOLIC BLOOD PRESSURE: 104 MMHG | BODY MASS INDEX: 33.15 KG/M2 | HEIGHT: 65 IN | DIASTOLIC BLOOD PRESSURE: 72 MMHG

## 2023-02-08 DIAGNOSIS — M25.552 LEFT HIP PAIN: ICD-10-CM

## 2023-02-08 DIAGNOSIS — M16.12 PRIMARY OSTEOARTHRITIS OF LEFT HIP: ICD-10-CM

## 2023-02-08 DIAGNOSIS — M70.62 TROCHANTERIC BURSITIS OF LEFT HIP: ICD-10-CM

## 2023-02-08 DIAGNOSIS — M25.552 LEFT HIP PAIN: Primary | ICD-10-CM

## 2023-02-08 PROCEDURE — 99214 OFFICE O/P EST MOD 30 MIN: CPT | Performed by: FAMILY MEDICINE

## 2023-02-08 PROCEDURE — 73502 X-RAY EXAM HIP UNI 2-3 VIEWS: CPT | Mod: TC | Performed by: RADIOLOGY

## 2023-02-08 RX ORDER — DICLOFENAC SODIUM 75 MG/1
75 TABLET, DELAYED RELEASE ORAL 2 TIMES DAILY PRN
Qty: 60 TABLET | Refills: 0 | Status: SHIPPED | OUTPATIENT
Start: 2023-02-08 | End: 2023-04-26

## 2023-02-08 NOTE — LETTER
"    2/8/2023         RE: Ranya Jackson  [address] [facility] [address]        Dear Colleague,    Thank you for referring your patient, Rayna Jackson, to the [facility] [place]. Please see a copy of my visit note below.    ASSESSMENT & PLAN  Patient Instructions     1. Left hip pain    2. Primary osteoarthritis of left hip    3. Trochanteric bursitis of left hip      -Patient is following up for chronic left hip pain due to arthritis, bursitis  -Patient has a cortisone injection 2 half months ago which provided 2 months of relief  -Patient will get an MRI of the left hip for further evaluation.  - Your MRI has been ordered. You may call [phone] to schedule over the phone. Once you get notified on HereOrThere of your results, send me a HereOrThere message to discuss results and next of treatment options.  Please call my office 2 days after your MRI is complete to discuss results and next of treatment options.  -Call direct clinic number [[phone]] at any time with questions or concerns.    Albert Yeo MD [facility] Orthopedics and Sports Medicine  [facility] [place]          -----    SUBJECTIVE:  Rayna Jackson is a 58 year old female who is seen in follow-up for left hip pain.They were last seen 11/23/2022     Since their last visit reports worsening pain. They indicate that their current pain level is 11/10. Pain is lateral hip \"deep\" in hip, radiating into anterior thigh. Denies numbness/tinglging. They have tried corticosteroid injection (most recent date: 11/23/2022) that provided 90% relief for 2 month(s) of relief. Taking Meloxicam and Voltaren Gel with no relief.     The patient is seen with their .    Patient's past medical, surgical, social, and family histories were reviewed today and no changes are noted.    REVIEW OF SYSTEMS:  Constitutional: NEGATIVE for fever, chills, change in weight  Skin: NEGATIVE for worrisome rashes, moles or " "lesions  GI/: NEGATIVE for bowel or bladder changes  Neuro: NEGATIVE for weakness, dizziness or paresthesias    OBJECTIVE:  /72   Ht 1.651 m (5' 5\")   Wt 90.3 kg (199 lb)   BMI 33.12 kg/m     General: healthy, alert and in no distress  HEENT: no scleral icterus or conjunctival erythema  Skin: no suspicious lesions or rash. No jaundice.  CV: regular rhythm by palpation, no pedal edema  Resp: normal respiratory effort without conversational dyspnea   Psych: normal mood and affect  Gait: antalgic gait with appropriate coordination and balance  Neuro: normal light touch sensory exam of the extremities.    MSK:  LEFT HIP  Inspection:    No obvious deformity or asymmetry, level pelvis  Palpation:    Tender about the greater trochanteric region. Otherwise all other landmarks are nontender.  Active Range of Motion:     Flexion limited by pain, IR limited by pain, ER  limited by pain  Strength:    Flexion painful, adduction grossly intact, abduction painful  Special Tests:    Positive: anterior impingement (FADIR), posterior impingement (EX/AB/ER), resisted gluteus medius provocation    Negative: YASMINE Sena       Independent visualization of the below image:    Personal review of left hip and pelvis x-rays taken in office today show mild degenerative changes of the glenohumeral joint and small osteophyte on the inferior aspect of the femoral head.  Slightly increased progression of degenerative changes compared to previous x-ray performed in 2019.    Albert Yeo MD, [facility]            Again, thank you for allowing me to participate in the care of your patient.        Sincerely,        Albert Yeo, MD    "

## 2023-02-08 NOTE — PATIENT INSTRUCTIONS
1. Left hip pain    2. Primary osteoarthritis of left hip    3. Trochanteric bursitis of left hip      -Patient is following up for chronic left hip pain due to arthritis, bursitis  -Patient has a cortisone injection 2 half months ago which provided 2 months of relief  -Patient will get an MRI of the left hip for further evaluation.  - Your MRI has been ordered. You may call [phone] to schedule over the phone. Once you get notified on Nival of your results, send me a Nival message to discuss results and next of treatment options.  Please call my office 2 days after your MRI is complete to discuss results and next of treatment options.  -Call direct clinic number [[phone]] at any time with questions or concerns.    Albert Yeo MD [facility] Orthopedics and Sports Medicine  [facility] [place]

## 2023-02-11 ENCOUNTER — HOSPITAL ENCOUNTER (OUTPATIENT)
Dept: MRI IMAGING | Facility: CLINIC | Age: 58
Discharge: HOME | End: 2023-02-11
Attending: FAMILY MEDICINE | Admitting: FAMILY MEDICINE
Payer: COMMERCIAL

## 2023-02-11 DIAGNOSIS — M70.62 TROCHANTERIC BURSITIS OF LEFT HIP: ICD-10-CM

## 2023-02-11 DIAGNOSIS — M16.12 PRIMARY OSTEOARTHRITIS OF LEFT HIP: ICD-10-CM

## 2023-02-11 DIAGNOSIS — M25.552 LEFT HIP PAIN: ICD-10-CM

## 2023-02-11 PROCEDURE — 73721 MRI JNT OF LWR EXTRE W/O DYE: CPT | Mod: LT

## 2023-02-12 ENCOUNTER — MYC MEDICAL ADVICE (OUTPATIENT)
Dept: ORTHOPEDICS | Facility: CLINIC | Age: 58
End: 2023-02-12
Payer: COMMERCIAL

## 2023-02-13 NOTE — TELEPHONE ENCOUNTER
Patient last seen 2/8/23. She had MRI completed 2/11/23. Per patient MyChart message she will try to schedule virtual appointment, however last office note states provider will relay the results/ recommendations via MyChart or over the phone. Please advise if follow up appointment is needed.     Chaparrita Barnes MSA, ATC  Certified Athletic Trainer          Results for orders placed or performed during the hospital encounter of 02/11/23   MR Hip Left w/o Contrast    Narrative    EXAM: MR HIP LEFT W/O CONTRAST  LOCATION: [facility]  DATE/TIME: 2/11/2023 6:38 PM    INDICATION: Left hip pain with weightbearing.  COMPARISON: None.  TECHNIQUE: Unenhanced.    FINDINGS:    LEFT HIP:   -Labrum: End-stage degenerative arthritic changes, with patchy high-grade cartilage loss, mixed grade III and grade IV, with areas of exposed subchondral bone, particularly over the superior central acetabulum, and the superior and medial femoral head.   There are moderate-sized osteophytes. There is mild reactive bone marrow edema in the acetabulum and the superior femoral head, but no acute subchondral fracture, and no osteonecrosis or bone marrow replacement process. There is a small joint effusion.   There is moderate synovitis. In keeping with arthritic changes, there is ossification and degenerative fraying of the remnant acetabular labrum.    MUSCLES AND TENDONS:   -Gluteal: Mild peritendinous edema surrounding the gluteus minimus tendon, suggesting gluteal tendinitis. The gluteus medius tendon is normal. No gluteal tendinopathy or tearing. No gluteal muscle strain. Mild chronic atrophy and fatty infiltration of   the musculature throughout the left hip. No evidence of trochanteric bursitis.  -Proximal hamstring: No tendon tear or tendinopathy.   -Iliopsoas: No tendon tear or tendinopathy. No bursitis.  -Rectus femoris origin: No tear or tendinopathy.    BONES:   -Moderate degenerative arthritic changes in the left  hip, with osteophytes and subchondral sclerosis. Mild reactive subchondral bone marrow edema in the acetabulum and femoral head.  -No fracture or concerning marrow replacing lesion. No AVN.  -SI joints are normal. Visualized lumbar spine is normal.    SOFT TISSUES:   -No soft tissue edema, fluid collection, or mass otherwise.    INTRA-PELVIC CONTENTS:  -Visualized portions are normal.      Impression    IMPRESSION:  1.  Moderate degenerative arthritic changes in the left hip, with mixed grade III and grade IV chondromalacia (including patchy areas of full-thickness cartilage loss). There is a small left hip joint effusion, with moderate synovitis. There is reactive   bone marrow edema in the acetabulum and femoral head, but no acute fracture.    2.  Left hip negative for AVN or bone marrow replacement lesion.    3.  Mild gluteus minimus peritendinitis, without tendinopathy or tearing. The gluteus medius tendon is normal.    4.  Mild chronic atrophy and fatty infiltration of the musculature throughout the left hip.

## 2023-02-15 DIAGNOSIS — E66.811 CLASS 1 OBESITY DUE TO EXCESS CALORIES WITH SERIOUS COMORBIDITY AND BODY MASS INDEX (BMI) OF 34.0 TO 34.9 IN ADULT: ICD-10-CM

## 2023-02-15 DIAGNOSIS — E66.09 CLASS 1 OBESITY DUE TO EXCESS CALORIES WITH SERIOUS COMORBIDITY AND BODY MASS INDEX (BMI) OF 34.0 TO 34.9 IN ADULT: ICD-10-CM

## 2023-02-16 RX ORDER — PHENTERMINE HYDROCHLORIDE 37.5 MG/1
TABLET ORAL
Qty: 30 TABLET | Refills: 0 | Status: SHIPPED | OUTPATIENT
Start: 2023-02-16 | End: 2023-03-20

## 2023-02-16 NOTE — TELEPHONE ENCOUNTER
Upon chart review, patient is scheduled for appt with Dr. Yeo as recommended on 2/24/23.    Susan Husain MBA, ATC

## 2023-02-20 NOTE — PROGRESS NOTES
ASSESSMENT & PLAN  Patient Instructions     1. Primary osteoarthritis of left hip      -Patient is following up for chronic left hip pain due to arthritis  -Patient tolerated repeat left hip intra-articular cortisone injection today without complications.  Patient was given postprocedure instruction  -Patient will start home exercise program.  Handouts were given today  -Patient is traveling to [place] tomorrow for the next 2 months.  Patient will also start swimming and activities in the water to minimize stress on the joints while strengthening the surrounding muscles  -Patient will minimize hiking and high-impact exercises as much as possible.  Patient will try biking  -Patient will follow up when pain returns  -Call direct clinic number [[phone]] at any time with questions or concerns.    Albert Yeo MD [facility] Orthopedics and Sports Medicine  [facility]          -----    SUBJECTIVE:  Rayna Jackson is a 58 year old female who is seen for left hip intra-articular corticosteriod injection       Patient rates pain as 10+/10 pre-procedure. Patient rates pain as 0/10 post-procedure.    Large Joint Injection/Arthocentesis: L hip joint    Date/Time: 2/24/2023 8:43 AM  Performed by: Yeo, Albert, MD  Authorized by: Yeo, Albert, MD     Indications:  Pain and osteoarthritis  Needle Size:  22 G  Guidance: ultrasound    Approach:  Anterior  Location:  Hip      Site:  L hip joint  Medications:  40 mg methylPREDNISolone 40 MG/ML; 4 mL ropivacaine 5 MG/ML; 5 mL lidocaine 1 %  Outcome:  Tolerated well, no immediate complications  Procedure discussed: discussed risks, benefits, and alternatives    Consent Given by:  Patient  Prep: patient was prepped and draped in usual sterile fashion     8 mL of lidocaine was used as local anesthetic prior to corticosteriod injection           Albert Yeo MD, [facility]

## 2023-02-23 DIAGNOSIS — H57.89 CYST OF EYE: Primary | ICD-10-CM

## 2023-02-24 ENCOUNTER — OFFICE VISIT (OUTPATIENT)
Dept: ORTHOPEDICS | Facility: CLINIC | Age: 58
End: 2023-02-24
Payer: COMMERCIAL

## 2023-02-24 VITALS
WEIGHT: 199 LBS | DIASTOLIC BLOOD PRESSURE: 72 MMHG | HEIGHT: 65 IN | BODY MASS INDEX: 33.15 KG/M2 | SYSTOLIC BLOOD PRESSURE: 104 MMHG

## 2023-02-24 DIAGNOSIS — M16.12 PRIMARY OSTEOARTHRITIS OF LEFT HIP: Primary | ICD-10-CM

## 2023-02-24 PROCEDURE — 20611 DRAIN/INJ JOINT/BURSA W/US: CPT | Mod: LT | Performed by: FAMILY MEDICINE

## 2023-02-24 RX ORDER — LIDOCAINE HYDROCHLORIDE 10 MG/ML
5 INJECTION, SOLUTION INFILTRATION; PERINEURAL
Status: DISCONTINUED | OUTPATIENT
Start: 2023-02-24 | End: 2023-08-09

## 2023-02-24 RX ORDER — ROPIVACAINE HYDROCHLORIDE 5 MG/ML
4 INJECTION, SOLUTION EPIDURAL; INFILTRATION; PERINEURAL
Status: DISCONTINUED | OUTPATIENT
Start: 2023-02-24 | End: 2023-08-09

## 2023-02-24 RX ORDER — METHYLPREDNISOLONE ACETATE 40 MG/ML
40 INJECTION, SUSPENSION INTRA-ARTICULAR; INTRALESIONAL; INTRAMUSCULAR; SOFT TISSUE
Status: DISCONTINUED | OUTPATIENT
Start: 2023-02-24 | End: 2023-08-09

## 2023-02-24 RX ADMIN — METHYLPREDNISOLONE ACETATE 40 MG: 40 INJECTION, SUSPENSION INTRA-ARTICULAR; INTRALESIONAL; INTRAMUSCULAR; SOFT TISSUE at 08:43

## 2023-02-24 RX ADMIN — LIDOCAINE HYDROCHLORIDE 5 ML: 10 INJECTION, SOLUTION INFILTRATION; PERINEURAL at 08:43

## 2023-02-24 RX ADMIN — ROPIVACAINE HYDROCHLORIDE 4 ML: 5 INJECTION, SOLUTION EPIDURAL; INFILTRATION; PERINEURAL at 08:43

## 2023-02-24 NOTE — LETTER
2/24/2023         RE: Rayna Jackson  [address]  [facility] [address]        Dear Colleague,    Thank you for referring your patient, Rayna Jackson, to the [facility] [place]. Please see a copy of my visit note below.    ASSESSMENT & PLAN  Patient Instructions     1. Primary osteoarthritis of left hip      -Patient is following up for chronic left hip pain due to arthritis  -Patient tolerated repeat left hip intra-articular cortisone injection today without complications.  Patient was given postprocedure instruction  -Patient will start home exercise program.  Handouts were given today  -Patient is traveling to [place] tomorrow for the next 2 months.  Patient will also start swimming and activities in the water to minimize stress on the joints while strengthening the surrounding muscles  -Patient will minimize hiking and high-impact exercises as much as possible.  Patient will try biking  -Patient will follow up when pain returns  -Call direct clinic number [[phone]] at any time with questions or concerns.    Albert Yeo MD [facility]  [facility] [place]          -----    SUBJECTIVE:  Rayna Jackson is a 58 year old female who is seen for left hip intra-articular corticosteriod injection       Patient rates pain as 10+/10 pre-procedure. Patient rates pain as 0/10 post-procedure.    Large Joint Injection/Arthocentesis: L hip joint    Date/Time: 2/24/2023 8:43 AM  Performed by: Yeo, Albert, MD  Authorized by: Yeo, Albert, MD     Indications:  Pain and osteoarthritis  Needle Size:  22 G  Guidance: ultrasound    Approach:  Anterior  Location:  Hip      Site:  L hip joint  Medications:  40 mg methylPREDNISolone 40 MG/ML; 4 mL ropivacaine 5 MG/ML; 5 mL lidocaine 1 %  Outcome:  Tolerated well, no immediate complications  Procedure discussed: discussed risks, benefits, and alternatives    Consent Given by:  Patient  Prep: patient  was prepped and draped in usual sterile fashion     8 mL of lidocaine was used as local anesthetic prior to corticosteriod injection           Albert Yeo MD, [facility]        Again, thank you for allowing me to participate in the care of your patient.        Sincerely,        Albert Yeo, MD

## 2023-02-24 NOTE — PATIENT INSTRUCTIONS
1. Primary osteoarthritis of left hip      -Patient is following up for chronic left hip pain due to arthritis  -Patient tolerated repeat left hip intra-articular cortisone injection today without complications.  Patient was given postprocedure instruction  -Patient will start home exercise program.  Handouts were given today  -Patient is traveling to [place] tomorrow for the next 2 months.  Patient will also start swimming and activities in the water to minimize stress on the joints while strengthening the surrounding muscles  -Patient will minimize hiking and high-impact exercises as much as possible.  Patient will try biking  -Patient will follow up when pain returns  -Call direct clinic number [[phone]] at any time with questions or concerns.    Albert Yeo MD [facility] Orth[facility] and Sports Medicine  [facility] [place]

## 2023-02-28 ENCOUNTER — TELEPHONE (OUTPATIENT)
Dept: OPHTHALMOLOGY | Facility: CLINIC | Age: 58
End: 2023-02-28
Payer: COMMERCIAL

## 2023-02-28 NOTE — TELEPHONE ENCOUNTER
LVM for patient regarding referral for : Mati -Mitra Garcia MD. Stated we are obtaining Records for RN to review for scheduling and patient will be called back once we have reviewed records. Provided direct number for any questions.

## 2023-03-06 ENCOUNTER — TELEPHONE (OUTPATIENT)
Dept: OPHTHALMOLOGY | Facility: CLINIC | Age: 58
End: 2023-03-06
Payer: COMMERCIAL

## 2023-03-06 NOTE — TELEPHONE ENCOUNTER
Spoke with patient regarding referral and scheduling. Informed patient we still need to gather her records and review before scheduling. Patient stated she is out of state for a month. She tried having Surgery through [facility] in [place] for multiple cysts or tumor on her eyelid but she is out of their network. - Per Patient    Referral has now been forwarded to [place].    Patient has Records at [facility] in [place] at [address], [place] [address]  Phone: [phone]  that need to be obtained.     Patient has records at [facility] in [place] at [facility] [address], [place] [address] Phone: [phone]  that also need to be obtained.     Informed patient our Records Team will be in contact with her regarding Release of Records Request. Patient is aware of Records call back.-Per Patient

## 2023-03-08 ENCOUNTER — TELEPHONE (OUTPATIENT)
Dept: OPHTHALMOLOGY | Facility: CLINIC | Age: 58
End: 2023-03-08
Payer: COMMERCIAL

## 2023-03-08 NOTE — TELEPHONE ENCOUNTER
Spoke with patient regarding referral and scheduling for :Cyst of eye-Per [place], Mitra Yadav MD. Scheduled patient after she is back in town and sent appointment letter and map to confirmed address.-Per Patient

## 2023-03-09 NOTE — TELEPHONE ENCOUNTER
FUTURE VISIT INFORMATION      FUTURE VISIT INFORMATION:    Date: 5/1/23    Time: 2:30pm    Location: [facility]  REFERRAL INFORMATION:    Referring provider:  Mitra Keita MD    Referring providers clinic:  [facility]    Reason for visit/diagnosis  Cyst of ey    RECORDS REQUESTED FROM:       Clinic name Comments Records Status Imaging Status   [facility] Ov/referral 2/2/23 [facility]    [facility] Request for recs sent/ resent 3/14- received and sent to scanning [facility]    [facility] received and scanned under in chart under 12/14/22 EPIC

## 2023-03-21 ENCOUNTER — TELEPHONE (OUTPATIENT)
Dept: FAMILY MEDICINE | Facility: CLINIC | Age: 58
End: 2023-03-21
Payer: COMMERCIAL

## 2023-03-21 NOTE — TELEPHONE ENCOUNTER
Prior Authorization Retail Medication Request    Medication/Dose: phentermine (ADIPEX-P) 37.5 MG tablet  ICD code (if different than what is on RX):  Previously Tried and Failed:  Rationale:    Insurance Name: unknown  Insurance ID: unknown    Pharmacy Information (if different than what is on RX)  Name:[facility]  Phone:[phone]    Please include previous medications tried and failed.  Please ask insurance for medications on formulary.

## 2023-03-29 ENCOUNTER — TELEPHONE (OUTPATIENT)
Dept: FAMILY MEDICINE | Facility: CLINIC | Age: 58
End: 2023-03-29
Payer: COMMERCIAL

## 2023-03-29 NOTE — TELEPHONE ENCOUNTER
OON was denied 2/27.  Spoke with Cassidy and informed her that pt can file an appeal with pt's insurance.    Ewa-Referral Coordinator

## 2023-03-29 NOTE — TELEPHONE ENCOUNTER
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

## 2023-04-18 NOTE — PROGRESS NOTES
ASSESSMENT & PLAN  Patient Instructions     1. Left hip pain    2. Primary osteoarthritis of left hip      -Patient has severe left hip pain that is not improving due to arthritis and possibly radiation from the lumbar spine due to nerve root impingement  -Patient reports no improvement in pain from her last intra-articular cortisone injection  -X-rays taken office today show L5-S1 intervertebral joint space narrowing but this does not correspond with her hip pain which would correlate with L2-L3 levels.  -We discussed ordering an MRI of the lumbar spine to further evaluate if she is having any lumbar nerve root impingement or referral to an orthopedic hip surgeon to discuss surgical treatment options.  Patient requests surgical referral at this time and so patient will refer to Dr. Neto Amezcua to discuss surgical intervention  -Call direct clinic number [[phone]] at any time with questions or concerns.    Albert Yeo MD [facility] Orthopedics and Sports Medicine  [facility]          -----    SUBJECTIVE:  Rayna Jackson is a 58 year old female who is seen in follow-up for left hip.They were last seen 2/24/2023     Since their last visit reports worsening pain. They indicate that their current pain level is over 10/10. Notes pain 24/7.  Pain is left groin radiating down anterior thigh. They have tried home exercises and corticosteroid injection (most recent date: 2/24/2023) that provided no amount of relief, MRI left hip 2/11/23.  Taking Advil every 4 hours.     The patient is seen with their .    Patient's past medical, surgical, social, and family histories were reviewed today and no changes are noted.    REVIEW OF SYSTEMS:  Constitutional: NEGATIVE for fever, chills, change in weight  Skin: NEGATIVE for worrisome rashes, moles or lesions  GI/: NEGATIVE for bowel or bladder changes  Neuro: NEGATIVE for weakness, dizziness or paresthesias    OBJECTIVE:  /78   Ht 1.651 m  "(5' 5\")   Wt 88.5 kg (195 lb)   BMI 32.45 kg/m     General: healthy, alert and in no distress  HEENT: no scleral icterus or conjunctival erythema  Skin: no suspicious lesions or rash. No jaundice.  CV: regular rhythm by palpation, no pedal edema  Resp: normal respiratory effort without conversational dyspnea   Psych: normal mood and affect  Gait: normal steady gait with appropriate coordination and balance  Neuro: normal light touch sensory exam of the extremities.    MSK:  LEFT HIP  Inspection:    No obvious deformity or asymmetry, level pelvis  Palpation:    Tender about the greater trochanteric region. Otherwise all other landmarks are nontender.  Active Range of Motion:     Flexion limited by pain, IR limited by pain, ER  limited by pain  Strength:    Flexion painful, adduction grossly intact, abduction painful  Special Tests:    Positive: anterior impingement (FADIR), posterior impingement (EX/AB/ER), resisted gluteus medius provocation    Negative: YASMINE Sena    Independent visualization of the below image:    Personal review of lumbar spine x-rays taken office today show multilevel small anterior endplate osteophytes, L5-S1 intervertebral joint space narrowing and likely an old fracture of the anterior aspect of the L5 lumbar vertebrae.        Albert Yeo MD, [facility]        "

## 2023-04-19 DIAGNOSIS — E03.9 HYPOTHYROIDISM, UNSPECIFIED TYPE: ICD-10-CM

## 2023-04-20 ENCOUNTER — PATIENT OUTREACH (OUTPATIENT)
Dept: CARE COORDINATION | Facility: CLINIC | Age: 58
End: 2023-04-20
Payer: COMMERCIAL

## 2023-04-21 ENCOUNTER — OFFICE VISIT (OUTPATIENT)
Dept: ORTHOPEDICS | Facility: CLINIC | Age: 58
End: 2023-04-21
Payer: COMMERCIAL

## 2023-04-21 ENCOUNTER — ANCILLARY PROCEDURE (OUTPATIENT)
Dept: GENERAL RADIOLOGY | Facility: CLINIC | Age: 58
End: 2023-04-21
Attending: FAMILY MEDICINE
Payer: COMMERCIAL

## 2023-04-21 VITALS
DIASTOLIC BLOOD PRESSURE: 78 MMHG | WEIGHT: 195 LBS | HEIGHT: 65 IN | SYSTOLIC BLOOD PRESSURE: 122 MMHG | BODY MASS INDEX: 32.49 KG/M2

## 2023-04-21 DIAGNOSIS — M16.12 PRIMARY OSTEOARTHRITIS OF LEFT HIP: ICD-10-CM

## 2023-04-21 DIAGNOSIS — M25.552 LEFT HIP PAIN: Primary | ICD-10-CM

## 2023-04-21 DIAGNOSIS — M25.552 LEFT HIP PAIN: ICD-10-CM

## 2023-04-21 PROCEDURE — 99214 OFFICE O/P EST MOD 30 MIN: CPT | Performed by: FAMILY MEDICINE

## 2023-04-21 PROCEDURE — 72100 X-RAY EXAM L-S SPINE 2/3 VWS: CPT | Mod: TC | Performed by: RADIOLOGY

## 2023-04-21 RX ORDER — LEVOTHYROXINE SODIUM 50 UG/1
TABLET ORAL
Qty: 90 TABLET | Refills: 0 | Status: SHIPPED | OUTPATIENT
Start: 2023-04-21 | End: 2023-05-17

## 2023-04-21 NOTE — LETTER
4/21/2023         RE: Rayna Jackson  [address] [place] [facility] [address]        Dear Colleague,    Thank you for referring your patient, aRyna Jackson, to the [facility] [place]. Please see a copy of my visit note below.    ASSESSMENT & PLAN  Patient Instructions     1. Left hip pain    2. Primary osteoarthritis of left hip      -Patient has severe left hip pain that is not improving due to arthritis and possibly radiation from the lumbar spine due to nerve root impingement  -Patient reports no improvement in pain from her last intra-articular cortisone injection  -X-rays taken office today show L5-S1 intervertebral joint space narrowing but this does not correspond with her hip pain which would correlate with L2-L3 levels.  -We discussed ordering an MRI of the lumbar spine to further evaluate if she is having any lumbar nerve root impingement or referral to an orthopedic hip surgeon to discuss surgical treatment options.  Patient requests surgical referral at this time and so patient will refer to Dr. Neto Amezcua to discuss surgical intervention  -Call direct clinic number [[phone]] at any time with questions or concerns.    Albert Yeo MD [facility] Orthopedics and Sports Medicine  [facility]          -----    SUBJECTIVE:  Rayna Jackson is a 58 year old female who is seen in follow-up for left hip.They were last seen 2/24/2023     Since their last visit reports worsening pain. They indicate that their current pain level is over 10/10. Notes pain 24/7.  Pain is left groin radiating down anterior thigh. They have tried home exercises and corticosteroid injection (most recent date: 2/24/2023) that provided no amount of relief, MRI left hip 2/11/23.  Taking Advil every 4 hours.     The patient is seen with their .    Patient's past medical, surgical, social, and family histories were reviewed today and no changes are noted.    REVIEW OF  "SYSTEMS:  Constitutional: NEGATIVE for fever, chills, change in weight  Skin: NEGATIVE for worrisome rashes, moles or lesions  GI/: NEGATIVE for bowel or bladder changes  Neuro: NEGATIVE for weakness, dizziness or paresthesias    OBJECTIVE:  /78   Ht 1.651 m (5' 5\")   Wt 88.5 kg (195 lb)   BMI 32.45 kg/m     General: healthy, alert and in no distress  HEENT: no scleral icterus or conjunctival erythema  Skin: no suspicious lesions or rash. No jaundice.  CV: regular rhythm by palpation, no pedal edema  Resp: normal respiratory effort without conversational dyspnea   Psych: normal mood and affect  Gait: normal steady gait with appropriate coordination and balance  Neuro: normal light touch sensory exam of the extremities.    MSK:  LEFT HIP  Inspection:    No obvious deformity or asymmetry, level pelvis  Palpation:    Tender about the greater trochanteric region. Otherwise all other landmarks are nontender.  Active Range of Motion:     Flexion limited by pain, IR limited by pain, ER  limited by pain  Strength:    Flexion painful, adduction grossly intact, abduction painful  Special Tests:    Positive: anterior impingement (FADIR), posterior impingement (EX/AB/ER), resisted gluteus medius provocation    Negative: YASMINE Sena    Independent visualization of the below image:    Personal review of lumbar spine x-rays taken office today show multilevel small anterior endplate osteophytes, L5-S1 intervertebral joint space narrowing and likely an old fracture of the anterior aspect of the L5 lumbar vertebrae.        Albert Yeo MD, [facility]            Again, thank you for allowing me to participate in the care of your patient.        Sincerely,        Albert Yeo, MD    "

## 2023-04-21 NOTE — TELEPHONE ENCOUNTER
Prescription approved per [facility] Refill Protocol.    Routing to prescribing provider to inform of needed labs per protocol: no TSH on file in past 12 months, please place future orders      TSH   Date Value Ref Range Status   04/14/2022 2.11 0.40 - 4.00 mU/L Final   07/02/2020 1.37 0.40 - 4.00 mU/L Final     Morelia Bal RN

## 2023-04-21 NOTE — PATIENT INSTRUCTIONS
1. Left hip pain    2. Primary osteoarthritis of left hip      -Patient has severe left hip pain that is not improving due to arthritis and possibly radiation from the lumbar spine due to nerve root impingement  -Patient reports no improvement in pain from her last intra-articular cortisone injection  -X-rays taken office today show L5-S1 intervertebral joint space narrowing but this does not correspond with her hip pain which would correlate with L2-L3 levels.  -We discussed ordering an MRI of the lumbar spine to further evaluate if she is having any lumbar nerve root impingement or referral to an orthopedic hip surgeon to discuss surgical treatment options.  Patient requests surgical referral at this time and so patient will refer to Dr. Neto Amezcua to discuss surgical intervention  -Call direct clinic number [[phone]] at any time with questions or concerns.    Albert Yeo MD [facility] Orthopedics and Sports Medicine  [facility] [place]

## 2023-04-22 DIAGNOSIS — M70.62 TROCHANTERIC BURSITIS OF LEFT HIP: ICD-10-CM

## 2023-04-22 DIAGNOSIS — M25.552 LEFT HIP PAIN: ICD-10-CM

## 2023-04-22 DIAGNOSIS — M16.12 PRIMARY OSTEOARTHRITIS OF LEFT HIP: ICD-10-CM

## 2023-04-26 RX ORDER — DICLOFENAC SODIUM 75 MG/1
75 TABLET, DELAYED RELEASE ORAL 2 TIMES DAILY PRN
Qty: 60 TABLET | Refills: 0 | Status: SHIPPED | OUTPATIENT
Start: 2023-04-26 | End: 2023-05-31

## 2023-04-26 NOTE — TELEPHONE ENCOUNTER
Medication Request for: Diclofenac 75mg            Prescription last written on 2/8/23 by Dr. Yeo   Sig: take 1 tab twice daily prn  Last Fill Quantity: 60 with # refills: 0     Last Office Visit by provider: 4/21/23  Patient desires to have faxed or E-prescribe to pharmacy if available  Pharmacy [place] in [place].    Phone number patient can be reached at: Cell number on file:    Telephone Information:   Mobile [phone]       Can we leave a detailed message on this number? NO    Please advise regarding refill request.     JEM Delgadillo RN

## 2023-05-01 ENCOUNTER — OFFICE VISIT (OUTPATIENT)
Dept: OPHTHALMOLOGY | Facility: CLINIC | Age: 58
End: 2023-05-01
Attending: FAMILY MEDICINE
Payer: COMMERCIAL

## 2023-05-01 ENCOUNTER — PRE VISIT (OUTPATIENT)
Dept: OPHTHALMOLOGY | Facility: CLINIC | Age: 58
End: 2023-05-01

## 2023-05-01 DIAGNOSIS — H57.89 CYST OF EYE: ICD-10-CM

## 2023-05-01 PROCEDURE — 88305 TISSUE EXAM BY PATHOLOGIST: CPT | Mod: 26 | Performed by: OPHTHALMOLOGY

## 2023-05-01 PROCEDURE — 99203 OFFICE O/P NEW LOW 30 MIN: CPT | Mod: 25 | Performed by: OPHTHALMOLOGY

## 2023-05-01 PROCEDURE — 92285 EXTERNAL OCULAR PHOTOGRAPHY: CPT | Mod: GC | Performed by: OPHTHALMOLOGY

## 2023-05-01 PROCEDURE — 67840 REMOVE EYELID LESION: CPT | Mod: E2 | Performed by: OPHTHALMOLOGY

## 2023-05-01 PROCEDURE — 88305 TISSUE EXAM BY PATHOLOGIST: CPT | Mod: TC | Performed by: OPHTHALMOLOGY

## 2023-05-01 RX ORDER — ERYTHROMYCIN 5 MG/G
OINTMENT OPHTHALMIC ONCE
Status: COMPLETED | OUTPATIENT
Start: 2023-05-01 | End: 2023-05-01

## 2023-05-01 RX ADMIN — ERYTHROMYCIN 1 G: 5 OINTMENT OPHTHALMIC at 15:02

## 2023-05-01 ASSESSMENT — VISUAL ACUITY
METHOD: SNELLEN - LINEAR
OD_CC: 20/20
OS_CC: 20/20

## 2023-05-01 ASSESSMENT — CONF VISUAL FIELD
METHOD: COUNTING FINGERS
OS_SUPERIOR_NASAL_RESTRICTION: 0
OD_INFERIOR_NASAL_RESTRICTION: 0
OS_INFERIOR_NASAL_RESTRICTION: 0
OS_INFERIOR_TEMPORAL_RESTRICTION: 0
OD_SUPERIOR_NASAL_RESTRICTION: 0
OD_NORMAL: 1
OD_SUPERIOR_TEMPORAL_RESTRICTION: 0
OD_INFERIOR_TEMPORAL_RESTRICTION: 0
OS_NORMAL: 1
OS_SUPERIOR_TEMPORAL_RESTRICTION: 0

## 2023-05-01 ASSESSMENT — EXTERNAL EXAM - RIGHT EYE: OD_EXAM: NORMAL

## 2023-05-01 ASSESSMENT — TONOMETRY
OD_IOP_MMHG: 18
IOP_METHOD: ICARE
OS_IOP_MMHG: 18

## 2023-05-01 ASSESSMENT — EXTERNAL EXAM - LEFT EYE: OS_EXAM: NORMAL

## 2023-05-01 NOTE — NURSING NOTE
Chief Complaints and History of Present Illnesses   Patient presents with     Eyelid Cyst Evaluation     Chief Complaint(s) and History of Present Illness(es)     Eyelid Cyst Evaluation            Laterality: right upper lid, right lower lid and left lower lid    Onset: 6 months ago    Associated symptoms: Negative for lid swelling, lid pain, blurred vision, discharge, mattering, tearing and eye pain          Comments    Patient states lesion have been present in the left eye for 5 months and the right eye for 6 months. Patient states [facility] found more than the two pointed out by [facility].  Zoë Bailey, JLUIS May 1, 2023 2:22 PM

## 2023-05-01 NOTE — LETTER
2023         RE:  :  MRN: Rayna Jackson  1965  [id number]     Dear Dr. Mitra Causey,    Thank you for asking me to see your patient, Rayna Jackson, for an oculoplastic   consultation.  My assessment and plan are below.  For further details, please see my attached clinic note.      Assessment & Plan     Rayna Jackson is a 58 year old female with the following diagnoses:   1. Cyst of eye    Lesions Right upper lid and Right lower lid and left lower lid - all look benign       PLAN:  Left lower lid excisional biopsy        Again, thank you for allowing me to participate in the care of your patient.      Sincerely,    Monroe Zacarias MD  Department of Ophthalmology and Visual Neurosciences  [facility]    CC: Mitra Keita MD  [address] [address]  Via In Basket

## 2023-05-01 NOTE — PROGRESS NOTES
Chief Complaints and History of Present Illnesses   Patient presents with     Eyelid Cyst Evaluation     Chief Complaint(s) and History of Present Illness(es)     Eyelid Cyst Evaluation    In right upper lid, right lower lid and left lower lid.  This started 6   months ago.  Associated symptoms include Negative for lid swelling, lid   pain, blurred vision, discharge, mattering, tearing and eye pain.           Comments    Patient states lesion have been present in the left eye for 5 months and   the right eye for 6 months. Patient states [facility] found more than the two   pointed out by [facility].  JLUIS Mayo May 1, 2023 2:22 PM           Assessment & Plan     Rayna Jackson is a 58 year old female with the following diagnoses:   1. Cyst of eye    Lesions Right upper lid and Right lower lid and left lower lid - all look benign       PLAN:  Left lower lid excisional biopsy          Attending Physician Attestation:  Complete documentation of historical and exam elements from today's encounter can be found in the full encounter summary report (not reduplicated in this progress note).  I personally obtained the chief complaint(s) and history of present illness.  I confirmed and edited as necessary the review of systems, past medical/surgical history, family history, social history, and examination findings as documented by others; and I examined the patient myself.  I personally reviewed the relevant tests, images, and reports as documented above.  I formulated and edited as necessary the assessment and plan and discussed the findings and management plan with the patient and family. I personally reviewed the ophthalmic test(s) associated with this encounter, agree with the interpretation(s) as documented  and have edited the corresponding report(s) as necessary.   -Monroe Zacarias MD  2:34 PM 5/1/2023    Today with Rayna Jackson  and her , I reviewed the indications, risks, benefits, and alternatives of the  proposed surgical procedure including, but not limited to, failure obtain the desired result  and need for additional surgery, bleeding, infection, loss of vision, loss of the eye, and the remote possibility of permanent damage to any organ system or death with the use of anesthesia.  I provided multiple opportunities for the questions, answered all questions to the best of my ability, and confirmed that my answers and my discussion were understood.     - Monroe Zacarias MD 2:45 PM 5/1/2023

## 2023-05-01 NOTE — PATIENT INSTRUCTIONS
Monroe Zacarias M.D.    POST OPERATIVE INSTRUCTIONS   OFFICE EYELID SURGERY      Ice pack immediately after surgery. Alternate ten minutes on and ten minutes off for the first 24 - 48 hours, while in a reclined position with two pillows under your head while awake.     You can use Tylenol extra strength for pain as directed on the bottle.     Sleep with two pillows under your head for the first night following surgery.      If bandaged, remove the dressing 24 hours after surgery.     Use ointment along the incision both morning and evening until your return visit. If you get ointment in your eye, it will blur your vision slightly.     Avoid aspirin or anti-inflammatory medications such as Advil or Motrin for one week following your surgery unless otherwise directed.     Avoid strenuous activity or straining for the first week after surgery.     Bathing and showers are OK but to facilitate healing, do not wash or apply water to the sutured areas.     Small amounts of bright red blood normally stain the bandages. Some blurring of vision can be expected due to drainage and ointment in the eyes.     If your eyes swell shut, you cannot establish vision in the operated eye, or the pain is not reasonably controlled with medication you must contact the eye clinic immediately.     If you should have a problem after normal office hours, you can reach the ophthalmologist on call at [phone]. If for some reason no one can be reached, proceed to the nearest emergency room for evaluation and treatment.

## 2023-05-02 LAB
PATH REPORT.COMMENTS IMP SPEC: NORMAL
PATH REPORT.COMMENTS IMP SPEC: NORMAL
PATH REPORT.FINAL DX SPEC: NORMAL
PATH REPORT.GROSS SPEC: NORMAL
PATH REPORT.MICROSCOPIC SPEC OTHER STN: NORMAL
PATH REPORT.RELEVANT HX SPEC: NORMAL
PHOTO IMAGE: NORMAL

## 2023-05-09 ENCOUNTER — LAB (OUTPATIENT)
Dept: LAB | Facility: CLINIC | Age: 58
End: 2023-05-09
Payer: COMMERCIAL

## 2023-05-09 DIAGNOSIS — E66.09 CLASS 1 OBESITY DUE TO EXCESS CALORIES WITH SERIOUS COMORBIDITY AND BODY MASS INDEX (BMI) OF 34.0 TO 34.9 IN ADULT: ICD-10-CM

## 2023-05-09 DIAGNOSIS — E66.811 CLASS 1 OBESITY DUE TO EXCESS CALORIES WITH SERIOUS COMORBIDITY AND BODY MASS INDEX (BMI) OF 34.0 TO 34.9 IN ADULT: ICD-10-CM

## 2023-05-09 DIAGNOSIS — E03.9 HYPOTHYROIDISM, UNSPECIFIED TYPE: ICD-10-CM

## 2023-05-09 PROCEDURE — 84439 ASSAY OF FREE THYROXINE: CPT

## 2023-05-09 PROCEDURE — 84443 ASSAY THYROID STIM HORMONE: CPT

## 2023-05-09 PROCEDURE — 36415 COLL VENOUS BLD VENIPUNCTURE: CPT

## 2023-05-09 RX ORDER — PHENTERMINE HYDROCHLORIDE 37.5 MG/1
TABLET ORAL
Qty: 30 TABLET | Refills: 0 | Status: SHIPPED | OUTPATIENT
Start: 2023-05-09 | End: 2023-05-17

## 2023-05-09 ASSESSMENT — HOOS JR
GOING UP OR DOWN STAIRS: SEVERE
HOOS JR TOTAL INTERVAL SCORE: 36.36
SITTING: MODERATE
RISING FROM SITTING: MODERATE
LYING IN BED (TURNING OVER, MAINTAINING HIP POSITION): EXTREME
WALKING ON UNEVEN SURFACE: SEVERE
BENDING TO THE FLOOR TO PICK UP OBJECT: SEVERE

## 2023-05-09 NOTE — TELEPHONE ENCOUNTER
Future appt to establish with new pcp I believe. Refilled. pdmp reviewed. Does not appear she has taken then 3 consecutive months recently. Refilled.     -danitza simons, pac

## 2023-05-10 LAB
T4 FREE SERPL-MCNC: 1.4 NG/DL (ref 0.9–1.7)
TSH SERPL DL<=0.005 MIU/L-ACNC: 4.42 UIU/ML (ref 0.3–4.2)

## 2023-05-12 ENCOUNTER — OFFICE VISIT (OUTPATIENT)
Dept: ORTHOPEDICS | Facility: CLINIC | Age: 58
End: 2023-05-12
Payer: COMMERCIAL

## 2023-05-12 VITALS
DIASTOLIC BLOOD PRESSURE: 96 MMHG | SYSTOLIC BLOOD PRESSURE: 140 MMHG | BODY MASS INDEX: 32.82 KG/M2 | WEIGHT: 197 LBS | HEIGHT: 65 IN

## 2023-05-12 DIAGNOSIS — M25.552 LEFT HIP PAIN: ICD-10-CM

## 2023-05-12 DIAGNOSIS — M16.12 PRIMARY OSTEOARTHRITIS OF LEFT HIP: ICD-10-CM

## 2023-05-12 PROCEDURE — 99204 OFFICE O/P NEW MOD 45 MIN: CPT | Performed by: STUDENT IN AN ORGANIZED HEALTH CARE EDUCATION/TRAINING PROGRAM

## 2023-05-12 NOTE — LETTER
5/12/2023         RE: Rayna Jackson  [address]  [facility] [address]        Dear Colleague,    Thank you for referring your patient, Rayna Jackson, to the [facility] [place]. Please see a copy of my visit note below.        [facility]  Orthopaedic Surgery Consultation by Neto Amezcua M.D.    Rayna Jackson MRN# [id number]   Age: 58 year old YOB: 1965     Requesting physician: Albert Yeo No Ref-Primary, Physician     Background history:  DX:  1. Headaches  2. Allergic rhinitis  3. Asthma  4. Laryngitis  Hypothyroidism  Hypertension    TREATMENTS:  1. None           History of Present Illness:   58 year old female who presents to our clinic because of chronic left hip/groin pain.  This pain has been present since November 2022 without antecedent trauma.  Pain has become progressively worse.  It is most notably in the anterior groin and radiates down to the knee but never past.  Patient does not endorse significant night pain.  No motor or sensory deficits of left lower extremity.  The pain gets worse during walking, bending, sitting and standing.  Patient reports night pain, initiation stiffness and soreness.  She has trouble putting on her shoes and socks or getting in and out of the car.  It is greatly affecting her quality of life and activity level.  To mitigate the pain she is tried over-the-counter analgesics, activity modification, home exercise regimen and injection therapy.  Home exercise regimen made her pain worse.  She did not notice any significant difference from the intra-articular injections or greater trochanteric bursa injections.    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 Normal  "mood and affect.  VITAL SIGNS: Height 1.651 m (5' 5\"), weight 89.4 kg (197 lb), not currently breastfeeding.  Reviewed nursing intake notes.   Body mass index is 32.78 kg/m .  RESP: non labored breathing   ABD: benign, soft, non-tender, no acute peritoneal findings  SKIN: grossly normal   LYMPHATIC: grossly normal, no adenopathy, no extremity edema  NEURO: grossly normal , no motor deficits  VASCULAR: satisfactory perfusion of all extremities   MUSCULOSKELETAL:   Alignment: Neutral  Gait: Antalgic  Hips:   L hip: ROM    , Extension 0  , IRF 10  , ERF 30  , ABD 30  , ADD 10  .  Deep flexion and rotations are recognizably painful.  Recognizable tenderness to palpation over greater trochanteric region.  Lasegue's test is negative.     Left LE:   Thigh and leg compartments soft and compressible   +Quad/TA/GSC/FHL/EHL   SILT DP/SP/Racheal/Saph/Tib nerve distributions   Palpable dorsalis pedis pulse          Data:   All laboratory data reviewed  All imaging studies reviewed by me personally.    XR pelvis/hip left 2/8/2023:  My interpretation: Moderate to severe osteoarthritic changes of the left femoral acetabular joint with joint space narrowing, presence of marginal osteophytes, sclerosis and subchondral cyst.  Multilevel degenerative changes of lumbar spine.    MRI hip left 2/11/2023:  My interpretation: Moderate degenerative arthritic change of the left hip with grade 3 and focal grade IV chondromalacia.  Moderate synovitis.  No signs of fracture.         Assessment and Plan:   Assessment:  58-year-old female presenting with chronic left hip pain most likely due to osteoarthritic changes of the femoral acetabular joint.  Insufficiently responding to nonsurgical treatment options.     Plan:  We had a long discussion with the patient etiology and regarding ongoing management options.  Reviewed surgical and nonsurgical treatments.  The non-operative management options consist of activity modification, pain medication, " PT and injection therapy. We reviewed total hip replacement in detail including the procedure, the implants, the recovery process, and long-term outcomes.    Although clinically and radiographically her pain could be well caused by the femoral acetabular osteoarthritic changes it is somewhat concerning that she had not responded well to any of the articular injections.  It would be my recommendation before moving forward with surgical intervention to have the left hip injected with lidocaine only by Buzz Kaba.  If patient responds positively to the injection it is reasonable to proceed with left total hip arthroplasty surgery.  Patient understands and agrees to the treatment plan set forth.  We will schedule this injection in the near future and follow-up afterwards.    More information on joint replacements can be found on : [email or web address]    Thank you for your referral.    Neto Amezcua MD, PhD     Adult Reconstruction  [facility] Department of Orthopaedic Surgery    This note was created using dictation software and may contain errors.  Please contact the creator for any clarifications that are needed.    DATA for DOCUMENTATION:         Past Medical History:     Patient Active Problem List   Diagnosis     Benign essential hypertension     Osteoarthritis of both knees, unspecified osteoarthritis type     Bilateral edema of lower extremity     Osteoarthritis of both feet, unspecified osteoarthritis type     IUD (intrauterine device) in place     Chronic nonintractable headache, unspecified headache type     HTN, goal below 140/90     Allergic rhinitis     Asthma     Environmental allergies     Moderate persistent asthma without complication     Subclinical hypothyroidism     Hip pain, left     Chills (without fever)     Laryngitis     Myalgia     Sore throat     Past Medical History:   Diagnosis Date     Arthritis       Hypertension      Torn meniscus 01/01/2013    left      Uncomplicated asthma        Also see scanned health assessment forms.       Past Surgical History:     Past Surgical History:   Procedure Laterality Date     APPENDECTOMY       appendicitis  01/01/1980     BIOPSY  2017    Removed gland in back of neck     COLONOSCOPY N/A 06/30/2017    Procedure: COLONOSCOPY;  COLONOSCOPY   ;  Surgeon: Brooks Villa MD;  Location:  GI     CYSTECTOMY OVARIAN BENIGN  01/01/1980     KNEE SURGERY Left             Social History:     Social History     Socioeconomic History     Marital status:      Spouse name: Juan F     Number of children: 4     Years of education: Not on file     Highest education level: Master's degree (e.g., MA, MS, Adry, MEd, MSW, [employer])   Occupational History     Occupation: manager     Employer: [employer]   Tobacco Use     Smoking status: Never     Smokeless tobacco: Never   Vaping Use     Vaping status: Never Used   Substance and Sexual Activity     Alcohol use: Yes     Comment: occ     Drug use: No     Sexual activity: Yes     Partners: Male     Birth control/protection: I.U.D., Female Surgical   Other Topics Concern     Parent/sibling w/ CABG, MI or angioplasty before 65F 55M? Yes   Social History Narrative     Not on file     Social Determinants of Health     Financial Resource Strain: Low Risk  (4/11/2023)    Overall Financial Resource Strain (CARDIA)      Difficulty of Paying Living Expenses: Not hard at all   Food Insecurity: No Food Insecurity (4/11/2023)    Hunger Vital Sign      Worried About Running Out of Food in the Last Year: Never true      Ran Out of Food in the Last Year: Never true   Transportation Needs: No Transportation Needs (4/11/2023)    PRAPARE - Transportation      Lack of Transportation (Medical): No      Lack of Transportation (Non-Medical): No   Physical Activity: Inactive (4/11/2023)    Exercise Vital Sign      Days of Exercise per Week: 0 days      Minutes of  Exercise per Session: 0 min   Stress: No Stress Concern Present (4/11/2023)    [place] [employer] - Occupational Stress Questionnaire      Feeling of Stress : Not at all   Social Connections: Moderately Integrated (4/11/2023)    Social Connection and Isolation Panel [NHANES]      Frequency of Communication with Friends and Family: More than three times a week      Frequency of Social Gatherings with Friends and Family: More than three times a week      Attends Confucianist Services: 1 to 4 times per year      Active Member of Clubs or Organizations: No      Attends Club or Organization Meetings: Not on file      Marital Status:    Intimate Partner Violence: Not on file   Housing Stability: Low Risk  (4/11/2023)    Housing Stability Vital Sign      Unable to Pay for Housing in the Last Year: No      Number of Places Lived in the Last Year: 1      Unstable Housing in the Last Year: No            Family History:       Family History   Problem Relation Age of Onset     Hypertension Mother      Thyroid Disease Mother      Coronary Artery Disease Mother      Hypertension Father      Diabetes Father      Coronary Artery Disease Father      Hyperlipidemia Father      Obesity Father      Macular Degeneration Maternal Grandmother      Thyroid Disease Maternal Grandmother      Coronary Artery Disease Maternal Grandfather      Coronary Artery Disease Paternal Grandmother      Cerebrovascular Disease Paternal Grandmother      Thyroid Disease Sister      Thyroid Disease Maternal Aunt      Diabetes Other         great grandmother     Glaucoma No family hx of             Medications:     Current Outpatient Medications   Medication Sig     albuterol (PROAIR HFA/PROVENTIL HFA/VENTOLIN HFA) 108 (90 Base) MCG/ACT inhaler INHALE 2 PUFFS INTO THE LUNGS AS NEEDED FOR SHORTNESS OF BREATH / DYSPNEA OR WHEEZING     diclofenac (VOLTAREN) 75 MG EC tablet TAKE 1 TABLET (75 MG) BY MOUTH 2 TIMES DAILY AS NEEDED FOR  MODERATE PAIN (4-6)     fexofenadine (ALLEGRA) 180 MG tablet Take 1 tablet (180 mg) by mouth daily Take 180 mg by mouth     fluticasone (FLONASE) 50 MCG/ACT nasal spray INSTILL 2 SPRAYS INTO BOTH NOSTRILS DAILY.     fluticasone (FLOVENT HFA) 110 MCG/ACT inhaler TAKE 2 PUFFS BY MOUTH TWICE A DAY     levonorgestrel (MIRENA) 20 MCG/24HR IUD 1 each by Intrauterine route once     levothyroxine (SYNTHROID/LEVOTHROID) 50 MCG tablet TAKE 1 TABLET BY MOUTH EVERY DAY     losartan (COZAAR) 50 MG tablet Take 1 tablet (50 mg) by mouth daily     meloxicam (MOBIC) 7.5 MG tablet TAKE 1 TABLET (7.5 MG) BY MOUTH DAILY AS NEEDED FOR MODERATE PAIN (SEVERE PAIN)     montelukast (SINGULAIR) 10 MG tablet TAKE 1 TABLET BY MOUTH EVERYDAY AT BEDTIME     phentermine (ADIPEX-P) 37.5 MG tablet TAKE 1 TABLET BY MOUTH EVERY DAY BEFORE BREAKFAST     triamcinolone (KENALOG) 0.1 % external cream Apply topically 2 times daily     topiramate (TOPAMAX) 50 MG tablet Take 1 tablet (50 mg) by mouth 2 times daily     Current Facility-Administered Medications   Medication     lidocaine 1 % injection 5 mL     methylPREDNISolone (DEPO-MEDROL) injection 40 mg     methylPREDNISolone (DEPO-MEDROL) injection 40 mg     methylPREDNISolone (DEPO-MEDROL) injection 40 mg     methylPREDNISolone (DEPO-MEDROL) injection 40 mg     ropivacaine (NAROPIN) injection 3 mL     ropivacaine (NAROPIN) injection 4 mL              Review of Systems:   A comprehensive 10 point review of systems (constitutional, ENT, cardiac, peripheral vascular, lymphatic, respiratory, GI, , Musculoskeletal, skin, Neurological) was performed and found to be negative except as described in this note.     See intake form completed by patient        Again, thank you for allowing me to participate in the care of your patient.        Sincerely,        Neto Amezcua MD

## 2023-05-12 NOTE — PROGRESS NOTES
"    [facility]  Orthopaedic Surgery Consultation by Neto Amezcua M.D.    Rayna Jackson MRN# [id number]   Age: 58 year old YOB: 1965     Requesting physician: Albert Yeo No Ref-Primary, Physician     Background history:  DX:  1. Headaches  2. Allergic rhinitis  3. Asthma  4. Laryngitis  Hypothyroidism  Hypertension    TREATMENTS:  1. None           History of Present Illness:   58 year old female who presents to our clinic because of chronic left hip/groin pain.  This pain has been present since November 2022 without antecedent trauma.  Pain has become progressively worse.  It is most notably in the anterior groin and radiates down to the knee but never past.  Patient does not endorse significant night pain.  No motor or sensory deficits of left lower extremity.  The pain gets worse during walking, bending, sitting and standing.  Patient reports night pain, initiation stiffness and soreness.  She has trouble putting on her shoes and socks or getting in and out of the car.  It is greatly affecting her quality of life and activity level.  To mitigate the pain she is tried over-the-counter analgesics, activity modification, home exercise regimen and injection therapy.  Home exercise regimen made her pain worse.  She did not notice any significant difference from the intra-articular injections or greater trochanteric bursa injections.    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 Normal mood and affect.  VITAL SIGNS: Height 1.651 m (5' 5\"), weight 89.4 kg (197 lb), not currently breastfeeding.  Reviewed nursing intake notes.   Body mass index is 32.78 kg/m .  RESP: non labored breathing   ABD: benign, soft, non-tender, no acute peritoneal " findings  SKIN: grossly normal   LYMPHATIC: grossly normal, no adenopathy, no extremity edema  NEURO: grossly normal , no motor deficits  VASCULAR: satisfactory perfusion of all extremities   MUSCULOSKELETAL:   Alignment: Neutral  Gait: Antalgic  Hips:   L hip: ROM    , Extension 0  , IRF 10  , ERF 30  , ABD 30  , ADD 10  .  Deep flexion and rotations are recognizably painful.  Recognizable tenderness to palpation over greater trochanteric region.  Lasegue's test is negative.     Left LE:   Thigh and leg compartments soft and compressible   +Quad/TA/GSC/FHL/EHL   SILT DP/SP/Racheal/Saph/Tib nerve distributions   Palpable dorsalis pedis pulse          Data:   All laboratory data reviewed  All imaging studies reviewed by me personally.    XR pelvis/hip left 2/8/2023:  My interpretation: Moderate to severe osteoarthritic changes of the left femoral acetabular joint with joint space narrowing, presence of marginal osteophytes, sclerosis and subchondral cyst.  Multilevel degenerative changes of lumbar spine.    MRI hip left 2/11/2023:  My interpretation: Moderate degenerative arthritic change of the left hip with grade 3 and focal grade IV chondromalacia.  Moderate synovitis.  No signs of fracture.         Assessment and Plan:   Assessment:  58-year-old female presenting with chronic left hip pain most likely due to osteoarthritic changes of the femoral acetabular joint.  Insufficiently responding to nonsurgical treatment options.     Plan:  We had a long discussion with the patient etiology and regarding ongoing management options.  Reviewed surgical and nonsurgical treatments.  The non-operative management options consist of activity modification, pain medication, PT and injection therapy. We reviewed total hip replacement in detail including the procedure, the implants, the recovery process, and long-term outcomes.    Although clinically and radiographically her pain could be well caused by the femoral acetabular  osteoarthritic changes it is somewhat concerning that she had not responded well to any of the articular injections.  It would be my recommendation before moving forward with surgical intervention to have the left hip injected with lidocaine only by Buzz Kaba.  If patient responds positively to the injection it is reasonable to proceed with left total hip arthroplasty surgery.  Patient understands and agrees to the treatment plan set forth.  We will schedule this injection in the near future and follow-up afterwards.    More information on joint replacements can be found on : [email or web address]    Thank you for your referral.    Neto Amezcua MD, PhD     Adult Reconstruction  [facility] Department of Orthopaedic Surgery    This note was created using dictation software and may contain errors.  Please contact the creator for any clarifications that are needed.    DATA for DOCUMENTATION:         Past Medical History:     Patient Active Problem List   Diagnosis     Benign essential hypertension     Osteoarthritis of both knees, unspecified osteoarthritis type     Bilateral edema of lower extremity     Osteoarthritis of both feet, unspecified osteoarthritis type     IUD (intrauterine device) in place     Chronic nonintractable headache, unspecified headache type     HTN, goal below 140/90     Allergic rhinitis     Asthma     Environmental allergies     Moderate persistent asthma without complication     Subclinical hypothyroidism     Hip pain, left     Chills (without fever)     Laryngitis     Myalgia     Sore throat     Past Medical History:   Diagnosis Date     Arthritis      Hypertension      Torn meniscus 01/01/2013    left      Uncomplicated asthma        Also see scanned health assessment forms.       Past Surgical History:     Past Surgical History:   Procedure Laterality Date     APPENDECTOMY       appendicitis  01/01/1980      BIOPSY  2017    Removed gland in back of neck     COLONOSCOPY N/A 06/30/2017    Procedure: COLONOSCOPY;  COLONOSCOPY   ;  Surgeon: Brooks Villa MD;  Location: [facility]     CYSTECTOMY OVARIAN BENIGN  01/01/1980     KNEE SURGERY Left             Social History:     Social History     Socioeconomic History     Marital status:      Spouse name: Juan F     Number of children: 4     Years of education: Not on file     Highest education level: Master's degree (e.g., MA, MS, Adry, MEd, MSW, [employer])   Occupational History     Occupation: manager     Employer: [employer]   Tobacco Use     Smoking status: Never     Smokeless tobacco: Never   Vaping Use     Vaping status: Never Used   Substance and Sexual Activity     Alcohol use: Yes     Comment: occ     Drug use: No     Sexual activity: Yes     Partners: Male     Birth control/protection: I.U.D., Female Surgical   Other Topics Concern     Parent/sibling w/ CABG, MI or angioplasty before 65F 55M? Yes   Social History Narrative     Not on file     Social Determinants of Health     Financial Resource Strain: Low Risk  (4/11/2023)    Overall Financial Resource Strain (CARDIA)      Difficulty of Paying Living Expenses: Not hard at all   Food Insecurity: No Food Insecurity (4/11/2023)    Hunger Vital Sign      Worried About Running Out of Food in the Last Year: Never true      Ran Out of Food in the Last Year: Never true   Transportation Needs: No Transportation Needs (4/11/2023)    PRAPARE - Transportation      Lack of Transportation (Medical): No      Lack of Transportation (Non-Medical): No   Physical Activity: Inactive (4/11/2023)    Exercise Vital Sign      Days of Exercise per Week: 0 days      Minutes of Exercise per Session: 0 min   Stress: No Stress Concern Present (4/11/2023)    [place] of Occupational Health - Occupational Stress Questionnaire      Feeling of Stress : Not at all   Social Connections: Moderately Integrated (4/11/2023)    Social  Connection and Isolation Panel [NHANES]      Frequency of Communication with Friends and Family: More than three times a week      Frequency of Social Gatherings with Friends and Family: More than three times a week      Attends Lutheran Services: 1 to 4 times per year      Active Member of Clubs or Organizations: No      Attends Club or Organization Meetings: Not on file      Marital Status:    Intimate Partner Violence: Not on file   Housing Stability: Low Risk  (4/11/2023)    Housing Stability Vital Sign      Unable to Pay for Housing in the Last Year: No      Number of Places Lived in the Last Year: 1      Unstable Housing in the Last Year: No            Family History:       Family History   Problem Relation Age of Onset     Hypertension Mother      Thyroid Disease Mother      Coronary Artery Disease Mother      Hypertension Father      Diabetes Father      Coronary Artery Disease Father      Hyperlipidemia Father      Obesity Father      Macular Degeneration Maternal Grandmother      Thyroid Disease Maternal Grandmother      Coronary Artery Disease Maternal Grandfather      Coronary Artery Disease Paternal Grandmother      Cerebrovascular Disease Paternal Grandmother      Thyroid Disease Sister      Thyroid Disease Maternal Aunt      Diabetes Other         great grandmother     Glaucoma No family hx of             Medications:     Current Outpatient Medications   Medication Sig     albuterol (PROAIR HFA/PROVENTIL HFA/VENTOLIN HFA) 108 (90 Base) MCG/ACT inhaler INHALE 2 PUFFS INTO THE LUNGS AS NEEDED FOR SHORTNESS OF BREATH / DYSPNEA OR WHEEZING     diclofenac (VOLTAREN) 75 MG EC tablet TAKE 1 TABLET (75 MG) BY MOUTH 2 TIMES DAILY AS NEEDED FOR MODERATE PAIN (4-6)     fexofenadine (ALLEGRA) 180 MG tablet Take 1 tablet (180 mg) by mouth daily Take 180 mg by mouth     fluticasone (FLONASE) 50 MCG/ACT nasal spray INSTILL 2 SPRAYS INTO BOTH NOSTRILS DAILY.     fluticasone (FLOVENT HFA) 110 MCG/ACT inhaler  TAKE 2 PUFFS BY MOUTH TWICE A DAY     levonorgestrel (MIRENA) 20 MCG/24HR IUD 1 each by Intrauterine route once     levothyroxine (SYNTHROID/LEVOTHROID) 50 MCG tablet TAKE 1 TABLET BY MOUTH EVERY DAY     losartan (COZAAR) 50 MG tablet Take 1 tablet (50 mg) by mouth daily     meloxicam (MOBIC) 7.5 MG tablet TAKE 1 TABLET (7.5 MG) BY MOUTH DAILY AS NEEDED FOR MODERATE PAIN (SEVERE PAIN)     montelukast (SINGULAIR) 10 MG tablet TAKE 1 TABLET BY MOUTH EVERYDAY AT BEDTIME     phentermine (ADIPEX-P) 37.5 MG tablet TAKE 1 TABLET BY MOUTH EVERY DAY BEFORE BREAKFAST     triamcinolone (KENALOG) 0.1 % external cream Apply topically 2 times daily     topiramate (TOPAMAX) 50 MG tablet Take 1 tablet (50 mg) by mouth 2 times daily     Current Facility-Administered Medications   Medication     lidocaine 1 % injection 5 mL     methylPREDNISolone (DEPO-MEDROL) injection 40 mg     methylPREDNISolone (DEPO-MEDROL) injection 40 mg     methylPREDNISolone (DEPO-MEDROL) injection 40 mg     methylPREDNISolone (DEPO-MEDROL) injection 40 mg     ropivacaine (NAROPIN) injection 3 mL     ropivacaine (NAROPIN) injection 4 mL              Review of Systems:   A comprehensive 10 point review of systems (constitutional, ENT, cardiac, peripheral vascular, lymphatic, respiratory, GI, , Musculoskeletal, skin, Neurological) was performed and found to be negative except as described in this note.     See intake form completed by patient

## 2023-05-16 ASSESSMENT — ASTHMA QUESTIONNAIRES
ACT_TOTALSCORE: 25
QUESTION_3 LAST FOUR WEEKS HOW OFTEN DID YOUR ASTHMA SYMPTOMS (WHEEZING, COUGHING, SHORTNESS OF BREATH, CHEST TIGHTNESS OR PAIN) WAKE YOU UP AT NIGHT OR EARLIER THAN USUAL IN THE MORNING: NOT AT ALL
QUESTION_4 LAST FOUR WEEKS HOW OFTEN HAVE YOU USED YOUR RESCUE INHALER OR NEBULIZER MEDICATION (SUCH AS ALBUTEROL): NOT AT ALL
QUESTION_5 LAST FOUR WEEKS HOW WOULD YOU RATE YOUR ASTHMA CONTROL: COMPLETELY CONTROLLED
QUESTION_2 LAST FOUR WEEKS HOW OFTEN HAVE YOU HAD SHORTNESS OF BREATH: NOT AT ALL
QUESTION_1 LAST FOUR WEEKS HOW MUCH OF THE TIME DID YOUR ASTHMA KEEP YOU FROM GETTING AS MUCH DONE AT WORK, SCHOOL OR AT HOME: NONE OF THE TIME
ACT_TOTALSCORE: 25

## 2023-05-17 ENCOUNTER — OFFICE VISIT (OUTPATIENT)
Dept: FAMILY MEDICINE | Facility: CLINIC | Age: 58
End: 2023-05-17
Payer: COMMERCIAL

## 2023-05-17 ENCOUNTER — TELEPHONE (OUTPATIENT)
Dept: FAMILY MEDICINE | Facility: CLINIC | Age: 58
End: 2023-05-17

## 2023-05-17 VITALS
HEART RATE: 83 BPM | DIASTOLIC BLOOD PRESSURE: 85 MMHG | SYSTOLIC BLOOD PRESSURE: 127 MMHG | BODY MASS INDEX: 32.65 KG/M2 | OXYGEN SATURATION: 97 % | RESPIRATION RATE: 18 BRPM | HEIGHT: 65 IN | TEMPERATURE: 98 F | WEIGHT: 196 LBS

## 2023-05-17 DIAGNOSIS — Z13.220 LIPID SCREENING: ICD-10-CM

## 2023-05-17 DIAGNOSIS — J45.20 MILD INTERMITTENT ASTHMA WITHOUT COMPLICATION: ICD-10-CM

## 2023-05-17 DIAGNOSIS — M16.12 OSTEOARTHRITIS OF LEFT HIP, UNSPECIFIED OSTEOARTHRITIS TYPE: ICD-10-CM

## 2023-05-17 DIAGNOSIS — H60.543 DERMATITIS OF BOTH EAR CANALS: ICD-10-CM

## 2023-05-17 DIAGNOSIS — E03.9 HYPOTHYROIDISM, UNSPECIFIED TYPE: ICD-10-CM

## 2023-05-17 DIAGNOSIS — J30.9 CHRONIC ALLERGIC RHINITIS: ICD-10-CM

## 2023-05-17 DIAGNOSIS — I10 BENIGN ESSENTIAL HYPERTENSION: ICD-10-CM

## 2023-05-17 DIAGNOSIS — E66.811 CLASS 1 OBESITY DUE TO EXCESS CALORIES WITH SERIOUS COMORBIDITY AND BODY MASS INDEX (BMI) OF 32.0 TO 32.9 IN ADULT: Primary | ICD-10-CM

## 2023-05-17 DIAGNOSIS — E66.09 CLASS 1 OBESITY DUE TO EXCESS CALORIES WITH SERIOUS COMORBIDITY AND BODY MASS INDEX (BMI) OF 32.0 TO 32.9 IN ADULT: Primary | ICD-10-CM

## 2023-05-17 PROCEDURE — 99214 OFFICE O/P EST MOD 30 MIN: CPT | Performed by: FAMILY MEDICINE

## 2023-05-17 RX ORDER — ALBUTEROL SULFATE 90 UG/1
AEROSOL, METERED RESPIRATORY (INHALATION)
Qty: 18 G | Refills: 1 | Status: SHIPPED | OUTPATIENT
Start: 2023-05-17

## 2023-05-17 RX ORDER — FLUTICASONE PROPIONATE 110 UG/1
AEROSOL, METERED RESPIRATORY (INHALATION)
Qty: 12 G | Refills: 5 | Status: SHIPPED | OUTPATIENT
Start: 2023-05-17

## 2023-05-17 RX ORDER — DICLOFENAC SODIUM 75 MG/1
75 TABLET, DELAYED RELEASE ORAL 2 TIMES DAILY PRN
Qty: 60 TABLET | Refills: 0 | Status: CANCELLED | OUTPATIENT
Start: 2023-05-17

## 2023-05-17 RX ORDER — FLUTICASONE PROPIONATE 50 MCG
SPRAY, SUSPENSION (ML) NASAL
Qty: 16 G | Refills: 3 | Status: SHIPPED | OUTPATIENT
Start: 2023-05-17 | End: 2023-05-19

## 2023-05-17 RX ORDER — TRIAMCINOLONE ACETONIDE 1 MG/G
CREAM TOPICAL 2 TIMES DAILY
Qty: 80 G | Refills: 1 | Status: SHIPPED | OUTPATIENT
Start: 2023-05-17

## 2023-05-17 RX ORDER — MELOXICAM 7.5 MG/1
7.5 TABLET ORAL DAILY PRN
Qty: 90 TABLET | Refills: 1 | Status: ON HOLD | OUTPATIENT
Start: 2023-05-17 | End: 2023-08-09

## 2023-05-17 RX ORDER — MONTELUKAST SODIUM 10 MG/1
TABLET ORAL
Qty: 90 TABLET | Refills: 3 | Status: SHIPPED | OUTPATIENT
Start: 2023-05-17 | End: 2024-08-28

## 2023-05-17 RX ORDER — LEVOTHYROXINE SODIUM 50 UG/1
50 TABLET ORAL DAILY
Qty: 90 TABLET | Refills: 1 | Status: SHIPPED | OUTPATIENT
Start: 2023-05-17 | End: 2023-09-06

## 2023-05-17 RX ORDER — LOSARTAN POTASSIUM 50 MG/1
50 TABLET ORAL DAILY
Qty: 90 TABLET | Refills: 3 | Status: SHIPPED | OUTPATIENT
Start: 2023-05-17 | End: 2024-05-15

## 2023-05-17 NOTE — TELEPHONE ENCOUNTER
Prior Authorization Approval    Medication: SEMAGLUTIDE-WEIGHT MANAGEMENT 0.25 MG/0.5ML SC SOAJ  Authorization Effective Date: 5/17/2023  Authorization Expiration Date: 12/17/2023  Approved Dose/Quantity:   Reference #:     Insurance Company: [employer] - Phone [phone] Fax [phone]  Expected CoPay:       CoPay Card Available:      Financial Assistance Needed:   Which Pharmacy is filling the prescription: [facility] #[id number] - [place], [place] - [address]  Pharmacy Notified: Yes  Patient Notified: Yes

## 2023-05-17 NOTE — PROGRESS NOTES
Assessment & Plan     Class 1 obesity due to excess calories with serious comorbidity and body mass index (BMI) of 32.0 to 32.9 in adult  Trial of Wegovy follow up in 3 months or sooner as needed.  - Semaglutide-Weight Management (WEGOVY) 0.25 MG/0.5ML pen; Inject 0.25 mg Subcutaneous once a week for 28 days  - Semaglutide-Weight Management (WEGOVY) 0.5 MG/0.5ML pen; Inject 0.5 mg Subcutaneous once a week for 28 days  - Semaglutide-Weight Management (WEGOVY) 1 MG/0.5ML pen; Inject 1 mg Subcutaneous once a week for 28 days    Mild intermittent asthma without complication  Controlled, continue current medications, follow up in 6 months or sooner as needed.  - albuterol (PROAIR HFA/PROVENTIL HFA/VENTOLIN HFA) 108 (90 Base) MCG/ACT inhaler; INHALE 2 PUFFS INTO THE LUNGS AS NEEDED FOR SHORTNESS OF BREATH / DYSPNEA OR WHEEZING  - montelukast (SINGULAIR) 10 MG tablet; TAKE 1 TABLET BY MOUTH EVERYDAY AT BEDTIME  - fluticasone (FLOVENT HFA) 110 MCG/ACT inhaler; TAKE 2 PUFFS BY MOUTH TWICE A DAY    Chronic allergic rhinitis  Controlled, refill medication.  - montelukast (SINGULAIR) 10 MG tablet; TAKE 1 TABLET BY MOUTH EVERYDAY AT BEDTIME    Hypothyroidism, unspecified type  Due for labs, Recommendations pending results.  - levothyroxine (SYNTHROID/LEVOTHROID) 50 MCG tablet; Take 1 tablet (50 mcg) by mouth daily  - Hemoglobin A1c; Future  - TSH with free T4 reflex; Future    Benign essential hypertension  Controlled, refill medication.  - losartan (COZAAR) 50 MG tablet; Take 1 tablet (50 mg) by mouth daily  - Comprehensive metabolic panel (BMP + Alb, Alk Phos, ALT, AST, Total. Bili, TP); Future  - CBC with platelets; Future  - Hemoglobin A1c; Future    Osteoarthritis of left hip, unspecified osteoarthritis type  Refill for PRN use  - meloxicam (MOBIC) 7.5 MG tablet; Take 1 tablet (7.5 mg) by mouth daily as needed for moderate pain (severe pain)    Dermatitis of both ear canals  Refill for PRN use  - triamcinolone (KENALOG)  0.1 % external cream; Apply topically 2 times daily    Lipid screening  - Lipid panel reflex to direct LDL Fasting; Future      30 minutes spent by me on the date of the encounter doing chart review, history and exam, documentation and further activities per the note       See Patient Instructions    Brooke Bach MD  [facility] [address] is a 58 year old, presenting for the following health issues:  Weight Loss         View : No data to display.              History of Present Illness       Reason for visit:  Weight loss and medication refill    She eats 2-3 servings of fruits and vegetables daily.She consumes 0 sweetened beverage(s) daily.She exercises with enough effort to increase her heart rate 9 or less minutes per day.  She exercises with enough effort to increase her heart rate 3 or less days per week.   She is taking medications regularly.     Here to establish care for medication refills.    Has a bump on her ear, present for a couple months, not getting bigger, unable to pop, hard, non-mobile.    Hypertension Follow-up      Do you check your blood pressure regularly outside of the clinic? No     Are you following a low salt diet? Yes    Are your blood pressures ever more than 140 on the top number (systolic) OR more   than 90 on the bottom number (diastolic), for example 140/90? No      Asthma Follow-Up    Was ACT completed today?  No      Do you have a cough?  YES    Are you experiencing any wheezing in your chest?  No    Do you have any shortness of breath?  No     How often are you using a short-acting (rescue) inhaler or nebulizer, such as Albuterol?  Never    How many days per week do you miss taking your asthma controller medication?  Not using the asthma controller inhaler     Please describe any recent triggers for your asthma: pollens, strong odors and fumes and grass cuttings, humidity     Have you had any Emergency Room Visits, Urgent Care Visits, or  Hospital Admissions since your last office visit?  No      Medication Followup of Phentermine and Topamax    Taking Medication as prescribed: No did not have it the month of March, ran out-Feb is the last time taking it    Side Effects:  None    Medication Helping Symptoms:  NO -was not working prior to Feb    Has been taking phentermine for a while which worked in the past, has not helped this time.  She has also done Topamax.  She is not eating sugar, trying to minimize carbs.  She has tried Keto, which helped before but does not seem to work this time.    She is trying eat luncheon meats, likes fruits and vegetables, salads, etc.        Review of Systems   Constitutional, HEENT, cardiovascular, pulmonary, gi and gu systems are negative, except as otherwise noted.      Objective    There were no vitals taken for this visit.  There is no height or weight on file to calculate BMI.  Physical Exam   GENERAL: healthy, alert and no distress  RESP: lungs clear to auscultation - no rales, rhonchi or wheezes  CV: regular rate and rhythm, normal S1 S2, no S3 or S4, no murmur, click or rub, no peripheral edema and peripheral pulses strong  PSYCH: mentation appears normal, affect normal/bright

## 2023-05-19 DIAGNOSIS — J30.9 CHRONIC ALLERGIC RHINITIS: ICD-10-CM

## 2023-05-19 RX ORDER — FLUTICASONE PROPIONATE 50 MCG
SPRAY, SUSPENSION (ML) NASAL
Qty: 48 ML | Refills: 0 | Status: ON HOLD | OUTPATIENT
Start: 2023-05-19 | End: 2023-08-09

## 2023-05-26 ENCOUNTER — OFFICE VISIT (OUTPATIENT)
Dept: ORTHOPEDICS | Facility: CLINIC | Age: 58
End: 2023-05-26
Payer: COMMERCIAL

## 2023-05-26 DIAGNOSIS — M16.12 PRIMARY OSTEOARTHRITIS OF LEFT HIP: Primary | ICD-10-CM

## 2023-05-26 PROCEDURE — 20610 DRAIN/INJ JOINT/BURSA W/O US: CPT | Mod: LT | Performed by: PHYSICIAN ASSISTANT

## 2023-05-26 PROCEDURE — 99214 OFFICE O/P EST MOD 30 MIN: CPT | Mod: 25 | Performed by: PHYSICIAN ASSISTANT

## 2023-05-26 RX ORDER — LIDOCAINE HYDROCHLORIDE 10 MG/ML
5 INJECTION, SOLUTION INFILTRATION; PERINEURAL
Status: DISCONTINUED | OUTPATIENT
Start: 2023-05-26 | End: 2023-08-09

## 2023-05-26 RX ORDER — TRANEXAMIC ACID 650 MG/1
1950 TABLET ORAL ONCE
Status: CANCELLED | OUTPATIENT
Start: 2023-05-26 | End: 2023-05-26

## 2023-05-26 RX ADMIN — LIDOCAINE HYDROCHLORIDE 5 ML: 10 INJECTION, SOLUTION INFILTRATION; PERINEURAL at 09:42

## 2023-05-26 NOTE — PROGRESS NOTES
[facility]  Orthopaedic Surgery Consultation by Neto Amezcua M.D.    Rayna Jackson MRN# [id number]   Age: 58 year old YOB: 1965     Requesting physician: Albert Yeo No Ref-Primary, Physician     Background history:  DX:  1. Headaches  2. Allergic rhinitis  3. Asthma  4. Laryngitis  Hypothyroidism  Hypertension    TREATMENTS:  1. none           History of Present Illness:   58 year old female who presents to our clinic with history of chronic left hip/groin pain and was referred to my clinic for a diagnostic left hip lidocaine injection Dr. Amezcua.  Given prior injections had questionable relief of symptoms and she has noted lumbar spine degenerative changes they wanted to be certain that her pain was originating from the hip.  Patient states that she did not get long-term relief from prior intra-articular hip injections.  Her pain has been very severe since November and is worse with range of motion and weightbearing.  She takes diclofenac for pain control which she has stopped the last 2 days as to not mask any pain for the procedure today.    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 Normal mood and affect.  VITAL SIGNS: not currently breastfeeding.  Reviewed nursing intake notes.   There is no height or weight on file to calculate BMI.  RESP: non labored breathing   ABD: benign, soft, non-tender, no acute peritoneal findings  SKIN: grossly normal   LYMPHATIC: grossly normal, no adenopathy, no extremity edema  NEURO: grossly normal , no motor deficits  VASCULAR: satisfactory perfusion of all extremities   MUSCULOSKELETAL:   Alignment: Neutral  Gait: Antalgic  Hips:   L hip: ROM    , Extension 0  , IRF 10  , ERF 30  , ABD  30  , ADD 10  .  Deep flexion and rotations are recognizably painful.  NO Recognizable tenderness to palpation over greater trochanteric region.       Left LE:   Thigh and leg compartments soft and compressible   +Quad/TA/GSC/FHL/EHL   SILT DP/SP/Racheal/Saph/Tib nerve distributions   Palpable dorsalis pedis pulse          Data:   All laboratory data reviewed  All imaging studies reviewed by me personally.    XR pelvis/hip left 2/8/2023:  My interpretation: Moderate to severe osteoarthritic changes of the left femoral acetabular joint with joint space narrowing, presence of marginal osteophytes, sclerosis and subchondral cyst.  Multilevel degenerative changes of lumbar spine.           Assessment and Plan:   Assessment:  58-year-old female presenting with chronic left hip pain most likely due to osteoarthritic changes of the femoral acetabular joint.  Insufficiently responding to nonsurgical treatment options.     Plan:  After discussing the risks and benefits of doing a diagnostic left hip injection the procedure was done as below.  The patient tolerated the procedure well and states she had complete relief of groin pain following the lidocaine injection.  A bandage was placed which she can remove tonight.  She can weight-bear as tolerated but should avoid running or jumping for 24 hours.  Because of her complete relief of pain she would like to proceed with a left total hip arthroplasty.  Therefore Dr. Amezcua was called in the room discussed left total hip arthroplasty.    Buzz Kaba PA-C  Physician Assistant   Oncology and Adult Reconstructive Surgery  Dept Orthopaedic Surgery, [facility]    This note was created using dictation software and may contain errors.  Please contact the creator for any clarifications that are needed.     Large Joint Injection/Arthocentesis: L hip joint    Date/Time: 5/26/2023 9:42 AM    Performed by: Buzz Kaba PA-C  Authorized by: Buzz Kaba PA-C   "  Needle Size:  22 G  Guidance: ultrasound    Approach:  Anterior  Location:  Hip      Site:  L hip joint  Medications:  5 mL lidocaine 1 %  Outcome:  Tolerated well, no immediate complications  Procedure discussed: discussed risks, benefits, and alternatives    Consent Given by:  Patient and power of   Timeout: timeout called immediately prior to procedure    Prep: patient was prepped and draped in usual sterile fashion     5mL of lidocaine was injected into the left hip joint for diagnostic injection.     Preinjection pain: 10+  Postinjection pain: \"Almost completely resolved next to some pressure\"        We had a long discussion with the patient regarding ongoing management options.  Reviewed surgical and nonsurgical treatments.  The non-operative management options consist of activity modification, pain medication, PT and injection therapy. We reviewed total hip replacement in detail including the procedure, the implants, the recovery process, and long-term outcomes.  We reviewed that the risks of the surgery include but are not limited to infection, wound problems, dislocation, persistent pain, leg length discrepancy, loosening, revision surgery.  We also reviewed less common risks such as neurovascular injury fracture, and other implant-related issues.  We reviewed other medical complications such as a blood clot which we would be mitigating by oral anticoagulants.  We discussed that the vast majority of cases have a highly successful outcome.  However there is a small subset of patients that do experience complications or problems following the hip replacement.  Based on a discussion of the risks and benefits, we believe that the benefits far outweigh the risks at this point and the patient  would like to proceed with right total hip replacement surgery.  We will work on scheduling surgery at a time that works well for them in the next few months.  They will contact us if they have any questions or " concerns leading up to surgery. Before surgery the patient will attend a joint replacement class and will be seen by the anesthesiologist and dentist.    She is a candidate for same-day discharge.      More information on joint replacements can be found on : [email or web address]    Thank you for your referral.    Neto Amezcua MD, PhD     Adult Reconstruction  [facility] Department of Orthopaedic Surgery    This note was created using dictation software and may contain errors.  Please contact the creator for any clarifications that are needed.    DATA for DOCUMENTATION:         Past Medical History:     Patient Active Problem List   Diagnosis     Benign essential hypertension     Osteoarthritis of both knees, unspecified osteoarthritis type     Bilateral edema of lower extremity     Osteoarthritis of both feet, unspecified osteoarthritis type     IUD (intrauterine device) in place     Chronic nonintractable headache, unspecified headache type     HTN, goal below 140/90     Allergic rhinitis     Asthma     Environmental allergies     Moderate persistent asthma without complication     Subclinical hypothyroidism     Hip pain, left     Chills (without fever)     Laryngitis     Myalgia     Sore throat     Past Medical History:   Diagnosis Date     Arthritis      Hypertension      Torn meniscus 01/01/2013    left      Uncomplicated asthma        Also see scanned health assessment forms.       Past Surgical History:     Past Surgical History:   Procedure Laterality Date     APPENDECTOMY       appendicitis  01/01/1980     BIOPSY  2017    Removed gland in back of neck     COLONOSCOPY N/A 06/30/2017    Procedure: COLONOSCOPY;  COLONOSCOPY   ;  Surgeon: Brooks Villa MD;  Location:  GI     CYSTECTOMY OVARIAN BENIGN  01/01/1980     KNEE SURGERY Left             Social History:     Social History     Socioeconomic History     Marital  status:      Spouse name: Juan F     Number of children: 4     Years of education: Not on file     Highest education level: Master's degree (e.g., MA, MS, [place], MEd, MSW, [employer])   Occupational History     Occupation: manager     Employer: [employer]   Tobacco Use     Smoking status: Never     Smokeless tobacco: Never   Vaping Use     Vaping status: Never Used   Substance and Sexual Activity     Alcohol use: Yes     Comment: occ     Drug use: No     Sexual activity: Yes     Partners: Male     Birth control/protection: I.U.D., Female Surgical   Other Topics Concern     Parent/sibling w/ CABG, MI or angioplasty before 65F 55M? Yes   Social History Narrative     Not on file     Social Determinants of Health     Financial Resource Strain: Low Risk  (4/11/2023)    Overall Financial Resource Strain (CARDIA)      Difficulty of Paying Living Expenses: Not hard at all   Food Insecurity: No Food Insecurity (4/11/2023)    Hunger Vital Sign      Worried About Running Out of Food in the Last Year: Never true      Ran Out of Food in the Last Year: Never true   Transportation Needs: No Transportation Needs (4/11/2023)    PRAPARE - Transportation      Lack of Transportation (Medical): No      Lack of Transportation (Non-Medical): No   Physical Activity: Inactive (4/11/2023)    Exercise Vital Sign      Days of Exercise per Week: 0 days      Minutes of Exercise per Session: 0 min   Stress: No Stress Concern Present (4/11/2023)    English [place] of Occupational [employer] - Occupational Stress Questionnaire      Feeling of Stress : Not at all   Social Connections: Moderately Integrated (4/11/2023)    Social Connection and Isolation Panel [NHANES]      Frequency of Communication with Friends and Family: More than three times a week      Frequency of Social Gatherings with Friends and Family: More than three times a week      Attends Alevism Services: 1 to 4 times per year      Active Member of Clubs or Organizations: No       Attends Club or Organization Meetings: Not on file      Marital Status:    Intimate Partner Violence: Not on file   Housing Stability: Low Risk  (4/11/2023)    Housing Stability Vital Sign      Unable to Pay for Housing in the Last Year: No      Number of Places Lived in the Last Year: 1      Unstable Housing in the Last Year: No            Family History:       Family History   Problem Relation Age of Onset     Hypertension Mother      Thyroid Disease Mother      Coronary Artery Disease Mother      Hypertension Father      Diabetes Father      Coronary Artery Disease Father      Hyperlipidemia Father      Obesity Father      Macular Degeneration Maternal Grandmother      Thyroid Disease Maternal Grandmother      Coronary Artery Disease Maternal Grandfather      Coronary Artery Disease Paternal Grandmother      Cerebrovascular Disease Paternal Grandmother      Thyroid Disease Sister      Thyroid Disease Maternal Aunt      Diabetes Other         great grandmother     Glaucoma No family hx of             Medications:     Current Outpatient Medications   Medication Sig     albuterol (PROAIR HFA/PROVENTIL HFA/VENTOLIN HFA) 108 (90 Base) MCG/ACT inhaler INHALE 2 PUFFS INTO THE LUNGS AS NEEDED FOR SHORTNESS OF BREATH / DYSPNEA OR WHEEZING     diclofenac (VOLTAREN) 75 MG EC tablet TAKE 1 TABLET (75 MG) BY MOUTH 2 TIMES DAILY AS NEEDED FOR MODERATE PAIN (4-6)     fexofenadine (ALLEGRA) 180 MG tablet Take 1 tablet (180 mg) by mouth daily Take 180 mg by mouth     fluticasone (FLONASE) 50 MCG/ACT nasal spray INSTILL 2 SPRAYS INTO BOTH NOSTRILS DAILY.     fluticasone (FLOVENT HFA) 110 MCG/ACT inhaler TAKE 2 PUFFS BY MOUTH TWICE A DAY     levothyroxine (SYNTHROID/LEVOTHROID) 50 MCG tablet Take 1 tablet (50 mcg) by mouth daily     losartan (COZAAR) 50 MG tablet Take 1 tablet (50 mg) by mouth daily     meloxicam (MOBIC) 7.5 MG tablet Take 1 tablet (7.5 mg) by mouth daily as needed for moderate pain (severe pain)      montelukast (SINGULAIR) 10 MG tablet TAKE 1 TABLET BY MOUTH EVERYDAY AT BEDTIME     Semaglutide-Weight Management (WEGOVY) 0.25 MG/0.5ML pen Inject 0.25 mg Subcutaneous once a week for 28 days     [START ON 6/15/2023] Semaglutide-Weight Management (WEGOVY) 0.5 MG/0.5ML pen Inject 0.5 mg Subcutaneous once a week for 28 days     Semaglutide-Weight Management (WEGOVY) 1 MG/0.5ML pen Inject 1 mg Subcutaneous once a week for 28 days     triamcinolone (KENALOG) 0.1 % external cream Apply topically 2 times daily     Current Facility-Administered Medications   Medication     lidocaine 1 % injection 5 mL     methylPREDNISolone (DEPO-MEDROL) injection 40 mg     methylPREDNISolone (DEPO-MEDROL) injection 40 mg     methylPREDNISolone (DEPO-MEDROL) injection 40 mg     methylPREDNISolone (DEPO-MEDROL) injection 40 mg     ropivacaine (NAROPIN) injection 3 mL     ropivacaine (NAROPIN) injection 4 mL              Review of Systems:   A comprehensive 10 point review of systems (constitutional, ENT, cardiac, peripheral vascular, lymphatic, respiratory, GI, , Musculoskeletal, skin, Neurological) was performed and found to be negative except as described in this note.     See intake form completed by patient

## 2023-05-26 NOTE — LETTER
5/26/2023         RE: Rayna Jackson  [address]  [facility] [address]        Dear Colleague,    Thank you for referring your patient, Rayna Jackson, to the [facility] [place]. Please see a copy of my visit note below.        [facility]  Orthopaedic Surgery Consultation by Neto Amezcua M.D.    Rayna Jackson MRN# [id number]   Age: 58 year old YOB: 1965     Requesting physician: Albert Yeo No Ref-Primary, Physician     Background history:  DX:  1. Headaches  2. Allergic rhinitis  3. Asthma  4. Laryngitis  Hypothyroidism  Hypertension    TREATMENTS:  1. none           History of Present Illness:   58 year old female who presents to our clinic with history of chronic left hip/groin pain and was referred to my clinic for a diagnostic left hip lidocaine injection Dr. Amezcua.  Given prior injections had questionable relief of symptoms and she has noted lumbar spine degenerative changes they wanted to be certain that her pain was originating from the hip.  Patient states that she did not get long-term relief from prior intra-articular hip injections.  Her pain has been very severe since November and is worse with range of motion and weightbearing.  She takes diclofenac for pain control which she has stopped the last 2 days as to not mask any pain for the procedure today.    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 Normal mood and affect.  VITAL SIGNS: not currently breastfeeding.  Reviewed nursing intake notes.   There is no height or weight on file to calculate BMI.  RESP: non labored breathing   ABD: benign, soft, non-tender, no acute peritoneal findings  SKIN: grossly normal   LYMPHATIC: grossly  normal, no adenopathy, no extremity edema  NEURO: grossly normal , no motor deficits  VASCULAR: satisfactory perfusion of all extremities   MUSCULOSKELETAL:   Alignment: Neutral  Gait: Antalgic  Hips:   L hip: ROM    , Extension 0  , IRF 10  , ERF 30  , ABD 30  , ADD 10  .  Deep flexion and rotations are recognizably painful.  NO Recognizable tenderness to palpation over greater trochanteric region.       Left LE:   Thigh and leg compartments soft and compressible   +Quad/TA/GSC/FHL/EHL   SILT DP/SP/Racheal/Saph/Tib nerve distributions   Palpable dorsalis pedis pulse          Data:   All laboratory data reviewed  All imaging studies reviewed by me personally.    XR pelvis/hip left 2/8/2023:  My interpretation: Moderate to severe osteoarthritic changes of the left femoral acetabular joint with joint space narrowing, presence of marginal osteophytes, sclerosis and subchondral cyst.  Multilevel degenerative changes of lumbar spine.           Assessment and Plan:   Assessment:  58-year-old female presenting with chronic left hip pain most likely due to osteoarthritic changes of the femoral acetabular joint.  Insufficiently responding to nonsurgical treatment options.     Plan:  After discussing the risks and benefits of doing a diagnostic left hip injection the procedure was done as below.  The patient tolerated the procedure well and states she had complete relief of groin pain following the lidocaine injection.  A bandage was placed which she can remove tonight.  She can weight-bear as tolerated but should avoid running or jumping for 24 hours.  Because of her complete relief of pain she would like to proceed with a left total hip arthroplasty.  Therefore Dr. Amezcua was called in the room discussed left total hip arthroplasty.    Buzz Kaba PA-C  Physician Assistant   Oncology and Adult Reconstructive Surgery  Dept Orthopaedic Surgery, [facility]    This note was created using dictation software  "and may contain errors.  Please contact the creator for any clarifications that are needed.     Large Joint Injection/Arthocentesis: L hip joint    Date/Time: 5/26/2023 9:42 AM    Performed by: Buzz Kaba PA-C  Authorized by: Buzz Kaba PA-C    Needle Size:  22 G  Guidance: ultrasound    Approach:  Anterior  Location:  Hip      Site:  L hip joint  Medications:  5 mL lidocaine 1 %  Outcome:  Tolerated well, no immediate complications  Procedure discussed: discussed risks, benefits, and alternatives    Consent Given by:  Patient and power of   Timeout: timeout called immediately prior to procedure    Prep: patient was prepped and draped in usual sterile fashion     Preinjection pain: 10+  Postinjection pain: \"Almost completely resolved next to some pressure\"        We had a long discussion with the patient regarding ongoing management options.  Reviewed surgical and nonsurgical treatments.  The non-operative management options consist of activity modification, pain medication, PT and injection therapy. We reviewed total hip replacement in detail including the procedure, the implants, the recovery process, and long-term outcomes.  We reviewed that the risks of the surgery include but are not limited to infection, wound problems, dislocation, persistent pain, leg length discrepancy, loosening, revision surgery.  We also reviewed less common risks such as neurovascular injury fracture, and other implant-related issues.  We reviewed other medical complications such as a blood clot which we would be mitigating by oral anticoagulants.  We discussed that the vast majority of cases have a highly successful outcome.  However there is a small subset of patients that do experience complications or problems following the hip replacement.  Based on a discussion of the risks and benefits, we believe that the benefits far outweigh the risks at this point and the patient  would like to proceed with right total " hip replacement surgery.  We will work on scheduling surgery at a time that works well for them in the next few months.  They will contact us if they have any questions or concerns leading up to surgery. Before surgery the patient will attend a joint replacement class and will be seen by the anesthesiologist and dentist.    She is a candidate for same-day discharge.      More information on joint replacements can be found on : [email or web address]    Thank you for your referral.    Neto Amezcua MD, PhD     Adult Reconstruction  [facility] Department of Orthopaedic Surgery    This note was created using dictation software and may contain errors.  Please contact the creator for any clarifications that are needed.    DATA for DOCUMENTATION:         Past Medical History:     Patient Active Problem List   Diagnosis     Benign essential hypertension     Osteoarthritis of both knees, unspecified osteoarthritis type     Bilateral edema of lower extremity     Osteoarthritis of both feet, unspecified osteoarthritis type     IUD (intrauterine device) in place     Chronic nonintractable headache, unspecified headache type     HTN, goal below 140/90     Allergic rhinitis     Asthma     Environmental allergies     Moderate persistent asthma without complication     Subclinical hypothyroidism     Hip pain, left     Chills (without fever)     Laryngitis     Myalgia     Sore throat     Past Medical History:   Diagnosis Date     Arthritis      Hypertension      Torn meniscus 01/01/2013    left      Uncomplicated asthma        Also see scanned health assessment forms.       Past Surgical History:     Past Surgical History:   Procedure Laterality Date     APPENDECTOMY       appendicitis  01/01/1980     BIOPSY  2017    Removed gland in back of neck     COLONOSCOPY N/A 06/30/2017    Procedure: COLONOSCOPY;  COLONOSCOPY   ;  Surgeon: Brooks Villa MD;   Location:  GI     CYSTECTOMY OVARIAN BENIGN  01/01/1980     KNEE SURGERY Left             Social History:     Social History     Socioeconomic History     Marital status:      Spouse name: Juan F     Number of children: 4     Years of education: Not on file     Highest education level: Master's degree (e.g., MA, MS, [place], MEd, MSW, [employer])   Occupational History     Occupation: manager     Employer: [employer]   Tobacco Use     Smoking status: Never     Smokeless tobacco: Never   Vaping Use     Vaping status: Never Used   Substance and Sexual Activity     Alcohol use: Yes     Comment: occ     Drug use: No     Sexual activity: Yes     Partners: Male     Birth control/protection: I.U.D., Female Surgical   Other Topics Concern     Parent/sibling w/ CABG, MI or angioplasty before 65F 55M? Yes   Social History Narrative     Not on file     Social Determinants of Health     Financial Resource Strain: Low Risk  (4/11/2023)    Overall Financial Resource Strain (CARDIA)      Difficulty of Paying Living Expenses: Not hard at all   Food Insecurity: No Food Insecurity (4/11/2023)    Hunger Vital Sign      Worried About Running Out of Food in the Last Year: Never true      Ran Out of Food in the Last Year: Never true   Transportation Needs: No Transportation Needs (4/11/2023)    PRAPARE - Transportation      Lack of Transportation (Medical): No      Lack of Transportation (Non-Medical): No   Physical Activity: Inactive (4/11/2023)    Exercise Vital Sign      Days of Exercise per Week: 0 days      Minutes of Exercise per Session: 0 min   Stress: No Stress Concern Present (4/11/2023)    [place] [employer] - Occupational Stress Questionnaire      Feeling of Stress : Not at all   Social Connections: Moderately Integrated (4/11/2023)    Social Connection and Isolation Panel [NHANES]      Frequency of Communication with Friends and Family: More than three times a week      Frequency of Social  Gatherings with Friends and Family: More than three times a week      Attends Evangelical Services: 1 to 4 times per year      Active Member of Clubs or Organizations: No      Attends Club or Organization Meetings: Not on file      Marital Status:    Intimate Partner Violence: Not on file   Housing Stability: Low Risk  (4/11/2023)    Housing Stability Vital Sign      Unable to Pay for Housing in the Last Year: No      Number of Places Lived in the Last Year: 1      Unstable Housing in the Last Year: No            Family History:       Family History   Problem Relation Age of Onset     Hypertension Mother      Thyroid Disease Mother      Coronary Artery Disease Mother      Hypertension Father      Diabetes Father      Coronary Artery Disease Father      Hyperlipidemia Father      Obesity Father      Macular Degeneration Maternal Grandmother      Thyroid Disease Maternal Grandmother      Coronary Artery Disease Maternal Grandfather      Coronary Artery Disease Paternal Grandmother      Cerebrovascular Disease Paternal Grandmother      Thyroid Disease Sister      Thyroid Disease Maternal Aunt      Diabetes Other         great grandmother     Glaucoma No family hx of             Medications:     Current Outpatient Medications   Medication Sig     albuterol (PROAIR HFA/PROVENTIL HFA/VENTOLIN HFA) 108 (90 Base) MCG/ACT inhaler INHALE 2 PUFFS INTO THE LUNGS AS NEEDED FOR SHORTNESS OF BREATH / DYSPNEA OR WHEEZING     diclofenac (VOLTAREN) 75 MG EC tablet TAKE 1 TABLET (75 MG) BY MOUTH 2 TIMES DAILY AS NEEDED FOR MODERATE PAIN (4-6)     fexofenadine (ALLEGRA) 180 MG tablet Take 1 tablet (180 mg) by mouth daily Take 180 mg by mouth     fluticasone (FLONASE) 50 MCG/ACT nasal spray INSTILL 2 SPRAYS INTO BOTH NOSTRILS DAILY.     fluticasone (FLOVENT HFA) 110 MCG/ACT inhaler TAKE 2 PUFFS BY MOUTH TWICE A DAY     levothyroxine (SYNTHROID/LEVOTHROID) 50 MCG tablet Take 1 tablet (50 mcg) by mouth daily     losartan (COZAAR) 50  MG tablet Take 1 tablet (50 mg) by mouth daily     meloxicam (MOBIC) 7.5 MG tablet Take 1 tablet (7.5 mg) by mouth daily as needed for moderate pain (severe pain)     montelukast (SINGULAIR) 10 MG tablet TAKE 1 TABLET BY MOUTH EVERYDAY AT BEDTIME     Semaglutide-Weight Management (WEGOVY) 0.25 MG/0.5ML pen Inject 0.25 mg Subcutaneous once a week for 28 days     [START ON 6/15/2023] Semaglutide-Weight Management (WEGOVY) 0.5 MG/0.5ML pen Inject 0.5 mg Subcutaneous once a week for 28 days     Semaglutide-Weight Management (WEGOVY) 1 MG/0.5ML pen Inject 1 mg Subcutaneous once a week for 28 days     triamcinolone (KENALOG) 0.1 % external cream Apply topically 2 times daily     Current Facility-Administered Medications   Medication     lidocaine 1 % injection 5 mL     methylPREDNISolone (DEPO-MEDROL) injection 40 mg     methylPREDNISolone (DEPO-MEDROL) injection 40 mg     methylPREDNISolone (DEPO-MEDROL) injection 40 mg     methylPREDNISolone (DEPO-MEDROL) injection 40 mg     ropivacaine (NAROPIN) injection 3 mL     ropivacaine (NAROPIN) injection 4 mL              Review of Systems:   A comprehensive 10 point review of systems (constitutional, ENT, cardiac, peripheral vascular, lymphatic, respiratory, GI, , Musculoskeletal, skin, Neurological) was performed and found to be negative except as described in this note.     See intake form completed by patient      Again, thank you for allowing me to participate in the care of your patient.        Sincerely,        Buzz Kaba PA-C

## 2023-05-30 ENCOUNTER — TELEPHONE (OUTPATIENT)
Dept: ORTHOPEDICS | Facility: CLINIC | Age: 58
End: 2023-05-30
Payer: COMMERCIAL

## 2023-05-30 DIAGNOSIS — M25.552 LEFT HIP PAIN: ICD-10-CM

## 2023-05-30 DIAGNOSIS — M70.62 TROCHANTERIC BURSITIS OF LEFT HIP: ICD-10-CM

## 2023-05-30 DIAGNOSIS — M16.12 PRIMARY OSTEOARTHRITIS OF LEFT HIP: ICD-10-CM

## 2023-05-30 NOTE — TELEPHONE ENCOUNTER
Patient is scheduled for surgery with Dr. Amezcua    Spoke with: Patient    Date of Surgery: 08/08/23    Location: [facility]    Informed patient they will need an adult  yes    Pre op with Provider: PAC    Additional imaging/appointments: pop made    Surgery packet: mailed     Additional comments: N/A

## 2023-05-30 NOTE — TELEPHONE ENCOUNTER
Medication Request for: Diclofenac Sodium 75mg            Prescription last written on 4/26/23 by Dr. Yeo   Sig: take 1 tab twice daily prn   Last Fill Quantity: 60 with # refills: 0     Last Office Visit by provider: 5/26/23  Date of Surgery (if applicable): 8/8/23 [facility]  Pharmacy selected in [employer].    Phone number patient can be reached at: Cell number on file:    Telephone Information:   Mobile [phone]       Can we leave a detailed message on this number? YES consent on file    Left voicemail asking for a return call regarding the refill request as we have several questions.     Request came via fax from [facility], mail order, [place], [place]. Patient will need to use a local pharmacy.   If provider refills, patient will need to stop it 7 days prior to surgery.     JEM Delgadillo RN

## 2023-05-30 NOTE — TELEPHONE ENCOUNTER
Phoned the patient and left VM. Stated to call back and scheduled surgery with Dr. Amezcua. Stated to call direct line at [phone].

## 2023-05-30 NOTE — TELEPHONE ENCOUNTER
-A call was placed to the patient, but there was an answer.    -Our clinic call back number was provided and she was told to ask for Dr. Seven Kat's nurse.    -Will await a call back or try again later.  (This is in regards to pre-op teaching).    Shey Estrada RN  5/30/2023 2:13 PM

## 2023-05-31 RX ORDER — DICLOFENAC SODIUM 75 MG/1
75 TABLET, DELAYED RELEASE ORAL 2 TIMES DAILY PRN
Qty: 60 TABLET | Refills: 0 | Status: ON HOLD | OUTPATIENT
Start: 2023-05-31 | End: 2023-08-09

## 2023-05-31 NOTE — TELEPHONE ENCOUNTER
Patient returns our call.   She had just seen her PCP and thought she had refilled the Diclofenac. She was informed we got the refill request, but because she was referred to Dr. Amezcua, we will need to check with him instead of Dr. Yeo who was the original prescriber.     Per chart review, her PCP refilled her Meloxicam. She was not aware of that and states it did not really help much with her pain. Discussed that Diclofenac is usually prescribed for short terms only. She gave ok to send to [facility]. We will contact her if Dr. Amezcua is not able to refill. She was informed to stop it 7 days prior to surgery. She verbalized understanding.     Please see refill request below and advise.     JEM Delgadillo RN

## 2023-06-01 ENCOUNTER — HEALTH MAINTENANCE LETTER (OUTPATIENT)
Age: 58
End: 2023-06-01

## 2023-06-01 ENCOUNTER — TELEPHONE (OUTPATIENT)
Dept: ORTHOPEDICS | Facility: CLINIC | Age: 58
End: 2023-06-01
Payer: COMMERCIAL

## 2023-06-01 NOTE — TELEPHONE ENCOUNTER
-Another call was placed to the patient, but there was no answer.    -A message was left, asking her to call us back to discuss pre-op teaching.    -Clinic call back number was provided and the patient was told to ask for Dr. Seven Kat's nurse.    -Will await her call or try again later.     Shey Estrada RN  6/1/2023 11:53 AM

## 2023-06-05 ENCOUNTER — TELEPHONE (OUTPATIENT)
Dept: ORTHOPEDICS | Facility: CLINIC | Age: 58
End: 2023-06-05
Payer: COMMERCIAL

## 2023-06-05 NOTE — TELEPHONE ENCOUNTER
FUTURE VISIT INFORMATION      SURGERY INFORMATION:    Date: 8/8/23    Location:  OR    Surgeon:  Neto Amezcua MD    Anesthesia Type:  choice    Procedure: LEFT TOTAL HIP ARTHROPLASTY    Consult: ov 5/12/23    RECORDS REQUESTED FROM:       Primary Care Provider: Mitra Keita MD- [place]    Pertinent Medical History: hypertension    Most recent ECHO: 10/7/16

## 2023-06-05 NOTE — TELEPHONE ENCOUNTER
-A call was placed to the patient (3rd attempt to reach her), but there was again, no answer.    -A detailed message was left, asking her to call us back to discuss pre-op teaching and to alert the call center as to what time of day works best for her to connect over the phone.    -Will await a call back or try again later.    Shey Estrada RN  6/5/2023 2:48 PM      Chief Complaint:   Chief Complaint   Patient presents with   • Irregular Heart Beat     3rd degree   • Constipation   Complete heart block     HPI  Pablito Ospina is a 87 y.o. male [facility] and was found to be bradycardic at his PCP wellness visit. Relatively asymptomatic. Denies chest pain, shortness or breath, presyncope or syncope. Reports some fatigue. EKG shows complete heart block with BBB. Not on rodolfo blocking agents. Euthyroid. No echocardiogram. Plan for permanent pacemaker implantation today.      Medical history significant for PVD, BPH, HDL, prior smoker.      No cardiac complaints at this time. AAOx4  Vital Signs/labs were reviewed. BP elevated. Kidney function stable.   Telemetry monitor: CHB, Hrs 30s bpm, asymptomatic at this time.   NPO  Patient is right handed.   Not on anticoagulation, INR today 0.99.   COVID negative (4/15/21)  TSH 2.840     Chest X-Ray (04/15/2021):  FINDINGS:  Cardiac silhouette is enlarged. Aortic calcified atherosclerotic plaque. No focal consolidation, pleural effusion, pulmonary edema or pneumothorax.  No acute osseous abnormality.  IMPRESSION:  Cardiac silhouette enlargement.  No acute abnormality.    ASSESSMENT/PLAN:  1. Complete Heart Block  - EKG with CHB and BBB, recommendation for PPM vs. CRT given BBB and likelihood of high RV pacing burden.   - No echocardiogram, stat echo ordered to evaluate LV function.   - The risk, benefits, and alternatives to permanent pacemaker placement were discussed in great detail, specific risks mentioned including bleeding, infection, cardiac perforation with possible tamponade requiring pericardiocentesis or open heart surgery. In addition the possibility of lead dislodgment (2-3%), pneumothorax (3%), hemothorax were discussed. Also mentioned were the possibility of death, stroke, and myocardial infarction.   - The patient verbalized  understanding of these potential complications and wishes to proceed with this procedure.      Thanks,      PHILIPPE Chandra   [facility]  [phone]

## 2023-06-07 ENCOUNTER — TELEPHONE (OUTPATIENT)
Dept: ORTHOPEDICS | Facility: CLINIC | Age: 58
End: 2023-06-07
Payer: COMMERCIAL

## 2023-06-07 NOTE — TELEPHONE ENCOUNTER
Reason for Call:  Form, our goal is to have forms completed with 7 days, however, some forms may require a visit or additional information.    Type of letter, form or note:  FMLA     Who is the form from?: Patient    Where did the form come from: Patient or family brought in       Phone number of person requesting form: n/a  Can we leave a detailed message on this number:  Not Applicable    Desired completion date of form: asap      How will form be returned?:  fax to [phone]    Has the patient signed a consent form for release of information (may be included with form)? Not Applicable    Additional comments: for patients husabdn    Form was started and place in Provider Basket for provider review/ completion at [facility] .

## 2023-06-07 NOTE — TELEPHONE ENCOUNTER
-Spoke to the patient over the phone and performed pre-op teaching.    Teaching Flowsheet   Relevant Diagnosis: Left total hip arthroplasty.  Teaching Topic: Pre-Op Teaching      Person(s) involved in teaching:   Patient     Motivation Level:  Asks Questions: Yes  Eager to Learn: Yes  Cooperative: Yes  Receptive (willing/able to accept information): Yes  Any cultural factors/Catholic beliefs that may influence understanding or compliance? No  Comments:      Patient demonstrates understanding of the following:  Reason for the appointment, diagnosis and treatment plan: Yes  Knowledge of proper use of medications and conditions for which they are ordered (with special attention to potential side effects or drug interactions): Yes  Which situations necessitate calling provider and whom to contact: Yes  What has the patient tried?    Has your symptoms improved?       Teaching Concerns Addressed:   Comments:      Proper use and care of surgical scrub.  (Patient to purchase OTC from a local pharmacy).  (medical equip, care aids, etc.): Yes  Nutritional needs and diet plan: Yes  Pain management techniques: Yes  Wound Care: Yes  How and/when to access community resources: Yes     Instructional Materials Used/Given: Packet received.      In addition to the information above, the following items were also discussed:    In addition to the information above, the following was also discussed:    -important contact info/ phone numbers  -map/ location  -showering instructions  -stop light tool  -Guide to Joint Replacement Handbook  -online joint class (website given, jorge enrolled in a class prior to surgery.)  -pre-op with PAC (7/24)  -dental visit: Patient has a dental visit scheduled for sometime in July.  Knows to take care of any dental work prior to surgery.   -post op / : Her , Juan F will be driving her home and staying with her for that first week post operatively.    Shey Estrada RN  6/7/2023 3:05  PM       Time spent with patient: 15 minutes.

## 2023-06-07 NOTE — TELEPHONE ENCOUNTER
Reason for Call:  Form, our goal is to have forms completed with 7 days, however, some forms may require a visit or additional information.    Type of letter, form or note:  FMLA     Who is the form from?: Patient    Where did the form come from: Patient or family brought in       Phone number of person requesting form:n/a  Can we leave a detailed message on this number:  YES    Desired completion date of form: aspa      How will form be returned?:  fax to [phone]    Has the patient signed a consent form for release of information (may be included with form)? Not Applicable    Additional information: complete information for patient     Form was started and place in Provider Basket for provider review/ completion at [facility].

## 2023-06-14 ENCOUNTER — TELEPHONE (OUTPATIENT)
Dept: ORTHOPEDICS | Facility: CLINIC | Age: 58
End: 2023-06-14

## 2023-06-14 NOTE — TELEPHONE ENCOUNTER
Reason for Call:  Form, our goal is to have forms completed with 7 days, however, some forms may require a visit or additional information.    Type of letter, form or note:  FMLA     Who is the form from?: Insurance    Where did the form come from: form was faxed in    Phone number of person requesting form: N/A  Can we leave a detailed message on this number:  Not Applicable    Desired completion date of form: ASAP      How will form be returned?:  fax to [phone]    Has the patient signed a consent form for release of information (may be included with form)? Not Applicable    Additional comments: N/A    Form was started and place in Provider Basket for provider review/ completion at [facility].

## 2023-06-15 ENCOUNTER — DOCUMENTATION ONLY (OUTPATIENT)
Dept: LAB | Facility: CLINIC | Age: 58
End: 2023-06-15
Payer: COMMERCIAL

## 2023-06-15 DIAGNOSIS — E03.9 HYPOTHYROIDISM, UNSPECIFIED TYPE: Primary | ICD-10-CM

## 2023-06-15 DIAGNOSIS — I10 BENIGN HYPERTENSION: ICD-10-CM

## 2023-06-15 NOTE — PROGRESS NOTES
Pt has a lab only coming up and currently no labs. Please review and place future orders.   Questions or concerns, please have your care team contact pt directly.    Thank You  Zoey MORIN

## 2023-06-15 NOTE — PROGRESS NOTES
pcp ooo. Unclear what she is expecting for lab only. Please inquire with patient.     danitza simons, pac

## 2023-06-16 NOTE — PROGRESS NOTES
Patient calling back.  Appears Dr. Duane Bach put lab orders in yesterday.  Advised patient there are now orders.  Che Quiñones RN

## 2023-06-20 ENCOUNTER — TELEPHONE (OUTPATIENT)
Dept: ORTHOPEDICS | Facility: CLINIC | Age: 58
End: 2023-06-20
Payer: COMMERCIAL

## 2023-06-20 NOTE — TELEPHONE ENCOUNTER
.Reason for Call:  Form, our goal is to have forms completed with 7 days, however, some forms may require a visit or additional information.     Type of letter, form or note:  Attending Physicians Statement     Who is the form from?: Insurance    Where did the form come from: form was faxed in    Phone number of person requesting form: N/A   Can we leave a detailed message on this number:  Not Applicable    Desired completion date of form: ASAP       How will form be returned?:  fax to [phone]    Has the patient signed a consent form for release of information (may be included with form)? YES    Additional comments: N/A    Form was started and place in Provider Basket for provider review/ completion at [place].

## 2023-06-28 ENCOUNTER — LAB (OUTPATIENT)
Dept: LAB | Facility: CLINIC | Age: 58
End: 2023-06-28
Payer: COMMERCIAL

## 2023-06-28 DIAGNOSIS — I10 BENIGN HYPERTENSION: ICD-10-CM

## 2023-06-28 DIAGNOSIS — E03.9 HYPOTHYROIDISM, UNSPECIFIED TYPE: ICD-10-CM

## 2023-06-28 LAB
ALBUMIN SERPL BCG-MCNC: 4.5 G/DL (ref 3.5–5.2)
ALP SERPL-CCNC: 75 U/L (ref 35–104)
ALT SERPL W P-5'-P-CCNC: 13 U/L (ref 0–50)
ANION GAP SERPL CALCULATED.3IONS-SCNC: 10 MMOL/L (ref 7–15)
AST SERPL W P-5'-P-CCNC: 24 U/L (ref 0–45)
BILIRUB SERPL-MCNC: 0.7 MG/DL
BUN SERPL-MCNC: 15.2 MG/DL (ref 6–20)
CALCIUM SERPL-MCNC: 9.7 MG/DL (ref 8.6–10)
CHLORIDE SERPL-SCNC: 104 MMOL/L (ref 98–107)
CHOLEST SERPL-MCNC: 255 MG/DL
CREAT SERPL-MCNC: 0.74 MG/DL (ref 0.51–0.95)
DEPRECATED HCO3 PLAS-SCNC: 25 MMOL/L (ref 22–29)
ERYTHROCYTE [DISTWIDTH] IN BLOOD BY AUTOMATED COUNT: 11.6 % (ref 10–15)
GFR SERPL CREATININE-BSD FRML MDRD: >90 ML/MIN/1.73M2
GLUCOSE SERPL-MCNC: 96 MG/DL (ref 70–99)
HBA1C MFR BLD: 5.3 % (ref 0–5.6)
HCT VFR BLD AUTO: 42.6 % (ref 35–47)
HDLC SERPL-MCNC: 53 MG/DL
HGB BLD-MCNC: 14.4 G/DL (ref 11.7–15.7)
LDLC SERPL CALC-MCNC: 173 MG/DL
MCH RBC QN AUTO: 32.4 PG (ref 26.5–33)
MCHC RBC AUTO-ENTMCNC: 33.8 G/DL (ref 31.5–36.5)
MCV RBC AUTO: 96 FL (ref 78–100)
NONHDLC SERPL-MCNC: 202 MG/DL
PLATELET # BLD AUTO: 297 10E3/UL (ref 150–450)
POTASSIUM SERPL-SCNC: 4.4 MMOL/L (ref 3.4–5.3)
PROT SERPL-MCNC: 7 G/DL (ref 6.4–8.3)
RBC # BLD AUTO: 4.45 10E6/UL (ref 3.8–5.2)
SODIUM SERPL-SCNC: 139 MMOL/L (ref 136–145)
T4 FREE SERPL-MCNC: 1.83 NG/DL (ref 0.9–1.7)
TRIGL SERPL-MCNC: 147 MG/DL
TSH SERPL DL<=0.005 MIU/L-ACNC: 6.44 UIU/ML (ref 0.3–4.2)
WBC # BLD AUTO: 5.8 10E3/UL (ref 4–11)

## 2023-06-28 PROCEDURE — 36415 COLL VENOUS BLD VENIPUNCTURE: CPT

## 2023-06-28 PROCEDURE — 83036 HEMOGLOBIN GLYCOSYLATED A1C: CPT

## 2023-06-28 PROCEDURE — 80061 LIPID PANEL: CPT

## 2023-06-28 PROCEDURE — 84443 ASSAY THYROID STIM HORMONE: CPT

## 2023-06-28 PROCEDURE — 85027 COMPLETE CBC AUTOMATED: CPT

## 2023-06-28 PROCEDURE — 84439 ASSAY OF FREE THYROXINE: CPT

## 2023-06-28 PROCEDURE — 80053 COMPREHEN METABOLIC PANEL: CPT

## 2023-06-30 NOTE — TELEPHONE ENCOUNTER
Reason for call:  Other   Patient called regarding (reason for call): Forms  Additional comments: pt calling to see if FMLA and insurance forms have been completed and sent     Phone number to reach patient:  Cell number on file:    Telephone Information:   Mobile [phone]       Best Time:  any    Can we leave a detailed message on this number?  YES    Travel screening: Not Applicable

## 2023-07-04 ENCOUNTER — MYC MEDICAL ADVICE (OUTPATIENT)
Dept: FAMILY MEDICINE | Facility: CLINIC | Age: 58
End: 2023-07-04
Payer: COMMERCIAL

## 2023-07-04 DIAGNOSIS — E03.9 HYPOTHYROIDISM, UNSPECIFIED TYPE: Primary | ICD-10-CM

## 2023-07-04 DIAGNOSIS — E78.01 FAMILIAL HYPERCHOLESTEREMIA: Primary | ICD-10-CM

## 2023-07-04 DIAGNOSIS — Z82.49 FAMILY HISTORY OF CORONARY ARTERY DISEASE: ICD-10-CM

## 2023-07-04 DIAGNOSIS — E78.2 MIXED HYPERLIPIDEMIA: ICD-10-CM

## 2023-07-05 RX ORDER — ROSUVASTATIN CALCIUM 20 MG/1
20 TABLET, COATED ORAL DAILY
Qty: 90 TABLET | Refills: 3 | Status: SHIPPED | OUTPATIENT
Start: 2023-07-05 | End: 2023-09-08

## 2023-07-05 NOTE — TELEPHONE ENCOUNTER
Dr. Duane Bach- see [employer] message below.  Please advise.  Che Quiñones RN    Cholesterol levels are higher higher than last check, however overall you are low risk for cardiovascular complications.  Thyroid abnormalities can contribute to abnormal lipid panels.     Given your family history of high cholesterol and heart disease, we have a couple of options on how to address this.  We can justify starting you on a cholesterol-lowering medication called a statin.  We can also send you to a cardiologist for consultation.  You may benefit from a screening called a coronary artery calcium score, which can be used to look for signs of coronary artery disease, which has been seen in your family history.     Please let me know how you would like to proceed   Written by Brooke Bach MD on 7/4/2023  3:51 PM CDT  Seen by patient Rayna JONES Manuel on 7/4/2023  4:58 PM

## 2023-07-06 ENCOUNTER — TELEPHONE (OUTPATIENT)
Dept: ORTHOPEDICS | Facility: CLINIC | Age: 58
End: 2023-07-06
Payer: COMMERCIAL

## 2023-07-06 NOTE — TELEPHONE ENCOUNTER
Paperwork completed and faxed. Patient notified by clinical team and appreciative of call. Copy sent to scan and copy placed in completed folder at [place].     Chaparrita Barnes MSA, ATC  Certified Athletic Trainer

## 2023-07-06 NOTE — TELEPHONE ENCOUNTER
Clinical staff notified patient paperwork has been completed and faxed. Copy sent to scan and copy placed in [facility] completed forms folder for reference.     Chaparrita Barnes MSA, ATC  Certified Athletic Trainer

## 2023-07-06 NOTE — TELEPHONE ENCOUNTER
Informed patient that paper work is completed , but needs signature before faxing. Patient will come in tomorrow for signature.    Tre Buckner ATC

## 2023-07-06 NOTE — TELEPHONE ENCOUNTER
Paperwork completed and faxed. Please notify patient of it's completion.     Chaparrita Barnes MSA, ATC  Certified Athletic Trainer

## 2023-07-06 NOTE — TELEPHONE ENCOUNTER
Patient is scheduled for left NINA on 8/8/23. Patient's spouse is requesting [place] paperwork be completed stating he (spouse) needs 6 weeks off of work to assist patient with post op recovery. Please advise if agreeable with this plan so paperwork can be completed for spouse.     Chaparrita Barnes, ATC

## 2023-07-06 NOTE — TELEPHONE ENCOUNTER
[facility] Call Center    Phone Message    May a detailed message be left on voicemail: yes     Reason for Call: Other: Patient's spouse, Juan F, emailed FMLA paperwork for himself to [email or web address] on 6/6/23 and is requesting a call back for a status update.     He is applying for FMLA because he'll be taking care of his wife (patient) after her surgery.    Please call Juan F back to discuss.    Action Taken: Message routed to:  [facility]    Travel Screening: Not Applicable

## 2023-07-08 ENCOUNTER — MYC MEDICAL ADVICE (OUTPATIENT)
Dept: FAMILY MEDICINE | Facility: CLINIC | Age: 58
End: 2023-07-08
Payer: COMMERCIAL

## 2023-07-08 DIAGNOSIS — E66.09 CLASS 1 OBESITY DUE TO EXCESS CALORIES WITH SERIOUS COMORBIDITY AND BODY MASS INDEX (BMI) OF 32.0 TO 32.9 IN ADULT: Primary | ICD-10-CM

## 2023-07-08 DIAGNOSIS — E66.811 CLASS 1 OBESITY DUE TO EXCESS CALORIES WITH SERIOUS COMORBIDITY AND BODY MASS INDEX (BMI) OF 32.0 TO 32.9 IN ADULT: Primary | ICD-10-CM

## 2023-07-10 ENCOUNTER — TELEPHONE (OUTPATIENT)
Dept: ORTHOPEDICS | Facility: CLINIC | Age: 58
End: 2023-07-10
Payer: COMMERCIAL

## 2023-07-10 NOTE — TELEPHONE ENCOUNTER
See Mychart message, wonders about higher dose of Wegovy as lower dose not available, routed to [facility], inform pt of plan    Sandra Plascencia, RN, BSN  [facility]

## 2023-07-10 NOTE — TELEPHONE ENCOUNTER
Per Dr. Amezcua, patient should be seen at the 2 week post op timeframe.     Left voicemail asking for a return call. There is no consent to communicate on file.   Also sent [employer] message.     JEM Delgadlilo RN

## 2023-07-10 NOTE — PROVIDER NOTIFICATION
07/10/23 1445   Discharge Planning   Patient/Family Anticipates Transition to home with family   Concerns to be Addressed all concerns addressed in this encounter   Living Arrangements   People in Home spouse   Type of Residence Private Residence   Is your private residence a single family home or apartment? Single family home   Number of Stairs, Within Home, Primary two   Stair Railings, Within Home, Primary none   Once home, are you able to live on one level? Yes   Which level? Main Level   Bathroom Shower/Tub Walk-in shower   Equipment Currently Used at Home walker, standard   Support System   Support Systems Spouse/Significant Other   Do you have someone available to stay with you one or two nights once you are home? Yes   Medical Clearance   Date of Physical 07/24/23   Clinic Name [facility]   It is recommended that you call and check with any specialty providers before surgery to see if you need surgical clearance.  Do you see any specialty providers outside of your primary care provider? Yes   Blood   Known Bleeding Disorder or Coagulopathy No   Does the patient have any Christian/cultural preferences related to blood products? No   Education   Has the patient scheduled or completed pre-op total joint education, either in class or online, in the last 12 months? Yes   What day did the patient complete, or plan to complete, pre-op total joint education? 08/12/23   Patient attended total joint pre-op class/received pre-op teaching  online

## 2023-07-10 NOTE — TELEPHONE ENCOUNTER
LOV 6/25/21  Last refill 5/26/21  There is no scheduled appt This patient has surgery for NINA on 8/8/23 and is not scheduled for her first post op until 9/8/23.     [facility] has openings on 8/17 and 8/24, and you have openings on 8/25. Please let me know how you'd prefer they follow up- [facility] on one of those dates, or with you on 8/25.     Chaparrita

## 2023-07-10 NOTE — TELEPHONE ENCOUNTER
Patient signed authorization to obtain and disclose information form on 7/7/23.     Paperwork was faxed to the [facility]. Paperwork placed in completed forms folder in [place] for reference, and copy sent to scan.     Chaparrita Barnes MSA, ATC  Certified Athletic Trainer

## 2023-07-11 RX ORDER — TIRZEPATIDE 10 MG/.5ML
10 INJECTION, SOLUTION SUBCUTANEOUS
Qty: 2 ML | Refills: 0 | Status: SHIPPED | OUTPATIENT
Start: 2023-09-18 | End: 2023-10-10

## 2023-07-11 RX ORDER — TIRZEPATIDE 5 MG/.5ML
5 INJECTION, SOLUTION SUBCUTANEOUS
Qty: 2 ML | Refills: 0 | Status: SHIPPED | OUTPATIENT
Start: 2023-08-03 | End: 2023-08-25

## 2023-07-11 RX ORDER — TIRZEPATIDE 2.5 MG/.5ML
2.5 INJECTION, SOLUTION SUBCUTANEOUS
Qty: 2 ML | Refills: 0 | Status: SHIPPED | OUTPATIENT
Start: 2023-07-11 | End: 2023-08-02

## 2023-07-11 RX ORDER — TIRZEPATIDE 7.5 MG/.5ML
7.5 INJECTION, SOLUTION SUBCUTANEOUS
Qty: 2 ML | Refills: 0 | Status: SHIPPED | OUTPATIENT
Start: 2023-08-26 | End: 2023-09-13

## 2023-07-11 RX ORDER — TIRZEPATIDE 12.5 MG/.5ML
12.5 INJECTION, SOLUTION SUBCUTANEOUS
Qty: 2 ML | Refills: 0 | Status: SHIPPED | OUTPATIENT
Start: 2023-10-11 | End: 2023-11-28

## 2023-07-11 NOTE — TELEPHONE ENCOUNTER
Dr. Duane Bach-    [facility]  Patient reports insurance covers both mounjaro and saxenda. Prior authorization is required.     Yoli NICE RN, BSN, PHN  [facility]  [phone]

## 2023-07-12 NOTE — TELEPHONE ENCOUNTER
[facility] Call Center    Phone Message    May a detailed message be left on voicemail: yes     Reason for Call: Other: pt calling in asking about Short term disablity forms - Sara has not received the forms yet.  Pt asking about the status of the forms and requesting call back  At [phone]       Action Taken: Message routed to:  [facility] Dr. Neto Amezcua.     Travel Screening: Not Applicable

## 2023-07-12 NOTE — TELEPHONE ENCOUNTER
Talked with patients  informed her that the we re-faxed the paper work today. Informed to call back if there are anymore problems    Tre Buckner ATC

## 2023-07-13 ENCOUNTER — ANCILLARY PROCEDURE (OUTPATIENT)
Dept: MAMMOGRAPHY | Facility: CLINIC | Age: 58
End: 2023-07-13
Attending: FAMILY MEDICINE
Payer: COMMERCIAL

## 2023-07-13 DIAGNOSIS — Z12.31 VISIT FOR SCREENING MAMMOGRAM: ICD-10-CM

## 2023-07-13 PROCEDURE — 77067 SCR MAMMO BI INCL CAD: CPT | Mod: TC | Performed by: RADIOLOGY

## 2023-07-18 ENCOUNTER — TELEPHONE (OUTPATIENT)
Dept: ORTHOPEDICS | Facility: CLINIC | Age: 58
End: 2023-07-18
Payer: COMMERCIAL

## 2023-07-18 NOTE — TELEPHONE ENCOUNTER
[facility] Call Center    Phone Message:    Pt's  called, requesting a CALL BACK in regard to his [facility] paperwork. Pt's  will be taking care of the pt after her surgery, so he needs the [facility] paperwork corrected (#10 on the paperwork was not filled out correctly).    Please CALL pt's  to discuss. Thank you.    Reason for Call: Other: [place] Paperwork     Action Taken: Message routed to:  Other: [facility]    Travel Screening: Not Applicable

## 2023-07-18 NOTE — TELEPHONE ENCOUNTER
Called patient's  to discuss FMLA paperwork. They presented concerns that #10 in the paperwork was not filled out and was needing for the paperwork to be processed.    Explained to patient's  not every question is applicable to every patient and what they need so #9 was answered in regards to the continuous care needed instead of the intermittent care that #10 discusses.     Patient's  stated their work will not accept the paperwork until #10 is filled out to show he will need to be out of work to assist his wife.     I told the patient's  I would touch base with the provider and other ATs to see what can be done and reach back out through phone or mychart if we were able to answer #10 and fax to the desired location.      Cyndi Gallagher, ATC, LAT

## 2023-07-19 NOTE — TELEPHONE ENCOUNTER
Dr. Amezcua is [place]. Paper work will be reviewed with Wilman Rodriguez PA-C, tomorrow. Paperwork left in his basket.     Chaparrita Barnes, ATC

## 2023-07-20 NOTE — TELEPHONE ENCOUNTER
Faxed updated FMLA form to employer at [phone]. Created copy and sent to scan.    Jim Diaz, Visit Facilitator

## 2023-07-21 ENCOUNTER — TELEPHONE (OUTPATIENT)
Dept: ORTHOPEDICS | Facility: CLINIC | Age: 58
End: 2023-07-21
Payer: COMMERCIAL

## 2023-07-21 NOTE — TELEPHONE ENCOUNTER
"Patient dropped off [facility] paperwork that was completed for  requesting the following note be added \"Employee has requested to assist wife (patient) with ADL's 8/8/23-8/21/23\".     Paperwork placed in accordion folder.     Chaparrita Barnes MSA, ATC  Certified Athletic Trainer  "

## 2023-07-23 LAB
ABO/RH(D): NORMAL
ANTIBODY SCREEN: NEGATIVE
SPECIMEN EXPIRATION DATE: NORMAL

## 2023-07-24 ENCOUNTER — PRE VISIT (OUTPATIENT)
Dept: SURGERY | Facility: CLINIC | Age: 58
End: 2023-07-24

## 2023-07-24 ENCOUNTER — LAB (OUTPATIENT)
Dept: LAB | Facility: CLINIC | Age: 58
End: 2023-07-24
Payer: COMMERCIAL

## 2023-07-24 ENCOUNTER — ANESTHESIA EVENT (OUTPATIENT)
Dept: SURGERY | Facility: CLINIC | Age: 58
End: 2023-07-24
Payer: COMMERCIAL

## 2023-07-24 ENCOUNTER — OFFICE VISIT (OUTPATIENT)
Dept: SURGERY | Facility: CLINIC | Age: 58
End: 2023-07-24
Payer: COMMERCIAL

## 2023-07-24 VITALS
OXYGEN SATURATION: 97 % | RESPIRATION RATE: 16 BRPM | SYSTOLIC BLOOD PRESSURE: 95 MMHG | HEIGHT: 65 IN | BODY MASS INDEX: 32.49 KG/M2 | DIASTOLIC BLOOD PRESSURE: 65 MMHG | WEIGHT: 195 LBS | TEMPERATURE: 98.5 F | HEART RATE: 75 BPM

## 2023-07-24 DIAGNOSIS — Z01.818 PRE-OP EVALUATION: ICD-10-CM

## 2023-07-24 DIAGNOSIS — Z82.49 FAMILY HISTORY OF CORONARY ARTERY DISEASE: ICD-10-CM

## 2023-07-24 DIAGNOSIS — E03.9 HYPOTHYROIDISM, UNSPECIFIED TYPE: ICD-10-CM

## 2023-07-24 DIAGNOSIS — E78.01 FAMILIAL HYPERCHOLESTEREMIA: ICD-10-CM

## 2023-07-24 DIAGNOSIS — E78.2 MIXED HYPERLIPIDEMIA: ICD-10-CM

## 2023-07-24 DIAGNOSIS — Z01.818 PRE-OP EVALUATION: Primary | ICD-10-CM

## 2023-07-24 PROCEDURE — 86850 RBC ANTIBODY SCREEN: CPT | Performed by: NURSE PRACTITIONER

## 2023-07-24 PROCEDURE — 86901 BLOOD TYPING SEROLOGIC RH(D): CPT | Performed by: NURSE PRACTITIONER

## 2023-07-24 PROCEDURE — 99203 OFFICE O/P NEW LOW 30 MIN: CPT | Performed by: NURSE PRACTITIONER

## 2023-07-24 PROCEDURE — 36415 COLL VENOUS BLD VENIPUNCTURE: CPT | Performed by: PATHOLOGY

## 2023-07-24 ASSESSMENT — ENCOUNTER SYMPTOMS: ORTHOPNEA: 0

## 2023-07-24 ASSESSMENT — PAIN SCALES - GENERAL: PAINLEVEL: SEVERE PAIN (7)

## 2023-07-24 ASSESSMENT — LIFESTYLE VARIABLES: TOBACCO_USE: 0

## 2023-07-24 NOTE — PATIENT INSTRUCTIONS
Name:  Rayna Jackson   MRN:  [id number]   :  1965   Today's Date:  2023         You were seen today for a pre-operative assessment in the:    Pre-operative Anesthesia Assessment Center(PAC)  [facility]  [address] [place] [id number]  phone [phone]      You will be receiving a call with location, date, arrival time and diet instructions from Preadmission Nursing at your surgical site:    -[facility]: [phone]   -[facility]: [phone]  -[facility]: [phone]  -[facility]: [phone]  -[facility]: [phone]  -[facility]: [phone]        Anesthesia recommendations for medications:    Hold Aspirin for 7 days before procedure.  Hold Multivitamins for 7 days before procedure.   Hold Herbal medications and Supplements for 7 days before procedure.  Hold Ibuprofen for 1 day before procedure.   Hold Naproxen for 4 days before procedure.     No alcohol or cannabis products for 24 hours before your procedure       Special instructions for anticoagulation medications:        Please DO NOT take the following medications the day of procedure:    Diclofenac (Voltaren)  - stop 7 days before surgery  Fexofenadine (Allegra)  Losartan (Cozaar) should not be taken within 12 hours before surgery    Please take these medications the night before or the day of procedure per your usual routine:    Albuterol (Proair) inhaler if needed and bring this with you day of surgery  Flonase nasal spray   Fluticasone (Flovent) inhaler  Levothyroxine (Synthroid)  Montelukast (Singulair)  Rosuvastatin (Crestor)  Tirzepaptide (Mounjaro) - do not take the week before surgeruy        How do I prepare myself?  - Please take 2 showers (one the night prior to surgery and one the morning of surgery) using Scrubcare or Hibiclens soap.    Use this soap only from the neck to your toes.     Leave the soap on your skin for one minute--then rinse  thoroughly.      You may use your own shampoo and conditioner. No other hair products.   - Please remove all jewelry and body piercings.  - No lotions, deodorants or fragrance.  - No makeup or fingernail polish.   - Bring your ID and insurance card.    -If you have a Deep Brain Stimulator, a Spinal Cord Stimulator, or any implanted Neuro Device, you must bring the remote to your appointment             For further questions regarding your surgery please call your surgeon's office.

## 2023-07-24 NOTE — H&P
Pre-Operative H & P     CC:  Preoperative exam to assess for increased cardiopulmonary risk while undergoing surgery and anesthesia.    Date of Encounter: 7/24/2023  Primary Care Physician:  Brooke Heredia     Reason for visit: Pre-operative evaluation      HPI  Rayna Jackson is a 58 year old female who presents for pre-operative H & P in preparation for  Procedure Information       Case: [id number] Date/Time: 08/08/23 0730    Procedure: LEFT TOTAL HIP ARTHROPLASTY (Left: Hip)    Anesthesia type: Choice    Diagnosis: Primary osteoarthritis of left hip [M16.12]    Pre-op diagnosis: Primary osteoarthritis of left hip [M16.12]    Location:  OR 02 /  OR    Providers: Neto Amezcua MD          Rayna Jackson is a 58-year-old female with PMH significant for HTN, HL, Asthma, allergic rhinnitis, laryngitis, HA, myalgia, hypothyroidism and OA. She consulted with Dr. Amezcua in regards to  chronic left hip pain  due to osteoarthritic changes of the femoral acetabular joint.  Insufficiently responding to nonsurgical treatment options. She presents today in preparation for the above recommended procedure.    History is obtained from the patient and chart review    Hx of abnormal bleeding or anti-platelet use: no    Menstrual history: No LMP recorded. Patient is postmenopausal.:      Past Medical History  Past Medical History:   Diagnosis Date    Arthritis     Hypertension     Torn meniscus 01/01/2013    left     Uncomplicated asthma        Past Surgical History  Past Surgical History:   Procedure Laterality Date    APPENDECTOMY      appendicitis  01/01/1980    BIOPSY  2017    Removed gland in back of neck    COLONOSCOPY N/A 06/30/2017    Procedure: COLONOSCOPY;  COLONOSCOPY   ;  Surgeon: Brooks Villa MD;  Location:  GI    CYSTECTOMY OVARIAN BENIGN  01/01/1980    KNEE SURGERY Left        Prior to Admission Medications  Current Outpatient Medications   Medication Sig Dispense Refill    albuterol (PROAIR  HFA/PROVENTIL HFA/VENTOLIN HFA) 108 (90 Base) MCG/ACT inhaler INHALE 2 PUFFS INTO THE LUNGS AS NEEDED FOR SHORTNESS OF BREATH / DYSPNEA OR WHEEZING 18 g 1    diclofenac (VOLTAREN) 75 MG EC tablet Take 1 tablet (75 mg) by mouth 2 times daily as needed for moderate pain 60 tablet 0    fexofenadine (ALLEGRA) 180 MG tablet Take 1 tablet (180 mg) by mouth daily Take 180 mg by mouth (Patient taking differently: Take 180 mg by mouth every morning Take 180 mg by mouth) 90 tablet 1    fluticasone (FLONASE) 50 MCG/ACT nasal spray INSTILL 2 SPRAYS INTO BOTH NOSTRILS DAILY. (Patient taking differently: Spray 2 sprays into both nostrils every morning INSTILL 2 SPRAYS INTO BOTH NOSTRILS DAILY.) 48 mL 0    fluticasone (FLOVENT HFA) 110 MCG/ACT inhaler TAKE 2 PUFFS BY MOUTH TWICE A DAY (Patient taking differently: Inhale 2 puffs into the lungs 2 times daily as needed TAKE 2 PUFFS BY MOUTH TWICE A DAY) 12 g 5    levothyroxine (SYNTHROID/LEVOTHROID) 50 MCG tablet Take 1 tablet (50 mcg) by mouth daily (Patient taking differently: Take 50 mcg by mouth every morning) 90 tablet 1    losartan (COZAAR) 50 MG tablet Take 1 tablet (50 mg) by mouth daily (Patient taking differently: Take 50 mg by mouth every morning) 90 tablet 3    montelukast (SINGULAIR) 10 MG tablet TAKE 1 TABLET BY MOUTH EVERYDAY AT BEDTIME (Patient taking differently: Take 10 mg by mouth At Bedtime TAKE 1 TABLET BY MOUTH EVERYDAY AT BEDTIME) 90 tablet 3    rosuvastatin (CRESTOR) 20 MG tablet Take 1 tablet (20 mg) by mouth daily (Patient taking differently: Take 20 mg by mouth every evening) 90 tablet 3    triamcinolone (KENALOG) 0.1 % external cream Apply topically 2 times daily 80 g 1    meloxicam (MOBIC) 7.5 MG tablet Take 1 tablet (7.5 mg) by mouth daily as needed for moderate pain (severe pain) (Patient not taking: Reported on 7/10/2023) 90 tablet 1    [START ON 9/18/2023] tirzepatide (MOUNJARO) 10 MG/0.5ML pen Inject 10 mg Subcutaneous every 7 days for 4 doses  (Patient taking differently: Inject 10 mg Subcutaneous every 7 days) 2 mL 0    [START ON 10/11/2023] tirzepatide (MOUNJARO) 12.5 MG/0.5ML pen Inject 12.5 mg Subcutaneous every 7 days for 4 doses (Patient taking differently: Inject 12.5 mg Subcutaneous every 7 days) 2 mL 0    tirzepatide (MOUNJARO) 2.5 MG/0.5ML pen Inject 2.5 mg Subcutaneous every 7 days for 4 doses (Patient taking differently: Inject 2.5 mg Subcutaneous every 7 days Tue) 2 mL 0    [START ON 8/3/2023] tirzepatide (MOUNJARO) 5 MG/0.5ML pen Inject 5 mg Subcutaneous every 7 days for 4 doses (Patient taking differently: Inject 5 mg Subcutaneous every 7 days) 2 mL 0    [START ON 8/26/2023] tirzepatide (MOUNJARO) 7.5 MG/0.5ML pen Inject 7.5 mg Subcutaneous every 7 days for 4 doses (Patient taking differently: Inject 7.5 mg Subcutaneous every 7 days) 2 mL 0       Allergies  Allergies   Allergen Reactions    Lisinopril Cough    Lactose GI Disturbance    Amlodipine Swelling    Aspirin Other (See Comments)     Throat swells    Codeine Unknown     headache       Social History  Social History     Socioeconomic History    Marital status:      Spouse name: Juan F    Number of children: 4    Years of education: Not on file    Highest education level: Master's degree (e.g., MA, MS, [place], MEd, MSW, [employer])   Occupational History    Occupation: manager     Employer: [employer]   Tobacco Use    Smoking status: Never     Passive exposure: Never    Smokeless tobacco: Never   Vaping Use    Vaping Use: Never used   Substance and Sexual Activity    Alcohol use: Yes     Comment: 2 drinks once a week    Drug use: No    Sexual activity: Yes     Partners: Male     Birth control/protection: I.U.D., Female Surgical   Other Topics Concern    Parent/sibling w/ CABG, MI or angioplasty before 65F 55M? Yes   Social History Narrative    Not on file     Social Determinants of Health     Financial Resource Strain: Low Risk  (4/11/2023)    Overall Financial Resource Strain  (CARDIA)     Difficulty of Paying Living Expenses: Not hard at all   Food Insecurity: No Food Insecurity (4/11/2023)    Hunger Vital Sign     Worried About Running Out of Food in the Last Year: Never true     Ran Out of Food in the Last Year: Never true   Transportation Needs: No Transportation Needs (4/11/2023)    PRAPARE - Transportation     Lack of Transportation (Medical): No     Lack of Transportation (Non-Medical): No   Physical Activity: Inactive (4/11/2023)    Exercise Vital Sign     Days of Exercise per Week: 0 days     Minutes of Exercise per Session: 0 min   Stress: No Stress Concern Present (4/11/2023)    [employer] - Occupational Stress Questionnaire     Feeling of Stress : Not at all   Social Connections: Moderately Integrated (4/11/2023)    Social Connection and Isolation Panel [NHANES]     Frequency of Communication with Friends and Family: More than three times a week     Frequency of Social Gatherings with Friends and Family: More than three times a week     Attends Alevism Services: 1 to 4 times per year     Active Member of Clubs or Organizations: No     Attends Club or Organization Meetings: Not on file     Marital Status:    Intimate Partner Violence: Not on file   Housing Stability: Low Risk  (4/11/2023)    Housing Stability Vital Sign     Unable to Pay for Housing in the Last Year: No     Number of Places Lived in the Last Year: 1     Unstable Housing in the Last Year: No       Family History  Family History   Problem Relation Age of Onset    Hypertension Mother     Thyroid Disease Mother     Coronary Artery Disease Mother     Hypertension Father     Diabetes Father     Coronary Artery Disease Father     Hyperlipidemia Father     Obesity Father     Macular Degeneration Maternal Grandmother     Thyroid Disease Maternal Grandmother     Coronary Artery Disease Maternal Grandfather     Coronary Artery Disease Paternal Grandmother     Cerebrovascular Disease  "Paternal Grandmother     Thyroid Disease Sister     Thyroid Disease Maternal Aunt     Diabetes Other         great grandmother    Glaucoma No family hx of        Review of Systems  The complete review of systems is negative other than noted in the HPI or here.   Anesthesia Evaluation   Pt has had prior anesthetic. Type: General and MAC.    No history of anesthetic complications       ROS/MED HX  ENT/Pulmonary: Comment: Tested for negative for sleep apnea    (+)     JERI risk factors, snores loudly, hypertension,    allergic rhinitis,         Intermittent, asthma  Treatment: Inhaler prn,              (-) tobacco use   Neurologic:     (+)      migraines,                          Cardiovascular:     (+) Dyslipidemia hypertension- -   -  - -                                   (-) SHEIKH, orthopnea/PND and irregular heartbeat/palpitations   METS/Exercise Tolerance: 3 - Able to walk 1-2 blocks without stopping Comment: Can be limited by Hip pain     Hematologic:  - neg hematologic  ROS     Musculoskeletal: Comment: OA left hip  (+)  arthritis,             GI/Hepatic:     (+)         appendicitis,           Renal/Genitourinary:  - neg Renal ROS     Endo:     (+)          thyroid problem, hypothyroidism,           Psychiatric/Substance Use:  - neg psychiatric ROS     Infectious Disease:  - neg infectious disease ROS     Malignancy:  - neg malignancy ROS     Other:            BP 95/65 (BP Location: Right arm, Patient Position: Chair, Cuff Size: Adult Large)   Pulse 75   Temp 98.5  F (36.9  C) (Oral)   Resp 16   Ht 1.651 m (5' 5\")   Wt 88.5 kg (195 lb)   SpO2 97%   Breastfeeding No   BMI 32.45 kg/m      Physical Exam   Constitutional: Awake, alert, cooperative, no apparent distress, and appears stated age.  Eyes: Pupils equal, round and reactive to light, extra ocular muscles intact, sclera clear, conjunctiva normal.  HENT: Normocephalic, oral pharynx with moist mucus membranes, good dentition. No goiter appreciated. "   Respiratory: Clear to auscultation bilaterally, no crackles or wheezing.  Cardiovascular: Regular rate and rhythm, normal S1 and S2, and no murmur noted.  Carotids +2, no bruits. No edema. Palpable pulses to radial  DP and PT arteries.   GI: Normal bowel sounds, soft, non-distended, non-tender, no masses palpated, no hepatosplenomegaly.    Lymph/Hematologic: No cervical lymphadenopathy and no supraclavicular lymphadenopathy.  Genitourinary:  deferred  Skin: Warm and dry.  No rashes at anticipated surgical site.   Musculoskeletal: Full ROM of neck. There is no redness, warmth, or swelling of the joints. Gross motor strength is normal.    Neurologic: Awake, alert, oriented to name, place and time. Cranial nerves II-XII are grossly intact. Gait has antalgic gait.   Neuropsychiatric: Calm, cooperative. Normal affect.     Prior Labs/Diagnostic Studies   All labs and imaging personally reviewed   Lab Results   Component Value Date    WBC 5.8 06/28/2023    WBC 4.1 02/19/2021     Lab Results   Component Value Date    RBC 4.45 06/28/2023    RBC 4.90 02/19/2021     Lab Results   Component Value Date    HGB 14.4 06/28/2023    HGB 15.3 02/19/2021     Lab Results   Component Value Date    HCT 42.6 06/28/2023    HCT 46.3 02/19/2021     No components found for: MCT  Lab Results   Component Value Date    MCV 96 06/28/2023    MCV 95 02/19/2021     Lab Results   Component Value Date    MCH 32.4 06/28/2023    MCH 31.2 02/19/2021     Lab Results   Component Value Date    MCHC 33.8 06/28/2023    MCHC 33.0 02/19/2021     Lab Results   Component Value Date    RDW 11.6 06/28/2023    RDW 12.9 02/19/2021     Lab Results   Component Value Date     06/28/2023     02/19/2021     Last Comprehensive Metabolic Panel:  Lab Results   Component Value Date     06/28/2023    POTASSIUM 4.4 06/28/2023    CHLORIDE 104 06/28/2023    CO2 25 06/28/2023    ANIONGAP 10 06/28/2023    GLC 96 06/28/2023    BUN 15.2 06/28/2023    CR 0.74  06/28/2023    GFRESTIMATED >90 06/28/2023    RUPINDER 9.7 06/28/2023       Lab Results   Component Value Date    AST 24 06/28/2023    AST 13 02/19/2021     Lab Results   Component Value Date    ALT 13 06/28/2023    ALT 15 02/19/2021     No results found for: BILICONJ   Lab Results   Component Value Date    BILITOTAL 0.7 06/28/2023    BILITOTAL 0.9 02/19/2021     Lab Results   Component Value Date    ALBUMIN 4.5 06/28/2023    ALBUMIN 4.2 04/14/2022    ALBUMIN 4.3 02/19/2021     Lab Results   Component Value Date    PROTTOTAL 7.0 06/28/2023    PROTTOTAL 7.9 02/19/2021      Lab Results   Component Value Date    ALKPHOS 75 06/28/2023    ALKPHOS 84 02/19/2021     Lab Results   Component Value Date    A1C 5.3 06/28/2023    A1C 5.4 03/11/2019    A1C 4.9 08/20/2018         EKG/ stress test - if available please see in ROS above   No results found.    The patient's records and results personally reviewed by this provider.     Outside records reviewed from: Care Everywhere    LAB/DIAGNOSTIC STUDIES TODAY:    Type & Screen     Assessment    Rayna Jackson is a 58 year old female seen as a PAC referral for risk assessment and optimization for anesthesia.    Plan/Recommendations  Pt will be optimized for the proposed procedure.  See below for details on the assessment, risk, and preoperative recommendations    NEUROLOGY  - No history of TIA, CVA or seizure  -Post Op delirium risk factors:  No risk identified    ENT  - No current airway concerns.  Will need to be reassessed day of surgery.  Mallampati: I  TM: > 3      CARDIAC  - No history of CAD and Afib  No anginal symptoms, Denies palpitations, PND, dizziness or syncope. '  - Hypertension- managed on losartan will hold DOS  Well controlled  - Hyperlipidemia- just started on statins  Well controlled on home regimen  - METS (Metabolic Equivalents)  METS 3 able to walk 1-2 blocks without stopping. Active with grandchildren in her pool   Patient CANNOT perform 4 METS exercise without  "symptoms            Total Score: 1    Functional Capacity: Unable to complete 4 METS      RCRI-Very low risk: Class 1 0.4% complication rate            Total Score: 0        PULMONARY  Has been tested for JERI with results negative    JERI Medium Risk            Total Score: 3    JERI: Snores loudly    JERI: Hypertension    JERI: Over 50 ys old      - Asthma- uses inhaler PRN  Well controlled  - Tobacco History    History   Smoking Status    Never   Smokeless Tobacco    Never       GI  PONV High Risk  Total Score: 3           1 AN PONV: Pt is Female    1 AN PONV: Patient is not a current smoker    1 AN PONV: Intended Post Op Opioids        /RENAL  - Baseline Creatinine  WNL    ENDOCRINE    - BMI: Estimated body mass index is 32.45 kg/m  as calculated from the following:    Height as of this encounter: 1.651 m (5' 5\").    Weight as of this encounter: 88.5 kg (195 lb).  Obesity (BMI >30)  - Thyroid disorder  Continue home replacement while hospitalized.    HEME  VTE Low Risk 0.26%            Total Score: 0      - No history of abnormal bleeding or antiplatelet use.      MSK  Patient is NOT Frail            Total Score: 0      OA left hip- Procedure scheduled as above.     Different anesthesia methods/types have been discussed with the patient, but they are aware that the final plan will be decided by the assigned anesthesia provider on the date of service.      The patient is optimized for their procedure. AVS with information on surgery time/arrival time, meds and NPO status given by nursing staff. No further diagnostic testing indicated.      On the day of service:     Prep time: 10 minutes  Visit time: 15 minutes  Documentation time: 7 minutes  ------------------------------------------  Total time: 32 minutes      MIRZA Alvarenga CNP  Preoperative Assessment Center  [facility]  [facility]  Phone: [phone]  Fax: [phone]    "

## 2023-07-25 NOTE — TELEPHONE ENCOUNTER
Patient said she was on hold on the phone with TYLER Cheatham but got disconnected. Would like a call back.

## 2023-07-25 NOTE — TELEPHONE ENCOUNTER
HR department needs period of time noted in question #6 that he is approved to take care of wife. Asked spouse how long he is requesting off, he stated whatever he will take whatever the provider feels is appropriate for a care giver (2 weeks, 6 weeks). Writer advised Juan F that we will discuss with Buzz Kaba PA-C, when he is in clinic on Friday. He was in understanding and appreciative of call.     Chaparrita Barnes MSA, ATC  Certified Athletic Trainer

## 2023-07-26 ENCOUNTER — HOSPITAL ENCOUNTER (OUTPATIENT)
Dept: CT IMAGING | Facility: CLINIC | Age: 58
Discharge: HOME | End: 2023-07-26
Attending: FAMILY MEDICINE | Admitting: FAMILY MEDICINE
Payer: COMMERCIAL

## 2023-07-26 VITALS — DIASTOLIC BLOOD PRESSURE: 77 MMHG | SYSTOLIC BLOOD PRESSURE: 120 MMHG | HEART RATE: 60 BPM

## 2023-07-26 DIAGNOSIS — E78.01 FAMILIAL HYPERCHOLESTEREMIA: ICD-10-CM

## 2023-07-26 DIAGNOSIS — Z82.49 FAMILY HISTORY OF CORONARY ARTERY DISEASE: ICD-10-CM

## 2023-07-26 DIAGNOSIS — E78.2 MIXED HYPERLIPIDEMIA: ICD-10-CM

## 2023-07-26 PROCEDURE — 250N000011 HC RX IP 250 OP 636: Performed by: FAMILY MEDICINE

## 2023-07-26 PROCEDURE — 75574 CT ANGIO HRT W/3D IMAGE: CPT

## 2023-07-26 PROCEDURE — 75574 CT ANGIO HRT W/3D IMAGE: CPT | Mod: 26 | Performed by: INTERNAL MEDICINE

## 2023-07-26 PROCEDURE — 250N000013 HC RX MED GY IP 250 OP 250 PS 637: Performed by: INTERNAL MEDICINE

## 2023-07-26 RX ORDER — IOPAMIDOL 755 MG/ML
110 INJECTION, SOLUTION INTRAVASCULAR ONCE
Status: COMPLETED | OUTPATIENT
Start: 2023-07-26 | End: 2023-07-26

## 2023-07-26 RX ORDER — METOPROLOL TARTRATE 1 MG/ML
5-15 INJECTION, SOLUTION INTRAVENOUS
Status: DISCONTINUED | OUTPATIENT
Start: 2023-07-26 | End: 2023-07-27 | Stop reason: HOSPADM

## 2023-07-26 RX ORDER — METOPROLOL TARTRATE 25 MG/1
25-100 TABLET, FILM COATED ORAL
Status: COMPLETED | OUTPATIENT
Start: 2023-07-26 | End: 2023-07-26

## 2023-07-26 RX ORDER — IVABRADINE 5 MG/1
5-15 TABLET, FILM COATED ORAL
Status: COMPLETED | OUTPATIENT
Start: 2023-07-26 | End: 2023-07-26

## 2023-07-26 RX ORDER — ACYCLOVIR 200 MG/1
0-1 CAPSULE ORAL
Status: DISCONTINUED | OUTPATIENT
Start: 2023-07-26 | End: 2023-07-27 | Stop reason: HOSPADM

## 2023-07-26 RX ORDER — NITROGLYCERIN 0.4 MG/1
.4-.8 TABLET SUBLINGUAL
Status: DISCONTINUED | OUTPATIENT
Start: 2023-07-26 | End: 2023-07-27 | Stop reason: HOSPADM

## 2023-07-26 RX ORDER — DIPHENHYDRAMINE HYDROCHLORIDE 50 MG/ML
25-50 INJECTION INTRAMUSCULAR; INTRAVENOUS
Status: DISCONTINUED | OUTPATIENT
Start: 2023-07-26 | End: 2023-07-27 | Stop reason: HOSPADM

## 2023-07-26 RX ORDER — DIPHENHYDRAMINE HCL 25 MG
25 CAPSULE ORAL
Status: DISCONTINUED | OUTPATIENT
Start: 2023-07-26 | End: 2023-07-27 | Stop reason: HOSPADM

## 2023-07-26 RX ORDER — ONDANSETRON 2 MG/ML
4 INJECTION INTRAMUSCULAR; INTRAVENOUS
Status: DISCONTINUED | OUTPATIENT
Start: 2023-07-26 | End: 2023-07-27 | Stop reason: HOSPADM

## 2023-07-26 RX ORDER — METHYLPREDNISOLONE SODIUM SUCCINATE 125 MG/2ML
125 INJECTION, POWDER, LYOPHILIZED, FOR SOLUTION INTRAMUSCULAR; INTRAVENOUS
Status: DISCONTINUED | OUTPATIENT
Start: 2023-07-26 | End: 2023-07-27 | Stop reason: HOSPADM

## 2023-07-26 RX ADMIN — METOPROLOL TARTRATE 50 MG: 50 TABLET, FILM COATED ORAL at 08:01

## 2023-07-26 RX ADMIN — NITROGLYCERIN 0.8 MG: 0.4 TABLET SUBLINGUAL at 09:01

## 2023-07-26 RX ADMIN — IVABRADINE 10 MG: 5 TABLET, FILM COATED ORAL at 08:00

## 2023-07-26 RX ADMIN — IOPAMIDOL 110 ML: 755 INJECTION, SOLUTION INTRAVENOUS at 09:00

## 2023-07-26 NOTE — PROGRESS NOTES
Pt arrived for Coronary CT angiogram. Test, meds and side effects reviewed with pt. Resting HR 74 bpm. Given 50 mg PO Metoprolol + 10 mg PO Ivabradine per verbal order. Administered 0.8 mg SL nitro on CTA table per order. CTA completed. Patient tolerated procedure well and denies symptoms of allergic reaction. Post monitoring completed and VSS. D/C instructions reviewed with pt whom verbalized understanding of need to increase PO fluids today. D/C to gold waiting room accompanied by staff.    Yoli Moser RN

## 2023-07-26 NOTE — PROVIDER NOTIFICATION
Ortho Navigator Note      Pre-op Date 7/24/23     Medical Clearance  Cleared     Labs WNR     COVID Test Date No longer indicated      Skin  Intact      Activity: Ambulates independently      Equipment Need Patient will likely need a walker for discharge. Defer to PT/OT for recs.       Meds to Hold Held all supplements 14 days prior to surgery  Diclofenac- Hold 7 days prior to surgery   Fexofenadine- Hold DOS  Losartan- hold 12 hr prior to surgery   * Medication recommendations are not intended to be exhaustive; they are limited to common medications that are potentially dangerous if incorrectly managed   NPO Instructions  Defer to PAN RN     Pre-op Joint Education Complete? Complete   Discharge Plan Patient has plan to discharge home on DOS as a Fast Track.   (Juan F) will remain at hospital to participate in therapy and discharge education. They will then transport patient home    /Transportation Juan F will be  and transportation.  is physically able to care for patient.      Barriers to Discharge No barriers to discharge.      Additional Info/   Special Needs : Patient had no unanswered questions or concerns.           07/10/23 1683   Discharge Planning   Patient/Family Anticipates Transition to home with family   Concerns to be Addressed all concerns addressed in this encounter   Living Arrangements   People in Home spouse   Type of Residence Private Residence   Is your private residence a single family home or apartment? Single family home   Number of Stairs, Within Home, Primary two   Stair Railings, Within Home, Primary none   Once home, are you able to live on one level? Yes   Which level? Main Level   Bathroom Shower/Tub Walk-in shower   Equipment Currently Used at Home walker, standard   Support System   Support Systems Spouse/Significant Other   Do you have someone available to stay with you one or two nights once you are home? Yes   Medical Clearance   Date of Physical 07/24/23    Clinic Name PAC   It is recommended that you call and check with any specialty providers before surgery to see if you need surgical clearance.  Do you see any specialty providers outside of your primary care provider? Yes   Blood   Known Bleeding Disorder or Coagulopathy No   Does the patient have any Worship/cultural preferences related to blood products? No   Education   Has the patient scheduled or completed pre-op total joint education, either in class or online, in the last 12 months? Yes   What day did the patient complete, or plan to complete, pre-op total joint education? 08/12/23   Patient attended total joint pre-op class/received pre-op teaching  online   Relationship/Living Environment   Name(s) of People in Home Juan F

## 2023-07-28 DIAGNOSIS — M25.552 HIP PAIN, LEFT: Primary | ICD-10-CM

## 2023-07-28 NOTE — TELEPHONE ENCOUNTER
[facility] Call Center    Phone Message    May a detailed message be left on voicemail: yes     Reason for Call: Other: Patient's spouse is still waiting to hear back from Chaparrita regarding his FMLA form as the caretaker.    Action Taken: Other: [facility]    Travel Screening: Not Applicable

## 2023-07-31 NOTE — TELEPHONE ENCOUNTER
Spouse calling again regarding his [place] paperwork. Said Chaparrita was suppose to call him back on Friday. Please c/b ASAP

## 2023-08-01 NOTE — TELEPHONE ENCOUNTER
Talked with patient ,  states his forms need to have exact dates for questions # 6 per his employer, forms updated and faxed, updated form sent to scan. Patient formed that fax will be sent out today.    Tre Buckner ATC

## 2023-08-07 DIAGNOSIS — I51.7 MILD CARDIOMEGALY: Primary | ICD-10-CM

## 2023-08-07 DIAGNOSIS — I25.10 CORONARY ARTERY DISEASE INVOLVING NATIVE CORONARY ARTERY OF NATIVE HEART WITHOUT ANGINA PECTORIS: ICD-10-CM

## 2023-08-08 ENCOUNTER — ANESTHESIA (OUTPATIENT)
Dept: ANESTHESIOLOGY | Facility: CLINIC | Age: 58
End: 2023-08-08
Payer: COMMERCIAL

## 2023-08-08 ENCOUNTER — APPOINTMENT (OUTPATIENT)
Dept: GENERAL RADIOLOGY | Facility: CLINIC | Age: 58
End: 2023-08-08
Attending: STUDENT IN AN ORGANIZED HEALTH CARE EDUCATION/TRAINING PROGRAM
Payer: COMMERCIAL

## 2023-08-08 ENCOUNTER — APPOINTMENT (OUTPATIENT)
Dept: PHYSICAL THERAPY | Facility: CLINIC | Age: 58
End: 2023-08-08
Attending: STUDENT IN AN ORGANIZED HEALTH CARE EDUCATION/TRAINING PROGRAM
Payer: COMMERCIAL

## 2023-08-08 ENCOUNTER — HOSPITAL ENCOUNTER (OUTPATIENT)
Facility: CLINIC | Age: 58
Discharge: HOME | End: 2023-08-09
Attending: STUDENT IN AN ORGANIZED HEALTH CARE EDUCATION/TRAINING PROGRAM | Admitting: STUDENT IN AN ORGANIZED HEALTH CARE EDUCATION/TRAINING PROGRAM
Payer: COMMERCIAL

## 2023-08-08 DIAGNOSIS — E66.09 CLASS 1 OBESITY DUE TO EXCESS CALORIES WITH SERIOUS COMORBIDITY AND BODY MASS INDEX (BMI) OF 32.0 TO 32.9 IN ADULT: ICD-10-CM

## 2023-08-08 DIAGNOSIS — M25.552 HIP PAIN, LEFT: ICD-10-CM

## 2023-08-08 DIAGNOSIS — E66.811 CLASS 1 OBESITY DUE TO EXCESS CALORIES WITH SERIOUS COMORBIDITY AND BODY MASS INDEX (BMI) OF 32.0 TO 32.9 IN ADULT: ICD-10-CM

## 2023-08-08 DIAGNOSIS — M16.12 OSTEOARTHRITIS OF ONE HIP, LEFT: Primary | ICD-10-CM

## 2023-08-08 PROBLEM — Z96.649 S/P TOTAL HIP ARTHROPLASTY: Status: ACTIVE | Noted: 2023-08-08

## 2023-08-08 PROCEDURE — 258N000003 HC RX IP 258 OP 636: Performed by: NURSE ANESTHETIST, CERTIFIED REGISTERED

## 2023-08-08 PROCEDURE — 250N000009 HC RX 250: Performed by: NURSE ANESTHETIST, CERTIFIED REGISTERED

## 2023-08-08 PROCEDURE — 999N000141 HC STATISTIC PRE-PROCEDURE NURSING ASSESSMENT: Performed by: STUDENT IN AN ORGANIZED HEALTH CARE EDUCATION/TRAINING PROGRAM

## 2023-08-08 PROCEDURE — 360N000077 HC SURGERY LEVEL 4, PER MIN: Performed by: STUDENT IN AN ORGANIZED HEALTH CARE EDUCATION/TRAINING PROGRAM

## 2023-08-08 PROCEDURE — 370N000017 HC ANESTHESIA TECHNICAL FEE, PER MIN: Performed by: STUDENT IN AN ORGANIZED HEALTH CARE EDUCATION/TRAINING PROGRAM

## 2023-08-08 PROCEDURE — 250N000013 HC RX MED GY IP 250 OP 250 PS 637: Performed by: STUDENT IN AN ORGANIZED HEALTH CARE EDUCATION/TRAINING PROGRAM

## 2023-08-08 PROCEDURE — 97110 THERAPEUTIC EXERCISES: CPT | Mod: GP

## 2023-08-08 PROCEDURE — C1776 JOINT DEVICE (IMPLANTABLE): HCPCS | Performed by: STUDENT IN AN ORGANIZED HEALTH CARE EDUCATION/TRAINING PROGRAM

## 2023-08-08 PROCEDURE — 710N000012 HC RECOVERY PHASE 2, PER MINUTE: Performed by: STUDENT IN AN ORGANIZED HEALTH CARE EDUCATION/TRAINING PROGRAM

## 2023-08-08 PROCEDURE — 250N000009 HC RX 250: Performed by: ANESTHESIOLOGY

## 2023-08-08 PROCEDURE — 250N000009 HC RX 250: Performed by: STUDENT IN AN ORGANIZED HEALTH CARE EDUCATION/TRAINING PROGRAM

## 2023-08-08 PROCEDURE — 710N000009 HC RECOVERY PHASE 1, LEVEL 1, PER MIN: Performed by: STUDENT IN AN ORGANIZED HEALTH CARE EDUCATION/TRAINING PROGRAM

## 2023-08-08 PROCEDURE — 258N000001 HC RX 258: Performed by: STUDENT IN AN ORGANIZED HEALTH CARE EDUCATION/TRAINING PROGRAM

## 2023-08-08 PROCEDURE — 250N000011 HC RX IP 250 OP 636: Performed by: NURSE ANESTHETIST, CERTIFIED REGISTERED

## 2023-08-08 PROCEDURE — 250N000011 HC RX IP 250 OP 636: Mod: JZ | Performed by: STUDENT IN AN ORGANIZED HEALTH CARE EDUCATION/TRAINING PROGRAM

## 2023-08-08 PROCEDURE — 250N000011 HC RX IP 250 OP 636: Mod: JZ | Performed by: ANESTHESIOLOGY

## 2023-08-08 PROCEDURE — 97530 THERAPEUTIC ACTIVITIES: CPT | Mod: GP

## 2023-08-08 PROCEDURE — 250N000011 HC RX IP 250 OP 636: Performed by: STUDENT IN AN ORGANIZED HEALTH CARE EDUCATION/TRAINING PROGRAM

## 2023-08-08 PROCEDURE — 258N000003 HC RX IP 258 OP 636: Performed by: STUDENT IN AN ORGANIZED HEALTH CARE EDUCATION/TRAINING PROGRAM

## 2023-08-08 PROCEDURE — 272N000001 HC OR GENERAL SUPPLY STERILE: Performed by: STUDENT IN AN ORGANIZED HEALTH CARE EDUCATION/TRAINING PROGRAM

## 2023-08-08 PROCEDURE — 250N000011 HC RX IP 250 OP 636: Performed by: ANESTHESIOLOGY

## 2023-08-08 PROCEDURE — 250N000025 HC SEVOFLURANE, PER MIN: Performed by: STUDENT IN AN ORGANIZED HEALTH CARE EDUCATION/TRAINING PROGRAM

## 2023-08-08 PROCEDURE — 97161 PT EVAL LOW COMPLEX 20 MIN: CPT | Mod: GP

## 2023-08-08 PROCEDURE — 27130 TOTAL HIP ARTHROPLASTY: CPT | Mod: LT | Performed by: STUDENT IN AN ORGANIZED HEALTH CARE EDUCATION/TRAINING PROGRAM

## 2023-08-08 PROCEDURE — 999N000065 XR PELVIS AND HIP PORTABLE LEFT 1 VIEW

## 2023-08-08 PROCEDURE — 97116 GAIT TRAINING THERAPY: CPT | Mod: GP

## 2023-08-08 DEVICE — IMPLANTABLE DEVICE: Type: IMPLANTABLE DEVICE | Site: HIP | Status: FUNCTIONAL

## 2023-08-08 DEVICE — IMP SHELL BIOM G7 ACETAB PPS LIM HOLE 50MM SZ D 010000662: Type: IMPLANTABLE DEVICE | Site: HIP | Status: FUNCTIONAL

## 2023-08-08 DEVICE — HEAD FEM 0MM OFST 36MM HIP ACTB MDLR TY 1 BLX D G7: Type: IMPLANTABLE DEVICE | Site: HIP | Status: FUNCTIONAL

## 2023-08-08 RX ORDER — OXYCODONE HYDROCHLORIDE 5 MG/1
5 TABLET ORAL EVERY 4 HOURS PRN
Status: DISCONTINUED | OUTPATIENT
Start: 2023-08-08 | End: 2023-08-09 | Stop reason: HOSPADM

## 2023-08-08 RX ORDER — LIDOCAINE 40 MG/G
CREAM TOPICAL
Status: DISCONTINUED | OUTPATIENT
Start: 2023-08-08 | End: 2023-08-08 | Stop reason: HOSPADM

## 2023-08-08 RX ORDER — ACETAMINOPHEN 325 MG/1
650 TABLET ORAL EVERY 4 HOURS PRN
Qty: 100 TABLET | Refills: 0 | Status: SHIPPED | OUTPATIENT
Start: 2023-08-08 | End: 2023-12-08

## 2023-08-08 RX ORDER — HYDROMORPHONE HCL IN WATER/PF 6 MG/30 ML
0.4 PATIENT CONTROLLED ANALGESIA SYRINGE INTRAVENOUS
Status: DISCONTINUED | OUTPATIENT
Start: 2023-08-08 | End: 2023-08-09 | Stop reason: HOSPADM

## 2023-08-08 RX ORDER — EPHEDRINE SULFATE 50 MG/ML
25 INJECTION, SOLUTION INTRAVENOUS ONCE
Status: COMPLETED | OUTPATIENT
Start: 2023-08-08 | End: 2023-08-08

## 2023-08-08 RX ORDER — SODIUM CHLORIDE, SODIUM LACTATE, POTASSIUM CHLORIDE, CALCIUM CHLORIDE 600; 310; 30; 20 MG/100ML; MG/100ML; MG/100ML; MG/100ML
INJECTION, SOLUTION INTRAVENOUS CONTINUOUS
Status: DISCONTINUED | OUTPATIENT
Start: 2023-08-08 | End: 2023-08-08 | Stop reason: HOSPADM

## 2023-08-08 RX ORDER — KETOROLAC TROMETHAMINE 15 MG/ML
15 INJECTION, SOLUTION INTRAMUSCULAR; INTRAVENOUS EVERY 6 HOURS
Status: COMPLETED | OUTPATIENT
Start: 2023-08-08 | End: 2023-08-09

## 2023-08-08 RX ORDER — LABETALOL 20 MG/4 ML (5 MG/ML) INTRAVENOUS SYRINGE
PRN
Status: DISCONTINUED | OUTPATIENT
Start: 2023-08-08 | End: 2023-08-08

## 2023-08-08 RX ORDER — ACETAMINOPHEN 325 MG/1
650 TABLET ORAL EVERY 4 HOURS PRN
Status: DISCONTINUED | OUTPATIENT
Start: 2023-08-11 | End: 2023-08-09 | Stop reason: HOSPADM

## 2023-08-08 RX ORDER — ONDANSETRON 4 MG/1
4 TABLET, ORALLY DISINTEGRATING ORAL EVERY 30 MIN PRN
Status: DISCONTINUED | OUTPATIENT
Start: 2023-08-08 | End: 2023-08-08 | Stop reason: HOSPADM

## 2023-08-08 RX ORDER — SODIUM CHLORIDE, SODIUM LACTATE, POTASSIUM CHLORIDE, CALCIUM CHLORIDE 600; 310; 30; 20 MG/100ML; MG/100ML; MG/100ML; MG/100ML
INJECTION, SOLUTION INTRAVENOUS CONTINUOUS
Status: DISCONTINUED | OUTPATIENT
Start: 2023-08-08 | End: 2023-08-09

## 2023-08-08 RX ORDER — CEFAZOLIN SODIUM/WATER 2 G/20 ML
2 SYRINGE (ML) INTRAVENOUS SEE ADMIN INSTRUCTIONS
Status: DISCONTINUED | OUTPATIENT
Start: 2023-08-08 | End: 2023-08-08 | Stop reason: HOSPADM

## 2023-08-08 RX ORDER — ONDANSETRON 4 MG/1
4 TABLET, ORALLY DISINTEGRATING ORAL EVERY 6 HOURS PRN
Status: DISCONTINUED | OUTPATIENT
Start: 2023-08-08 | End: 2023-08-09 | Stop reason: HOSPADM

## 2023-08-08 RX ORDER — SODIUM CHLORIDE, SODIUM LACTATE, POTASSIUM CHLORIDE, CALCIUM CHLORIDE 600; 310; 30; 20 MG/100ML; MG/100ML; MG/100ML; MG/100ML
INJECTION, SOLUTION INTRAVENOUS CONTINUOUS PRN
Status: DISCONTINUED | OUTPATIENT
Start: 2023-08-08 | End: 2023-08-08

## 2023-08-08 RX ORDER — FENTANYL CITRATE 50 UG/ML
INJECTION, SOLUTION INTRAMUSCULAR; INTRAVENOUS PRN
Status: DISCONTINUED | OUTPATIENT
Start: 2023-08-08 | End: 2023-08-08

## 2023-08-08 RX ORDER — ONDANSETRON 2 MG/ML
4 INJECTION INTRAMUSCULAR; INTRAVENOUS EVERY 30 MIN PRN
Status: DISCONTINUED | OUTPATIENT
Start: 2023-08-08 | End: 2023-08-08 | Stop reason: HOSPADM

## 2023-08-08 RX ORDER — BISACODYL 10 MG
10 SUPPOSITORY, RECTAL RECTAL DAILY PRN
Status: DISCONTINUED | OUTPATIENT
Start: 2023-08-08 | End: 2023-08-09 | Stop reason: HOSPADM

## 2023-08-08 RX ORDER — ONDANSETRON 2 MG/ML
4 INJECTION INTRAMUSCULAR; INTRAVENOUS EVERY 6 HOURS PRN
Status: DISCONTINUED | OUTPATIENT
Start: 2023-08-08 | End: 2023-08-09 | Stop reason: HOSPADM

## 2023-08-08 RX ORDER — PROPOFOL 10 MG/ML
INJECTION, EMULSION INTRAVENOUS PRN
Status: DISCONTINUED | OUTPATIENT
Start: 2023-08-08 | End: 2023-08-08

## 2023-08-08 RX ORDER — LIDOCAINE HYDROCHLORIDE 20 MG/ML
INJECTION, SOLUTION INFILTRATION; PERINEURAL PRN
Status: DISCONTINUED | OUTPATIENT
Start: 2023-08-08 | End: 2023-08-08

## 2023-08-08 RX ORDER — OXYCODONE HYDROCHLORIDE 5 MG/1
5-10 TABLET ORAL EVERY 4 HOURS PRN
Qty: 26 TABLET | Refills: 0 | Status: SHIPPED | OUTPATIENT
Start: 2023-08-08 | End: 2023-09-06

## 2023-08-08 RX ORDER — FENTANYL CITRATE 50 UG/ML
50 INJECTION, SOLUTION INTRAMUSCULAR; INTRAVENOUS EVERY 5 MIN PRN
Status: DISCONTINUED | OUTPATIENT
Start: 2023-08-08 | End: 2023-08-08 | Stop reason: HOSPADM

## 2023-08-08 RX ORDER — ACETAMINOPHEN 325 MG/1
975 TABLET ORAL EVERY 8 HOURS
Status: DISCONTINUED | OUTPATIENT
Start: 2023-08-08 | End: 2023-08-09 | Stop reason: HOSPADM

## 2023-08-08 RX ORDER — LIDOCAINE 40 MG/G
CREAM TOPICAL
Status: DISCONTINUED | OUTPATIENT
Start: 2023-08-08 | End: 2023-08-09 | Stop reason: HOSPADM

## 2023-08-08 RX ORDER — ONDANSETRON 2 MG/ML
INJECTION INTRAMUSCULAR; INTRAVENOUS PRN
Status: DISCONTINUED | OUTPATIENT
Start: 2023-08-08 | End: 2023-08-08

## 2023-08-08 RX ORDER — CEFAZOLIN SODIUM/WATER 2 G/20 ML
2 SYRINGE (ML) INTRAVENOUS
Status: DISCONTINUED | OUTPATIENT
Start: 2023-08-08 | End: 2023-08-08 | Stop reason: HOSPADM

## 2023-08-08 RX ORDER — DEXAMETHASONE SODIUM PHOSPHATE 4 MG/ML
INJECTION, SOLUTION INTRA-ARTICULAR; INTRALESIONAL; INTRAMUSCULAR; INTRAVENOUS; SOFT TISSUE PRN
Status: DISCONTINUED | OUTPATIENT
Start: 2023-08-08 | End: 2023-08-08

## 2023-08-08 RX ORDER — TRANEXAMIC ACID 650 MG/1
1950 TABLET ORAL ONCE
Status: DISCONTINUED | OUTPATIENT
Start: 2023-08-08 | End: 2023-08-08 | Stop reason: HOSPADM

## 2023-08-08 RX ORDER — PROCHLORPERAZINE MALEATE 10 MG
10 TABLET ORAL EVERY 6 HOURS PRN
Status: DISCONTINUED | OUTPATIENT
Start: 2023-08-08 | End: 2023-08-09 | Stop reason: HOSPADM

## 2023-08-08 RX ORDER — AMOXICILLIN 250 MG
1 CAPSULE ORAL 2 TIMES DAILY
Status: DISCONTINUED | OUTPATIENT
Start: 2023-08-08 | End: 2023-08-09 | Stop reason: HOSPADM

## 2023-08-08 RX ORDER — GLYCOPYRROLATE 0.2 MG/ML
INJECTION, SOLUTION INTRAMUSCULAR; INTRAVENOUS PRN
Status: DISCONTINUED | OUTPATIENT
Start: 2023-08-08 | End: 2023-08-08

## 2023-08-08 RX ORDER — HYDROMORPHONE HCL IN WATER/PF 6 MG/30 ML
0.2 PATIENT CONTROLLED ANALGESIA SYRINGE INTRAVENOUS EVERY 5 MIN PRN
Status: DISCONTINUED | OUTPATIENT
Start: 2023-08-08 | End: 2023-08-08 | Stop reason: HOSPADM

## 2023-08-08 RX ORDER — POLYETHYLENE GLYCOL 3350 17 G/17G
1 POWDER, FOR SOLUTION ORAL DAILY
Qty: 7 PACKET | Refills: 0 | Status: SHIPPED | OUTPATIENT
Start: 2023-08-08 | End: 2023-12-08

## 2023-08-08 RX ORDER — PROMETHAZINE HYDROCHLORIDE 25 MG/ML
25 INJECTION INTRAMUSCULAR; INTRAVENOUS ONCE
Status: COMPLETED | OUTPATIENT
Start: 2023-08-08 | End: 2023-08-08

## 2023-08-08 RX ORDER — CEFAZOLIN SODIUM 2 G/100ML
2 INJECTION, SOLUTION INTRAVENOUS EVERY 8 HOURS
Status: COMPLETED | OUTPATIENT
Start: 2023-08-08 | End: 2023-08-09

## 2023-08-08 RX ORDER — NEOSTIGMINE METHYLSULFATE 1 MG/ML
VIAL (ML) INJECTION PRN
Status: DISCONTINUED | OUTPATIENT
Start: 2023-08-08 | End: 2023-08-08

## 2023-08-08 RX ORDER — OXYCODONE HYDROCHLORIDE 10 MG/1
10 TABLET ORAL EVERY 4 HOURS PRN
Status: DISCONTINUED | OUTPATIENT
Start: 2023-08-08 | End: 2023-08-09 | Stop reason: HOSPADM

## 2023-08-08 RX ORDER — PROPOFOL 10 MG/ML
INJECTION, EMULSION INTRAVENOUS CONTINUOUS PRN
Status: DISCONTINUED | OUTPATIENT
Start: 2023-08-08 | End: 2023-08-08

## 2023-08-08 RX ORDER — NALOXONE HYDROCHLORIDE 0.4 MG/ML
0.4 INJECTION, SOLUTION INTRAMUSCULAR; INTRAVENOUS; SUBCUTANEOUS
Status: DISCONTINUED | OUTPATIENT
Start: 2023-08-08 | End: 2023-08-09 | Stop reason: HOSPADM

## 2023-08-08 RX ORDER — CEFAZOLIN SODIUM/WATER 2 G/20 ML
2 SYRINGE (ML) INTRAVENOUS EVERY 8 HOURS
Status: DISCONTINUED | OUTPATIENT
Start: 2023-08-08 | End: 2023-08-08

## 2023-08-08 RX ORDER — AMOXICILLIN 250 MG
1-2 CAPSULE ORAL 2 TIMES DAILY
Qty: 30 TABLET | Refills: 0 | Status: SHIPPED | OUTPATIENT
Start: 2023-08-08 | End: 2023-12-08

## 2023-08-08 RX ORDER — HYDROMORPHONE HCL IN WATER/PF 6 MG/30 ML
0.4 PATIENT CONTROLLED ANALGESIA SYRINGE INTRAVENOUS EVERY 5 MIN PRN
Status: DISCONTINUED | OUTPATIENT
Start: 2023-08-08 | End: 2023-08-08 | Stop reason: HOSPADM

## 2023-08-08 RX ORDER — NALOXONE HYDROCHLORIDE 0.4 MG/ML
0.2 INJECTION, SOLUTION INTRAMUSCULAR; INTRAVENOUS; SUBCUTANEOUS
Status: DISCONTINUED | OUTPATIENT
Start: 2023-08-08 | End: 2023-08-09 | Stop reason: HOSPADM

## 2023-08-08 RX ORDER — HYDROMORPHONE HCL IN WATER/PF 6 MG/30 ML
0.2 PATIENT CONTROLLED ANALGESIA SYRINGE INTRAVENOUS
Status: DISCONTINUED | OUTPATIENT
Start: 2023-08-08 | End: 2023-08-09 | Stop reason: HOSPADM

## 2023-08-08 RX ORDER — POLYETHYLENE GLYCOL 3350 17 G/17G
17 POWDER, FOR SOLUTION ORAL DAILY
Status: DISCONTINUED | OUTPATIENT
Start: 2023-08-09 | End: 2023-08-09 | Stop reason: HOSPADM

## 2023-08-08 RX ORDER — FENTANYL CITRATE 50 UG/ML
25 INJECTION, SOLUTION INTRAMUSCULAR; INTRAVENOUS EVERY 5 MIN PRN
Status: DISCONTINUED | OUTPATIENT
Start: 2023-08-08 | End: 2023-08-08 | Stop reason: HOSPADM

## 2023-08-08 RX ORDER — TRANEXAMIC ACID 10 MG/ML
1 INJECTION, SOLUTION INTRAVENOUS ONCE
Status: COMPLETED | OUTPATIENT
Start: 2023-08-08 | End: 2023-08-08

## 2023-08-08 RX ADMIN — LABETALOL 20 MG/4 ML (5 MG/ML) INTRAVENOUS SYRINGE 5 MG: at 08:21

## 2023-08-08 RX ADMIN — HYDROMORPHONE HYDROCHLORIDE 1 MG: 1 INJECTION, SOLUTION INTRAMUSCULAR; INTRAVENOUS; SUBCUTANEOUS at 08:03

## 2023-08-08 RX ADMIN — FENTANYL CITRATE 100 MCG: 50 INJECTION, SOLUTION INTRAMUSCULAR; INTRAVENOUS at 07:32

## 2023-08-08 RX ADMIN — SODIUM CHLORIDE, POTASSIUM CHLORIDE, SODIUM LACTATE AND CALCIUM CHLORIDE: 600; 310; 30; 20 INJECTION, SOLUTION INTRAVENOUS at 20:46

## 2023-08-08 RX ADMIN — RIVAROXABAN 10 MG: 10 TABLET, FILM COATED ORAL at 20:06

## 2023-08-08 RX ADMIN — Medication 3 MG: at 09:42

## 2023-08-08 RX ADMIN — TRANEXAMIC ACID 1 G: 10 INJECTION, SOLUTION INTRAVENOUS at 09:25

## 2023-08-08 RX ADMIN — FENTANYL CITRATE 50 MCG: 50 INJECTION, SOLUTION INTRAMUSCULAR; INTRAVENOUS at 10:18

## 2023-08-08 RX ADMIN — MIDAZOLAM 2 MG: 1 INJECTION INTRAMUSCULAR; INTRAVENOUS at 07:25

## 2023-08-08 RX ADMIN — SODIUM CHLORIDE, POTASSIUM CHLORIDE, SODIUM LACTATE AND CALCIUM CHLORIDE: 600; 310; 30; 20 INJECTION, SOLUTION INTRAVENOUS at 07:25

## 2023-08-08 RX ADMIN — PROPOFOL 40 MG: 10 INJECTION, EMULSION INTRAVENOUS at 08:16

## 2023-08-08 RX ADMIN — KETOROLAC TROMETHAMINE 15 MG: 15 INJECTION, SOLUTION INTRAMUSCULAR; INTRAVENOUS at 20:48

## 2023-08-08 RX ADMIN — GLYCOPYRROLATE 0.2 MG: 0.2 INJECTION, SOLUTION INTRAMUSCULAR; INTRAVENOUS at 07:32

## 2023-08-08 RX ADMIN — GLYCOPYRROLATE 0.4 MG: 0.2 INJECTION, SOLUTION INTRAMUSCULAR; INTRAVENOUS at 09:42

## 2023-08-08 RX ADMIN — PROPOFOL 50 MCG/KG/MIN: 10 INJECTION, EMULSION INTRAVENOUS at 07:38

## 2023-08-08 RX ADMIN — FENTANYL CITRATE 50 MCG: 50 INJECTION, SOLUTION INTRAMUSCULAR; INTRAVENOUS at 07:33

## 2023-08-08 RX ADMIN — ACETAMINOPHEN 975 MG: 325 TABLET, FILM COATED ORAL at 19:55

## 2023-08-08 RX ADMIN — CEFAZOLIN SODIUM 2 G: 2 INJECTION, SOLUTION INTRAVENOUS at 20:47

## 2023-08-08 RX ADMIN — FENTANYL CITRATE 25 MCG: 50 INJECTION, SOLUTION INTRAMUSCULAR; INTRAVENOUS at 10:27

## 2023-08-08 RX ADMIN — PROPOFOL 200 MG: 10 INJECTION, EMULSION INTRAVENOUS at 07:32

## 2023-08-08 RX ADMIN — ONDANSETRON 4 MG: 2 INJECTION INTRAMUSCULAR; INTRAVENOUS at 08:29

## 2023-08-08 RX ADMIN — ROCURONIUM BROMIDE 50 MG: 10 INJECTION INTRAVENOUS at 07:32

## 2023-08-08 RX ADMIN — LABETALOL 20 MG/4 ML (5 MG/ML) INTRAVENOUS SYRINGE 5 MG: at 08:39

## 2023-08-08 RX ADMIN — DEXAMETHASONE SODIUM PHOSPHATE 4 MG: 4 INJECTION, SOLUTION INTRA-ARTICULAR; INTRALESIONAL; INTRAMUSCULAR; INTRAVENOUS; SOFT TISSUE at 07:32

## 2023-08-08 RX ADMIN — PHENYLEPHRINE HYDROCHLORIDE 50 MCG: 10 INJECTION INTRAVENOUS at 09:21

## 2023-08-08 RX ADMIN — EPHEDRINE SULFATE 25 MG: 50 INJECTION INTRAVENOUS at 14:44

## 2023-08-08 RX ADMIN — Medication 2 G: at 07:25

## 2023-08-08 RX ADMIN — OXYCODONE HYDROCHLORIDE 5 MG: 5 TABLET ORAL at 10:45

## 2023-08-08 RX ADMIN — LIDOCAINE HYDROCHLORIDE 30 MG: 20 INJECTION, SOLUTION INFILTRATION; PERINEURAL at 07:32

## 2023-08-08 RX ADMIN — ONDANSETRON 4 MG: 2 INJECTION INTRAMUSCULAR; INTRAVENOUS at 12:49

## 2023-08-08 RX ADMIN — SENNOSIDES AND DOCUSATE SODIUM 1 TABLET: 50; 8.6 TABLET ORAL at 19:55

## 2023-08-08 RX ADMIN — TRANEXAMIC ACID 1 G: 10 INJECTION, SOLUTION INTRAVENOUS at 07:28

## 2023-08-08 RX ADMIN — HYDROMORPHONE HYDROCHLORIDE 1 MG: 1 INJECTION, SOLUTION INTRAMUSCULAR; INTRAVENOUS; SUBCUTANEOUS at 08:26

## 2023-08-08 RX ADMIN — ROCURONIUM BROMIDE 20 MG: 10 INJECTION INTRAVENOUS at 08:27

## 2023-08-08 RX ADMIN — FENTANYL CITRATE 25 MCG: 50 INJECTION, SOLUTION INTRAMUSCULAR; INTRAVENOUS at 10:40

## 2023-08-08 RX ADMIN — SODIUM CHLORIDE, POTASSIUM CHLORIDE, SODIUM LACTATE AND CALCIUM CHLORIDE: 600; 310; 30; 20 INJECTION, SOLUTION INTRAVENOUS at 08:57

## 2023-08-08 RX ADMIN — FENTANYL CITRATE 100 MCG: 50 INJECTION, SOLUTION INTRAMUSCULAR; INTRAVENOUS at 08:01

## 2023-08-08 RX ADMIN — PROMETHAZINE HYDROCHLORIDE 25 MG: 25 INJECTION INTRAMUSCULAR; INTRAVENOUS at 14:43

## 2023-08-08 ASSESSMENT — ACTIVITIES OF DAILY LIVING (ADL)
ADLS_ACUITY_SCORE: 21
ADLS_ACUITY_SCORE: 20
ADLS_ACUITY_SCORE: 20

## 2023-08-08 NOTE — PROGRESS NOTES
Notified PA at 1117 PM regarding  c/o numbness in LLE .      Spoke with: PA    Orders were not obtained.    Comments: continue to monitor, probably from joint mix

## 2023-08-08 NOTE — DISCHARGE INSTRUCTIONS
GENERAL ANESTHESIA ADULT DISCHARGE INSTRUCTIONS   SPECIAL PRECAUTIONS FOR 24 HOURS AFTER SURGERY    IT IS NOT UNUSUAL TO FEEL LIGHT-HEADED OR FAINT, UP TO 24 HOURS AFTER SURGERY OR WHILE TAKING PAIN MEDICATION.  IF YOU HAVE THESE SYMPTOMS; SIT FOR A FEW MINUTES BEFORE STANDING AND HAVE SOMEONE ASSIST YOU WHEN YOU GET UP TO WALK OR USE THE BATHROOM.    YOU SHOULD REST AND RELAX FOR THE NEXT 24 HOURS AND YOU MUST MAKE ARRANGEMENTS TO HAVE SOMEONE STAY WITH YOU FOR AT LEAST 24 HOURS AFTER YOUR DISCHARGE.  AVOID HAZARDOUS AND STRENUOUS ACTIVITIES.  DO NOT MAKE IMPORTANT DECISIONS FOR 24 HOURS.    DO NOT DRIVE ANY VEHICLE OR OPERATE MECHANICAL EQUIPMENT FOR 24 HOURS FOLLOWING THE END OF YOUR SURGERY.  EVEN THOUGH YOU MAY FEEL NORMAL, YOUR REACTIONS MAY BE AFFECTED BY THE MEDICATION YOU HAVE RECEIVED.    DO NOT DRINK ALCOHOLIC BEVERAGES FOR 24 HOURS FOLLOWING YOUR SURGERY.    DRINK CLEAR LIQUIDS (APPLE JUICE, GINGER ALE, 7-UP, BROTH, ETC.).  PROGRESS TO YOUR REGULAR DIET AS YOU FEEL ABLE.    YOU MAY HAVE A DRY MOUTH, A SORE THROAT, MUSCLES ACHES OR TROUBLE SLEEPING.  THESE SHOULD GO AWAY AFTER 24 HOURS.    CALL YOUR DOCTOR FOR ANY OF THE FOLLOWING:  SIGNS OF INFECTION (FEVER, GROWING TENDERNESS AT THE SURGERY SITE, A LARGE AMOUNT OF DRAINAGE OR BLEEDING, SEVERE PAIN, FOUL-SMELLING DRAINAGE, REDNESS OR SWELLING.    IT HAS BEEN OVER 8 TO 10 HOURS SINCE SURGERY AND YOU ARE STILL NOT ABLE TO URINATE (PASS WATER).

## 2023-08-08 NOTE — ANESTHESIA POSTPROCEDURE EVALUATION
Patient: Rayna Jackson    Procedure: Procedure(s):  Left total hip arthroplasty       Anesthesia Type:  General    Note:  Disposition: Admission   Postop Pain Control: Uneventful            Sign Out: Well controlled pain   PONV: No   Neuro/Psych: Uneventful            Sign Out: Acceptable/Baseline neuro status   Airway/Respiratory: Uneventful            Sign Out: Acceptable/Baseline resp. status   CV/Hemodynamics: Uneventful            Sign Out: Acceptable CV status; No obvious hypovolemia; No obvious fluid overload   Other NRE: NONE   DID A NON-ROUTINE EVENT OCCUR? No           Last vitals:  Vitals Value Taken Time   /98 08/08/23 1130   Temp 97.5  F (36.4  C) 08/08/23 0953   Pulse 72 08/08/23 1130   Resp 9 08/08/23 1123   SpO2 87 % 08/08/23 1135   Vitals shown include unvalidated device data.    Electronically Signed By: Larry Presley MD  August 8, 2023  12:56 PM

## 2023-08-08 NOTE — OP NOTE
[facility]    Operative Note    Pre-operative diagnosis: 58-year-old female presenting with chronic left hip pain most likely due to osteoarthritic changes of the femoral acetabular joint. Insufficiently responding to nonsurgical treatment options.   Post-operative diagnosis Same as pre-operative diagnosis    Procedure: Procedure(s):  Left total hip arthroplasty  Surgeon: Surgeon(s) and Role:     * Neto Amezcua MD - Primary  Anesthesia: General   Estimated Blood Loss: 200 ml    Drains: None  Specimens: * No specimens in log *    Complications: None.  Implants:   Implant Name Type Inv. Item Serial No.  Lot No. LRB No. Used Action   G7 Acetabular highly crosslinked poly liner neutral 36mm ID Size D    CANDE [id number] Left 1 Implanted   IMP SHELL BIOM G7 ACETAB PPS NOGUEIRA HOLE 50MM SZ D [id number] - [id number] Total Joint Component/Insert IMP SHELL BIOM G7 ACETAB PPS NOGUEIRA HOLE 50MM SZ D [id number]  [employer] [id number] Left 1 Implanted   Femoral porous stem standard offset [id number]    BIOMET [id number] Left 1 Implanted   HEAD FEM 0MM OFST 36MM HIP ACTB MDLR TY 1 BLX D G7 - [id number] Total Joint Component/Insert HEAD FEM 0MM OFST 36MM HIP ACTB MDLR TY 1 BLX D G7  [employer] [id number] Left 1 Implanted       COMPONENTS USED:  1. Biomet Taperloc Size 9 standard Offset  2. Biomet G7 Acetabular Component Size 50,   3. Biomet ArCOM XL Polyethylene Liner neutral   4. Biolox Delta Ceramic Head Size 36mm standard         FINDINGS: Cartilage loss, irregularity of the femoral head, diffuse cartilage loss, osteophyte formation of the native acetabulum.  Acetabular component well positioned and secure.  Femoral component well positioned.  Intraoperative stability with full extension and ER, flexion to 90 with IR to 80, and in the sleep position.  Appropriate limb length.      DESCRIPTION OF PROCEDURE: Patient was seen in the preoperative area where we again reviewed the risks, benefits, and  alternatives to total hip arthroplasty.  Patient understands and agrees to the treatment plan as set forth. Informed written consent was obtained.  The operative left hip was marked with an indelible marker after patient confirmation of the operative site.  All the patient's questions were answered.  The patient was taken to the operating room and underwent induction of  General  anesthesia.  The patient was placed on the operating table in the lateral decubitus position with the operative site up.   An SCD was placed on the nonoperative leg.   Bony prominences were well padded and was secured to the table with the Wixon positioner. The patient was prepped and draped in the normal sterile fashion.         After the completion of a timeout procedure a posterior approach to the hip joint was performed making a incision over the greater trochanter and taking this down through the subcutaneous tissue. The gluteus nico and IT band were identified and then split in the direction of its fibers. A charnley retractor was placed and hemostasis was obtained.  After internal rotation of the femur, the piriformis tendon and external rotators were detached.  A posterior capsulotomy was performed.  The capsule and pirformis was tagged using a #1 Vicryl suture.  Care was taken to do incised the inferior and superior capsule to relieve tension and the limb was positioned in the sleep position and the hip was dislocated.  The soft tissues under the greater trochanter were debrided, exposing the neck.  The femoral neck osteotomy was made in accordance with the preoperative plan, approximately 10 mm above the level of the lesser trochanter.  The femoral head was removed and noted to have diffuse, extensive cartilage loss.     The acetabulum was exposed with an anterior jones's crook retractor and posteroinferior retractor as well as a self-retaining retractor.  The remaining acetabular labrum was removed.  The patient was noted  to have anterior and inferior osteophytes.  The pulvinar was removed and the medial wall was identified.  We began reaming with care to maintain the anterior and posterior walls.  We began with a size 44 reamer and reamed to 49.  At this point the sclerotic bone was removed back to healthy bleeding cancelleous bone.  The 50 G7 cup was placed.  It had excellent initial stability.  Any prominent anterior and inferior osteophytes were debrided and carefully removed.  The acetabular component was irrigated and cleaned.  The definitive neutral liner was placed without complications.    Attention was turned to the femur, which was exposed in the standard fashion with a femoral elevator.  Care was taken to not damage the gluteus medius.  A box osteotome and rongeur was used to remove residual lateral neck.  The canal was identified and reamed with a Charnley Awl.  The femoral broaches was positioned in the appropriate anteversion, care was taken to broach the lateral cancellous bone.  We broached, starting with a size 4, up to size 9.  Care was taken to minimize risk of calcar fracture.  The final trial fit well.  A standard offset neck was placed with a 36mm standard head.  The hip was reduced.  The stability was examined and  was found to be secure and stable in full extension and external rotation of > 45 degrees as well as flexion of 90 degrees combined with internal rotation > 80 degrees.  Leg lengths were judged to be within 5 mm of symmetry.     The hip was dislocated and the trial femoral component was removed.   The femur was exposed and the 9 stem with standard offset was placed and noted to fit well.  The hip was trialed with various head lengths and the 36 standard head had the best fit. .  Again, the  stability was examined and  was found to be secure and stable in full extension and external rotation of > 45 degrees as well as flexion of 90 degrees combined with internal rotation > 80 degrees.  Leg lengths  were judged to be within 5 mm of symmetry as evaluated by limb palpation and the markings on the greater trochanter. The hip was dislocated and the trial head was removed.  The trunion was washed and dried and the final Biolox 36 mm standard head was impacted into place.  The hip was reduced and again stable as listed above.      The hip was lavaged with dilute betaine irrigant followed by sterile saline.  Periarticular injection of ropivicaine, toradol, and epi was injected into the soft tissue.  The capsule was reattached to the greater trochanter using a 2 mm drill and suture passer.  The piriformis and external rotators were repaired to the medius tendon.  The IT and gluteal fascia was closed with #1 Vicryl figure of 8 interrupted sutures.  The deep dermal layer was closed with 0 Vicryl and 2-0 Vicryl. The skin was closed with 3-0 PDS, Steri-Strips and a sterile dressing consisting of alginate and Tegaderm. The patient was positioned supine and awoken from anesthesia.  The patient was transferred to the PACU in stable condition.      POSTOPERATIVE PLAN:  The patient will be admitted to the floor for pain control and monitoring.    Weight bearing: Weightbearing as tolerated.  Anticoagulation: Xarelto daily for 4 weeks as DVT prophylaxis  Precautions: Posterior hip precautions  PT/OT  Antibiotics per protocol.  X-ray in PACU  Patient will discharge later to day when all requirements are met.    Clinic visit 2 weeks in 6 weeks.    Neto Amezcua MD     Adult Reconstruction  [facility] Department of Orthopaedic Surgery  Pager [phone]

## 2023-08-08 NOTE — TELEPHONE ENCOUNTER
Please call patient and get update on weight.  She should have a Rx for Mounjaro 5 mg, the next dose up if she wants to increase.  I can refill the 2.5 if she would prefer.

## 2023-08-08 NOTE — PLAN OF CARE
PT: Patient seen by physical therapy in therapy gym.  Patient reported continued numbness in left LE, primarily distal from knee into foot.  Patient able to plantarflex with assist, able to demonstrate active ankle inversion/eversion, unable to dorsiflex even with direct cueing or assist.  Able to demonstrate quad activation in supine and sitting, however during ambulation of approx 10 feet demonstrated intermittent knee buckling requiring Ax2 for safety.  Unable to trial stair negotiation secondary to severe nausea and knee buckling.  BP assessed, 131/53.  No reports of lightheadedness.  Patient returned to PACU, did not pass PT.

## 2023-08-08 NOTE — ANESTHESIA CARE TRANSFER NOTE
Patient: Rayna Jackson    Procedure: Procedure(s):  Left total hip arthroplasty       Diagnosis: Primary osteoarthritis of left hip [M16.12]  Diagnosis Additional Information: No value filed.    Anesthesia Type:   General     Note:    Oropharynx: oropharynx clear of all foreign objects and spontaneously breathing  Level of Consciousness: awake  Oxygen Supplementation: face mask    Independent Airway: airway patency satisfactory and stable  Dentition: dentition unchanged  Vital Signs Stable: post-procedure vital signs reviewed and stable  Report to RN Given: handoff report given  Patient transferred to: PACU  Comments: Report and signed off to RN in PACU.  Good Resps, skin pink, VSS, O2 via face tent.  Handoff Report: Identifed the Patient, Identified the Reponsible Provider, Reviewed the pertinent medical history, Discussed the surgical course, Reviewed Intra-OP anesthesia mangement and issues during anesthesia, Set expectations for post-procedure period and Allowed opportunity for questions and acknowledgement of understanding      Vitals:  Vitals Value Taken Time   /92 08/08/23 0954   Temp     Pulse 88 08/08/23 0954   Resp     SpO2 99 % 08/08/23 0957   Vitals shown include unvalidated device data.    Electronically Signed By: MIRZA Lambert CRNA  August 8, 2023  9:58 AM

## 2023-08-08 NOTE — ANESTHESIA PROCEDURE NOTES
Airway       Patient location during procedure: OR       Procedure Start/Stop Times: 8/8/2023 7:32 AM  Staff -        Anesthesiologist:  Merly Collins APRN CRNA       Performed By: CRNA  Consent for Airway        Urgency: elective  Indications and Patient Condition       Indications for airway management: primo-procedural       Induction type:intravenous       Mask difficulty assessment: 1 - vent by mask    Final Airway Details       Final airway type: endotracheal airway       Successful airway: ETT - single and Oral  Endotracheal Airway Details        ETT size (mm): 7.0       Cuffed: yes       Successful intubation technique: direct laryngoscopy       DL Blade Type: Piña 2       Grade View of Cords: 1       Adjucts: stylet       Position: Right       Bite block used: Soft    Post intubation assessment        Placement verified by: capnometry, equal breath sounds and chest rise        Number of attempts at approach: 1       Secured with: plastic tape       Ease of procedure: easy       Dentition: Intact    Medication(s) Administered   Medication Administration Time: 8/8/2023 7:32 AM

## 2023-08-08 NOTE — TELEPHONE ENCOUNTER
Message #1 left to return call to triage nurse     Sonali Varela, Registered Nurse  [facility]

## 2023-08-08 NOTE — ANESTHESIA PREPROCEDURE EVALUATION
Anesthesia Pre-Procedure Evaluation    Patient: Rayna Jackson   MRN: [id number] : 1965        Procedure : Procedure(s):  Left total hip arthroplasty          Past Medical History:   Diagnosis Date    Arthritis     Hypertension     Torn meniscus 2013    left     Uncomplicated asthma       Past Surgical History:   Procedure Laterality Date    APPENDECTOMY      appendicitis  1980    BIOPSY  2017    Removed gland in back of neck    COLONOSCOPY N/A 2017    Procedure: COLONOSCOPY;  COLONOSCOPY   ;  Surgeon: Brooks Villa MD;  Location: [facility]    CYSTECTOMY OVARIAN BENIGN  1980    KNEE SURGERY Left       Allergies   Allergen Reactions    Lisinopril Cough    Lactose GI Disturbance    Amlodipine Swelling    Aspirin Other (See Comments)     Throat swells    Codeine Unknown     headache      Social History     Tobacco Use    Smoking status: Never     Passive exposure: Never    Smokeless tobacco: Never   Substance Use Topics    Alcohol use: Yes     Comment: 2 drinks once a week      Wt Readings from Last 1 Encounters:   23 86.9 kg (191 lb 8 oz)        Anesthesia Evaluation   Pt has had prior anesthetic. Type: General.    No history of anesthetic complications       ROS/MED HX  ENT/Pulmonary:     (+)                    Intermittent, asthma                  Neurologic:  - neg neurologic ROS     Cardiovascular:     (+)  hypertension- -   -  - -                                      METS/Exercise Tolerance:     Hematologic:  - neg hematologic  ROS     Musculoskeletal:   (+)  arthritis,             GI/Hepatic:  - neg GI/hepatic ROS     Renal/Genitourinary:  - neg Renal ROS     Endo:     (+)          thyroid problem, hypothyroidism,    Obesity,       Psychiatric/Substance Use:  - neg psychiatric ROS     Infectious Disease:  - neg infectious disease ROS     Malignancy:       Other:            Physical Exam    Airway        Mallampati: II   TM distance: > 3 FB   Neck ROM: full   Mouth opening: > 3  cm    Respiratory Devices and Support         Dental       (+) Minor Abnormalities - some fillings, tiny chips      Cardiovascular   cardiovascular exam normal          Pulmonary   pulmonary exam normal                OUTSIDE LABS:  CBC:   Lab Results   Component Value Date    WBC 5.8 06/28/2023    WBC 6.2 04/14/2022    HGB 14.4 06/28/2023    HGB 14.8 04/14/2022    HCT 42.6 06/28/2023    HCT 45.1 04/14/2022     06/28/2023     04/14/2022     BMP:   Lab Results   Component Value Date     06/28/2023     04/14/2022    POTASSIUM 4.4 06/28/2023    POTASSIUM 4.0 04/14/2022    CHLORIDE 104 06/28/2023    CHLORIDE 107 04/14/2022    CO2 25 06/28/2023    CO2 27 04/14/2022    BUN 15.2 06/28/2023    BUN 13 04/14/2022    CR 0.74 06/28/2023    CR 0.70 04/14/2022    GLC 96 06/28/2023    GLC 94 04/14/2022     COAGS: No results found for: PTT, INR, FIBR  POC: No results found for: BGM, HCG, HCGS  HEPATIC:   Lab Results   Component Value Date    ALBUMIN 4.5 06/28/2023    PROTTOTAL 7.0 06/28/2023    ALT 13 06/28/2023    AST 24 06/28/2023    ALKPHOS 75 06/28/2023    BILITOTAL 0.7 06/28/2023     OTHER:   Lab Results   Component Value Date    A1C 5.3 06/28/2023    RUPINDER 9.7 06/28/2023    TSH 6.44 (H) 06/28/2023    T4 1.83 (H) 06/28/2023       Anesthesia Plan    ASA Status:  2       Anesthesia Type: General.     - Airway: ETT   Induction: Intravenous.   Maintenance: Balanced.        Consents    Anesthesia Plan(s) and associated risks, benefits, and realistic alternatives discussed. Questions answered and patient/representative(s) expressed understanding.     - Discussed:     - Discussed with:  Patient      - Extended Intubation/Ventilatory Support Discussed: No.      - Patient is DNR/DNI Status: No     Use of blood products discussed: No .     Postoperative Care    Pain management: IV analgesics, Oral pain medications, Multi-modal analgesia.   PONV prophylaxis: Ondansetron (or other 5HT-3), Dexamethasone or  Solumedrol     Comments:                Larry Presley MD

## 2023-08-08 NOTE — OR NURSING
Pt maintains numbness down left leg. Is unchanged since noon when this RN took over for this patient. Pt up with 1 assist and gait belt to wheelchair and bathroom. Sent to PT and was reported that pt should stay overnight for safety due to the ongoing numbness in left leg and ongoing nausea. All discussed with Dr. Amezcua and admission was decided for continued monitoring and management. Pt and  agreeable to this plan. Pt moved back to bed and awaiting room placement upstairs.

## 2023-08-08 NOTE — PROGRESS NOTES
Dr Amezcua updated re: pt L leg numbness now all the way from hip to foot.  states to go ahead with PT as planned at 1:30 pm.  Pt assisted to recliner with 2 assist and use of walker.

## 2023-08-08 NOTE — PROGRESS NOTES
08/08/23 5381   Appointment Info   Signing Clinician's Name / Credentials (PT) Court Avitia DPT   Rehab Comments (PT) Posterior hip precautions   Living Environment   People in Home spouse   Transportation Anticipated family or friend will provide   Living Environment Comments Patient has 2 stairs to enter main floor of home through garage; has grab bars on either side for use.   Self-Care   Usual Activity Tolerance good   Activity/Exercise/Self-Care Comment Patient independent with mobility prior to admission.  Has walker and leg  for use after surgery   General Information   Onset of Illness/Injury or Date of Surgery 08/08/23   Referring Physician Neto Amezcua MD   Patient/Family Therapy Goals Statement (PT) Return to home when patient has passes PT.   Pertinent History of Current Problem (include personal factors and/or comorbidities that impact the POC) Patient seen POD#0 s/p left NINA   Existing Precautions/Restrictions no hip IR;no hip ADD past midline;90 degree hip flexion;no pivoting or twisting;fall   Weight-Bearing Status - LLE weight-bearing as tolerated   Cognition   Orientation Status (Cognition) oriented x 4   Pain Assessment   Patient Currently in Pain Yes, see Vital Sign flowsheet  (reported pain in groin area with movement)   Posture    Posture Not impaired   Range of Motion (ROM)   Range of Motion ROM deficits secondary to weakness   Strength (Manual Muscle Testing)   Strength (Manual Muscle Testing) Deficits observed during functional mobility   Strength Comments Poor dynamic quad strength as noted during gait   Bed Mobility   Comment, (Bed Mobility) SBA with use of leg  at left LE   Transfers   Transfers sit-stand transfer   Sit-Stand Transfer   Sit-Stand Bowlegs (Transfers) minimum assist (75% patient effort);verbal cues   Assistive Device (Sit-Stand Transfers) walker, front-wheeled   Gait/Stairs (Locomotion)   Bowlegs Level (Gait) 2 person assist   Assistive  Device (Gait) walker, front-wheeled   Distance in Feet 10   Distance in Feet (Gait) 10   Comment, (Gait/Stairs) Knee buckling during weight bearing through left LE, unable to actively ankle dorsiflex   Balance   Balance Comments High fall risk secondary to decreased sensation at left LE, knee buckling at left LE   Sensory Examination   Sensory Perception Comments Patient reports left LE numbness, primarily distal to knee   Coordination   Coordination no deficits were identified   Clinical Impression   Criteria for Skilled Therapeutic Intervention Yes, treatment indicated   PT Diagnosis (PT) Impaired functional mobility   Influenced by the following impairments decreased sensation, decreased strength, decreased activity tolerance   Functional limitations due to impairments difficulties with gait and transfers   Clinical Presentation (PT Evaluation Complexity) Stable/Uncomplicated   Clinical Presentation Rationale clinical judgement   Clinical Decision Making (Complexity) low complexity   Planned Therapy Interventions (PT) balance training;bed mobility training;gait training;home exercise program;patient/family education;ROM (range of motion);stair training;strengthening;transfer training;progressive activity/exercise;home program guidelines   Anticipated Equipment Needs at Discharge (PT)   (has walker and leg  for home use)   Risk & Benefits of therapy have been explained evaluation/treatment results reviewed;care plan/treatment goals reviewed;risks/benefits reviewed;current/potential barriers reviewed;participants voiced agreement with care plan;participants included;patient;spouse/significant other   PT Total Evaluation Time   PT Eval, Low Complexity Minutes (18671) 10   Physical Therapy Goals   PT Frequency 2x/day   PT Predicted Duration/Target Date for Goal Attainment 08/09/23   PT Goals Bed Mobility;Transfers;Gait;Stairs   PT: Bed Mobility Supervision/stand-by assist;Supine to/from sit;Within precautions    PT: Transfers Supervision/stand-by assist;Sit to/from stand;Bed to/from chair;Assistive device;Within precautions   PT: Gait Supervision/stand-by assist;Assistive device;Within precautions;150 feet   PT: Stairs Supervision/stand-by assist;Within precautions;Assistive device;2 stairs   Interventions   Interventions Quick Adds Therapeutic Activity;Gait Training;Therapeutic Procedure   Therapeutic Procedure/Exercise   Ther. Procedure: strength, endurance, ROM, flexibillity Minutes (76114) 10   Symptoms Noted During/After Treatment increased pain  (nausea)   Treatment Detail/Skilled Intervention Facilitated therapeutic exercise to increase independence with transfers and ambulation.  Patient performed the following exercises x 10 reps with verbal and tactile cueing for correct technique: ankle DF(unable to perform)/PF (performs with assist), quad/ham/glut sets, heel slides < 90 degrees hip flex, SAQ  Handout provided.   Therapeutic Activity   Therapeutic Activities: dynamic activities to improve functional performance Minutes (65013) 15   Symptoms Noted During/After Treatment   (nausea)   Treatment Detail/Skilled Intervention Patient seen by physical therapy in therapy gym.  Patient reported continued numbness in left LE, primarily distal from knee into foot.  Patient able to plantarflex with assist, able to demonstrate active ankle inversion/eversion, unable to dorsiflex even with direct cueing or assist.  Able to demonstrate quad activation in supine and sitting.  Education provided regarding hip precautions, hand out provided.  Patient performed bed mobility with SBA and intermittent verbal cueing.  Facilitated transfer between sitting and standing with 2WW and mod A, direct cueing for hand placement.  Returned to sitting in staxi wheelchair with min A and cueing, patient reporting severe nausea.  BP assessed, 131/53.  Patient returned to PACU for medical management.   Gait Training   Gait Training Minutes (61142)  10   Symptoms Noted During/After Treatment (Gait Training)   (nausea)   Treatment Detail/Skilled Intervention Facilitated amb  10 feet with 2WW and min A x2.  Patient with decreased gait speed, increased trunk flexion, increased reliance on UE support at walker, poor left foot clearance, demonstrated intermittent knee buckling requiring Ax2 for safety.  Unable to trial stair negotiation secondary to severe nausea and knee buckling.   PT Discharge Planning   PT Plan increase amb distance, stairs   PT Discharge Recommendation (DC Rec)   (per ortho MD)   PT Rationale for DC Rec Anticipate that with ongoing medical management and skilled IP PT, patient will demonstrate functional, household mobility with SBA and least restrictive assistive device   PT Brief overview of current status Ax2 with walker   Total Session Time   Timed Code Treatment Minutes 35   Total Session Time (sum of timed and untimed services) 45

## 2023-08-09 ENCOUNTER — APPOINTMENT (OUTPATIENT)
Dept: PHYSICAL THERAPY | Facility: CLINIC | Age: 58
End: 2023-08-09
Attending: STUDENT IN AN ORGANIZED HEALTH CARE EDUCATION/TRAINING PROGRAM
Payer: COMMERCIAL

## 2023-08-09 ENCOUNTER — APPOINTMENT (OUTPATIENT)
Dept: OCCUPATIONAL THERAPY | Facility: CLINIC | Age: 58
End: 2023-08-09
Attending: STUDENT IN AN ORGANIZED HEALTH CARE EDUCATION/TRAINING PROGRAM
Payer: COMMERCIAL

## 2023-08-09 VITALS
HEIGHT: 65 IN | SYSTOLIC BLOOD PRESSURE: 114 MMHG | BODY MASS INDEX: 31.9 KG/M2 | RESPIRATION RATE: 15 BRPM | HEART RATE: 80 BPM | WEIGHT: 191.5 LBS | DIASTOLIC BLOOD PRESSURE: 65 MMHG | TEMPERATURE: 98.3 F | OXYGEN SATURATION: 97 %

## 2023-08-09 LAB
CREAT SERPL-MCNC: 0.72 MG/DL (ref 0.51–0.95)
GFR SERPL CREATININE-BSD FRML MDRD: >90 ML/MIN/1.73M2
HGB BLD-MCNC: 10.5 G/DL (ref 11.7–15.7)

## 2023-08-09 PROCEDURE — 97535 SELF CARE MNGMENT TRAINING: CPT | Mod: GO

## 2023-08-09 PROCEDURE — 250N000011 HC RX IP 250 OP 636: Mod: JZ | Performed by: STUDENT IN AN ORGANIZED HEALTH CARE EDUCATION/TRAINING PROGRAM

## 2023-08-09 PROCEDURE — 250N000013 HC RX MED GY IP 250 OP 250 PS 637: Performed by: STUDENT IN AN ORGANIZED HEALTH CARE EDUCATION/TRAINING PROGRAM

## 2023-08-09 PROCEDURE — 82565 ASSAY OF CREATININE: CPT | Performed by: STUDENT IN AN ORGANIZED HEALTH CARE EDUCATION/TRAINING PROGRAM

## 2023-08-09 PROCEDURE — 85018 HEMOGLOBIN: CPT | Performed by: STUDENT IN AN ORGANIZED HEALTH CARE EDUCATION/TRAINING PROGRAM

## 2023-08-09 PROCEDURE — 250N000013 HC RX MED GY IP 250 OP 250 PS 637: Performed by: NURSE PRACTITIONER

## 2023-08-09 PROCEDURE — 97110 THERAPEUTIC EXERCISES: CPT | Mod: GP

## 2023-08-09 PROCEDURE — 97116 GAIT TRAINING THERAPY: CPT | Mod: GP

## 2023-08-09 PROCEDURE — 97165 OT EVAL LOW COMPLEX 30 MIN: CPT | Mod: GO

## 2023-08-09 PROCEDURE — 36415 COLL VENOUS BLD VENIPUNCTURE: CPT | Performed by: STUDENT IN AN ORGANIZED HEALTH CARE EDUCATION/TRAINING PROGRAM

## 2023-08-09 RX ORDER — TIRZEPATIDE 2.5 MG/.5ML
2.5 INJECTION, SOLUTION SUBCUTANEOUS
OUTPATIENT
Start: 2023-08-09 | End: 2023-08-31

## 2023-08-09 RX ORDER — HYDROXYZINE HYDROCHLORIDE 25 MG/1
25 TABLET, FILM COATED ORAL EVERY 6 HOURS PRN
Status: DISCONTINUED | OUTPATIENT
Start: 2023-08-09 | End: 2023-08-09 | Stop reason: HOSPADM

## 2023-08-09 RX ORDER — HYDROXYZINE HYDROCHLORIDE 50 MG/1
50 TABLET, FILM COATED ORAL EVERY 6 HOURS PRN
Status: DISCONTINUED | OUTPATIENT
Start: 2023-08-09 | End: 2023-08-09 | Stop reason: HOSPADM

## 2023-08-09 RX ORDER — HYDROXYZINE HYDROCHLORIDE 25 MG/1
25-50 TABLET, FILM COATED ORAL EVERY 6 HOURS PRN
Qty: 30 TABLET | Refills: 0 | Status: SHIPPED | OUTPATIENT
Start: 2023-08-09 | End: 2023-12-08

## 2023-08-09 RX ADMIN — CEFAZOLIN SODIUM 2 G: 2 INJECTION, SOLUTION INTRAVENOUS at 05:24

## 2023-08-09 RX ADMIN — KETOROLAC TROMETHAMINE 15 MG: 15 INJECTION, SOLUTION INTRAMUSCULAR; INTRAVENOUS at 10:13

## 2023-08-09 RX ADMIN — OXYCODONE HYDROCHLORIDE 5 MG: 5 TABLET ORAL at 05:24

## 2023-08-09 RX ADMIN — ACETAMINOPHEN 975 MG: 325 TABLET, FILM COATED ORAL at 02:35

## 2023-08-09 RX ADMIN — ACETAMINOPHEN 975 MG: 325 TABLET, FILM COATED ORAL at 10:08

## 2023-08-09 RX ADMIN — OXYCODONE HYDROCHLORIDE 5 MG: 5 TABLET ORAL at 10:09

## 2023-08-09 RX ADMIN — HYDROXYZINE HYDROCHLORIDE 25 MG: 25 TABLET, FILM COATED ORAL at 08:34

## 2023-08-09 RX ADMIN — KETOROLAC TROMETHAMINE 15 MG: 15 INJECTION, SOLUTION INTRAMUSCULAR; INTRAVENOUS at 02:35

## 2023-08-09 ASSESSMENT — ACTIVITIES OF DAILY LIVING (ADL)
ADLS_ACUITY_SCORE: 21

## 2023-08-09 NOTE — TELEPHONE ENCOUNTER
Patient calling back.  Current weight 190#.  Has one shot left of 2.5 mg.  She already picked up the 5 mg dose.  Che Quiñones RN

## 2023-08-09 NOTE — PLAN OF CARE
Occupational Therapy Discharge Summary    Reason for therapy discharge:    All goals and outcomes met, no further needs identified.    Progress towards therapy goal(s). See goals on Care Plan in [facility] electronic health record for goal details.  Goals met    Therapy recommendation(s):    No further therapy is recommended. Defer to ortho

## 2023-08-09 NOTE — PLAN OF CARE
Physical Therapy Discharge Summary     Reason for therapy discharge:    All goals and outcomes met, no further needs identified.     Progress towards therapy goal(s). See goals on Care Plan in Epic electronic health record for goal details.  Goals met- patient does require assist for bed mobility, reports daughter is able to assist.     Therapy recommendation(s):    Continue home exercise program.    Patient reports she will be going to OP PT on 8/9.    Recommend use of walker with all mobility, supervision from spouse with stair negotiation, assist from spouse with bed mobility as needed.

## 2023-08-09 NOTE — PROGRESS NOTES
Discharge instructions given to pt and spouse.  Verbalize understanding. Prescriptions for stool softeners, Tylenol, Miralax, Xarelto, oxycodone and Atarax given to pt. Personal belongings sent with pt. Leaves for home with spouse.

## 2023-08-09 NOTE — PHARMACY-ADMISSION MEDICATION HISTORY
Medication reconciliation interview completed by pre-admitting nurse, reviewed by pharmacy. No further clarifications needed.       Prior to Admission medications    Medication Sig Last Dose Taking? Auth Provider Long Term End Date   acetaminophen (TYLENOL) 325 MG tablet Take 2 tablets (650 mg) by mouth every 4 hours as needed for other (mild pain)  Yes Neto Amezcua MD     albuterol (PROAIR HFA/PROVENTIL HFA/VENTOLIN HFA) 108 (90 Base) MCG/ACT inhaler INHALE 2 PUFFS INTO THE LUNGS AS NEEDED FOR SHORTNESS OF BREATH / DYSPNEA OR WHEEZING  Yes Coming Brooke Bach MD Yes    diclofenac (VOLTAREN) 75 MG EC tablet Take 1 tablet (75 mg) by mouth 2 times daily as needed for moderate pain  Yes Neto Amezcua MD Yes    fexofenadine (ALLEGRA) 180 MG tablet Take 1 tablet (180 mg) by mouth daily Take 180 mg by mouth  Yes Mitra Keita MD     fluticasone (FLONASE) 50 MCG/ACT nasal spray INSTILL 2 SPRAYS INTO BOTH NOSTRILS DAILY.  Yes Coming Brooke Bach MD     fluticasone (FLOVENT HFA) 110 MCG/ACT inhaler TAKE 2 PUFFS BY MOUTH TWICE A DAY  Patient taking differently: Inhale 2 puffs into the lungs 2 times daily as needed TAKE 2 PUFFS BY MOUTH TWICE A DAY  Yes Coming Brooke Bach MD Yes    levothyroxine (SYNTHROID/LEVOTHROID) 50 MCG tablet Take 1 tablet (50 mcg) by mouth daily 8/8/2023 at 0400 Yes Coming Brooke Bach MD Yes    losartan (COZAAR) 50 MG tablet Take 1 tablet (50 mg) by mouth daily 8/7/2023 at am Yes Coming Brooke Bach MD Yes    montelukast (SINGULAIR) 10 MG tablet TAKE 1 TABLET BY MOUTH EVERYDAY AT BEDTIME  Yes Coming Brooke Bach MD Yes    oxyCODONE (ROXICODONE) 5 MG tablet Take 1-2 tablets (5-10 mg) by mouth every 4 hours as needed for moderate to severe pain  Yes Neto Amezcua MD     polyethylene glycol (MIRALAX) 17 g packet Take 17 g by mouth daily  Yes Neto Amezcua MD     rivaroxaban ANTICOAGULANT (XARELTO) 10 MG TABS tablet Take 1  tablet (10 mg) by mouth daily (with dinner)  Yes Neto Amezcua MD Yes    rosuvastatin (CRESTOR) 20 MG tablet Take 1 tablet (20 mg) by mouth daily  Patient taking differently: Take 20 mg by mouth every evening 8/7/2023 at pm Yes Coming Brooke Bach MD Yes    senna-docusate (SENOKOT-S/PERICOLACE) 8.6-50 MG tablet Take 1-2 tablets by mouth 2 times daily Take while on oral narcotics to prevent or treat constipation.  Yes Neto Amezcua MD     triamcinolone (KENALOG) 0.1 % external cream Apply topically 2 times daily  Yes Coming Brooke Bach MD     tirzepatide (MOUNJARO) 10 MG/0.5ML pen Inject 10 mg Subcutaneous every 7 days for 4 doses  Patient taking differently: Inject 10 mg Subcutaneous every 7 days   Coming Brooke Bach MD  10/10/23   tirzepatide (MOUNJARO) 12.5 MG/0.5ML pen Inject 12.5 mg Subcutaneous every 7 days for 4 doses  Patient taking differently: Inject 12.5 mg Subcutaneous every 7 days   Coming Brooke Bach MD  11/2/23   tirzepatide (MOUNJARO) 5 MG/0.5ML pen Inject 5 mg Subcutaneous every 7 days for 4 doses  Patient taking differently: Inject 5 mg Subcutaneous every 7 days   Coming Brooke Bach MD  8/25/23   tirzepatide (MOUNJARO) 7.5 MG/0.5ML pen Inject 7.5 mg Subcutaneous every 7 days for 4 doses  Patient taking differently: Inject 7.5 mg Subcutaneous every 7 days   Coming Brooke Bach MD  9/17/23

## 2023-08-09 NOTE — TELEPHONE ENCOUNTER
Message #2 left for patient to return call to triage nurse     Sonali Varela, Registered Nurse  [facility]

## 2023-08-09 NOTE — PLAN OF CARE
A&Ox4. Ax2 with walker and gait belt. Bedside commode. Voiding well.   VSS. 02 - 99% on RA. LS - clear.  IV LR 50ml/hr.  Numbness to left leg improving. Able to D/P and wiggle toes.   Denies nausea. Ate well.   Requires large pills to be crushed.   Dressing - CD&I.

## 2023-08-09 NOTE — PROGRESS NOTES
08/09/23 0907   Appointment Info   Signing Clinician's Name / Credentials (OT) GHULAM Haro   Student Supervision Line of sight supervision provided   Rehab Comments (OT) No IR, flex > 90, add/mid, WBAT LLE   Quick Adds   Quick Adds Certification   Living Environment   People in Home spouse   Current Living Arrangements house   Home Accessibility stairs to enter home;stairs within home   Number of Stairs, Main Entrance 2   Stair Railings, Main Entrance   (garage has 2 grab bars)   Number of Stairs, Within Home, Primary greater than 10 stairs   Stair Railings, Within Home, Primary railing on right side (ascending)   Transportation Anticipated family or friend will provide   Living Environment Comments Pt lives in a 2 story home with spouse. All needs met on main level. Pt has a walk in shower with no grab bars or shower johan. Pt does have a RTS with a toilet frame.   Self-Care   Usual Activity Tolerance good   Current Activity Tolerance moderate   Regular Exercise No   Equipment Currently Used at Home cane, straight;raised toilet seat;walker, rolling;grab bar, toilet  (lef )   Fall history within last six months no   Activity/Exercise/Self-Care Comment Pt reports being indep with all ADLs, IADLs and mobility with no device. Pt's spouse will be staying home for the next 6 weeks with Pt to assist with all needs. Pt has a walk in shower - no grab bars/shower chair. Pt does have a RTS w/ toilet frame   Instrumental Activities of Daily Living (IADL)   Previous Responsibilities work;laundry;medication management;finances;driving   IADL Comments Pt has 6 wks off work. Pt has cleaning service that cleans the house every other week. Spouse will be completing all IADLs for the time such as meal prep, laundry and driving.   General Information   Onset of Illness/Injury or Date of Surgery 08/08/23   Referring Physician Neto Amezcua MD   Patient/Family Therapy Goal Statement (OT) to d/c home   Additional  Occupational Profile Info/Pertinent History of Current Problem Pt is a 58 year old female s/p L INNA   Existing Precautions/Restrictions no hip ADD past midline;90 degree hip flexion;no hip IR;weight bearing;fall   Left Lower Extremity (Weight-bearing Status) weight-bearing as tolerated (WBAT)   General Observations and Info Pt sitting in bed with HOB elevated upon arrival; agreeable to session. Spouse present.   Cognitive Status Examination   Orientation Status orientation to person, place and time   Visual Perception   Visual Impairment/Limitations WFL   Pain Assessment   Patient Currently in Pain Yes, see Vital Sign flowsheet  (LLE pain)   Range of Motion Comprehensive   Comment, General Range of Motion limited LLE ROM as expected s/p L NINA   Strength Comprehensive (MMT)   Comment, General Manual Muscle Testing (MMT) Assessment limited LLE strength as expected s/p L NINA   Coordination   Upper Extremity Coordination No deficits were identified   Bed Mobility   Bed Mobility supine-sit   Supine-Sit [place] (Bed Mobility) minimum assist (75% patient effort)   Assistive Device (Bed Mobility) leg    Transfers   Transfers bed-chair transfer;sit-stand transfer;toilet transfer;shower transfer   Transfer Skill: Bed to Chair/Chair to Bed   Bed-Chair [place] (Transfers) contact guard   Assistive Device (Bed-Chair Transfers) rolling walker   Sit-Stand Transfer   Sit-Stand [place] (Transfers) contact guard   Assistive Device (Sit-Stand Transfers) walker, front-wheeled   Shower Transfer   Type (Shower Transfer) lateral   [place] Level (Shower Transfer) contact guard   Assistive Device (Shower Transfer) walker, front-wheeled   Toilet Transfer   Type (Toilet Transfer) sit-stand;stand-sit   [place] Level (Toilet Transfer) contact guard   Assistive Device (Toilet Transfer) walker, front-wheeled;grab bars/safety frame   Balance   Balance Comments CGA and FWW   Activities of Daily Living   BADL  Assessment/Intervention lower body dressing;bathing;toileting   Bathing Assessment/Intervention   [place] Level (Bathing) minimum assist (75% patient effort)   Lower Body Dressing Assessment/Training   [place] Level (Lower Body Dressing) minimum assist (75% patient effort)   Toileting   [place] Level (Toileting) supervision   Clinical Impression   Criteria for Skilled Therapeutic Interventions Met (OT) Yes, treatment indicated   OT Diagnosis impaired IADLs, ADLs, and mobility   OT Problem List-Impairments impacting ADL problems related to;activity tolerance impaired;balance;range of motion (ROM);strength;pain;post-surgical precautions;flexibility   Assessment of Occupational Performance 5 or more Performance Deficits   Identified Performance Deficits toileting, bathing, dsg, driving, mobility   Planned Therapy Interventions (OT) ADL retraining;IADL retraining;balance training;ROM;strengthening;transfer training;progressive activity/exercise;risk factor education   Clinical Decision Making Complexity (OT) low complexity   Risk & Benefits of therapy have been explained evaluation/treatment results reviewed;care plan/treatment goals reviewed;risks/benefits reviewed;current/potential barriers reviewed;participants voiced agreement with care plan;participants included;patient;spouse/significant other   OT Total Evaluation Time   OT Eval, Low Complexity Minutes (93863) 10   Therapy Certification   Medical Diagnosis s/p L NINA   Start of Care Date 08/09/23   Certification date from 08/09/23   Certification date to 08/09/23   OT Goals   Therapy Frequency (OT) One time eval and treatment   OT Predicted Duration/Target Date for Goal Attainment 08/09/23   OT Goals Lower Body Dressing;Toilet Transfer/Toileting;OT Goal 1;OT Goal 2;Lower Body Bathing   OT: Lower Body Dressing Minimal assist;within precautions;using adaptive equipment   OT: Lower Body Bathing Minimal assist;using adaptive equipment;with precautions    OT: Toilet Transfer/Toileting Supervision/stand-by assist;toilet transfer;cleaning and garment management;using adaptive equipment;within precautions   OT: Goal 1 Pt will be able to verbalize 3/3 hip precautions to demonstrate understanding of maitnaing joint integrity in d/c environment   OT: Goal 2 Pt will be able to tfs in/out of shower using compensatory technique to demonstrate safe tfs in d/c environment   Interventions   Interventions Quick Adds Self-Care/Home Management   Self-Care/Home Management   Self-Care/Home Mgmt/ADL, Compensatory, Meal Prep Minutes (81919) 28   Symptoms Noted During/After Treatment (Meal Preparation/Planning Training) fatigue   Treatment Detail/Skilled Intervention OT: Pt sitting on bed with HOB elevated upon arrival; agreeable to session. Spouse present. Pt able to indep verbalize 3/3 hip precautions. Pt educated on LB dsg AE such as reacher and sock aid; Pt declined to trial as she was already dressed and spouse will be assisting at home. Pt and spouse had no further questions regarding TB dsg. Pt educated on use of leg . Pt cued to supine > EOB with use of leg ; Pt required Daljit for UB though completed tfs with Sophia and increase time. Pt educated on use of leg  in addition to lifting leg to maintain precautions. Pt and spouse verbalized understanding. Pt sit > stand and engaged in functional mobility to BR with CGA and use of FWW. Pt educated on compensatory techniques for toilet and shower tfs. Pt return demo and completed both tfs with CGA and use of FWW. Pt returned to bedside chair and stand > sit with CGA and use of FWW; safe hand placement and good technique observed. Pt and spouse educated o additional home modifications and recommendations regarding car tfs, shower safety, fall prevention, safe tfs, activity progression and home safety. Pt and spouse verbalized understanding and had no further questions. Pt remains on bedside chair at end of sessions;  all needs within reach.   OT Discharge Planning   OT Plan OT: One time eval and treat   OT Discharge Recommendation (DC Rec)   (defer to ortho)   OT Rationale for DC Rec Pt's ability to complete ADls, IADls and mobility are below baseline d/p L NINA. Pt required Daljit for LB dsg and supervision for toileting. Pt also required CGA and FWW for all tfs in addition to leg  for bed mobility. Pt does have a good support system set in place at home as her spouse will have 6 weeks off work to assist with all ADLs and IADLs needs for the time. Anticipate Pt to be able to return to previous living environment with Ax1 for all IADLs and higher level ADLs such as bathing.   OT Brief overview of current status CGA and FWW for mobility and all tfs, Daljit LB dsg   Total Session Time   Timed Code Treatment Minutes 28   Total Session Time (sum of timed and untimed services) 38        [facility]  OUTPATIENT OCCUPATIONAL THERAPY  EVALUATION  PLAN OF TREATMENT FOR OUTPATIENT REHABILITATION  (COMPLETE FOR INITIAL CLAIMS ONLY)  Patient's Last Name, First Name, M.I.  YOB: 1965  Rayna Jackson                          Provider's Name  [facility] Medical Record No.  [id number]                             Onset Date:  08/08/23   Start of Care Date:  08/09/23   Type:     ___PT   _X_OT   ___SLP Medical Diagnosis:  s/p L NINA                    OT Diagnosis:  impaired IADLs, ADLs, and mobility Visits from SOC:  1     See note for plan of treatment, functional goals and certification details    I CERTIFY THE NEED FOR THESE SERVICES FURNISHED UNDER        THIS PLAN OF TREATMENT AND WHILE UNDER MY CARE     (Physician co-signature of this document indicates review and certification of the therapy plan).

## 2023-08-09 NOTE — PLAN OF CARE
Patient vital signs are at baseline: Yes  Patient able to ambulate as they were prior to admission or with assist devices provided by therapies during their stay:  Yes  Patient MUST void prior to discharge:  Yes  Patient able to tolerate oral intake:  Yes  Pain has adequate pain control using Oral analgesics:  Yes  Does patient have an identified :  Yes  Has goal D/C date and time been discussed with patient:  Yes  Goal Outcome Evaluation:       A/O, lung sounds clear, up with assist of one walker and gait.Toerating regular diet, passing gas,Toradol/Oxy/Tylenol for pain. Dressing c/d/I.Voiding ,plan to discharge home today.

## 2023-08-10 ASSESSMENT — ACTIVITIES OF DAILY LIVING (ADL)
HEAVY_WORK: UNABLE TO DO
WALKING_15_MINUTES_OR_GREATER: UNABLE TO DO
WALKING_UP_STEEP_HILLS: UNABLE TO DO
WALKING_APPROXIMATELY_10_MINUTES: UNABLE TO DO
ROLLING OVER IN BED: UNABLE TO DO
SITTING FOR 15 MINUTES: SLIGHT DIFFICULTY
RECREATIONAL ACTIVITIES: UNABLE TO DO
TWISTING/PIVOTING ON INVOLVED LEG: UNABLE TO DO
GETTING_INTO_AND_OUT_OF_A_BATHTUB: UNABLE TO DO
HOS_ADL_ITEM_SCORE_TOTAL: 3
LIGHT_TO_MODERATE_WORK: UNABLE TO DO
HOS_ADL_HIGHEST_POTENTIAL_SCORE: 68
HOS_ADL_SCORE(%): 4.41
WALKING_DOWN_STEEP_HILLS: UNABLE TO DO
WALKING_INITIALLY: EXTREME DIFFICULTY
PUTTING ON SOCKS AND SHOES: UNABLE TO DO
DEEP SQUATTING: UNABLE TO DO
STANDING FOR 15 MINUTES: EXTREME DIFFICULTY
GOING DOWN 1 FLIGHT OF STAIRS: UNABLE TO DO
GETTING INTO AND OUT OF AN AVERAGE CAR: EXTREME DIFFICULTY
STEPPING UP AND DOWN CURBS: UNABLE TO DO
GOING UP 1 FLIGHT OF STAIRS: UNABLE TO DO

## 2023-08-11 ENCOUNTER — THERAPY VISIT (OUTPATIENT)
Dept: PHYSICAL THERAPY | Facility: CLINIC | Age: 58
End: 2023-08-11
Payer: COMMERCIAL

## 2023-08-11 DIAGNOSIS — Z47.1 AFTERCARE FOLLOWING LEFT HIP JOINT REPLACEMENT SURGERY: Primary | ICD-10-CM

## 2023-08-11 DIAGNOSIS — M25.552 HIP PAIN, LEFT: ICD-10-CM

## 2023-08-11 DIAGNOSIS — Z96.642 AFTERCARE FOLLOWING LEFT HIP JOINT REPLACEMENT SURGERY: Primary | ICD-10-CM

## 2023-08-11 PROCEDURE — 97110 THERAPEUTIC EXERCISES: CPT | Mod: GP | Performed by: PHYSICAL THERAPIST

## 2023-08-11 PROCEDURE — 97161 PT EVAL LOW COMPLEX 20 MIN: CPT | Mod: GP | Performed by: PHYSICAL THERAPIST

## 2023-08-11 NOTE — PROGRESS NOTES
PHYSICAL THERAPY EVALUATION  Type of Visit: Evaluation    See electronic medical record for Abuse and Falls Screening details.    Subjective       Pt reports to PT S/P L NINA. Also has history of left knee pain and difficulty with knee extension. Previous knee surgery for meniscus tear.   Presenting condition or subjective complaint: Hip replacement  Date of onset: 08/08/23    Relevant medical history: Asthma; High blood pressure; Osteoarthritis   Dates & types of surgery: Hip replacement 8/8    Prior diagnostic imaging/testing results: Other Had hip replacement on 8/8   Prior therapy history for the same diagnosis, illness or injury: No      Living Environment  Social support: With a significant other or spouse   Type of home: House   Stairs to enter the home: Yes 2 Is there a railing: No   Ramp: No   Stairs inside the home: Yes 12 Is there a railing: Yes   Help at home: Self Cares (home health aide/personal care attendant, family, etc)  Equipment owned: Walker with wheels     Employment: Yes    Hobbies/Interests:      Patient goals for therapy:  Be able to navigate stairs and walk 2-3 miles       Objective   HIP EVALUATION  PAIN: States not really having pain just discomfort in the hip  INTEGUMENTARY (edema, incisions):  Currently has bandage from surgery still on incision  POSTURE:  Decreased knee and hip extension in standing and gait  GAIT:   Weightbearing Status: WBAT  Assistive Device(s): Walker (standard)  Gait Deviations: Stance time decreased  Stride length decreased  ROM:  AROM hip flexion 70 and extension lacking 35 degrees.  Knee flexion 125 and ext lacking 20 degrees     Assessment & Plan   CLINICAL IMPRESSIONS  Medical Diagnosis: Left Hip Pain    Treatment Diagnosis: S/P L NINA   Impression/Assessment: Patient is a 58 year old female with S/P L NINA complaints.  The following significant findings have been identified: Pain, Decreased ROM/flexibility, Decreased strength, Impaired balance, Impaired gait,  Impaired muscle performance, Decreased activity tolerance, and Impaired posture. These impairments interfere with their ability to perform self care tasks, work tasks, recreational activities, household chores, driving , household mobility, and community mobility as compared to previous level of function.     Clinical Decision Making (Complexity):  Clinical Presentation: Stable/Uncomplicated  Clinical Presentation Rationale: based on medical and personal factors listed in PT evaluation  Clinical Decision Making (Complexity): Low complexity    PLAN OF CARE  Treatment Interventions:  Interventions: Gait Training, Manual Therapy, Neuromuscular Re-education, Therapeutic Activity, Therapeutic Exercise, Self-Care/Home Management    Long Term Goals     PT Goal 1  Goal Description: Patient will be able to walk 2-3 miles without AD  Rationale: to maximize safety and independence with self cares;to maximize safety and independence within the community;to maximize safety and independence within the home;to maximize safety and independence with performance of ADLs and functional tasks  Target Date: 11/03/23  PT Goal 2  Goal Description: Patient will be able to navigate a full flight of stairs normally with 2/10 pain at worst  Rationale: to maximize safety and independence with performance of ADLs and functional tasks;to maximize safety and independence within the home;to maximize safety and independence within the community  Target Date: 11/03/23      Frequency of Treatment:    Duration of Treatment:      Recommended Referrals to Other Professionals:   Education Assessment:   Learner/Method: No Barriers to Learning  Education Comments: online    Risks and benefits of evaluation/treatment have been explained.   Patient/Family/caregiver agrees with Plan of Care.     Evaluation Time:     PT Eval, Low Complexity Minutes (89175): 12       Signing Clinician: London Brody PT

## 2023-08-14 ENCOUNTER — ANCILLARY PROCEDURE (OUTPATIENT)
Dept: ULTRASOUND IMAGING | Facility: CLINIC | Age: 58
End: 2023-08-14
Attending: PHYSICIAN ASSISTANT
Payer: COMMERCIAL

## 2023-08-14 ENCOUNTER — NURSE TRIAGE (OUTPATIENT)
Dept: NURSING | Facility: CLINIC | Age: 58
End: 2023-08-14

## 2023-08-14 ENCOUNTER — DOCUMENTATION ONLY (OUTPATIENT)
Dept: OTHER | Facility: CLINIC | Age: 58
End: 2023-08-14
Payer: COMMERCIAL

## 2023-08-14 DIAGNOSIS — M25.552 HIP PAIN, LEFT: Primary | ICD-10-CM

## 2023-08-14 DIAGNOSIS — M25.552 HIP PAIN, LEFT: ICD-10-CM

## 2023-08-14 PROCEDURE — 93971 EXTREMITY STUDY: CPT | Mod: LT | Performed by: RADIOLOGY

## 2023-08-14 NOTE — TELEPHONE ENCOUNTER
"Phoned patient back regarding post op low grade fevers.     Patient is s/p Left total hip arthroplasty DOS 8/8/23.    Per RN Triage, \" reporting, Pt started running a low grade fever  last night which was 100.6 (Oral temp)   Pt took some tylenol.    Fever decreased,   Then came back up around 3:30 AM at 99.6.     Pt took some more Tylenol.      But fever is not going away.       Family has not seen what the incision looks like yet.   As they were told not to take the bandages off yet.    But the skin around the area is clean, clear, dry, warm.\"    Per patient , last recorded temp was around 10:30am and was 99.2F.  Writer consulted ALE Gerber who instructed writer to tell patient to remove surgery dressing, check for s/sx of DVT, infection, and instruct use of Spirometer.  According to the patient, she does have calf pain but denies redness, warmth and swelling.  Writer offered a visit today with Buzz if they were concerned, they said the next visit is scheduled with ALE Stovall this coming Thursday.  Writer instructed patient to remove surgery dressing and let us know if there are any concerns of infection (redness, warmth, swelling, purulent drainage, increased fevers/chills) or wound dehiscence. Writer also instructed spirometer use and continuing to utilize Tylenol for pain and fever mitigation.      Writer consulted again with Buzz regarding the above conversation and would like us to order an ultrasound to rule out DVT.      "

## 2023-08-14 NOTE — TELEPHONE ENCOUNTER
Ultrasound ordered per SUNG Gerber.  Writer phoned patient's , Medhi to relay this recommendation to rule out DVT.   Writer helped to coordinate a STAT ultrasound today at 4pm, check in time 3:45 here at the [facility].  Mehdi and patient verbalized understanding of this.    Writer advised again, if fevers continue or get worse OR if surgery site showing signs of infection to let us know immediately.    María Laura RN on 8/14/2023 at 12:35 PM

## 2023-08-14 NOTE — TELEPHONE ENCOUNTER
Nurse Triage SBAR    Is this a 2nd Level Triage?   Yes     Situation:   Fever after hip replacement    Background/Assessment:      reporting, Pt started running a low grade fever  last night which was 100.6 (Oral temp)   Pt took some tylenol.    Fever decreased,   Then came back up around 3:30 AM at 99.6.     Pt took some more Tylenol.      But fever is not going away.      Family has not seen what the incision looks like yet.   As they were told not to take the bandages off yet.    But the skin around the area is clean, clear, dry, warm.      Family would like a call back @ [phone] for further assistance.    Tricia Beavers RN  Central Triage Red Flags/Med Refills        Protocol Recommended Disposition:   See in Office Today or Tomorrow      Reason for Disposition   Patient wants to be seen    Additional Information   Negative: Major abdominal surgical incision and wound gaping open with visible internal organs   Negative: Sounds like a life-threatening emergency to the triager   Negative: Bleeding from incision and won't stop after 10 minutes of direct pressure   Negative: Bleeding (more than a few drops) from incision and after blood vessel surgery (e.g., carotidendarterectomy, femoral bypass graft, kidney dialysis fistula, tracheostomy)   Negative: Bright red, wide-spread, sunburn-like rash   Negative: SEVERE pain in the incision   Negative: Incision gaping open and < 2 days (48 hours) since wound re-opened   Negative: Incision gaping open and length of opening > 2 inches (5 cm)   Negative: Patient sounds very sick or weak to the triager   Negative: Sounds like a serious complication to the triager   Negative: Fever > 100.4 F (38.0 C)   Negative: Incision looks infected (spreading redness, pain)   Negative: Red streak runs from the incision and longer than 1 inch (2.5 cm)   Negative: Pus or bad-smelling fluid draining from incision   Negative: Dressing soaked with blood or body fluid (e.g., drainage)    Negative: Raised bruise and size > 2 inches (5 cm) and expanding   Negative: Scant bleeding (e.g., few drops) from incision and after blood vessel surgery (e.g., carotid endarterectomy, femoral bypass graft, kidney dialysis fistula   Negative: Caller has URGENT question and triager unable to answer question   Negative: Incision gaping open and length of opening > 1/4 inch (6 mm) and on the face and over 2 days since wound re-opened   Negative: Incision gaping open and length of opening > 1/2 inch (1 cm) and over 2 days since wound re-opened   Negative: Clear or blood-tinged fluid draining from wound and no fever   Negative: Suture or staple removal is overdue    Protocols used: Post-Op Incision Symptoms and [id number]

## 2023-08-17 ENCOUNTER — OFFICE VISIT (OUTPATIENT)
Dept: ORTHOPEDICS | Facility: CLINIC | Age: 58
End: 2023-08-17
Payer: COMMERCIAL

## 2023-08-17 DIAGNOSIS — Z47.89 ORTHOPEDIC AFTERCARE: Primary | ICD-10-CM

## 2023-08-17 PROCEDURE — 99024 POSTOP FOLLOW-UP VISIT: CPT | Performed by: PHYSICIAN ASSISTANT

## 2023-08-17 NOTE — PROGRESS NOTES
HISTORY OF PRESENT ILLNESS:    Rayna Jackson is a 58 year old female who is seen in follow up for left NINA, 8/8/2023 Dr. Amezcua.  .  Present symptoms: Pt reports symptom improvement.  Treatments include Tylenol, hip restrictions, walker.  Using 2 wheeled walker for ambulation.  Patient did have a low-grade fever up to 101  F however denies symptoms, redness or drainage at the incision.  She did remove the surgical dressing checked the incision has been leaving it open..  Xarelto for DVT prophylaxis.  She is following hip restrictions.  She does note some knee pain.  Partaking in physical therapy.  No new complaints.  Denies Chest pain, Calve pain, Fever, Chills.    PHYSICAL EXAM:  There were no vitals taken for this visit.  There is no height or weight on file to calculate BMI.   GENERAL APPEARANCE: healthy, alert, and no distress   PSYCH:  mentation appears normal and affect normal/bright    MSK:  Left:  Hip.  Ambulates: Well with 2 wheeled walker..  Incision clean and dry, no dressing present, tails proximally and distally present, healing.  Appropriate incisional erythema.   No Ecchymosis.  No calve pain on palpation.  Edema moderate at hip none at knee .  CMS: primo incisional numbness, otherwise grossly intact.    IMAGING INTERPRETATION:  None today.     ASSESSMENT:  Rayna Jackson is a 58 year old female S/P left total hip arthroplasty..    We discussed the knee pain may either be a flare of arthritis or potentially mild sprain strain injury due to the surgery.  Should resolve with time.    She is doing well overall.    PLAN:  - Surgery discussed, images reviewed if applicable, and all questions were answered at this time.  -Suture tails removed with sterile technique, dressing applied in usual fashion, care instructions given and verbally acknowledged.  - Medications: As current.  - hip restrictions.  - Physical Therapy: Physical therapy: continue with current physical therapy    Return to clinic 4 weeks  for x rays    Deangelo Rodriguez PA-C    Dept. Orthopedic Surgery  [facility]   8/17/2023

## 2023-08-17 NOTE — Clinical Note
"    8/17/2023         RE: Rayna Jackson  [address] [facility] [address]        Dear Colleague,    Thank you for referring your patient, Rayna Jackson, to the [facility] [place]. Please see a copy of my visit note below.      HISTORY OF PRESENT ILLNESS:    Rayna Jackson is a 58 year old female who is seen in follow up for left NINA, 8/8/2023 Dr. Amezcua.  .  Present symptoms: Pt reports ***.  Treatments include ***.  Using *** for ambulation.  ***.  *** for DVT prophylaxis.  *** Following hip restrictions.  No new complaints.  Denies Chest pain, Calve pain, Fever, Chills.    PHYSICAL EXAM:  There were no vitals taken for this visit.  There is no height or weight on file to calculate BMI.   GENERAL APPEARANCE: {ALEYDA GENERAL APPEARANCE:50::\"healthy\",\"alert\",\"no distress\"}   PSYCH:  {ALEYDA EXAM CPE - PSYCH:699832::\"mentation appears normal\",\"affect normal/bright\"}    MSK:  {RIGHT:167802} Hip.  Ambulates: ***.  Incision clean and dry, *** present, healing.  Appropriate incisional erythema.   {YES/NO -default:043614::\"No\"} Ecchymosis ***.  {YES/NO -default:052175::\"No\"} calve pain on palpation.  Edema *** at knee ***.  CMS: primo incisional numbness, otherwise grossly intact.    IMAGING INTERPRETATION:  None today.     ASSESSMENT:  Rayna Jackson is a 58 year old female S/P ***.  ***    PLAN:  - Surgery discussed, images reviewed if applicable, and all questions were answered at this time.  - *** removed with sterile technique, steri-strips applied in usual fashion, care instructions given and verbally acknowledged.  - Medications: ***  - hip restrictions.  - Physical Therapy: {REHAB :708247}    Return to clinic 4 weeks for x rays    Deangelo Rodriguez PA-C    Dept. Orthopedic Surgery  [facility]   8/17/2023          Again, thank you for allowing me to participate in the care of your patient.        Sincerely,        Deangelo Rodriguez PA-C  "

## 2023-08-17 NOTE — PATIENT INSTRUCTIONS
It is recommended that you delay any invasive procedures such as dental work, colonoscopy or other surgeries for 4 months after surgery unless it is an emergency.  Please notify the provider if an emergency occurs.    You may discontinue the aspirin regimen or return to a dose recommended by your Primary care provider 4 weeks after surgery.    You may increase your activities as you can tolerate them.  Walking is a good activity.    No bending past 90 degrees at the hip joint, do not cross your legs, or perform excessive twisting at the hip for 4 months from surgery.    Please follow up in 6 weeks.

## 2023-08-18 ENCOUNTER — THERAPY VISIT (OUTPATIENT)
Dept: PHYSICAL THERAPY | Facility: CLINIC | Age: 58
End: 2023-08-18
Payer: COMMERCIAL

## 2023-08-18 DIAGNOSIS — Z96.642 AFTERCARE FOLLOWING LEFT HIP JOINT REPLACEMENT SURGERY: ICD-10-CM

## 2023-08-18 DIAGNOSIS — M25.552 HIP PAIN, LEFT: Primary | ICD-10-CM

## 2023-08-18 DIAGNOSIS — Z47.1 AFTERCARE FOLLOWING LEFT HIP JOINT REPLACEMENT SURGERY: ICD-10-CM

## 2023-08-18 PROCEDURE — 97110 THERAPEUTIC EXERCISES: CPT | Mod: GP | Performed by: PHYSICAL THERAPIST

## 2023-08-28 ENCOUNTER — MYC MEDICAL ADVICE (OUTPATIENT)
Dept: ORTHOPEDICS | Facility: CLINIC | Age: 58
End: 2023-08-28

## 2023-08-28 ENCOUNTER — HOSPITAL ENCOUNTER (OUTPATIENT)
Dept: CARDIOLOGY | Facility: CLINIC | Age: 58
Discharge: HOME | End: 2023-08-28
Attending: FAMILY MEDICINE | Admitting: FAMILY MEDICINE
Payer: COMMERCIAL

## 2023-08-28 DIAGNOSIS — I25.10 CORONARY ARTERY DISEASE INVOLVING NATIVE CORONARY ARTERY OF NATIVE HEART WITHOUT ANGINA PECTORIS: ICD-10-CM

## 2023-08-28 DIAGNOSIS — I51.7 MILD CARDIOMEGALY: ICD-10-CM

## 2023-08-28 LAB — LVEF ECHO: NORMAL

## 2023-08-28 PROCEDURE — 93306 TTE W/DOPPLER COMPLETE: CPT

## 2023-08-28 PROCEDURE — 93306 TTE W/DOPPLER COMPLETE: CPT | Mod: 26 | Performed by: INTERNAL MEDICINE

## 2023-08-28 NOTE — TELEPHONE ENCOUNTER
Patient is s/p left NINA, 8/8/23, Dr. Amezcua, Please advise when patient can return to driving post-operatively. Per patient MyChart message she is no longer taking pain medication.     Chaparrita Barnes MSA, ATC  Certified Athletic Trainer

## 2023-08-29 ENCOUNTER — THERAPY VISIT (OUTPATIENT)
Dept: PHYSICAL THERAPY | Facility: CLINIC | Age: 58
End: 2023-08-29
Payer: COMMERCIAL

## 2023-08-29 DIAGNOSIS — M25.552 HIP PAIN, LEFT: Primary | ICD-10-CM

## 2023-08-29 DIAGNOSIS — Z96.642 AFTERCARE FOLLOWING LEFT HIP JOINT REPLACEMENT SURGERY: ICD-10-CM

## 2023-08-29 DIAGNOSIS — Z47.1 AFTERCARE FOLLOWING LEFT HIP JOINT REPLACEMENT SURGERY: ICD-10-CM

## 2023-08-29 PROCEDURE — 97110 THERAPEUTIC EXERCISES: CPT | Mod: 59 | Performed by: PHYSICAL THERAPIST

## 2023-08-29 PROCEDURE — 97530 THERAPEUTIC ACTIVITIES: CPT | Mod: GP | Performed by: PHYSICAL THERAPIST

## 2023-08-30 ENCOUNTER — LAB (OUTPATIENT)
Dept: LAB | Facility: CLINIC | Age: 58
End: 2023-08-30
Payer: COMMERCIAL

## 2023-08-30 DIAGNOSIS — E78.01 FAMILIAL HYPERCHOLESTEREMIA: ICD-10-CM

## 2023-08-30 DIAGNOSIS — E78.2 MIXED HYPERLIPIDEMIA: ICD-10-CM

## 2023-08-30 DIAGNOSIS — Z82.49 FAMILY HISTORY OF CORONARY ARTERY DISEASE: ICD-10-CM

## 2023-08-30 LAB
CHOLEST SERPL-MCNC: 215 MG/DL
HDLC SERPL-MCNC: 45 MG/DL
LDLC SERPL CALC-MCNC: 138 MG/DL
NONHDLC SERPL-MCNC: 170 MG/DL
T4 FREE SERPL-MCNC: 1.27 NG/DL (ref 0.9–1.7)
TRIGL SERPL-MCNC: 160 MG/DL
TSH SERPL DL<=0.005 MIU/L-ACNC: 6.02 UIU/ML (ref 0.3–4.2)

## 2023-08-30 PROCEDURE — 80061 LIPID PANEL: CPT

## 2023-08-30 PROCEDURE — 36415 COLL VENOUS BLD VENIPUNCTURE: CPT

## 2023-08-30 PROCEDURE — 84443 ASSAY THYROID STIM HORMONE: CPT

## 2023-08-30 PROCEDURE — 84439 ASSAY OF FREE THYROXINE: CPT

## 2023-09-05 ENCOUNTER — MYC MEDICAL ADVICE (OUTPATIENT)
Dept: FAMILY MEDICINE | Facility: CLINIC | Age: 58
End: 2023-09-05
Payer: COMMERCIAL

## 2023-09-05 DIAGNOSIS — Z82.49 FAMILY HISTORY OF CORONARY ARTERY DISEASE: ICD-10-CM

## 2023-09-05 DIAGNOSIS — E78.01 FAMILIAL HYPERCHOLESTEREMIA: ICD-10-CM

## 2023-09-05 DIAGNOSIS — E03.9 HYPOTHYROIDISM, UNSPECIFIED TYPE: ICD-10-CM

## 2023-09-05 DIAGNOSIS — E78.2 MIXED HYPERLIPIDEMIA: Primary | ICD-10-CM

## 2023-09-06 RX ORDER — LEVOTHYROXINE SODIUM 75 UG/1
75 TABLET ORAL DAILY
Qty: 90 TABLET | Refills: 0 | Status: SHIPPED | OUTPATIENT
Start: 2023-09-06 | End: 2023-11-14

## 2023-09-06 NOTE — TELEPHONE ENCOUNTER
Dr. Duane Bach- see mychart response below.  Please advise.  Che Quiñones RN    TSH lowered some, and the T4 levels dropped.  This combination of results is more common to see, whereas the high T4 levels were abnormal with a low TSH.  I am concerned that if we raise your dose of levothyroxine we will get your T4 levels too high.       Are you feeling okay overall? Fatigued?  If you are doing okay, I would keep your dose the same and do one more check- If the TSH remains high in 6-12 weeks we can increase your levothyroxine.  It could normalize in that time frame also. What are your thoughts?   Written by Brooke Bach MD on 9/5/2023 12:38 PM CDT  Seen by patient Rayna Jackson on 9/5/2023  9:57 PM

## 2023-09-08 ENCOUNTER — THERAPY VISIT (OUTPATIENT)
Dept: PHYSICAL THERAPY | Facility: CLINIC | Age: 58
End: 2023-09-08
Payer: COMMERCIAL

## 2023-09-08 DIAGNOSIS — M25.552 HIP PAIN, LEFT: Primary | ICD-10-CM

## 2023-09-08 DIAGNOSIS — Z47.1 AFTERCARE FOLLOWING LEFT HIP JOINT REPLACEMENT SURGERY: ICD-10-CM

## 2023-09-08 DIAGNOSIS — Z96.642 AFTERCARE FOLLOWING LEFT HIP JOINT REPLACEMENT SURGERY: ICD-10-CM

## 2023-09-08 PROCEDURE — 97530 THERAPEUTIC ACTIVITIES: CPT | Mod: GP | Performed by: PHYSICAL THERAPIST

## 2023-09-08 PROCEDURE — 97110 THERAPEUTIC EXERCISES: CPT | Mod: GP | Performed by: PHYSICAL THERAPIST

## 2023-09-08 RX ORDER — ROSUVASTATIN CALCIUM 40 MG/1
40 TABLET, COATED ORAL DAILY
Qty: 90 TABLET | Refills: 1 | Status: SHIPPED | OUTPATIENT
Start: 2023-09-08 | End: 2024-03-18

## 2023-09-13 DIAGNOSIS — E66.811 CLASS 1 OBESITY DUE TO EXCESS CALORIES WITH SERIOUS COMORBIDITY AND BODY MASS INDEX (BMI) OF 32.0 TO 32.9 IN ADULT: ICD-10-CM

## 2023-09-13 DIAGNOSIS — E66.09 CLASS 1 OBESITY DUE TO EXCESS CALORIES WITH SERIOUS COMORBIDITY AND BODY MASS INDEX (BMI) OF 32.0 TO 32.9 IN ADULT: ICD-10-CM

## 2023-09-13 RX ORDER — TIRZEPATIDE 7.5 MG/.5ML
7.5 INJECTION, SOLUTION SUBCUTANEOUS WEEKLY
Qty: 2 ML | Refills: 1 | Status: SHIPPED | OUTPATIENT
Start: 2023-09-13 | End: 2023-12-08 | Stop reason: DRUGHIGH

## 2023-09-15 ENCOUNTER — THERAPY VISIT (OUTPATIENT)
Dept: PHYSICAL THERAPY | Facility: CLINIC | Age: 58
End: 2023-09-15
Payer: COMMERCIAL

## 2023-09-15 DIAGNOSIS — M25.552 HIP PAIN, LEFT: Primary | ICD-10-CM

## 2023-09-15 DIAGNOSIS — Z47.1 AFTERCARE FOLLOWING LEFT HIP JOINT REPLACEMENT SURGERY: ICD-10-CM

## 2023-09-15 DIAGNOSIS — Z96.642 AFTERCARE FOLLOWING LEFT HIP JOINT REPLACEMENT SURGERY: ICD-10-CM

## 2023-09-15 PROCEDURE — 97530 THERAPEUTIC ACTIVITIES: CPT | Mod: GP | Performed by: PHYSICAL THERAPIST

## 2023-09-15 PROCEDURE — 97140 MANUAL THERAPY 1/> REGIONS: CPT | Mod: GP | Performed by: PHYSICAL THERAPIST

## 2023-09-15 PROCEDURE — 97110 THERAPEUTIC EXERCISES: CPT | Mod: GP | Performed by: PHYSICAL THERAPIST

## 2023-09-15 NOTE — PROGRESS NOTES
09/15/23 0500   Appointment Info   Signing clinician's name / credentials London Brody DPT   Total/Authorized Visits 12 (E&T)   Visits Used 5   Medical Diagnosis Left Hip Pain   PT Tx Diagnosis S/P L NINA   Other pertinent information Pain in L knee unable to fully extend the knee   Progress Note/Certification   Onset of illness/injury or Date of Surgery 08/08/23   Progress Note Completed Date 08/11/23   GOALS   PT Goals 2   PT Goal 1   Goal Description Patient will be able to walk 2-3 miles without AD   Rationale to maximize safety and independence with self cares;to maximize safety and independence within the community;to maximize safety and independence within the home;to maximize safety and independence with performance of ADLs and functional tasks   Goal Progress improving walking 4 blocks with cane   Target Date 11/03/23   PT Goal 2   Goal Description Patient will be able to navigate a full flight of stairs normally with 2/10 pain at worst   Rationale to maximize safety and independence with performance of ADLs and functional tasks;to maximize safety and independence within the home;to maximize safety and independence within the community   Goal Progress improving still taking them 1 step at a time but pain is decreasing   Target Date 11/03/23   Subjective Report   Subjective Report States she is making progress. Still getting pain wrapping from her groin to her buttocks with walking and trying to put her full weight on her left leg. Has been able to ambulate with cane 4 blocks and small household distances without her cane. Still difficulty with stair navigation.   Objective Measures   Objective Measures Objective Measure 1;Objective Measure 2   Objective Measure 1   Objective Measure Tenderness with palpation and increased tone in glute med and max   Objective Measure 2   Objective Measure Hip AROM flexion 90 deg pain free   Details Knee Extension AROM lacking 5 degrees   Treatment Interventions (PT)  "  Interventions Therapeutic Procedure/Exercise;Therapeutic Activity;Manual Therapy   Therapeutic Procedure/Exercise   Therapeutic Procedures: strength, endurance, ROM, flexibillity minutes (19551) 20   Therapeutic Procedures Ther Proc 2;Ther Proc 3;Ther Proc 4;Ther Proc 5   Ther Proc 1 Education   Ther Proc 1 - Details discussed continuing using the cane and how to continue use of the stairs   Ther Proc 2 standing extension against wall   Ther Proc 2 - Details worked on knee extension in standing and hip to 0 deg of ext in standing instead of bent forward   Ther Proc 3 calf stretch   Ther Proc 3 - Details slant board 3 x 30\"   Ther Proc 4 walking standing tall after standing tall against the wall   Ther Proc 4 - Details with cane   Ther Proc 5 calf raises   Ther Proc 5 - Details x20   PTRx Ther Proc 1 Isometric Hamstring   PTRx Ther Proc 1 - Details HEP   PTRx Ther Proc 2 Supine or Standing Gluteal Set   PTRx Ther Proc 2 - Details HEP   PTRx Ther Proc 3 Isometric Quad   PTRx Ther Proc 3 - Details x10 5\" holds    PTRx Ther Proc 4 Supine Heel Slides   PTRx Ther Proc 4 - Details x10   PTRx Ther Proc 5 Roll Ins Hooklying   PTRx Ther Proc 5 - Details HEP   PTRx Ther Proc 6 Standing Weight Shift   PTRx Ther Proc 6 - Details forwards/backwards weight shift at table    PTRx Ther Proc 7 Functional Knee Extension with Tubing   PTRx Ther Proc 7 - Details wall ball 10 x 5\"   Skilled Intervention cued corect technique, glut activation, knee extension with exercises   Patient Response/Progress still having pain with full weight bearing on left leg   Therapeutic Activity   Therapeutic Activities: dynamic activities to improve functional performance minutes (17257) 12   Therapeutic Activities Ther Act 2   Ther Act 1 step up on 6 inch step and step down from 4 inch step   Ther Act 1 - Details working on technique and form   Ther Act 2 mini squat with UE support   Ther Act 2 - Details x20   Skilled Intervention cued pushing through " heel and glute activation   Patient Response/Progress less pain with stair navigation   Manual Therapy   Manual Therapy: Mobilization, MFR, MLD, friction massage minutes (11519) 8   Manual Therapy 1 STM   Manual Therapy 1 - Details L glutes   Skilled Intervention increased tone   Patient Response/Progress decreased pain after and improved gait   Education   Learner/Method No Barriers to Learning   Education Comments online   Plan   Home program PTRX       PLAN  Continue therapy per current plan of care.    Beginning/End Dates of Progress Note Reporting Period:  08/11/23 to 09/15/2023    Referring Provider:  Neto Amezcua

## 2023-09-20 ENCOUNTER — TELEPHONE (OUTPATIENT)
Dept: ORTHOPEDICS | Facility: CLINIC | Age: 58
End: 2023-09-20

## 2023-09-20 ENCOUNTER — ANCILLARY PROCEDURE (OUTPATIENT)
Dept: GENERAL RADIOLOGY | Facility: CLINIC | Age: 58
End: 2023-09-20
Attending: STUDENT IN AN ORGANIZED HEALTH CARE EDUCATION/TRAINING PROGRAM
Payer: COMMERCIAL

## 2023-09-20 ENCOUNTER — OFFICE VISIT (OUTPATIENT)
Dept: ORTHOPEDICS | Facility: CLINIC | Age: 58
End: 2023-09-20
Payer: COMMERCIAL

## 2023-09-20 VITALS
DIASTOLIC BLOOD PRESSURE: 89 MMHG | HEIGHT: 65 IN | SYSTOLIC BLOOD PRESSURE: 130 MMHG | WEIGHT: 195 LBS | BODY MASS INDEX: 32.49 KG/M2

## 2023-09-20 DIAGNOSIS — Z47.89 ORTHOPEDIC AFTERCARE: ICD-10-CM

## 2023-09-20 DIAGNOSIS — Z96.642 S/P TOTAL LEFT HIP ARTHROPLASTY: Primary | ICD-10-CM

## 2023-09-20 DIAGNOSIS — M16.12 PRIMARY OSTEOARTHRITIS OF LEFT HIP: ICD-10-CM

## 2023-09-20 PROCEDURE — 99024 POSTOP FOLLOW-UP VISIT: CPT | Performed by: STUDENT IN AN ORGANIZED HEALTH CARE EDUCATION/TRAINING PROGRAM

## 2023-09-20 PROCEDURE — 73502 X-RAY EXAM HIP UNI 2-3 VIEWS: CPT | Mod: TC | Performed by: RADIOLOGY

## 2023-09-20 NOTE — TELEPHONE ENCOUNTER
Patient brought form to appointment for completed. Form filled out indicating that patient is not currently cleared to return but can return on 10/12/23 to work without restriction. Form was faxed to number on form and patient was given a copy. A copy was sent to scan and a copy was placed in the completed forms folder in [place] for reference.     Chaparrita Barnes MSA, ATC  Certified Athletic Trainer

## 2023-09-20 NOTE — PROGRESS NOTES
[facility]  Orthopaedic Surgery Consultation by Neto Amezcua M.D.    Rayna Jackson MRN# [id number]   Age: 58 year old YOB: 1965     Requesting physician: Albert Yeo  No Ref-Primary, Physician     Background history:  DX:  Headaches  Allergic rhinitis  Asthma  Laryngitis  Hypothyroidism  Hypertension    TREATMENTS:  None           History of Present Illness:     58-year-old female presenting with chronic left hip pain most likely due to osteoarthritic changes of the femoral acetabular joint.  Insufficiently responding to nonsurgical treatment options.  Patient underwent left total hip arthroplasty on 8/8/2023.    9/20/23: Patient is s/p left NINA, 8/8/23. She was last seen by Wilman Rodriguez PA-C, on 8/17/23. She is walking with a slight limp today. Since her last visit patient reports her pain is much better compared to pre-operatively. She notes constant muscle tightness and intermittent pain with weight bearing. She has been attending PT, working on HEP, Tylenol and icing.  The incision has healed well.  She denies any fevers or chills.  She is happy with the result so far.      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 Normal mood and affect.  VITAL SIGNS: not currently breastfeeding.  Reviewed nursing intake notes.   There is no height or weight on file to calculate BMI.  RESP: non labored breathing   ABD: benign, soft, non-tender, no acute peritoneal findings  SKIN: grossly normal   LYMPHATIC: grossly normal, no adenopathy, no extremity edema  NEURO: grossly normal , no motor deficits  VASCULAR: satisfactory perfusion of all extremities   MUSCULOSKELETAL:   Alignment: Neutral  Gait: Antalgic  Hips:   L hip: Incision is well-healed.   No signs of infection.  ROM not extensively tested due to presence of recent surgery.  No lower extremity edema.  No signs of DVT.     Left LE:   Thigh and leg compartments soft and compressible   +Quad/TA/GSC/FHL/EHL   SILT DP/SP/Racheal/Saph/Tib nerve distributions   Palpable dorsalis pedis pulse          Data:   All laboratory data reviewed  All imaging studies reviewed by me personally.    XR pelvis/hip left 9/20/2023:  My interpretation: Status post placement of left total hip arthroplasty.  Adequate sizing, orientation and fixation of components.  No signs of immediate postoperative complications.  Leg lengths appear to be <5mm.            Assessment and Plan:   Assessment:  58-year-old female presenting with chronic left hip pain most likely due to osteoarthritic changes of the femoral acetabular joint.  Insufficiently responding to nonsurgical treatment options.  Patient underwent left total hip arthroplasty on 8/8/2023.  Recovering appropriately.     Plan:  I extensively discussed my findings with the patient.  Patient appears to be recovering appropriately.  Patient will continue to work with physical therapy on strengthening and gait training.  Posterior hip precautions were repeated and advised to remain in place until 3 months postoperatively.    All questions were answered.  Patient understands and agrees to the treatment plan as set forth.  We will follow-up with patient at the 1 year postoperative date with renewed radiographic imaging studies.      Neto Amezcua MD, PhD     Adult Reconstruction  [facility] Department of Orthopaedic Surgery    This note was created using dictation software and may contain errors.  Please contact the creator for any clarifications that are needed.

## 2023-09-20 NOTE — PATIENT INSTRUCTIONS
1. Orthopedic aftercare        Follow up with Dr. Amezcua 1 year post-operatively (around August 2024).     Call my office with any questions or concerns, [phone].

## 2023-09-20 NOTE — LETTER
9/20/2023         RE: Rayna Jackson  [address]  [facility] [address]        Dear Colleague,    Thank you for referring your patient, Rayna Jackson, to the [facility] [place]. Please see a copy of my visit note below.        [facility]  Orthopaedic Surgery Consultation by Neto Amezcua M.D.    Rayna Jackson MRN# [id number]   Age: 58 year old YOB: 1965     Requesting physician: Albert Yeo  No Ref-Primary, Physician     Background history:  DX:  Headaches  Allergic rhinitis  Asthma  Laryngitis  Hypothyroidism  Hypertension    TREATMENTS:  None           History of Present Illness:     58-year-old female presenting with chronic left hip pain most likely due to osteoarthritic changes of the femoral acetabular joint.  Insufficiently responding to nonsurgical treatment options.  Patient underwent left total hip arthroplasty on 8/8/2023.    9/20/23: Patient is s/p left NINA, 8/8/23. She was last seen by Wilman Rodriguez PA-C, on 8/17/23. She is walking with a slight limp today. Since her last visit patient reports her pain is much better compared to pre-operatively. She notes constant muscle tightness and intermittent pain with weight bearing. She has been attending PT, working on HEP, Tylenol and icing.  The incision has healed well.  She denies any fevers or chills.  She is happy with the result so far.      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 Normal mood and affect.  VITAL SIGNS: not currently breastfeeding.  Reviewed nursing intake notes.   There is no height or weight on file to calculate BMI.  RESP: non labored breathing   ABD: benign, soft, non-tender, no acute peritoneal findings  SKIN: grossly  normal   LYMPHATIC: grossly normal, no adenopathy, no extremity edema  NEURO: grossly normal , no motor deficits  VASCULAR: satisfactory perfusion of all extremities   MUSCULOSKELETAL:   Alignment: Neutral  Gait: Antalgic  Hips:   L hip: Incision is well-healed.  No signs of infection.  ROM not extensively tested due to presence of recent surgery.  No lower extremity edema.  No signs of DVT.     Left LE:   Thigh and leg compartments soft and compressible   +Quad/TA/GSC/FHL/EHL   SILT DP/SP/Racheal/Saph/Tib nerve distributions   Palpable dorsalis pedis pulse          Data:   All laboratory data reviewed  All imaging studies reviewed by me personally.    XR pelvis/hip left 9/20/2023:  My interpretation: Status post placement of left total hip arthroplasty.  Adequate sizing, orientation and fixation of components.  No signs of immediate postoperative complications.  Leg lengths appear to be <5mm.            Assessment and Plan:   Assessment:  58-year-old female presenting with chronic left hip pain most likely due to osteoarthritic changes of the femoral acetabular joint.  Insufficiently responding to nonsurgical treatment options.  Patient underwent left total hip arthroplasty on 8/8/2023.  Recovering appropriately.     Plan:  I extensively discussed my findings with the patient.  Patient appears to be recovering appropriately.  Patient will continue to work with physical therapy on strengthening and gait training.  Posterior hip precautions were repeated and advised to remain in place until 3 months postoperatively.    All questions were answered.  Patient understands and agrees to the treatment plan as set forth.  We will follow-up with patient at the 1 year postoperative date with renewed radiographic imaging studies.      Neto Amezcua MD, PhD     Adult Reconstruction  [facility] Department of Orthopaedic Surgery    This note was created using dictation software and may contain  errors.  Please contact the creator for any clarifications that are needed.      Again, thank you for allowing me to participate in the care of your patient.        Sincerely,        Neto Amezcua MD

## 2023-09-22 ENCOUNTER — THERAPY VISIT (OUTPATIENT)
Dept: PHYSICAL THERAPY | Facility: CLINIC | Age: 58
End: 2023-09-22
Payer: COMMERCIAL

## 2023-09-22 DIAGNOSIS — Z47.1 AFTERCARE FOLLOWING LEFT HIP JOINT REPLACEMENT SURGERY: ICD-10-CM

## 2023-09-22 DIAGNOSIS — Z96.642 AFTERCARE FOLLOWING LEFT HIP JOINT REPLACEMENT SURGERY: ICD-10-CM

## 2023-09-22 DIAGNOSIS — M25.552 HIP PAIN, LEFT: Primary | ICD-10-CM

## 2023-09-22 PROCEDURE — 97110 THERAPEUTIC EXERCISES: CPT | Mod: GP | Performed by: PHYSICAL THERAPIST

## 2023-09-22 PROCEDURE — 97530 THERAPEUTIC ACTIVITIES: CPT | Mod: GP | Performed by: PHYSICAL THERAPIST

## 2023-09-22 PROCEDURE — 97116 GAIT TRAINING THERAPY: CPT | Mod: GP | Performed by: PHYSICAL THERAPIST

## 2023-09-29 ENCOUNTER — THERAPY VISIT (OUTPATIENT)
Dept: PHYSICAL THERAPY | Facility: CLINIC | Age: 58
End: 2023-09-29
Payer: COMMERCIAL

## 2023-09-29 DIAGNOSIS — M25.552 HIP PAIN, LEFT: Primary | ICD-10-CM

## 2023-09-29 DIAGNOSIS — Z47.1 AFTERCARE FOLLOWING LEFT HIP JOINT REPLACEMENT SURGERY: ICD-10-CM

## 2023-09-29 DIAGNOSIS — Z96.642 AFTERCARE FOLLOWING LEFT HIP JOINT REPLACEMENT SURGERY: ICD-10-CM

## 2023-09-29 PROCEDURE — 97110 THERAPEUTIC EXERCISES: CPT | Mod: GP | Performed by: PHYSICAL THERAPIST

## 2023-09-29 PROCEDURE — 97530 THERAPEUTIC ACTIVITIES: CPT | Mod: GP | Performed by: PHYSICAL THERAPIST

## 2023-10-06 ENCOUNTER — THERAPY VISIT (OUTPATIENT)
Dept: PHYSICAL THERAPY | Facility: CLINIC | Age: 58
End: 2023-10-06
Payer: COMMERCIAL

## 2023-10-06 DIAGNOSIS — Z47.1 AFTERCARE FOLLOWING LEFT HIP JOINT REPLACEMENT SURGERY: ICD-10-CM

## 2023-10-06 DIAGNOSIS — Z96.642 AFTERCARE FOLLOWING LEFT HIP JOINT REPLACEMENT SURGERY: ICD-10-CM

## 2023-10-06 DIAGNOSIS — M25.552 HIP PAIN, LEFT: Primary | ICD-10-CM

## 2023-10-06 PROCEDURE — 97110 THERAPEUTIC EXERCISES: CPT | Mod: GP | Performed by: PHYSICAL THERAPIST

## 2023-10-06 PROCEDURE — 97530 THERAPEUTIC ACTIVITIES: CPT | Mod: GP | Performed by: PHYSICAL THERAPIST

## 2023-10-10 ENCOUNTER — LAB (OUTPATIENT)
Dept: LAB | Facility: CLINIC | Age: 58
End: 2023-10-10
Payer: COMMERCIAL

## 2023-10-10 DIAGNOSIS — Z82.49 FAMILY HISTORY OF CORONARY ARTERY DISEASE: ICD-10-CM

## 2023-10-10 DIAGNOSIS — E78.2 MIXED HYPERLIPIDEMIA: ICD-10-CM

## 2023-10-10 DIAGNOSIS — E78.01 FAMILIAL HYPERCHOLESTEREMIA: ICD-10-CM

## 2023-10-10 DIAGNOSIS — E03.9 HYPOTHYROIDISM, UNSPECIFIED TYPE: ICD-10-CM

## 2023-10-10 LAB
CHOLEST SERPL-MCNC: 132 MG/DL
HDLC SERPL-MCNC: 47 MG/DL
LDLC SERPL CALC-MCNC: 64 MG/DL
NONHDLC SERPL-MCNC: 85 MG/DL
TRIGL SERPL-MCNC: 106 MG/DL
TSH SERPL DL<=0.005 MIU/L-ACNC: 1.4 UIU/ML (ref 0.3–4.2)

## 2023-10-10 PROCEDURE — 84443 ASSAY THYROID STIM HORMONE: CPT

## 2023-10-10 PROCEDURE — 36415 COLL VENOUS BLD VENIPUNCTURE: CPT

## 2023-10-10 PROCEDURE — 80061 LIPID PANEL: CPT

## 2023-10-11 ENCOUNTER — THERAPY VISIT (OUTPATIENT)
Dept: PHYSICAL THERAPY | Facility: CLINIC | Age: 58
End: 2023-10-11
Payer: COMMERCIAL

## 2023-10-11 DIAGNOSIS — M25.552 HIP PAIN, LEFT: Primary | ICD-10-CM

## 2023-10-11 DIAGNOSIS — Z47.1 AFTERCARE FOLLOWING LEFT HIP JOINT REPLACEMENT SURGERY: ICD-10-CM

## 2023-10-11 DIAGNOSIS — Z96.642 AFTERCARE FOLLOWING LEFT HIP JOINT REPLACEMENT SURGERY: ICD-10-CM

## 2023-10-11 PROCEDURE — 97140 MANUAL THERAPY 1/> REGIONS: CPT | Mod: GP | Performed by: PHYSICAL THERAPIST

## 2023-10-11 PROCEDURE — 97110 THERAPEUTIC EXERCISES: CPT | Mod: GP | Performed by: PHYSICAL THERAPIST

## 2023-10-20 ENCOUNTER — THERAPY VISIT (OUTPATIENT)
Dept: PHYSICAL THERAPY | Facility: CLINIC | Age: 58
End: 2023-10-20
Payer: COMMERCIAL

## 2023-10-20 DIAGNOSIS — M25.552 HIP PAIN, LEFT: Primary | ICD-10-CM

## 2023-10-20 DIAGNOSIS — Z96.642 AFTERCARE FOLLOWING LEFT HIP JOINT REPLACEMENT SURGERY: ICD-10-CM

## 2023-10-20 DIAGNOSIS — Z47.1 AFTERCARE FOLLOWING LEFT HIP JOINT REPLACEMENT SURGERY: ICD-10-CM

## 2023-10-20 PROCEDURE — 97140 MANUAL THERAPY 1/> REGIONS: CPT | Mod: GP | Performed by: PHYSICAL THERAPIST

## 2023-10-20 PROCEDURE — 97110 THERAPEUTIC EXERCISES: CPT | Mod: GP | Performed by: PHYSICAL THERAPIST

## 2023-10-27 ENCOUNTER — THERAPY VISIT (OUTPATIENT)
Dept: PHYSICAL THERAPY | Facility: CLINIC | Age: 58
End: 2023-10-27
Payer: COMMERCIAL

## 2023-10-27 DIAGNOSIS — M25.552 HIP PAIN, LEFT: Primary | ICD-10-CM

## 2023-10-27 DIAGNOSIS — Z96.642 AFTERCARE FOLLOWING LEFT HIP JOINT REPLACEMENT SURGERY: ICD-10-CM

## 2023-10-27 DIAGNOSIS — Z47.1 AFTERCARE FOLLOWING LEFT HIP JOINT REPLACEMENT SURGERY: ICD-10-CM

## 2023-10-27 PROCEDURE — 97110 THERAPEUTIC EXERCISES: CPT | Mod: GP | Performed by: PHYSICAL THERAPIST

## 2023-10-27 PROCEDURE — 97140 MANUAL THERAPY 1/> REGIONS: CPT | Mod: GP | Performed by: PHYSICAL THERAPIST

## 2023-11-02 ENCOUNTER — THERAPY VISIT (OUTPATIENT)
Dept: PHYSICAL THERAPY | Facility: CLINIC | Age: 58
End: 2023-11-02
Payer: COMMERCIAL

## 2023-11-02 DIAGNOSIS — Z47.1 AFTERCARE FOLLOWING LEFT HIP JOINT REPLACEMENT SURGERY: ICD-10-CM

## 2023-11-02 DIAGNOSIS — Z96.642 AFTERCARE FOLLOWING LEFT HIP JOINT REPLACEMENT SURGERY: ICD-10-CM

## 2023-11-02 DIAGNOSIS — M25.552 HIP PAIN, LEFT: Primary | ICD-10-CM

## 2023-11-02 PROCEDURE — 97110 THERAPEUTIC EXERCISES: CPT | Mod: GP | Performed by: PHYSICAL THERAPIST

## 2023-11-02 PROCEDURE — 97140 MANUAL THERAPY 1/> REGIONS: CPT | Mod: GP | Performed by: PHYSICAL THERAPIST

## 2023-11-09 ENCOUNTER — THERAPY VISIT (OUTPATIENT)
Dept: PHYSICAL THERAPY | Facility: CLINIC | Age: 58
End: 2023-11-09
Payer: COMMERCIAL

## 2023-11-09 DIAGNOSIS — M25.552 HIP PAIN, LEFT: Primary | ICD-10-CM

## 2023-11-09 DIAGNOSIS — Z47.1 AFTERCARE FOLLOWING LEFT HIP JOINT REPLACEMENT SURGERY: ICD-10-CM

## 2023-11-09 DIAGNOSIS — Z96.642 AFTERCARE FOLLOWING LEFT HIP JOINT REPLACEMENT SURGERY: ICD-10-CM

## 2023-11-09 PROCEDURE — 97110 THERAPEUTIC EXERCISES: CPT | Mod: GP | Performed by: PHYSICAL THERAPIST

## 2023-11-09 PROCEDURE — 97140 MANUAL THERAPY 1/> REGIONS: CPT | Mod: GP | Performed by: PHYSICAL THERAPIST

## 2023-11-09 NOTE — PROGRESS NOTES
DISCHARGE  Reason for Discharge: Patient has met all goals.    Equipment Issued:     Discharge Plan: Patient to continue home program.    Referring Provider:  Neto Amezcua       11/09/23 0500   Appointment Info   Signing clinician's name / credentials London Brody DPT   Total/Authorized Visits 12 (E&T)   Visits Used 13   Medical Diagnosis Left Hip Pain   PT Tx Diagnosis S/P L NINA   Other pertinent information Pain in L knee unable to fully extend the knee   Progress Note/Certification   Onset of illness/injury or Date of Surgery 08/08/23   Progress Note Completed Date 09/15/23   GOALS   PT Goals 2   PT Goal 1   Goal Description Patient will be able to walk 2-3 miles without AD   Rationale to maximize safety and independence with self cares;to maximize safety and independence within the community;to maximize safety and independence within the home;to maximize safety and independence with performance of ADLs and functional tasks   Target Date 11/03/23   Date Met 11/02/23   PT Goal 2   Goal Description Patient will be able to navigate a full flight of stairs normally with 2/10 pain at worst   Rationale to maximize safety and independence with performance of ADLs and functional tasks;to maximize safety and independence within the home;to maximize safety and independence within the community   Target Date 11/03/23   Date Met 11/09/23   Subjective Report   Subjective Report Reports overall she is doing well. Able to do all things but putting on her socks is still hard to do. Feels walking is a lot better and stair navigation has improved a lot.   Objective Measures   Objective Measures Objective Measure 1;Objective Measure 2   Objective Measure 1   Objective Measure still has tension in ITB   Details gait also improving dec trendelenberg gait pattern   Objective Measure 2   Objective Measure Hip AROM flexion 90 deg pain free   Details still has weakness in hip abd but strength is improving   Treatment  Interventions (PT)   Interventions Therapeutic Procedure/Exercise;Therapeutic Activity;Manual Therapy;Gait Training;Neuromuscular Re-education   Therapeutic Procedure/Exercise   Therapeutic Procedures: strength, endurance, ROM, flexibillity minutes (47559) 24   Therapeutic Procedures Ther Proc 2;Ther Proc 3;Ther Proc 4;Ther Proc 5;Ther Proc 6;Ther Proc 7;Ther Proc 8   Ther Proc 1 Education   Ther Proc 1 - Details discussed continued POC and HEP   Ther Proc 2 standing extension against wall   Ther Proc 2 - Details HEP   Ther Proc 3 squat   Ther Proc 3 - Details x20   Ther Proc 4 leg press   Ther Proc 4 - Details nt   Ther Proc 5 calf raises   Ther Proc 5 - Details HEP   Ther Proc 6 Bike   Ther Proc 6 - Details SH 3 10 min   Ther Proc 7 standing hip abd   Ther Proc 7 - Details 2 x20   Ther Proc 8 bridge   Ther Proc 8 - Details HEP   PTRx Ther Proc 1 Isometric Hamstring   PTRx Ther Proc 1 - Details HEP   PTRx Ther Proc 2 Hooklying  Gluteal Set   PTRx Ther Proc 2 - Details nt   PTRx Ther Proc 3 Standing marching   PTRx Ther Proc 3 - Details HEP   PTRx Ther Proc 4 Supine Heel Slides   PTRx Ther Proc 4 - Details HEP   PTRx Ther Proc 5 Roll Ins Hooklying   PTRx Ther Proc 5 - Details HEP   PTRx Ther Proc 6 standing hip hike   PTRx Ther Proc 6 - Details x20 B   PTRx Ther Proc 7 Functional Knee Extension with Tubing   PTRx Ther Proc 7 - Details nt   Skilled Intervention cued correct technique with squat and hip hike for HEP   Patient Response/Progress tolerated well   Therapeutic Activity   Therapeutic Activities: dynamic activities to improve functional performance minutes (89431) 5   Therapeutic Activities Ther Act 2   Ther Act 1 step up 8 inch and step down   Ther Act 1 - Details x20   Skilled Intervention cued to continue to work on at HEP   Patient Response/Progress tolerated exercises   Neuromuscular Re-education   Neuromuscular re-ed of mvmt, balance, coord, kinesthetic sense, posture, proprioception minutes (11696) 2    Neuro Re-ed 1 single leg stance   Neuro Re-ed 1 - Details rev   Skilled Intervention discussed importance of this for hiking in the spring   Gait Training   Gait 1 worked on gait with and without cane in the clinic   Manual Therapy   Manual Therapy: Mobilization, MFR, MLD, friction massage minutes (88201) 10   Manual Therapy 1 STM   Manual Therapy 1 - Details L glutes and ITB   Skilled Intervention increased tone   Patient Response/Progress less pain after   Education   Learner/Method No Barriers to Learning   Education Comments online   Plan   Home program PTRX   Total Session Time   Timed Code Treatment Minutes 41   Total Treatment Time (sum of timed and untimed services) 41

## 2023-11-14 ENCOUNTER — VIRTUAL VISIT (OUTPATIENT)
Dept: ENDOCRINOLOGY | Facility: CLINIC | Age: 58
End: 2023-11-14
Payer: COMMERCIAL

## 2023-11-14 DIAGNOSIS — E03.9 HYPOTHYROIDISM, UNSPECIFIED TYPE: ICD-10-CM

## 2023-11-14 DIAGNOSIS — E03.8 SUBCLINICAL HYPOTHYROIDISM: Primary | ICD-10-CM

## 2023-11-14 PROCEDURE — 99213 OFFICE O/P EST LOW 20 MIN: CPT | Mod: VID | Performed by: INTERNAL MEDICINE

## 2023-11-14 RX ORDER — LEVOTHYROXINE SODIUM 75 UG/1
75 TABLET ORAL DAILY
Qty: 90 TABLET | Refills: 1 | Status: SHIPPED | OUTPATIENT
Start: 2023-11-14 | End: 2024-05-20

## 2023-11-14 ASSESSMENT — PAIN SCALES - GENERAL: PAINLEVEL: NO PAIN (0)

## 2023-11-14 NOTE — NURSING NOTE
Is the patient currently in the state of [place]? YES    Visit mode:VIDEO    If the visit is dropped, the patient can be reconnected by: VIDEO VISIT: Send to e-mail at: [email or web address]    Will anyone else be joining the visit? NO  (If patient encounters technical issues they should call [phone]    How would you like to obtain your AVS? MyChart    Are changes needed to the allergy or medication list? No    Reason for visit: RECHECK Shelby Kocher VVF

## 2023-11-14 NOTE — PATIENT INSTRUCTIONS
[facility]  Dr Jules, Endocrinology Department    [facility]   [address] [facility]  [place] [address]  Appointment [phone]  Fax: [phone]  [place]: Monday - Thursday      Thyroid labs are in acceptable range.  Continue current dose of thyroid hormone replacement--levothyroxine 75 mcg/day.   Labs in 6 months or sooner if concerns or wt changes.  Please make a lab appointment for blood work and follow up clinic appointment in 1 week after that to discuss results.    Take Levothyroxine on an empty stomach. Take it with a full glass of water at least 30 minutes to 1 hour before eating breakfast.   This medicine should be taken at least 4 hours before or 4 hours after these medicines: antacids (Maalox , Mylanta , Tums ), calcium supplements, cholestyramine (Prevalite , Questran ), colestipol (Colestid ), iron supplements, orlistat (Johnnie , Xenical ), simethicone (Gas-X , Mylicon ), and sucralfate (Carafate ).   Swallow the capsule whole. Do not cut or crush it.

## 2023-11-14 NOTE — LETTER
"    11/14/2023         RE: Rayna Jackson  [address]  [facility] [id number]        Dear Colleague,    Thank you for referring your patient, Rayna Jackson, to the [facility]. Please see a copy of my visit note below.    THIS IS A VIDEO VISIT:    Phone call visit/virtual visit encounter:  DINORA form Kyra Helms earlier.    Name of patient: Rayna Jackson    Date of encounter: 11/14/2023    Time of start of video visit: 4:00    Video started: 4:10    Video ended: 4:19    Provider location: working from home/ [facility]    Patient location: patients home.    Mode of transmission: video/ Doximity    Verbal consent: obtained before starting visit. Pt is agreeable.      The patient has been notified of following:      \"This VIDEO visit will be conducted via a call between you and your physician/provider. We have found that certain health care needs can be provided without the need for a physical exam.  This service lets us provide the care you need with a short phone conversation.  If a prescription is necessary we can send it directly to your pharmacy.  If lab work is needed we can place an order for that and you can then stop by our lab to have the test done at a later time.     With new updates with corona virus patient might be billed as clinic visit.     If during the course of the call the physician/provider feels a telephone visit is not appropriate, you will not be charged for this service.\"      Past medical history, social history, family history, allergy and medications were reviewed and updated as appropriate.  Reviewed pertinent labs, notes, imaging studies personally.    Name: Rayna Jackson  F/u for obesity. Last visit > 1 year back.  HPI:  Rayna Jackson is a 58 year old female who presents for the management of obestiy.   has a past medical history of Arthritis, Hypertension, Torn meniscus (01/01/2013), and Uncomplicated asthma.  Since last visit she was working with  " Hay for hypothyroidism and dose was adjusted.    1. Obesity:  Has noted abnormal weight gain over the past 3 years - states she has gained 70 lbs.   Making healthy food choices - doesn't feel she overeats.  Unable to lose weight and has continued to gain.  Growing up was active.  Weight gain started after college.  FH of obesity. (Dad and Dads side is heavy, brother is heavy)    Previously tried a program called [employer], also weight watchers, [place] Weight Loss was most effective - lost 20 lbs.    She was on Phentermine 37.5 mg daily ( on it X 5/2019) and Topiramate 50 mg bid ( started it in 4/2020).    Currently treated with Munjaro ( prescribed by PCP)  Diabetes:No but states her blood sugars are 'always' high-normal.  No GD with pregnancy but was monitored more closely because glucose levels were elevated but not in the diabetes range.  + FH of DM2 - father and MGGM.  Sleep Apnea/Snores: snores - was seen by specialist who did not feel sleep apnea eval was indicated, didn't believe she had sleep apnea  Hypertension or CAD:Yes: Controlled on Losartan  Hyperlipidemia:Yes: untreated.  Diet: Doing Keto diet.  Healthy food choices, low carb, portion controlled  Exercise: Difficult due to joint pain, + OA  #2. Hypothyroidism: Currently treated with levothyroxine 75 mcg/day. (on this dose X 9/2023)   Takes generic.  Reports compliance.  FH + for thyroid disease - Mother, MGM, maternal aunt, sister.    Previous lymph node biopsy + for Lupus - was seen at [place], no diagnosis of Lupus has been confirmed.  Patient feels well at this time and denies any tachycardia, palpitations, heat intolerance, tremor, insomnia, diarrhea, or unexplained weight loss.  Patient also denies  cold intolerance, constipation, or unexplained weight gain.     PMH/PSH:  Past Medical History:   Diagnosis Date     Arthritis      Hypertension      Torn meniscus 01/01/2013    left      Uncomplicated asthma      Past Surgical History:   Procedure Laterality  Date     APPENDECTOMY       appendicitis  01/01/1980     ARTHROPLASTY HIP Left 8/8/2023    Procedure: Left total hip arthroplasty;  Surgeon: Neto Amezcua MD;  Location: [facility] OR     BIOPSY  2017    Removed gland in back of neck     COLONOSCOPY N/A 06/30/2017    Procedure: COLONOSCOPY;  COLONOSCOPY   ;  Surgeon: Brooks Villa MD;  Location: [facility] GI     CYSTECTOMY OVARIAN BENIGN  01/01/1980     KNEE SURGERY Left      Family Hx:  Family History   Problem Relation Age of Onset     Hypertension Mother      Thyroid Disease Mother      Coronary Artery Disease Mother      Hypertension Father      Diabetes Father      Coronary Artery Disease Father      Hyperlipidemia Father      Obesity Father      Macular Degeneration Maternal Grandmother      Thyroid Disease Maternal Grandmother      Coronary Artery Disease Maternal Grandfather      Coronary Artery Disease Paternal Grandmother      Cerebrovascular Disease Paternal Grandmother      Thyroid Disease Sister      Thyroid Disease Maternal Aunt      Diabetes Other         great grandmother     Glaucoma No family hx of      Thyroid disease: Yes, mother, sister, aunt, MGM        DM2: Yes: father, MGGM          Autoimmune: DM1, SLE, RA, Vitiligo     Social Hx:  Social History     Socioeconomic History     Marital status:      Spouse name: Juan F     Number of children: 4     Years of education: Not on file     Highest education level: Master's degree (e.g., MA, MS, Adry, MEd, MSW, [employer])   Occupational History     Occupation: manager     Employer: [employer]   Tobacco Use     Smoking status: Never     Passive exposure: Never     Smokeless tobacco: Never   Vaping Use     Vaping Use: Never used   Substance and Sexual Activity     Alcohol use: Yes     Comment: 2 drinks once a week     Drug use: No     Sexual activity: Yes     Partners: Male     Birth control/protection: I.U.D., Female Surgical   Other Topics Concern     Parent/sibling w/ CABG, MI or angioplasty before  65F 55M? Yes   Social History Narrative     Not on file     Social Determinants of Health     Financial Resource Strain: Low Risk  (4/11/2023)    Overall Financial Resource Strain (CARDIA)      Difficulty of Paying Living Expenses: Not hard at all   Food Insecurity: No Food Insecurity (4/11/2023)    Hunger Vital Sign      Worried About Running Out of Food in the Last Year: Never true      Ran Out of Food in the Last Year: Never true   Transportation Needs: No Transportation Needs (4/11/2023)    PRAPARE - Transportation      Lack of Transportation (Medical): No      Lack of Transportation (Non-Medical): No   Physical Activity: Inactive (4/11/2023)    Exercise Vital Sign      Days of Exercise per Week: 0 days      Minutes of Exercise per Session: 0 min   Stress: No Stress Concern Present (4/11/2023)    [employer] [employer] - Occupational Stress Questionnaire      Feeling of Stress : Not at all   Social Connections: Moderately Integrated (4/11/2023)    Social Connection and Isolation Panel [NHANES]      Frequency of Communication with Friends and Family: More than three times a week      Frequency of Social Gatherings with Friends and Family: More than three times a week      Attends Jehovah's witness Services: 1 to 4 times per year      Active Member of Clubs or Organizations: No      Attends Club or Organization Meetings: Not on file      Marital Status:    Interpersonal Safety: Not on file   Housing Stability: Low Risk  (4/11/2023)    Housing Stability Vital Sign      Unable to Pay for Housing in the Last Year: No      Number of Places Lived in the Last Year: 1      Unstable Housing in the Last Year: No          MEDICATIONS:  has a current medication list which includes the following prescription(s): acetaminophen, albuterol, fexofenadine, fluticasone, hydroxyzine, levothyroxine, losartan, montelukast, rivaroxaban anticoagulant, rosuvastatin, mounjaro, triamcinolone, polyethylene glycol, and  senna-docusate.    ROS   ROS: 10 point ROS neg other than the symptoms noted above in the HPI.    PE  VS: There were no vitals taken for this visit.  GENERAL: healthy, alert and no distress  EYES: Eyes grossly normal to inspection, conjunctivae and sclerae normal  ENT: no nose swelling, nasal discharge.  Thyroid: no apparent thyroid nodules  RESP: no audible wheeze, cough, or visible cyanosis.  No visible retractions or increased work of breathing.  Able to speak fully in complete sentences.  ABDO: not evaluated.  EXTREMITIES: no hand tremors.  NEURO: Cranial nerves grossly intact, mentation intact and speech normal  SKIN: No apparent skin lesions, rash or edema seen   PSYCH: mentation appears normal, affect normal/bright, judgement and insight intact, normal speech and appearance well-groomed    LABS:  !COMPREHENSIVE Latest Ref Rng & Units 5/17/2019   SODIUM 133 - 144 mmol/L 142   POTASSIUM 3.4 - 5.3 mmol/L 3.9   CHLORIDE 94 - 109 mmol/L 109   BUN 7 - 30 mg/dL 11   Creatinine 0.52 - 1.04 mg/dL 0.66   Glucose 70 - 99 mg/dL 87   ANION GAP 3 - 14 mmol/L 7   CALCIUM 8.5 - 10.1 mg/dL 9.3   ALBUMIN 3.4 - 5.0 g/dL 4.0     !COMPREHENSIVE Latest Ref Rng & Units 5/17/2019   AST 0 - 45 U/L 15   ALT 0 - 50 U/L 15     Component      Latest Ref Rng & Units 8/20/2018 3/11/2019   Hemoglobin A1C      0 - 5.6 % 4.9 5.4     !LIPID/HEPATIC Latest Ref Rng & Units 1/2/2018   CHOLESTEROL <200 mg/dL 212 (H)   TRIGLYCERIDES <150 mg/dL 154 (H)   HDL CHOLESTEROL >49 mg/dL 58   LDL CHOLESTEROL, CALCULATED <100 mg/dL 123 (H)   NON HDL CHOLESTEROL <130 mg/dL 154 (H)     Component      Latest Ref Rng & Units 3/11/2019   Vitamin D Deficiency screening      20 - 75 ug/L 23     ENDO THYROID LABS-UMP Latest Ref Rng & Units 7/2/2020   TSH 0.40 - 4.00 mU/L 1.37     All pertinent notes, labs, and images personally reviewed by me.     A/P  Ms.Jodi ROBERT Jackson is a 56 year old evaluated via video visit for the management of obesity and hypothyroidism:    1.  Hypothyroidism:  Currently treated with levothyroxine 75  mcg/day. (X 9/2023)  Clinically and biochemically euthyroid.  Plan:  Discussed diagnosis, pathophysiology, management and treatment options of condition with pt.  Thyroid labs are in acceptable range.  Continue current dose of thyroid hormone replacement--levothyroxine 75 mcg/day.   Labs in 6 months or sooner if concerns or wt changes.  Screen for hashimoto at that time.  Please make a lab appointment for blood work and follow up clinic appointment in 1 week after that to discuss results.  Plan: T4 free, TSH, Thyroid peroxidase antibody       2. Obesity/abnormal weight gain:  No recent weight or BMI to review.  Obesity related comorbidity include HTN, OA, hyperlipidemia.  Currently treated with Munjaro and managed by PCP.  Not discussed.    Discussed s/s of hypothyroidism and hyperthyroidism to watch for.  The patient indicates understanding of these issues and agrees with the plan.    Follow-up:  As noted in AVS    Alice Jules MD  Endocrinology   [facility]  CC: Mitra Keita       Again, thank you for allowing me to participate in the care of your patient.        Sincerely,        Alice Jules MD

## 2023-11-14 NOTE — PROGRESS NOTES
"THIS IS A VIDEO VISIT:    Phone call visit/virtual visit encounter:  DINORA form Kyra Helms earlier.    Name of patient: Rayna Jackson    Date of encounter: 11/14/2023    Time of start of video visit: 4:00    Video started: 4:10    Video ended: 4:19    Provider location: working from [place]/ [facility]    Patient location: patients home.    Mode of transmission: video/ Doximity    Verbal consent: obtained before starting visit. Pt is agreeable.      The patient has been notified of following:      \"This VIDEO visit will be conducted via a call between you and your physician/provider. We have found that certain health care needs can be provided without the need for a physical exam.  This service lets us provide the care you need with a short phone conversation.  If a prescription is necessary we can send it directly to your pharmacy.  If lab work is needed we can place an order for that and you can then stop by our lab to have the test done at a later time.     With new updates with corona virus patient might be billed as clinic visit.     If during the course of the call the physician/provider feels a telephone visit is not appropriate, you will not be charged for this service.\"      Past medical history, social history, family history, allergy and medications were reviewed and updated as appropriate.  Reviewed pertinent labs, notes, imaging studies personally.    Name: Rayna Jackson  F/u for obesity. Last visit > 1 year back.  HPI:  Rayna Jackson is a 58 year old female who presents for the management of obestiy.   has a past medical history of Arthritis, Hypertension, Torn meniscus (01/01/2013), and Uncomplicated asthma.  Since last visit she was working with Dr.Coming Bach for hypothyroidism and dose was adjusted.    1. Obesity:  Has noted abnormal weight gain over the past 3 years - states she has gained 70 lbs.   Making healthy food choices - doesn't feel she overeats.  Unable to lose weight and has continued to " gain.  Growing up was active.  Weight gain started after college.  FH of obesity. (Dad and Dads side is heavy, brother is heavy)    Previously tried a program called [employer], also weight watchers, [place] Weight [place] was most effective - lost 20 lbs.    She was on Phentermine 37.5 mg daily ( on it X 5/2019) and Topiramate 50 mg bid ( started it in 4/2020).    Currently treated with Munjaro ( prescribed by PCP)  Diabetes:No but states her blood sugars are 'always' high-normal.  No GD with pregnancy but was monitored more closely because glucose levels were elevated but not in the diabetes range.  + FH of DM2 - father and MGGM.  Sleep Apnea/Snores: snores - was seen by specialist who did not feel sleep apnea eval was indicated, didn't believe she had sleep apnea  Hypertension or CAD:Yes: Controlled on Losartan  Hyperlipidemia:Yes: untreated.  Diet: Doing Keto diet.  Healthy food choices, low carb, portion controlled  Exercise: Difficult due to joint pain, + OA  #2. Hypothyroidism: Currently treated with levothyroxine 75 mcg/day. (on this dose X 9/2023)   Takes generic.  Reports compliance.  FH + for thyroid disease - Mother, MGM, maternal aunt, sister.    Previous lymph node biopsy + for Lupus - was seen at [place], no diagnosis of Lupus has been confirmed.  Patient feels well at this time and denies any tachycardia, palpitations, heat intolerance, tremor, insomnia, diarrhea, or unexplained weight loss.  Patient also denies  cold intolerance, constipation, or unexplained weight gain.     PMH/PSH:  Past Medical History:   Diagnosis Date    Arthritis     Hypertension     Torn meniscus 01/01/2013    left     Uncomplicated asthma      Past Surgical History:   Procedure Laterality Date    APPENDECTOMY      appendicitis  01/01/1980    ARTHROPLASTY HIP Left 8/8/2023    Procedure: Left total hip arthroplasty;  Surgeon: Neto Amezcua MD;  Location: [facility] OR    BIOPSY  2017    Removed gland in back of neck    COLONOSCOPY N/A 06/30/2017     Procedure: COLONOSCOPY;  COLONOSCOPY   ;  Surgeon: Brooks Villa MD;  Location: [facility]    CYSTECTOMY OVARIAN BENIGN  01/01/1980    KNEE SURGERY Left      Family Hx:  Family History   Problem Relation Age of Onset    Hypertension Mother     Thyroid Disease Mother     Coronary Artery Disease Mother     Hypertension Father     Diabetes Father     Coronary Artery Disease Father     Hyperlipidemia Father     Obesity Father     Macular Degeneration Maternal Grandmother     Thyroid Disease Maternal Grandmother     Coronary Artery Disease Maternal Grandfather     Coronary Artery Disease Paternal Grandmother     Cerebrovascular Disease Paternal Grandmother     Thyroid Disease Sister     Thyroid Disease Maternal Aunt     Diabetes Other         great grandmother    Glaucoma No family hx of      Thyroid disease: Yes, mother, sister, aunt, MGM        DM2: Yes: father, MGGM          Autoimmune: DM1, SLE, RA, Vitiligo     Social Hx:  Social History     Socioeconomic History    Marital status:      Spouse name: Juan F    Number of children: 4    Years of education: Not on file    Highest education level: Master's degree (e.g., MA, MS, Adry, MEd, MSW, [employer])   Occupational History    Occupation: manager     Employer: [employer]   Tobacco Use    Smoking status: Never     Passive exposure: Never    Smokeless tobacco: Never   Vaping Use    Vaping Use: Never used   Substance and Sexual Activity    Alcohol use: Yes     Comment: 2 drinks once a week    Drug use: No    Sexual activity: Yes     Partners: Male     Birth control/protection: I.U.D., Female Surgical   Other Topics Concern    Parent/sibling w/ CABG, MI or angioplasty before 65F 55M? Yes   Social History Narrative    Not on file     Social Determinants of Health     Financial Resource Strain: Low Risk  (4/11/2023)    Overall Financial Resource Strain (CARDIA)     Difficulty of Paying Living Expenses: Not hard at all   Food Insecurity: No Food Insecurity  (4/11/2023)    Hunger Vital Sign     Worried About Running Out of Food in the Last Year: Never true     Ran Out of Food in the Last Year: Never true   Transportation Needs: No Transportation Needs (4/11/2023)    PRAPARE - Transportation     Lack of Transportation (Medical): No     Lack of Transportation (Non-Medical): No   Physical Activity: Inactive (4/11/2023)    Exercise Vital Sign     Days of Exercise per Week: 0 days     Minutes of Exercise per Session: 0 min   Stress: No Stress Concern Present (4/11/2023)    [employer] [place] [employer] - Occupational Stress Questionnaire     Feeling of Stress : Not at all   Social Connections: Moderately Integrated (4/11/2023)    Social Connection and Isolation Panel [NHANES]     Frequency of Communication with Friends and Family: More than three times a week     Frequency of Social Gatherings with Friends and Family: More than three times a week     Attends Worship Services: 1 to 4 times per year     Active Member of Clubs or Organizations: No     Attends Club or Organization Meetings: Not on file     Marital Status:    Interpersonal Safety: Not on file   Housing Stability: Low Risk  (4/11/2023)    Housing Stability Vital Sign     Unable to Pay for Housing in the Last Year: No     Number of Places Lived in the Last Year: 1     Unstable Housing in the Last Year: No          MEDICATIONS:  has a current medication list which includes the following prescription(s): acetaminophen, albuterol, fexofenadine, fluticasone, hydroxyzine, levothyroxine, losartan, montelukast, rivaroxaban anticoagulant, rosuvastatin, mounjaro, triamcinolone, polyethylene glycol, and senna-docusate.    ROS   ROS: 10 point ROS neg other than the symptoms noted above in the HPI.    PE  VS: There were no vitals taken for this visit.  GENERAL: healthy, alert and no distress  EYES: Eyes grossly normal to inspection, conjunctivae and sclerae normal  ENT: no nose swelling, nasal  discharge.  Thyroid: no apparent thyroid nodules  RESP: no audible wheeze, cough, or visible cyanosis.  No visible retractions or increased work of breathing.  Able to speak fully in complete sentences.  ABDO: not evaluated.  EXTREMITIES: no hand tremors.  NEURO: Cranial nerves grossly intact, mentation intact and speech normal  SKIN: No apparent skin lesions, rash or edema seen   PSYCH: mentation appears normal, affect normal/bright, judgement and insight intact, normal speech and appearance well-groomed    LABS:  !COMPREHENSIVE Latest Ref Rng & Units 5/17/2019   SODIUM 133 - 144 mmol/L 142   POTASSIUM 3.4 - 5.3 mmol/L 3.9   CHLORIDE 94 - 109 mmol/L 109   BUN 7 - 30 mg/dL 11   Creatinine 0.52 - 1.04 mg/dL 0.66   Glucose 70 - 99 mg/dL 87   ANION GAP 3 - 14 mmol/L 7   CALCIUM 8.5 - 10.1 mg/dL 9.3   ALBUMIN 3.4 - 5.0 g/dL 4.0     !COMPREHENSIVE Latest Ref Rng & Units 5/17/2019   AST 0 - 45 U/L 15   ALT 0 - 50 U/L 15     Component      Latest Ref Rng & Units 8/20/2018 3/11/2019   Hemoglobin A1C      0 - 5.6 % 4.9 5.4     !LIPID/HEPATIC Latest Ref Rng & Units 1/2/2018   CHOLESTEROL <200 mg/dL 212 (H)   TRIGLYCERIDES <150 mg/dL 154 (H)   HDL CHOLESTEROL >49 mg/dL 58   LDL CHOLESTEROL, CALCULATED <100 mg/dL 123 (H)   NON HDL CHOLESTEROL <130 mg/dL 154 (H)     Component      Latest Ref Rng & Units 3/11/2019   Vitamin D Deficiency screening      20 - 75 ug/L 23     ENDO THYROID LABS-UMP Latest Ref Rng & Units 7/2/2020   TSH 0.40 - 4.00 mU/L 1.37     All pertinent notes, labs, and images personally reviewed by me.     A/P  Ms.Jodi ROBERT Jackson is a 56 year old evaluated via video visit for the management of obesity and hypothyroidism:    1. Hypothyroidism:  Currently treated with levothyroxine 75  mcg/day. (X 9/2023)  Clinically and biochemically euthyroid.  Plan:  Discussed diagnosis, pathophysiology, management and treatment options of condition with pt.  Thyroid labs are in acceptable range.  Continue current dose of thyroid  hormone replacement--levothyroxine 75 mcg/day.   Labs in 6 months or sooner if concerns or wt changes.  Screen for hashimoto at that time.  Please make a lab appointment for blood work and follow up clinic appointment in 1 week after that to discuss results.  Plan: T4 free, TSH, Thyroid peroxidase antibody       2. Obesity/abnormal weight gain:  No recent weight or BMI to review.  Obesity related comorbidity include HTN, OA, hyperlipidemia.  Currently treated with Munjaro and managed by PCP.  Not discussed.    Discussed s/s of hypothyroidism and hyperthyroidism to watch for.  The patient indicates understanding of these issues and agrees with the plan.    Follow-up:  As noted in AVS    Alice Jules MD  Endocrinology   [facility]  CC: Mitra Keita

## 2023-11-28 DIAGNOSIS — E66.811 CLASS 1 OBESITY DUE TO EXCESS CALORIES WITH SERIOUS COMORBIDITY AND BODY MASS INDEX (BMI) OF 32.0 TO 32.9 IN ADULT: ICD-10-CM

## 2023-11-28 DIAGNOSIS — E66.09 CLASS 1 OBESITY DUE TO EXCESS CALORIES WITH SERIOUS COMORBIDITY AND BODY MASS INDEX (BMI) OF 32.0 TO 32.9 IN ADULT: ICD-10-CM

## 2023-11-28 RX ORDER — TIRZEPATIDE 12.5 MG/.5ML
12.5 INJECTION, SOLUTION SUBCUTANEOUS
Qty: 2 ML | Refills: 0 | Status: SHIPPED | OUTPATIENT
Start: 2023-11-28 | End: 2024-02-24

## 2023-11-28 NOTE — TELEPHONE ENCOUNTER
Patient calling to reschedule appointment as her appointment today was canceled due to PCP being out.  Scheduled patient for med check virtual visit on 12/8/2023 with April Coming MD [place].    Patient is out of of her Mounjaro. She requests a refill of her 12.5 mg dose be sent to pharmacy for her.    T'd up med and pharmacy and routing refill request to refill pool to be processed in order.    Morelia NICE RN

## 2023-12-08 ENCOUNTER — VIRTUAL VISIT (OUTPATIENT)
Dept: FAMILY MEDICINE | Facility: CLINIC | Age: 58
End: 2023-12-08
Payer: COMMERCIAL

## 2023-12-08 DIAGNOSIS — E66.811 CLASS 1 OBESITY DUE TO EXCESS CALORIES WITH SERIOUS COMORBIDITY AND BODY MASS INDEX (BMI) OF 32.0 TO 32.9 IN ADULT: Primary | ICD-10-CM

## 2023-12-08 DIAGNOSIS — E66.09 CLASS 1 OBESITY DUE TO EXCESS CALORIES WITH SERIOUS COMORBIDITY AND BODY MASS INDEX (BMI) OF 32.0 TO 32.9 IN ADULT: Primary | ICD-10-CM

## 2023-12-08 PROBLEM — J02.9 SORE THROAT: Status: RESOLVED | Noted: 2020-02-09 | Resolved: 2023-12-08

## 2023-12-08 PROCEDURE — 99213 OFFICE O/P EST LOW 20 MIN: CPT | Mod: VID | Performed by: FAMILY MEDICINE

## 2023-12-08 NOTE — PROGRESS NOTES
"Rayna is a 58 year old who is being evaluated via a billable video visit.      How would you like to obtain your AVS? MyChart  If the video visit is dropped, the invitation should be resent by: Text to cell phone: [phone]  Will anyone else be joining your video visit? No          Assessment & Plan     Class 1 obesity due to excess calories with serious comorbidity and body mass index (BMI) of 32.0 to 32.9 in adult  Current BMP improved to 29.4.  She is doing well on the Tirzepatide 12.5 mg.  We will have her touch base at the end of the month when she will be due for a refill.  If her weight continues to drop, we will keep her at 12.5 mg, however if she plateaus we will increase to 15 mg.  We will complete the PA next month as needed.      20 minutes spent by me on the date of the encounter doing chart review, history and exam, documentation and further activities per the note       BMI:   Estimated body mass index is 32.45 kg/m  as calculated from the following:    Height as of 9/20/23: 1.651 m (5' 5\").    Weight as of 9/20/23: 88.5 kg (195 lb).       See Patient Instructions    Brooke Bach MD  [facility]    Subjective   Rayna is a 58 year old, presenting for the following health issues:  Recheck Medication        12/8/2023     9:38 AM   Additional Questions   Roomed by Adam Florez cma   Accompanied by self         12/8/2023     9:38 AM   Patient Reported Additional Medications   Patient reports taking the following new medications na       \[facility]\""     Medication Followup of Mounjaro   Taking Medication as prescribed: yes  Side Effects:  None  Medication Helping Symptoms:  yes    Taking Mounjaro, down to 177.9 lbs, lost more inches than pounds.  Working out more, had her hip replaced in August, got permission a month ago to workout, doing bicycle. Our charts show 195 in August, down almost 20 lbs. BMI 29.5 today, was 3.4.    She is dairy intolerant and since being on the " "mounjaro is able to tolerate pizza, able to eat lettuce she couldn't have before and has had swelling. She is going to have to get a PA in January. She is losing the weight now with the increased dose of 12.5 mg. No side effects right now. Refill 12.5 mg at the end of the month.  A1c at free check was 5.3.      Review of Systems   Constitutional, HEENT, cardiovascular, pulmonary, gi and gu systems are negative, except as otherwise noted.      Objective    Vitals - Patient Reported  Weight (Patient Reported): 80.3 kg (177 lb)  Height (Patient Reported): 165.1 cm (5' 5\")  BMI (Based on Pt Reported Ht/Wt): 29.45      Vitals:  No vitals were obtained today due to virtual visit.    Physical Exam   GENERAL: Healthy, alert and no distress  EYES: Eyes grossly normal to inspection.  No discharge or erythema, or obvious scleral/conjunctival abnormalities.  RESP: No audible wheeze, cough, or visible cyanosis.  No visible retractions or increased work of breathing.    SKIN: Visible skin clear. No significant rash, abnormal pigmentation or lesions.  NEURO: Cranial nerves grossly intact.  Mentation and speech appropriate for age.  PSYCH: Mentation appears normal, affect normal/bright, judgement and insight intact, normal speech and appearance well-groomed.    Lab on 10/10/2023   Component Date Value Ref Range Status    TSH 10/10/2023 1.40  0.30 - 4.20 uIU/mL Final    Cholesterol 10/10/2023 132  <200 mg/dL Final    Triglycerides 10/10/2023 106  <150 mg/dL Final    Direct Measure HDL 10/10/2023 47 (L)  >=50 mg/dL Final    LDL Cholesterol Calculated 10/10/2023 64  <=100 mg/dL Final    Non HDL Cholesterol 10/10/2023 85  <130 mg/dL Final               Video-Visit Details    Type of service:  Video Visit     Originating Location (pt. Location): Home    Distant Location (provider location):  Off-site  Platform used for Video Visit: Dontrell"

## 2023-12-26 ENCOUNTER — MYC MEDICAL ADVICE (OUTPATIENT)
Dept: FAMILY MEDICINE | Facility: CLINIC | Age: 58
End: 2023-12-26
Payer: COMMERCIAL

## 2023-12-26 DIAGNOSIS — E66.09 CLASS 1 OBESITY DUE TO EXCESS CALORIES WITH SERIOUS COMORBIDITY AND BODY MASS INDEX (BMI) OF 32.0 TO 32.9 IN ADULT: Primary | ICD-10-CM

## 2023-12-26 DIAGNOSIS — E66.811 CLASS 1 OBESITY DUE TO EXCESS CALORIES WITH SERIOUS COMORBIDITY AND BODY MASS INDEX (BMI) OF 32.0 TO 32.9 IN ADULT: Primary | ICD-10-CM

## 2023-12-26 NOTE — TELEPHONE ENCOUNTER
See mychart message below.  T'd up Mounjaro 15 mg.  Please advise.  Che Quiñones, RN    12/8/2023  [facility]    Class 1 obesity due to excess calories with serious comorbidity and body mass index (BMI) of 32.0 to 32.9 in adult  Current BMP improved to 29.4.  She is doing well on the Tirzepatide 12.5 mg.  We will have her touch base at the end of the month when she will be due for a refill.  If her weight continues to drop, we will keep her at 12.5 mg, however if she plateaus we will increase to 15 mg.  We will complete the PA next month as needed.    Brooke Bach MD  [facility]

## 2024-01-09 ENCOUNTER — TELEPHONE (OUTPATIENT)
Dept: FAMILY MEDICINE | Facility: CLINIC | Age: 59
End: 2024-01-09
Payer: COMMERCIAL

## 2024-01-09 NOTE — TELEPHONE ENCOUNTER
Prior Authorization Retail Medication Request    Medication/Dose: tirzepatide (MOUNJARO) 15 MG/0.5ML pen  Diagnosis and ICD code (if different than what is on RX):  New/renewal/insurance change PA/secondary ins. PA:  Previously Tried and Failed:    Rationale:      Valenzuela: [id number]    Carly Choe TC

## 2024-01-16 NOTE — TELEPHONE ENCOUNTER
PRIOR AUTHORIZATION DENIED    Medication: MOUNJARO 15 MG/0.5ML SC SOPN  Insurance Company: [employer] - Phone [phone] Fax [phone]  Denial Date: 1/16/2024  Denial Reason(s): MEDICATION IS NOT COVERED FOR DX - only covered for DMII  POSSIBLE ALTERNATIVES: Saxenda or Wegovy    Appeal Information:     Patient Notified: NO

## 2024-01-17 ENCOUNTER — MYC MEDICAL ADVICE (OUTPATIENT)
Dept: FAMILY MEDICINE | Facility: CLINIC | Age: 59
End: 2024-01-17
Payer: COMMERCIAL

## 2024-01-17 ENCOUNTER — TELEPHONE (OUTPATIENT)
Dept: FAMILY MEDICINE | Facility: CLINIC | Age: 59
End: 2024-01-17
Payer: COMMERCIAL

## 2024-01-17 DIAGNOSIS — E66.09 CLASS 1 OBESITY DUE TO EXCESS CALORIES WITH SERIOUS COMORBIDITY AND BODY MASS INDEX (BMI) OF 32.0 TO 32.9 IN ADULT: Primary | ICD-10-CM

## 2024-01-17 DIAGNOSIS — E66.811 CLASS 1 OBESITY DUE TO EXCESS CALORIES WITH SERIOUS COMORBIDITY AND BODY MASS INDEX (BMI) OF 32.0 TO 32.9 IN ADULT: Primary | ICD-10-CM

## 2024-01-17 NOTE — LETTER
"February 2, 2024      Rayna Jackson  [address] [id number]        To Whom It May Concern,         I am writing to formally request a prior authorization of coverage for my patient, Rayna Jackson, for treatment using Zepbound 15 mg, #2 mL for 28 day supply.  She has been on this dose for the last one month, having taken all previous doses with successful weight loss that reached a plateau.  She has lost an additional 5 lbs since being on the 15 mg dose.    The patient will be using for FDA approved indication (if patient has a BMI greater than 30 or > 27 with weight-related comorbidities).     Please see below for rationale for coverage for Zepbound:    Current BMI is 28.6 with current weight at 172 lbs, and Zepbound would be used for comprehensive weight management. Initial BMI as below:  Estimated body mass index is 32.45 kg/m  as calculated from the following:    Height as of 9/20/23: 1.651 m (5' 5\").    Weight as of 9/20/23: 88.5 kg (195 lb).    Zepbound would be used as an adjunct to a reduced-calorie diet and increased physical activity.    Patient has been and is currently participating in a home weight loss program addressing diet and exercise.    Other formulary options are not appropriate for this patient.  Patient was previously on Topiramate and Phentermine, which did cause weight loss but caused plateau and stopped working.  She regained weight as soon as they were stopped.    Wegovy is on national backorder and is not available for this patient to start at this time to help reach their weight loss goals.     Saxenda is a once daily GLP-1 agonist and patient would prefer a once weekly GLP-1 agonist to help them reach their weight loss goals due to their busy schedule causing adherence concerns.     Please approve this patient for Zepbound to help this patient to achieve their weight loss goals.  The weight loss has already improved pain from her arthritis.    I would request this " submission be evaluated on an individual basis by an endocrinologist or weight  who is current in the practice and standards of care. I firmly believe that this therapy is clinically appropriate and that Rayna Jackson would benefit from improved clinical outcomes and quality of life if allowed the opportunity to receive this treatment.  I urge you to follow the aforementioned evidence presented to keep the best interest of our patient in mind.              Sincerely,        Brooke Bach MD

## 2024-01-17 NOTE — TELEPHONE ENCOUNTER
Please call patient and let her know that Mounjaro is not covered any longer.  Insurance mentioned alternatives of Wegovy or Saxenda.    We could try to send in a prescription for Zepbound, which is the weight loss version of Mounjaro (Tirzepatide still).  They did not mention it in the denial, so I would guess it is not covered, but we could try.

## 2024-01-17 NOTE — TELEPHONE ENCOUNTER
Prior Authorization Retail Medication Request    Medication/Dose: Zepbound  Diagnosis and ICD code (if different than what is on RX):    New/renewal/insurance change PA/secondary ins. PA:  Previously Tried and Failed:    Rationale:      Insurance   Primary: [facility]  Insurance ID:  [id number]    Pharmacy Information (if different than what is on RX)    Daniela Dill CPhT  [facility]  [address] [place] [address] [phone]

## 2024-01-25 ENCOUNTER — VIRTUAL VISIT (OUTPATIENT)
Dept: URGENT CARE | Facility: CLINIC | Age: 59
End: 2024-01-25
Payer: COMMERCIAL

## 2024-01-25 DIAGNOSIS — J10.1 INFLUENZA A: Primary | ICD-10-CM

## 2024-01-25 DIAGNOSIS — R05.1 ACUTE COUGH: ICD-10-CM

## 2024-01-25 PROCEDURE — 99441 PR PHYSICIAN TELEPHONE EVALUATION 5-10 MIN: CPT | Mod: 93 | Performed by: EMERGENCY MEDICINE

## 2024-01-25 RX ORDER — BENZONATATE 200 MG/1
200 CAPSULE ORAL 3 TIMES DAILY PRN
Qty: 30 CAPSULE | Refills: 0 | Status: SHIPPED | OUTPATIENT
Start: 2024-01-25 | End: 2024-08-28

## 2024-01-25 RX ORDER — OSELTAMIVIR PHOSPHATE 75 MG/1
75 CAPSULE ORAL 2 TIMES DAILY
Qty: 10 CAPSULE | Refills: 0 | Status: SHIPPED | OUTPATIENT
Start: 2024-01-25 | End: 2024-01-30

## 2024-01-25 NOTE — PROGRESS NOTES
Phone appointment:  Duration: 5 minutes        CHIEF COMPLAINT: Influenza A exposure      HPI: Patient is a 58-year-old female who became ill on Tuesday with headache, cough, fever.  She was exposed to daughter who tested positive for influenza A.  No shortness of breath.  She describes the headaches as intermittent and sharp.      ROS: See HPI otherwise normal.    Allergies   Allergen Reactions     Lisinopril Cough     Lactose GI Disturbance     Amlodipine Swelling     Aspirin Other (See Comments)     Throat swells     Codeine Unknown     headache      Current Outpatient Medications   Medication Sig Dispense Refill     benzonatate (TESSALON) 200 MG capsule Take 1 capsule (200 mg) by mouth 3 times daily as needed 30 capsule 0     oseltamivir (TAMIFLU) 75 MG capsule Take 1 capsule (75 mg) by mouth 2 times daily for 5 days 10 capsule 0     albuterol (PROAIR HFA/PROVENTIL HFA/VENTOLIN HFA) 108 (90 Base) MCG/ACT inhaler INHALE 2 PUFFS INTO THE LUNGS AS NEEDED FOR SHORTNESS OF BREATH / DYSPNEA OR WHEEZING 18 g 1     fexofenadine (ALLEGRA) 180 MG tablet Take 1 tablet (180 mg) by mouth daily Take 180 mg by mouth 90 tablet 1     fluticasone (FLOVENT HFA) 110 MCG/ACT inhaler TAKE 2 PUFFS BY MOUTH TWICE A DAY (Patient taking differently: Inhale 2 puffs into the lungs 2 times daily as needed TAKE 2 PUFFS BY MOUTH TWICE A DAY) 12 g 5     levothyroxine (SYNTHROID/LEVOTHROID) 75 MCG tablet Take 1 tablet (75 mcg) by mouth daily 90 tablet 1     losartan (COZAAR) 50 MG tablet Take 1 tablet (50 mg) by mouth daily 90 tablet 3     montelukast (SINGULAIR) 10 MG tablet TAKE 1 TABLET BY MOUTH EVERYDAY AT BEDTIME 90 tablet 3     rosuvastatin (CRESTOR) 40 MG tablet Take 1 tablet (40 mg) by mouth daily 90 tablet 1     Semaglutide-Weight Management (WEGOVY) 1.7 MG/0.75ML pen Inject 1.7 mg Subcutaneous once a week for 4 doses 3 mL 0     [START ON 2/10/2024] Semaglutide-Weight Management (WEGOVY) 2.4 MG/0.75ML pen Inject 2.4 mg Subcutaneous  once a week 3 mL 3     tirzepatide (MOUNJARO) 12.5 MG/0.5ML pen Inject 12.5 mg Subcutaneous every 7 days 2 mL 0     tirzepatide (MOUNJARO) 15 MG/0.5ML pen Inject 15 mg Subcutaneous every 7 days 2 mL 5     tirzepatide-Weight Management (ZEPBOUND) 15 MG/0.5ML prefilled pen Inject 0.5 mLs (15 mg) Subcutaneous every 7 days 2 mL 2     triamcinolone (KENALOG) 0.1 % external cream Apply topically 2 times daily 80 g 1         PE: No acute distress on phone appointment.  Patient sounds nasally congested.  She is alert and oriented.  She is nondyspneic sounding speaking in full sentences.        TREATMENT: None.      ASSESSMENT: Viral URI symptoms with history of influenza A exposure.  Will treat empirically.  Patient is to monitor her headaches carefully.      DIAGNOSIS: Influenza A.      PLAN: Tamiflu, Tessalon as instructed.  Be seen in the emergency department for  any worsening headaches.  Be seen in 48 hours if no improvement.

## 2024-01-26 NOTE — TELEPHONE ENCOUNTER
Central Prior Authorization Team   Phone: [phone]    PA Initiation    Medication: Zepbound  Insurance Company: [employer] - Phone [phone] Fax [phone]  Pharmacy Filling the Rx: [place], [place] - [address]  Filling Pharmacy Phone: [phone]  Filling Pharmacy Fax:    Start Date: 1/26/2024

## 2024-01-31 NOTE — TELEPHONE ENCOUNTER
PRIOR AUTHORIZATION DENIED    Medication: Zepbound-PA DENIED     Denial Date: 1/26/2024    Denial Rational:             Appeal Information:

## 2024-02-02 ENCOUNTER — MYC MEDICAL ADVICE (OUTPATIENT)
Dept: FAMILY MEDICINE | Facility: CLINIC | Age: 59
End: 2024-02-02
Payer: COMMERCIAL

## 2024-02-02 NOTE — TELEPHONE ENCOUNTER
Letter of appeal written.    Please fax letter of appeal to number listed on PA denial.  Not sure if this message should be sent to Triage team for assitance or to PA pool, so I am sending it to both.

## 2024-02-02 NOTE — TELEPHONE ENCOUNTER
Dr. Duane Bach- see response below.  There is also an encounter for PA for Mounjaro.  Che Quiñones, RN       Rayna DANIELS Cr Triage (supporting You)35 minutes ago (2:43 PM)       I was on toperimate and Phentermine I did lose weight  but then it  stoped working.   was taken off and gained weight back.   There is a prescription in place for Wegovy that  prescribed and I believe a PA needs to be done for that.  I have lost weight since moving up to 15mg. Current weight is 172.      The mounjaro not only has assisted with weight loss but I no longer have edema in my feet. Overall arthritis symptoms/ pain is almost gone. My blood pressure is normal. Have up coming tests for thyroid to see if it can be decreased.

## 2024-02-02 NOTE — TELEPHONE ENCOUNTER
Please call patient and let her know that her insurance has denied the PA for Zepbound.    They require her to have tried and failed: Wegovy, Saxenda, Orlistat, and/or Qsymia (Phentermine and Topiramate).    Please ask if patient has taken Orlistat (OTC known as Ally), Phentermine, and/or Topiramate.  If so, please document if there was weight loss, side effects, and why medication was discontinued.    I do have a letter of appeal started that was provided to me by our MTM team, but it needs to include whether or not the above medications have been tried and what the outcome was, OR indicate a reason why these medications would not be appropriate for her to try.    Once I have the above information, I can complete and try to submit the letter of appeal to cover Zepbound.  Please also find out if patient has lost any weight since we increased the dose to 15 mg.

## 2024-02-05 NOTE — TELEPHONE ENCOUNTER
Medication Appeal Initiation    We have initiated an appeal for the requested medication:  Medication: Zepbound-PA DENIED, INITIATE PA APPEAL   Appeal Start Date:  2/5/2024  Insurance Company:  [facility] [employer] [phone]  Comments:         Initiate PA Appeal via fax [phone] with Letter of Medical Necessity (dated 2/2/2024) attached with Request. Awaiting on response on Appeal.

## 2024-02-05 NOTE — TELEPHONE ENCOUNTER
PA team   Please see PA appeal letter     Thank you   Sonali Varela, Registered Nurse  [facility]

## 2024-02-06 ENCOUNTER — MYC MEDICAL ADVICE (OUTPATIENT)
Dept: FAMILY MEDICINE | Facility: CLINIC | Age: 59
End: 2024-02-06
Payer: COMMERCIAL

## 2024-02-06 DIAGNOSIS — E66.811 CLASS 1 OBESITY DUE TO EXCESS CALORIES WITH SERIOUS COMORBIDITY AND BODY MASS INDEX (BMI) OF 32.0 TO 32.9 IN ADULT: Primary | ICD-10-CM

## 2024-02-06 DIAGNOSIS — E66.09 CLASS 1 OBESITY DUE TO EXCESS CALORIES WITH SERIOUS COMORBIDITY AND BODY MASS INDEX (BMI) OF 32.0 TO 32.9 IN ADULT: Primary | ICD-10-CM

## 2024-02-06 NOTE — TELEPHONE ENCOUNTER
MEDICATION APPEAL DENIED    Medication: Zepbound-PA DENIED, PA APPEAL DENIED    Denial Date: 2/6/2024    Denial Rational:           Second Level Appeal Information:     Second level appeals will be managed by the clinic staff and provider. Please contact the [employer] Prior Authorization Team if additional information about the denial is needed.

## 2024-02-06 NOTE — TELEPHONE ENCOUNTER
Dr.Coming Bach   Please see ALE zuluaga     Thank you   Sonali Varela, Registered Nurse  [facility]

## 2024-02-06 NOTE — TELEPHONE ENCOUNTER
"Chief Complaint  Follow-up (for labs, pap, mammo)    Subjective          Antonina Greenberg presents to [facility]  History of Present Illness     States that she is still having some trouble with sleep  Discussed the stress that she is going through  Several life changes with divorce, empty nesting, etc    No chest pain  No trouble breathing    Otherwise feeling well      Objective   Vital Signs:   /64 (BP Location: Left arm, Patient Position: Sitting)   Pulse 79   Temp 98.4 °F (36.9 °C) (Oral)   Resp 18   Ht 165.1 cm (65\")   Wt 76 kg (167 lb 9.6 oz)   SpO2 99%   BMI 27.89 kg/m²     Physical Exam  Vitals reviewed.   Constitutional:       Appearance: Normal appearance. She is well-developed.   HENT:      Head: Normocephalic and atraumatic.      Right Ear: External ear normal.      Left Ear: External ear normal.   Eyes:      Conjunctiva/sclera: Conjunctivae normal.      Pupils: Pupils are equal, round, and reactive to light.   Cardiovascular:      Rate and Rhythm: Normal rate and regular rhythm.      Heart sounds: No murmur heard.  No friction rub. No gallop.    Pulmonary:      Effort: Pulmonary effort is normal.      Breath sounds: Normal breath sounds. No wheezing or rhonchi.   Skin:     General: Skin is warm and dry.   Neurological:      Mental Status: She is alert and oriented to person, place, and time.      Cranial Nerves: No cranial nerve deficit.   Psychiatric:         Mood and Affect: Affect normal.         Behavior: Behavior normal.         Thought Content: Thought content normal.        Result Review :       Common labs    Common Labsle 1/10/22 1/10/22 1/10/22    1552 1552 1553   Glucose 93     BUN 6     Creatinine 0.68     eGFR Non African Am 93     Sodium 139     Potassium 3.6     Chloride 103     Calcium 9.5     Albumin 4.80     Total Bilirubin 0.7     Alkaline Phosphatase 69     AST (SGOT) 22     ALT (SGPT) 17     WBC   6.74   Hemoglobin   12.9 " See Unicon message below.  Please advise.  Che Quiñones RN       Hematocrit   37.7   Platelets   341   Total Cholesterol  221 (A)    Triglycerides  101    HDL Cholesterol  81 (A)    LDL Cholesterol   122 (A)    (A) Abnormal value                     Procedures        Assessment and Plan    Diagnoses and all orders for this visit:    1. Adjustment disorder with other symptom (Primary)  Comments:  will treat with zoloft warned of side effects    Other orders  -     sertraline (Zoloft) 25 MG tablet; Take 1 tablet by mouth Daily.  Dispense: 90 tablet; Refill: 1                  Follow Up   Return in about 2 months (around 4/23/2022).  Patient was given instructions and counseling regarding her condition or for health maintenance advice. Please see specific information pulled into the AVS if appropriate.

## 2024-02-09 ENCOUNTER — TELEPHONE (OUTPATIENT)
Dept: FAMILY MEDICINE | Facility: CLINIC | Age: 59
End: 2024-02-09
Payer: COMMERCIAL

## 2024-02-09 DIAGNOSIS — E66.811 CLASS 1 OBESITY DUE TO EXCESS CALORIES WITH SERIOUS COMORBIDITY AND BODY MASS INDEX (BMI) OF 32.0 TO 32.9 IN ADULT: Primary | ICD-10-CM

## 2024-02-09 DIAGNOSIS — E66.09 CLASS 1 OBESITY DUE TO EXCESS CALORIES WITH SERIOUS COMORBIDITY AND BODY MASS INDEX (BMI) OF 32.0 TO 32.9 IN ADULT: Primary | ICD-10-CM

## 2024-02-09 NOTE — TELEPHONE ENCOUNTER
Prior Authorization Retail Medication Request    Medication/Dose:   Diagnosis and ICD code (if different than what is on RX):    New/renewal/insurance change PA/secondary ins. PA:  Previously Tried and Failed:    Rationale:      Insurance   Primary: [employer]   Insurance ID:[id number]    Secondary (if applicable):   Insurance ID:      Pharmacy Information (if different than what is on RX)  Name:  Phone:   Fax:        A prior auth is required for Saxenda 18 mg/3ml  Please contact the insurance at [phone]. Please let us know whem it's approved or denied. Thanks Ramona on behalf of [facility]

## 2024-02-13 ENCOUNTER — TRANSFERRED RECORDS (OUTPATIENT)
Dept: FAMILY MEDICINE | Facility: CLINIC | Age: 59
End: 2024-02-13

## 2024-02-22 NOTE — TELEPHONE ENCOUNTER
Note: Due to record-high volumes, our turn-around time is taking longer than usual . We are currently 10 business days behind in the pools.   We are working diligently to submit all requests in a timely manner and in the order they are received. Please only flag TRUE URGENT requests as high priority to the pool at this time.   If you have questions - please send a note/message in the active PA encounter and send back to the RPPA (Retail Pharmacy Prior Authorization) team [[id number]].    If you have more specific questions about our process please reach out to our supervisor Kerri Bajwa.   Thank you!     PA Initiation    Medication: LIRAGLUTIDE -WEIGHT MANAGEMENT 18 MG/3ML SC SOPN  Insurance Company: [employer] - Phone [phone] Fax [phone]  Pharmacy Filling the Rx: [facility] [place], [place] - [address]  Filling Pharmacy Phone: [phone]  Filling Pharmacy Fax: [phone]  Start Date: 2/22/2024

## 2024-02-23 NOTE — TELEPHONE ENCOUNTER
PRIOR AUTHORIZATION DENIED    Medication: LIRAGLUTIDE -WEIGHT MANAGEMENT 18 MG/3ML SC SOPN  Insurance Company: [employer] - Phone [phone] Fax [phone]  Denial Date: 2/23/2024  Denial Reason(s):     Appeal Information:     Patient Notified: No, care team must notify

## 2024-02-29 ENCOUNTER — DOCUMENTATION ONLY (OUTPATIENT)
Dept: FAMILY MEDICINE | Facility: CLINIC | Age: 59
End: 2024-02-29

## 2024-02-29 NOTE — PROGRESS NOTES
Rayna ROBERT Jackson has an upcoming lab appointment:currently no labs due at the time of apt (Narcisa are due in May 2024).    Future Appointments   Date Time Provider Department Center   3/18/2024  4:30 PM Brooke Heredia MD [facility]   3/20/2024  7:45 AM CR LAB [facility] CR   5/20/2024  1:00 PM [facility]   8/7/2024  8:40 AM Neto Amezcua MD [facility]     Please review and place either future orders or HMPO (Review of Health Maintenance Protocol Orders), as appropriate.    There are no preventive care reminders to display for this patient.  Zoey Yu

## 2024-03-18 ENCOUNTER — OFFICE VISIT (OUTPATIENT)
Dept: FAMILY MEDICINE | Facility: CLINIC | Age: 59
End: 2024-03-18
Attending: FAMILY MEDICINE
Payer: COMMERCIAL

## 2024-03-18 VITALS
HEART RATE: 75 BPM | TEMPERATURE: 98.1 F | DIASTOLIC BLOOD PRESSURE: 83 MMHG | OXYGEN SATURATION: 98 % | BODY MASS INDEX: 29.66 KG/M2 | SYSTOLIC BLOOD PRESSURE: 138 MMHG | RESPIRATION RATE: 14 BRPM | HEIGHT: 65 IN | WEIGHT: 178 LBS

## 2024-03-18 DIAGNOSIS — J30.89 NON-SEASONAL ALLERGIC RHINITIS DUE TO OTHER ALLERGIC TRIGGER: ICD-10-CM

## 2024-03-18 DIAGNOSIS — E66.09 CLASS 1 OBESITY DUE TO EXCESS CALORIES WITH SERIOUS COMORBIDITY AND BODY MASS INDEX (BMI) OF 32.0 TO 32.9 IN ADULT: Primary | ICD-10-CM

## 2024-03-18 DIAGNOSIS — E78.2 MIXED HYPERLIPIDEMIA: ICD-10-CM

## 2024-03-18 DIAGNOSIS — E66.811 CLASS 1 OBESITY DUE TO EXCESS CALORIES WITH SERIOUS COMORBIDITY AND BODY MASS INDEX (BMI) OF 32.0 TO 32.9 IN ADULT: Primary | ICD-10-CM

## 2024-03-18 PROCEDURE — 99214 OFFICE O/P EST MOD 30 MIN: CPT | Performed by: FAMILY MEDICINE

## 2024-03-18 RX ORDER — ROSUVASTATIN CALCIUM 40 MG/1
40 TABLET, COATED ORAL DAILY
Qty: 90 TABLET | Refills: 1 | Status: SHIPPED | OUTPATIENT
Start: 2024-03-18 | End: 2024-08-28

## 2024-03-18 RX ORDER — FLUTICASONE PROPIONATE 50 MCG
1 SPRAY, SUSPENSION (ML) NASAL DAILY
Qty: 18.2 ML | Refills: 1 | Status: SHIPPED | OUTPATIENT
Start: 2024-03-18

## 2024-03-18 NOTE — PROGRESS NOTES
"   Assessment & Plan     Class 1 obesity due to excess calories with serious comorbidity and body mass index (BMI) of 32.0 to 32.9 in adult  Weight is down, will continue the Semaglutide at current dose.  Follow up in 6 months or sooner as needed.  - Basic metabolic panel  (Ca, Cl, CO2, Creat, Gluc, K, Na, BUN); Future    Mixed hyperlipidemia  Controlled, continue.  - rosuvastatin (CRESTOR) 40 MG tablet; Take 1 tablet (40 mg) by mouth daily    Non-seasonal allergic rhinitis due to other allergic trigger  Working well for her, continue.  - fluticasone (FLONASE) 50 MCG/ACT nasal spray; Spray 1 spray into both nostrils daily    15 minutes spent by me on the date of the encounter doing chart review, history and exam, documentation and further activities per the note      BMI  Estimated body mass index is 29.62 kg/m  as calculated from the following:    Height as of this encounter: 1.651 m (5' 5\").    Weight as of this encounter: 80.7 kg (178 lb).   Weight management plan: Continue Semaglutide, dietary changes, and exercise.      See Patient Instructions    Subjective   Rayna is a 59 year old, presenting for the following health issues:  Lipids and Hypertension        3/18/2024     4:15 PM   Additional Questions   Roomed by Carlos SOUZA CMA       History of Present Illness       Hyperlipidemia:  She presents for follow up of hyperlipidemia.   She is taking medication to lower cholesterol. She is not having myalgia or other side effects to statin medications.    Hypertension: She presents for follow up of hypertension.  She does check blood pressure  regularly outside of the clinic. Outpatient blood pressures have not been over 140/90. She follows a low salt diet.     Reason for visit:  Weight losd    She eats 2-3 servings of fruits and vegetables daily.She consumes 1 sweetened beverage(s) daily.She exercises with enough effort to increase her heart rate 30 to 60 minutes per day.  She exercises with enough effort to increase " her heart rate 3 or less days per week.   She is taking medications regularly.       Checking in on weight loss. Was taking the Mounjaro but had issues with getting this filled, now on Wegovy 2.4 mg.  She just started the first shot of wegovy.  She gained weight when she was off the medication, but was eating a lot of pasta.  Since taking the first dose she is doing well overall.    The month she was off the medication her arthritis pains worsened.      She is down about 20 lbs since being on the medication.    Current Outpatient Medications   Medication Sig Dispense Refill    albuterol (PROAIR HFA/PROVENTIL HFA/VENTOLIN HFA) 108 (90 Base) MCG/ACT inhaler INHALE 2 PUFFS INTO THE LUNGS AS NEEDED FOR SHORTNESS OF BREATH / DYSPNEA OR WHEEZING 18 g 1    fexofenadine (ALLEGRA) 180 MG tablet Take 1 tablet (180 mg) by mouth daily Take 180 mg by mouth 90 tablet 1    fluticasone (FLOVENT HFA) 110 MCG/ACT inhaler TAKE 2 PUFFS BY MOUTH TWICE A DAY (Patient taking differently: Inhale 2 puffs into the lungs 2 times daily as needed TAKE 2 PUFFS BY MOUTH TWICE A DAY) 12 g 5    levothyroxine (SYNTHROID/LEVOTHROID) 75 MCG tablet Take 1 tablet (75 mcg) by mouth daily 90 tablet 1    losartan (COZAAR) 50 MG tablet Take 1 tablet (50 mg) by mouth daily 90 tablet 3    montelukast (SINGULAIR) 10 MG tablet TAKE 1 TABLET BY MOUTH EVERYDAY AT BEDTIME 90 tablet 3    rosuvastatin (CRESTOR) 40 MG tablet Take 1 tablet (40 mg) by mouth daily 90 tablet 1    Semaglutide-Weight Management (WEGOVY) 2.4 MG/0.75ML pen Inject 2.4 mg Subcutaneous once a week 3 mL 3    triamcinolone (KENALOG) 0.1 % external cream Apply topically 2 times daily 80 g 1    benzonatate (TESSALON) 200 MG capsule Take 1 capsule (200 mg) by mouth 3 times daily as needed (Patient not taking: Reported on 3/18/2024) 30 capsule 0             Objective    /83 (BP Location: Right arm, Patient Position: Sitting, Cuff Size: Adult Regular)   Pulse 75   Temp 98.1  F (36.7  C) (Oral)  "  Resp 14   Ht 1.651 m (5' 5\")   Wt 80.7 kg (178 lb)   SpO2 98%   BMI 29.62 kg/m    Body mass index is 29.62 kg/m .  Physical Exam   GENERAL: alert and no distress  PSYCH: mentation appears normal, affect normal/bright          Signed Electronically by: Brooke Bach MD    "

## 2024-03-20 ENCOUNTER — LAB (OUTPATIENT)
Dept: LAB | Facility: CLINIC | Age: 59
End: 2024-03-20
Payer: COMMERCIAL

## 2024-03-20 DIAGNOSIS — E66.811 CLASS 1 OBESITY DUE TO EXCESS CALORIES WITH SERIOUS COMORBIDITY AND BODY MASS INDEX (BMI) OF 32.0 TO 32.9 IN ADULT: ICD-10-CM

## 2024-03-20 DIAGNOSIS — E66.09 CLASS 1 OBESITY DUE TO EXCESS CALORIES WITH SERIOUS COMORBIDITY AND BODY MASS INDEX (BMI) OF 32.0 TO 32.9 IN ADULT: ICD-10-CM

## 2024-03-20 LAB
ANION GAP SERPL CALCULATED.3IONS-SCNC: 9 MMOL/L (ref 7–15)
BUN SERPL-MCNC: 11.2 MG/DL (ref 6–20)
CALCIUM SERPL-MCNC: 9.5 MG/DL (ref 8.6–10)
CHLORIDE SERPL-SCNC: 106 MMOL/L (ref 98–107)
CREAT SERPL-MCNC: 0.78 MG/DL (ref 0.51–0.95)
DEPRECATED HCO3 PLAS-SCNC: 25 MMOL/L (ref 22–29)
EGFRCR SERPLBLD CKD-EPI 2021: 88 ML/MIN/1.73M2
GLUCOSE SERPL-MCNC: 91 MG/DL (ref 70–99)
POTASSIUM SERPL-SCNC: 4.4 MMOL/L (ref 3.4–5.3)
SODIUM SERPL-SCNC: 140 MMOL/L (ref 135–145)

## 2024-03-20 PROCEDURE — 80048 BASIC METABOLIC PNL TOTAL CA: CPT

## 2024-03-20 PROCEDURE — 36415 COLL VENOUS BLD VENIPUNCTURE: CPT

## 2024-05-06 ENCOUNTER — LAB (OUTPATIENT)
Dept: LAB | Facility: CLINIC | Age: 59
End: 2024-05-06
Payer: COMMERCIAL

## 2024-05-06 DIAGNOSIS — E03.9 HYPOTHYROIDISM, UNSPECIFIED TYPE: ICD-10-CM

## 2024-05-06 DIAGNOSIS — E03.8 SUBCLINICAL HYPOTHYROIDISM: ICD-10-CM

## 2024-05-06 PROCEDURE — 36415 COLL VENOUS BLD VENIPUNCTURE: CPT

## 2024-05-06 PROCEDURE — 84439 ASSAY OF FREE THYROXINE: CPT

## 2024-05-06 PROCEDURE — 84443 ASSAY THYROID STIM HORMONE: CPT

## 2024-05-06 PROCEDURE — 86376 MICROSOMAL ANTIBODY EACH: CPT

## 2024-05-07 LAB
T4 FREE SERPL-MCNC: 1.49 NG/DL (ref 0.9–1.7)
THYROPEROXIDASE AB SERPL-ACNC: 1140 IU/ML
TSH SERPL DL<=0.005 MIU/L-ACNC: 1.58 UIU/ML (ref 0.3–4.2)

## 2024-05-15 DIAGNOSIS — I10 BENIGN ESSENTIAL HYPERTENSION: ICD-10-CM

## 2024-05-15 RX ORDER — LOSARTAN POTASSIUM 50 MG/1
50 TABLET ORAL DAILY
Qty: 90 TABLET | Refills: 0 | Status: SHIPPED | OUTPATIENT
Start: 2024-05-15 | End: 2024-08-28

## 2024-05-20 ENCOUNTER — VIRTUAL VISIT (OUTPATIENT)
Dept: ENDOCRINOLOGY | Facility: CLINIC | Age: 59
End: 2024-05-20
Payer: COMMERCIAL

## 2024-05-20 DIAGNOSIS — E03.9 HYPOTHYROIDISM, UNSPECIFIED TYPE: ICD-10-CM

## 2024-05-20 DIAGNOSIS — E03.8 SUBCLINICAL HYPOTHYROIDISM: Primary | ICD-10-CM

## 2024-05-20 PROCEDURE — 99214 OFFICE O/P EST MOD 30 MIN: CPT | Mod: 95 | Performed by: INTERNAL MEDICINE

## 2024-05-20 PROCEDURE — G2211 COMPLEX E/M VISIT ADD ON: HCPCS | Mod: 95 | Performed by: INTERNAL MEDICINE

## 2024-05-20 RX ORDER — LEVOTHYROXINE SODIUM 75 UG/1
75 TABLET ORAL DAILY
Qty: 90 TABLET | Refills: 3 | Status: SHIPPED | OUTPATIENT
Start: 2024-05-20

## 2024-05-20 NOTE — PROGRESS NOTES
"THIS IS A VIDEO VISIT:    Phone call visit/virtual visit encounter:  DINORA form Kyra Helms earlier.    Name of patient: Rayna Jackson    Date of encounter: 5/20/2024    Time of start of video visit: 1:00    Video started: 1:10    Video ended: 1:22 (part of visit was telephone only)    Provider location: working from [place]/ [facility]    Patient location: patients home.    Mode of transmission: video/ Doximity    Verbal consent: obtained before starting visit. Pt is agreeable.      The patient has been notified of following:      \"This VIDEO visit will be conducted via a call between you and your physician/provider. We have found that certain health care needs can be provided without the need for a physical exam.  This service lets us provide the care you need with a short phone conversation.  If a prescription is necessary we can send it directly to your pharmacy.  If lab work is needed we can place an order for that and you can then stop by our lab to have the test done at a later time.     With new updates with corona virus patient might be billed as clinic visit.     If during the course of the call the physician/provider feels a telephone visit is not appropriate, you will not be charged for this service.\"      Past medical history, social history, family history, allergy and medications were reviewed and updated as appropriate.  Reviewed pertinent labs, notes, imaging studies personally.    Name: Rayna Jackson  F/u for obesity. Last visit > 1 year back.  HPI:  Rayna Jackson is a 59 year old female who presents for the management of obestiy.   has a past medical history of Arthritis, Hypertension, Torn meniscus (01/01/2013), and Uncomplicated asthma.  Since last visit she was working with Dr.Coming Bach for hypothyroidism and dose was adjusted.    1. Obesity:  Has noted abnormal weight gain over the past 3 years - states she has gained 70 lbs.   Making healthy food choices - doesn't feel she overeats.  Unable to " lose weight and has continued to gain.  Growing up was active.  Weight gain started after college.  FH of obesity. (Dad and Dads side is heavy, brother is heavy)    Previously tried a program called [employer], also weight watchers, [place] [facility] was most effective - lost 20 lbs.    She was on Phentermine 37.5 mg daily ( on it X 5/2019) and Topiramate 50 mg bid ( started it in 4/2020).    Currently treated with Wegovy ( prescribed by PCP)-- lost about 20 lbs so far. ( In last 6 months)  Diabetes:No but states her blood sugars are 'always' high-normal.  No GD with pregnancy but was monitored more closely because glucose levels were elevated but not in the diabetes range.  + FH of DM2 - father and MGGM.  Sleep Apnea/Snores: snores - was seen by specialist who did not feel sleep apnea eval was indicated, didn't believe she had sleep apnea  Hypertension or CAD:Yes: Controlled on Losartan  Hyperlipidemia:Yes: untreated.  Diet: Doing Keto diet.  Healthy food choices, low carb, portion controlled  Exercise: Difficult due to joint pain, + OA  #2. Hypothyroidism: Currently treated with levothyroxine 75 mcg/day. (on this dose X 9/2023)   Takes generic.  Reports compliance.  + tired. More now  FH + for thyroid disease - Mother, MGM, maternal aunt, sister.    Previous lymph node biopsy + for Lupus - was seen at [facility], no diagnosis of Lupus has been confirmed.  Patient feels well at this time and denies any tachycardia, palpitations, heat intolerance, tremor, insomnia, diarrhea, or unexplained weight loss.  Patient also denies  cold intolerance, constipation, or unexplained weight gain.     PMH/PSH:  Past Medical History:   Diagnosis Date    Arthritis     Hypertension     Torn meniscus 01/01/2013    left     Uncomplicated asthma      Past Surgical History:   Procedure Laterality Date    APPENDECTOMY      appendicitis  01/01/1980    ARTHROPLASTY HIP Left 8/8/2023    Procedure: Left total hip arthroplasty;  Surgeon: Neto Amezcua,  MD;  Location: [facility] OR    BIOPSY  2017    Removed gland in back of neck    COLONOSCOPY N/A 06/30/2017    Procedure: COLONOSCOPY;  COLONOSCOPY   ;  Surgeon: Brooks Villa MD;  Location:  GI    CYSTECTOMY OVARIAN BENIGN  01/01/1980    KNEE SURGERY Left      Family Hx:  Family History   Problem Relation Age of Onset    Hypertension Mother     Thyroid Disease Mother     Coronary Artery Disease Mother     Hypertension Father     Diabetes Father     Coronary Artery Disease Father     Hyperlipidemia Father     Obesity Father     Macular Degeneration Maternal Grandmother     Thyroid Disease Maternal Grandmother     Coronary Artery Disease Maternal Grandfather     Coronary Artery Disease Paternal Grandmother     Cerebrovascular Disease Paternal Grandmother     Thyroid Disease Sister     Thyroid Disease Maternal Aunt     Diabetes Other         great grandmother    Glaucoma No family hx of      Thyroid disease: Yes, mother, sister, aunt, MGM        DM2: Yes: father, MGGM          Autoimmune: DM1, SLE, RA, Vitiligo     Social Hx:  Social History     Socioeconomic History    Marital status:      Spouse name: Juan F    Number of children: 4    Years of education: Not on file    Highest education level: Master's degree (e.g., MA, MS, [place], MEd, MSW, [employer])   Occupational History    Occupation: manager     Employer: [employer]   Tobacco Use    Smoking status: Never     Passive exposure: Never    Smokeless tobacco: Never   Vaping Use    Vaping status: Never Used   Substance and Sexual Activity    Alcohol use: Yes     Comment: 2 drinks once a week    Drug use: No    Sexual activity: Yes     Partners: Male     Birth control/protection: I.U.D., Female Surgical   Other Topics Concern    Parent/sibling w/ CABG, MI or angioplasty before 65F 55M? Yes   Social History Narrative    Not on file     Social Determinants of Health     Financial Resource Strain: Low Risk  (11/21/2023)    Financial Resource Strain     Within the  past 12 months, have you or your family members you live with been unable to get utilities (heat, electricity) when it was really needed?: No   Food Insecurity: Low Risk  (11/21/2023)    Food Insecurity     Within the past 12 months, did you worry that your food would run out before you got money to buy more?: No     Within the past 12 months, did the food you bought just not last and you didn t have money to get more?: No   Transportation Needs: Low Risk  (11/21/2023)    Transportation Needs     Within the past 12 months, has lack of transportation kept you from medical appointments, getting your medicines, non-medical meetings or appointments, work, or from getting things that you need?: No   Physical Activity: Insufficiently Active (11/21/2023)    Exercise Vital Sign     Days of Exercise per Week: 3 days     Minutes of Exercise per Session: 30 min   Stress: No Stress Concern Present (11/21/2023)    [employer] - Occupational Stress Questionnaire     Feeling of Stress : Not at all   Social Connections: Socially Integrated (11/21/2023)    Social Connection and Isolation Panel [NHANES]     Frequency of Communication with Friends and Family: More than three times a week     Frequency of Social Gatherings with Friends and Family: More than three times a week     Attends Anabaptism Services: 1 to 4 times per year     Active Member of Clubs or Organizations: Yes     Attends Club or Organization Meetings: More than 4 times per year     Marital Status:    Interpersonal Safety: Low Risk  (3/18/2024)    Interpersonal Safety     Do you feel physically and emotionally safe where you currently live?: Yes     Within the past 12 months, have you been hit, slapped, kicked or otherwise physically hurt by someone?: No     Within the past 12 months, have you been humiliated or emotionally abused in other ways by your partner or ex-partner?: No   Housing Stability: Low Risk  (11/21/2023)    Housing  Stability     Do you have housing? : Yes     Are you worried about losing your housing?: No          MEDICATIONS:  has a current medication list which includes the following prescription(s): albuterol, benzonatate, fexofenadine, fluticasone, fluticasone, levothyroxine, losartan, montelukast, rosuvastatin, semaglutide-weight management, and triamcinolone.    ROS   ROS: 10 point ROS neg other than the symptoms noted above in the HPI.    PE  VS: There were no vitals taken for this visit.  GENERAL: healthy, alert and no distress  EYES: Eyes grossly normal to inspection, conjunctivae and sclerae normal  ENT: no nose swelling, nasal discharge.  Thyroid: no apparent thyroid nodules  RESP: no audible wheeze, cough, or visible cyanosis.  No visible retractions or increased work of breathing.  Able to speak fully in complete sentences.  ABDO: not evaluated.  EXTREMITIES: no hand tremors.  NEURO: Cranial nerves grossly intact, mentation intact and speech normal  SKIN: No apparent skin lesions, rash or edema seen   PSYCH: mentation appears normal, affect normal/bright, judgement and insight intact, normal speech and appearance well-groomed    LABS:   Latest Ref Rng 5/6/2024  11:52 AM   [facility]ROID [facility]     TSH 0.30 - 4.20 uIU/mL 1.58    FREE T4 0.90 - 1.70 ng/dL 1.49    Thyroid Peroxidase Antibody <35 IU/mL 1,140 (H)      All pertinent notes, labs, and images personally reviewed by me.     A/P  Ms.Jodi ROBERT Jackson is a 59 year old evaluated via video visit for the management of obesity and hypothyroidism:    1. Hypothyroidism (+TPO):  Currently treated with levothyroxine 75  mcg/day. (X 9/2023)  Clinically and biochemically euthyroid.  Plan:  Discussed diagnosis, pathophysiology, management and treatment options of condition with pt.  5/2024 Thyroid labs are in acceptable range.  Continue current dose of thyroid hormone replacement--levothyroxine 75 mcg/day.   Labs in 1 year or sooner if concerns or wt changes.  Please make a  lab appointment for blood work and follow up clinic appointment in 1 week after that to discuss results.  Plan: T4 free, TSH, Thyroid peroxidase antibody       2. Dysphagia:  Ongoing dysphagia.  On video visit, does not appear thyromegaly.  Recommend baseline Thyroid US    3. Obesity/abnormal weight gain:  No recent weight or BMI to review.  Obesity related comorbidity include HTN, OA, hyperlipidemia.  Currently treated with Wegovy and managed by PCP.  Not discussed.    Discussed s/s of hypothyroidism and hyperthyroidism to watch for.  The patient indicates understanding of these issues and agrees with the plan.    Discussed indications, risks and benefits of all medications prescribed, and answered questions to patient's satisfaction.  The longitudinal plan of care for the diagnosis(es)/condition(s) as documented were addressed during this visit. Due to the added complexity in care, I will continue to support Rayna in the subsequent management and with ongoing continuity of care.  All questions were answered.  The patient indicates understanding of the above issues and agrees with the plan set forth.      Follow-up:  As noted in AVS    Alice Jules MD  Endocrinology   [facility]  CC: Mitra Keita

## 2024-05-20 NOTE — NURSING NOTE
Is the patient currently in the state of [place]? YES    Visit mode:VIDEO    If the visit is dropped, the patient can be reconnected by: VIDEO VISIT: Text to cell phone:   Telephone Information:   Mobile [phone]       Will anyone else be joining the visit? NO  (If patient encounters technical issues they should call [phone])    How would you like to obtain your AVS? MyChart    Are changes needed to the allergy or medication list? Pt stated no med changes    Are refills needed on medications prescribed by this physician? YES- pt needs to see if provider is going to change dose on synthroid- depending on labs    Reason for visit: RECHECK (thyroid)    Shellie REEDF

## 2024-05-20 NOTE — LETTER
"    5/20/2024         RE: Rayna Jackson  [address] [facility] [address]        Dear Colleague,    Thank you for referring your patient, Rayna Jackson, to the [facility]. Please see a copy of my visit note below.    THIS IS A VIDEO VISIT:    Phone call visit/virtual visit encounter:  DINORA form Kyra fang.    Name of patient: Rayna Jackson    Date of encounter: 5/20/2024    Time of start of video visit: 1:00    Video started: 1:10    Video ended: 1:22 (part of visit was telephone only)    [facility]    Patient location: patients home.    Mode of transmission: video/ Doximity    Verbal consent: obtained before starting visit. Pt is agreeable.      The patient has been notified of following:      \"This VIDEO visit will be conducted via a call between you and your physician/provider. We have found that certain health care needs can be provided without the need for a physical exam.  This service lets us provide the care you need with a short phone conversation.  If a prescription is necessary we can send it directly to your pharmacy.  If lab work is needed we can place an order for that and you can then stop by our lab to have the test done at a later time.     With new updates with corona virus patient might be billed as clinic visit.     If during the course of the call the physician/provider feels a telephone visit is not appropriate, you will not be charged for this service.\"      Past medical history, social history, family history, allergy and medications were reviewed and updated as appropriate.  Reviewed pertinent labs, notes, imaging studies personally.    Name: Rayna Jackson  F/u for obesity. Last visit > 1 year back.  HPI:  Rayna Jackson is a 59 year old female who presents for the management of obestiy.   has a past medical history of Arthritis, Hypertension, Torn meniscus (01/01/2013), and Uncomplicated asthma.  Since last " visit she was working with Dr.Coming Bach for hypothyroidism and dose was adjusted.    1. Obesity:  Has noted abnormal weight gain over the past 3 years - states she has gained 70 lbs.   Making healthy food choices - doesn't feel she overeats.  Unable to lose weight and has continued to gain.  Growing up was active.  Weight gain started after college.  FH of obesity. (Dad and Dads side is heavy, brother is heavy)    Previously tried a program called [employer], also weight watchers, [place] [facility] was most effective - lost 20 lbs.    She was on Phentermine 37.5 mg daily ( on it X 5/2019) and Topiramate 50 mg bid ( started it in 4/2020).    Currently treated with Wegovy ( prescribed by PCP)-- lost about 20 lbs so far. ( In last 6 months)  Diabetes:No but states her blood sugars are 'always' high-normal.  No GD with pregnancy but was monitored more closely because glucose levels were elevated but not in the diabetes range.  + FH of DM2 - father and MGGM.  Sleep Apnea/Snores: snores - was seen by specialist who did not feel sleep apnea eval was indicated, didn't believe she had sleep apnea  Hypertension or CAD:Yes: Controlled on Losartan  Hyperlipidemia:Yes: untreated.  Diet: Doing Keto diet.  Healthy food choices, low carb, portion controlled  Exercise: Difficult due to joint pain, + OA  #2. Hypothyroidism: Currently treated with levothyroxine 75 mcg/day. (on this dose X 9/2023)   Takes generic.  Reports compliance.  + tired. More now  FH + for thyroid disease - Mother, MGM, maternal aunt, sister.    Previous lymph node biopsy + for Lupus - was seen at [place], no diagnosis of Lupus has been confirmed.  Patient feels well at this time and denies any tachycardia, palpitations, heat intolerance, tremor, insomnia, diarrhea, or unexplained weight loss.  Patient also denies  cold intolerance, constipation, or unexplained weight gain.     PMH/PSH:  Past Medical History:   Diagnosis Date     Arthritis      Hypertension      Torn  meniscus 01/01/2013    left      Uncomplicated asthma      Past Surgical History:   Procedure Laterality Date     APPENDECTOMY       appendicitis  01/01/1980     ARTHROPLASTY HIP Left 8/8/2023    Procedure: Left total hip arthroplasty;  Surgeon: Neto Amezcua MD;  Location: [facility] OR     BIOPSY  2017    Removed gland in back of neck     COLONOSCOPY N/A 06/30/2017    Procedure: COLONOSCOPY;  COLONOSCOPY   ;  Surgeon: Brooks Villa MD;  Location:  GI     CYSTECTOMY OVARIAN BENIGN  01/01/1980     KNEE SURGERY Left      Family Hx:  Family History   Problem Relation Age of Onset     Hypertension Mother      Thyroid Disease Mother      Coronary Artery Disease Mother      Hypertension Father      Diabetes Father      Coronary Artery Disease Father      Hyperlipidemia Father      Obesity Father      Macular Degeneration Maternal Grandmother      Thyroid Disease Maternal Grandmother      Coronary Artery Disease Maternal Grandfather      Coronary Artery Disease Paternal Grandmother      Cerebrovascular Disease Paternal Grandmother      Thyroid Disease Sister      Thyroid Disease Maternal Aunt      Diabetes Other         great grandmother     Glaucoma No family hx of      Thyroid disease: Yes, mother, sister, aunt, MGM        DM2: Yes: father, MGGM          Autoimmune: DM1, SLE, RA, Vitiligo     Social Hx:  Social History     Socioeconomic History     Marital status:      Spouse name: Juan F     Number of children: 4     Years of education: Not on file     Highest education level: Master's degree (e.g., MA, MS, [place], MEd, MSW, [employer])   Occupational History     Occupation: manager     Employer: [employer]   Tobacco Use     Smoking status: Never     Passive exposure: Never     Smokeless tobacco: Never   Vaping Use     Vaping status: Never Used   Substance and Sexual Activity     Alcohol use: Yes     Comment: 2 drinks once a week     Drug use: No     Sexual activity: Yes     Partners: Male     Birth  control/protection: I.U.D., Female Surgical   Other Topics Concern     Parent/sibling w/ CABG, MI or angioplasty before 65F 55M? Yes   Social History Narrative     Not on file     Social Determinants of Health     Financial Resource Strain: Low Risk  (11/21/2023)    Financial Resource Strain      Within the past 12 months, have you or your family members you live with been unable to get utilities (heat, electricity) when it was really needed?: No   Food Insecurity: Low Risk  (11/21/2023)    Food Insecurity      Within the past 12 months, did you worry that your food would run out before you got money to buy more?: No      Within the past 12 months, did the food you bought just not last and you didn t have money to get more?: No   Transportation Needs: Low Risk  (11/21/2023)    Transportation Needs      Within the past 12 months, has lack of transportation kept you from medical appointments, getting your medicines, non-medical meetings or appointments, work, or from getting things that you need?: No   Physical Activity: Insufficiently Active (11/21/2023)    Exercise Vital Sign      Days of Exercise per Week: 3 days      Minutes of Exercise per Session: 30 min   Stress: No Stress Concern Present (11/21/2023)    [place] of [employer] - Occupational Stress Questionnaire      Feeling of Stress : Not at all   Social Connections: Socially Integrated (11/21/2023)    Social Connection and Isolation Panel [NHANES]      Frequency of Communication with Friends and Family: More than three times a week      Frequency of Social Gatherings with Friends and Family: More than three times a week      Attends Spiritism Services: 1 to 4 times per year      Active Member of Clubs or Organizations: Yes      Attends Club or Organization Meetings: More than 4 times per year      Marital Status:    Interpersonal Safety: Low Risk  (3/18/2024)    Interpersonal Safety      Do you feel physically and emotionally safe  where you currently live?: Yes      Within the past 12 months, have you been hit, slapped, kicked or otherwise physically hurt by someone?: No      Within the past 12 months, have you been humiliated or emotionally abused in other ways by your partner or ex-partner?: No   Housing Stability: Low Risk  (11/21/2023)    Housing Stability      Do you have housing? : Yes      Are you worried about losing your housing?: No          MEDICATIONS:  has a current medication list which includes the following prescription(s): albuterol, benzonatate, fexofenadine, fluticasone, fluticasone, levothyroxine, losartan, montelukast, rosuvastatin, semaglutide-weight management, and triamcinolone.    ROS   ROS: 10 point ROS neg other than the symptoms noted above in the HPI.    PE  VS: There were no vitals taken for this visit.  GENERAL: healthy, alert and no distress  EYES: Eyes grossly normal to inspection, conjunctivae and sclerae normal  ENT: no nose swelling, nasal discharge.  Thyroid: no apparent thyroid nodules  RESP: no audible wheeze, cough, or visible cyanosis.  No visible retractions or increased work of breathing.  Able to speak fully in complete sentences.  ABDO: not evaluated.  EXTREMITIES: no hand tremors.  NEURO: Cranial nerves grossly intact, mentation intact and speech normal  SKIN: No apparent skin lesions, rash or edema seen   PSYCH: mentation appears normal, affect normal/bright, judgement and insight intact, normal speech and appearance well-groomed    LABS:   Latest Ref [facility] 5/6/2024  11:52 AM   ENDO THYROID LABS-[facility]     TSH 0.30 - 4.20 uIU/mL 1.58    FREE T4 0.90 - 1.70 ng/dL 1.49    Thyroid Peroxidase Antibody <35 IU/mL 1,140 (H)      All pertinent notes, labs, and images personally reviewed by me.     A/P  Ms.Jodi ROBERT Jackson is a 59 year old evaluated via video visit for the management of obesity and hypothyroidism:    1. Hypothyroidism (+TPO):  Currently treated with levothyroxine 75  mcg/day. (X  9/2023)  Clinically and biochemically euthyroid.  Plan:  Discussed diagnosis, pathophysiology, management and treatment options of condition with pt.  5/2024 Thyroid labs are in acceptable range.  Continue current dose of thyroid hormone replacement--levothyroxine 75 mcg/day.   Labs in 1 year or sooner if concerns or wt changes.  Please make a lab appointment for blood work and follow up clinic appointment in 1 week after that to discuss results.  Plan: T4 free, TSH, Thyroid peroxidase antibody       2. Dysphagia:  Ongoing dysphagia.  On video visit, does not appear thyromegaly.  Recommend baseline Thyroid US    3. Obesity/abnormal weight gain:  No recent weight or BMI to review.  Obesity related comorbidity include HTN, OA, hyperlipidemia.  Currently treated with Wegovy and managed by PCP.  Not discussed.    Discussed s/s of hypothyroidism and hyperthyroidism to watch for.  The patient indicates understanding of these issues and agrees with the plan.    Discussed indications, risks and benefits of all medications prescribed, and answered questions to patient's satisfaction.  The longitudinal plan of care for the diagnosis(es)/condition(s) as documented were addressed during this visit. Due to the added complexity in care, I will continue to support Rayna in the subsequent management and with ongoing continuity of care.  All questions were answered.  The patient indicates understanding of the above issues and agrees with the plan set forth.      Follow-up:  As noted in AVS    Alice Jules MD  Endocrinology   [facility]  CC: Mitra Keita       Again, thank you for allowing me to participate in the care of your patient.        Sincerely,        Alice Jules MD

## 2024-05-20 NOTE — PATIENT INSTRUCTIONS
[facility]  Dr Jules, Endocrinology Department    [facility]   [address] [id number]  [place] [address]  Appointment [phone]  Fax: [phone]  [place]: Monday - Thursday      Thyroid labs are in acceptable range.  Continue current dose of thyroid hormone replacement--levothyroxine 75 mcg/day.   Thyroid ultrasound with Radiology.  Labs in 1 year.  Please make a lab appointment for blood work and follow up clinic appointment in 1 week after that to discuss results.     [facility] radiology scheduleing   [place]  [phone]   [facility] Radiology scheduling  [place]  [phone]     Please call and schedule the recommended test as discussed in clinic visit. These are the numbers to call.    Take Levothyroxine on an empty stomach. Take it with a full glass of water at least 30 minutes to 1 hour before eating breakfast.   This medicine should be taken at least 4 hours before or 4 hours after these medicines: antacids (Maalox , Mylanta , Tums ), calcium supplements, cholestyramine (Prevalite , Questran ), colestipol (Colestid ), iron supplements, orlistat (Johnnie , Xenical ), simethicone (Gas-X , Mylicon ), and sucralfate (Carafate ).   Swallow the capsule whole. Do not cut or crush it.

## 2024-05-25 NOTE — LETTER
Problem: Safety - Adult  Goal: Free from fall injury  Outcome: Progressing      September 20, 2023      Rayna Jackson  [address] [facility] [address]        To Whom It May Concern:    Rayna Jackson  was seen on 9/20/23.  Please excuse her for an additional 3 weeks (9/20/23- 10/11/23) due to surgery.        Sincerely,        Neto Amezcua MD

## 2024-06-04 ENCOUNTER — HOSPITAL ENCOUNTER (OUTPATIENT)
Dept: ULTRASOUND IMAGING | Facility: CLINIC | Age: 59
Discharge: HOME | End: 2024-06-04
Attending: INTERNAL MEDICINE | Admitting: INTERNAL MEDICINE
Payer: COMMERCIAL

## 2024-06-04 DIAGNOSIS — E03.9 HYPOTHYROIDISM, UNSPECIFIED TYPE: ICD-10-CM

## 2024-06-04 DIAGNOSIS — E03.8 SUBCLINICAL HYPOTHYROIDISM: ICD-10-CM

## 2024-06-04 PROCEDURE — 76536 US EXAM OF HEAD AND NECK: CPT

## 2024-06-08 ENCOUNTER — HEALTH MAINTENANCE LETTER (OUTPATIENT)
Age: 59
End: 2024-06-08

## 2024-07-02 ENCOUNTER — MYC REFILL (OUTPATIENT)
Dept: FAMILY MEDICINE | Facility: CLINIC | Age: 59
End: 2024-07-02
Payer: COMMERCIAL

## 2024-07-02 DIAGNOSIS — E66.811 CLASS 1 OBESITY DUE TO EXCESS CALORIES WITH SERIOUS COMORBIDITY AND BODY MASS INDEX (BMI) OF 32.0 TO 32.9 IN ADULT: ICD-10-CM

## 2024-07-02 DIAGNOSIS — E66.09 CLASS 1 OBESITY DUE TO EXCESS CALORIES WITH SERIOUS COMORBIDITY AND BODY MASS INDEX (BMI) OF 32.0 TO 32.9 IN ADULT: ICD-10-CM

## 2024-08-01 ASSESSMENT — HOOS JR
HOOS JR TOTAL INTERVAL SCORE: 92.34
GOING UP OR DOWN STAIRS: MILD

## 2024-08-06 NOTE — PROGRESS NOTES
[facility]  Orthopaedic Surgery Consultation by Neto Amezcua M.D.    Rayna Jackson MRN# [id number]   Age: 58 year old YOB: 1965     Requesting physician: Albert Yeo No Ref-Primary, Physician     Background history:  DX:  Headaches  Allergic rhinitis  Asthma  Laryngitis  Hypothyroidism  Hypertension    TREATMENTS:  8/8/2023, left total hip arthroplasty via direct anterior approach.           History of Present Illness:     59-year-old female previously presenting with chronic left hip pain due to osteoarthritic changes of the femoral acetabular joint.  Insufficiently responding to nonsurgical treatment options.  Patient underwent left total hip arthroplasty on 8/8/2023.    Patient is seen today in follow up for left NINA, 8/8/23.  She was previously evaluated on 9/20/23 and is now 1 year out from surgery. Since this visit they report  she is feeling much better, the only issue is that she is unable to sleep on her affected side.  Her presurgical pain is gone.  The incision is healed well.  She denies any numbness of the anterolateral thigh.  She is happy with the result.  She does notice a leg length discrepancy when she stands still in the same location for longer periods of time.  Otherwise it does not bother her during her daily activities.  She is not using the left on the contralateral side.      More notably today she is reporting chronic bilateral knee pain left > right.  This pain has been present for multiple years.  She has known osteoarthritic changes of bilateral knees for which previously she has used meloxicam, she has a knee sleeve and has had injections with both cortisone and longer neurotic acid.  This was done in [place] many years ago.  She is starting to notice an increase in pain and discomfort without clear antecedent trauma.  It is felt on the medial and lateral joint line.  Not necessarily anterior.  The pain is worse when rising from a seated position, stair climbing,  kneeling and squatting.  She does not report significant night pain.  She does endorse initiation stiffness and soreness.  She does not report significant effusions or instability.  She endorses occasional crepitus.    To mitigate her pain recently she has not tried any of the standard treatment modalities.    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 Normal mood and affect.  VITAL SIGNS: not currently breastfeeding.  Reviewed nursing intake notes.   There is no height or weight on file to calculate BMI.  RESP: non labored breathing   ABD: benign, soft, non-tender, no acute peritoneal findings  SKIN: grossly normal   LYMPHATIC: grossly normal, no adenopathy, no extremity edema  NEURO: grossly normal , no motor deficits  VASCULAR: satisfactory perfusion of all extremities   MUSCULOSKELETAL:   Alignment: Neutral  Gait: Apparent leg length discrepancy with the left leg being longer than the right.  Hips:   L hip: Incision is well-healed.  No signs of infection.  ROM not extensively tested due to presence of recent surgery.  No lower extremity edema.  No signs of DVT.    L knee: -0-0 . Straight leg raise +. No redness, warmth or skin changes present. Effusion No. Ligamentously stable in both ML and AP direction. Normal PF tracking with crepitus.  Rabot's recognizably painful.  Apprehension -. Meniscal provocation tests are sensitive.  There is recognizable tenderness to palpation over the joint line.     R knee:-0-0 . Straight leg raise +. No redness, warmth or skin changes present. Effusion No. Ligamentously stable in both ML and AP direction. Normal PF tracking with crepitus.  Rabot's recognizably painful.  Apprehension -. Meniscal provocation tests  are sensitive.  There is recognizable tenderness to palpation over the joint line.      Bilateral LE:   Thigh and leg compartments soft and compressible   +Quad/TA/GSC/FHL/EHL   SILT DP/SP/Racheal/Saph/Tib nerve distributions   Palpable dorsalis pedis pulse          Data:   All laboratory data reviewed  All imaging studies reviewed by me personally.    XR pelvis/hip left 8/7/2024:  My interpretation: Status post placement of left total hip arthroplasty.  Adequate sizing, orientation and fixation of components.  No signs of immediate postoperative complications.   Lengths appear to be 5mm.     XR bilateral knees 8/7/2024:  My interpretation: Mild osteoarthritic changes of bilateral tibiofemoral joints.  Relatively well-preserved joint space but presence of large marginal osteophytes, sclerosis and subchondral cysts.  Severe osteoarthritic changes of bilateral patellofemoral joints with joint space narrowing lateral facet, presence of large marginal osteophytes, sclerosis and subchondral cysts.         Assessment and Plan:   Assessment:  59-year-old female presenting with chronic left hip pain due to osteoarthritic changes of the femoral acetabular joint.  Insufficiently responding to nonsurgical treatment options.  Patient underwent left total hip arthroplasty on 8/8/2023.  Recovered appropriately.  Now also presenting with chronic bilateral knee pain left> right due to osteoarthritic changes most notably in the patellofemoral joint.  Nonsurgical treatment options not been optimized currently.     Plan:  Regards to the left hip I extensively discussed my findings with the patient.  Patient appears to have recovered appropriately.  She could use a heel lift on the contralateral side to level out her leg length discrepancy if she wishes to do so.all questions were answered.  Patient understands and agrees to the treatment plan as set forth.  We will follow-up with patient at the 5 year postoperative date with renewed  radiographic imaging studies.    In regards to the bilateral knees I had a long discussion with the patient regarding etiology and ongoing management options.  Reviewed surgical and nonsurgical treatments.  The non-surgical options include activity modification, pain medication, PT, bracing and injection therapy.  A prescription for meloxicam was provided.  Patient will use her knee sleeve.  A prescription to physical therapy was provided.  We will hold off on injection therapy at this point in time.  Patient understands and agrees to the treatment plan set forth.  We will follow-up with her on an as-needed basis.      Neto Amezcua MD, PhD     Adult Reconstruction  [facility] Department of Orthopaedic Surgery    This note was created using dictation software and may contain errors.  Please contact the creator for any clarifications that are needed.

## 2024-08-07 ENCOUNTER — ANCILLARY PROCEDURE (OUTPATIENT)
Dept: GENERAL RADIOLOGY | Facility: CLINIC | Age: 59
End: 2024-08-07
Attending: STUDENT IN AN ORGANIZED HEALTH CARE EDUCATION/TRAINING PROGRAM
Payer: COMMERCIAL

## 2024-08-07 ENCOUNTER — OFFICE VISIT (OUTPATIENT)
Dept: ORTHOPEDICS | Facility: CLINIC | Age: 59
End: 2024-08-07
Payer: COMMERCIAL

## 2024-08-07 VITALS
DIASTOLIC BLOOD PRESSURE: 82 MMHG | SYSTOLIC BLOOD PRESSURE: 120 MMHG | HEIGHT: 65 IN | BODY MASS INDEX: 29.66 KG/M2 | WEIGHT: 178 LBS

## 2024-08-07 DIAGNOSIS — M25.561 CHRONIC PAIN OF BOTH KNEES: ICD-10-CM

## 2024-08-07 DIAGNOSIS — Z96.642 S/P TOTAL LEFT HIP ARTHROPLASTY: Primary | ICD-10-CM

## 2024-08-07 DIAGNOSIS — M25.562 CHRONIC PAIN OF BOTH KNEES: ICD-10-CM

## 2024-08-07 DIAGNOSIS — Z96.642 S/P TOTAL LEFT HIP ARTHROPLASTY: ICD-10-CM

## 2024-08-07 DIAGNOSIS — M25.562 BILATERAL KNEE PAIN: ICD-10-CM

## 2024-08-07 DIAGNOSIS — G89.29 CHRONIC PAIN OF BOTH KNEES: ICD-10-CM

## 2024-08-07 DIAGNOSIS — M17.11 OSTEOARTHROSIS, LOCALIZED, PRIMARY, KNEE, RIGHT: ICD-10-CM

## 2024-08-07 DIAGNOSIS — M17.12 OSTEOARTHROSIS, LOCALIZED, PRIMARY, KNEE, LEFT: ICD-10-CM

## 2024-08-07 DIAGNOSIS — M25.561 BILATERAL KNEE PAIN: ICD-10-CM

## 2024-08-07 PROCEDURE — 99214 OFFICE O/P EST MOD 30 MIN: CPT | Performed by: STUDENT IN AN ORGANIZED HEALTH CARE EDUCATION/TRAINING PROGRAM

## 2024-08-07 PROCEDURE — 73502 X-RAY EXAM HIP UNI 2-3 VIEWS: CPT | Mod: TC | Performed by: RADIOLOGY

## 2024-08-07 PROCEDURE — 73562 X-RAY EXAM OF KNEE 3: CPT | Mod: TC | Performed by: RADIOLOGY

## 2024-08-07 RX ORDER — MELOXICAM 15 MG/1
15 TABLET ORAL DAILY
Qty: 30 TABLET | Refills: 2 | Status: SHIPPED | OUTPATIENT
Start: 2024-08-07

## 2024-08-07 NOTE — LETTER
8/7/2024      Rayna Jackson  [address]  [facility] [address]      Dear Colleague,    Thank you for referring your patient, Rayna Jackson, to the [facility] [place]. Please see a copy of my visit note below.        [facility]  Orthopaedic Surgery Consultation by Neto Amezcua M.D.    Rayna Jackson MRN# [id number]   Age: 58 year old YOB: 1965     Requesting physician: Albert Yeo No Ref-Primary, Physician     Background history:  DX:  Headaches  Allergic rhinitis  Asthma  Laryngitis  Hypothyroidism  Hypertension    TREATMENTS:  8/8/2023, left total hip arthroplasty via direct anterior approach.           History of Present Illness:     59-year-old female previously presenting with chronic left hip pain due to osteoarthritic changes of the femoral acetabular joint.  Insufficiently responding to nonsurgical treatment options.  Patient underwent left total hip arthroplasty on 8/8/2023.    Patient is seen today in follow up for left NINA, 8/8/23.  She was previously evaluated on 9/20/23 and is now 1 year out from surgery. Since this visit they report  she is feeling much better, the only issue is that she is unable to sleep on her affected side.  Her presurgical pain is gone.  The incision is healed well.  She denies any numbness of the anterolateral thigh.  She is happy with the result.  She does notice a leg length discrepancy when she stands still in the same location for longer periods of time.  Otherwise it does not bother her during her daily activities.  She is not using the left on the contralateral side.      More notably today she is reporting chronic bilateral knee pain left > right.  This pain has been present for multiple years.  She has known osteoarthritic changes of bilateral knees for which previously she has used meloxicam, she has a knee sleeve and has had injections with both cortisone and longer neurotic acid.  This was done in [place] many years  ago.  She is starting to notice an increase in pain and discomfort without clear antecedent trauma.  It is felt on the medial and lateral joint line.  Not necessarily anterior.  The pain is worse when rising from a seated position, stair climbing, kneeling and squatting.  She does not report significant night pain.  She does endorse initiation stiffness and soreness.  She does not report significant effusions or instability.  She endorses occasional crepitus.    To mitigate her pain recently she has not tried any of the standard treatment modalities.    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 Normal mood and affect.  VITAL SIGNS: not currently breastfeeding.  Reviewed nursing intake notes.   There is no height or weight on file to calculate BMI.  RESP: non labored breathing   ABD: benign, soft, non-tender, no acute peritoneal findings  SKIN: grossly normal   LYMPHATIC: grossly normal, no adenopathy, no extremity edema  NEURO: grossly normal , no motor deficits  VASCULAR: satisfactory perfusion of all extremities   MUSCULOSKELETAL:   Alignment: Neutral  Gait: Apparent leg length discrepancy with the left leg being longer than the right.  Hips:   L hip: Incision is well-healed.  No signs of infection.  ROM not extensively tested due to presence of recent surgery.  No lower extremity edema.  No signs of DVT.    L knee: -0-0 . Straight leg raise +. No redness, warmth or skin changes present. Effusion No. Ligamentously stable in both ML and AP direction. Normal PF tracking with crepitus.  Rabot's recognizably painful.  Apprehension -. Meniscal provocation tests are sensitive.  There is recognizable tenderness to palpation over the joint line.     R knee:ROM  130-0-0 . Straight leg raise +. No redness, warmth or skin changes present. Effusion No. Ligamentously stable in both ML and AP direction. Normal PF tracking with crepitus.  Rabot's recognizably painful.  Apprehension -. Meniscal provocation tests are sensitive.  There is recognizable tenderness to palpation over the joint line.      Bilateral LE:   Thigh and leg compartments soft and compressible   +Quad/TA/GSC/FHL/EHL   SILT DP/SP/Racheal/Saph/Tib nerve distributions   Palpable dorsalis pedis pulse          Data:   All laboratory data reviewed  All imaging studies reviewed by me personally.    XR pelvis/hip left 8/7/2024:  My interpretation: Status post placement of left total hip arthroplasty.  Adequate sizing, orientation and fixation of components.  No signs of immediate postoperative complications.   Lengths appear to be 5mm.     XR bilateral knees 8/7/2024:  My interpretation: Mild osteoarthritic changes of bilateral tibiofemoral joints.  Relatively well-preserved joint space but presence of large marginal osteophytes, sclerosis and subchondral cysts.  Severe osteoarthritic changes of bilateral patellofemoral joints with joint space narrowing lateral facet, presence of large marginal osteophytes, sclerosis and subchondral cysts.         Assessment and Plan:   Assessment:  59-year-old female presenting with chronic left hip pain due to osteoarthritic changes of the femoral acetabular joint.  Insufficiently responding to nonsurgical treatment options.  Patient underwent left total hip arthroplasty on 8/8/2023.  Recovered appropriately.  Now also presenting with chronic bilateral knee pain left> right due to osteoarthritic changes most notably in the patellofemoral joint.  Nonsurgical treatment options not been optimized currently.     Plan:  Regards to the left hip I extensively discussed my findings with the patient.  Patient appears to have recovered appropriately.  She could use a heel lift on the contralateral  side to level out her leg length discrepancy if she wishes to do so.all questions were answered.  Patient understands and agrees to the treatment plan as set forth.  We will follow-up with patient at the 5 year postoperative date with renewed radiographic imaging studies.    In regards to the bilateral knees I had a long discussion with the patient regarding etiology and ongoing management options.  Reviewed surgical and nonsurgical treatments.  The non-surgical options include activity modification, pain medication, PT, bracing and injection therapy.  A prescription for meloxicam was provided.  Patient will use her knee sleeve.  A prescription to physical therapy was provided.  We will hold off on injection therapy at this point in time.  Patient understands and agrees to the treatment plan set forth.  We will follow-up with her on an as-needed basis.      Neto Amezcua MD, PhD     Adult Reconstruction  [facility] Department of Orthopaedic Surgery    This note was created using dictation software and may contain errors.  Please contact the creator for any clarifications that are needed.      Again, thank you for allowing me to participate in the care of your patient.        Sincerely,        Neto Amezcua MD

## 2024-08-07 NOTE — PATIENT INSTRUCTIONS
1. S/P total left hip arthroplasty    2. Bilateral knee pain          [facility].  You can call [phone]  to schedule at your convenience.       Call my office with any questions or concerns, [phone].

## 2024-08-23 ASSESSMENT — ASTHMA QUESTIONNAIRES
QUESTION_4 LAST FOUR WEEKS HOW OFTEN HAVE YOU USED YOUR RESCUE INHALER OR NEBULIZER MEDICATION (SUCH AS ALBUTEROL): TWO OR THREE TIMES PER WEEK
ACT_TOTALSCORE: 16
QUESTION_2 LAST FOUR WEEKS HOW OFTEN HAVE YOU HAD SHORTNESS OF BREATH: ONCE A DAY
QUESTION_5 LAST FOUR WEEKS HOW WOULD YOU RATE YOUR ASTHMA CONTROL: WELL CONTROLLED
QUESTION_1 LAST FOUR WEEKS HOW MUCH OF THE TIME DID YOUR ASTHMA KEEP YOU FROM GETTING AS MUCH DONE AT WORK, SCHOOL OR AT HOME: NONE OF THE TIME
ACT_TOTALSCORE: 16
QUESTION_3 LAST FOUR WEEKS HOW OFTEN DID YOUR ASTHMA SYMPTOMS (WHEEZING, COUGHING, SHORTNESS OF BREATH, CHEST TIGHTNESS OR PAIN) WAKE YOU UP AT NIGHT OR EARLIER THAN USUAL IN THE MORNING: TWO OR THREE NIGHTS A WEEK

## 2024-08-24 ASSESSMENT — ACTIVITIES OF DAILY LIVING (ADL)
STIFFNESS: THE SYMPTOM AFFECTS MY ACTIVITY MODERATELY
GO UP STAIRS: ACTIVITY IS VERY DIFFICULT
GIVING WAY, BUCKLING OR SHIFTING OF KNEE: THE SYMPTOM AFFECTS MY ACTIVITY SLIGHTLY
STAND: ACTIVITY IS SOMEWHAT DIFFICULT
GO DOWN STAIRS: ACTIVITY IS SOMEWHAT DIFFICULT
STIFFNESS: THE SYMPTOM AFFECTS MY ACTIVITY MODERATELY
RISE FROM A CHAIR: ACTIVITY IS SOMEWHAT DIFFICULT
KNEEL ON THE FRONT OF YOUR KNEE: ACTIVITY IS VERY DIFFICULT
WEAKNESS: THE SYMPTOM AFFECTS MY ACTIVITY SLIGHTLY
SWELLING: I DO NOT HAVE THE SYMPTOM
PLEASE_INDICATE_YOR_PRIMARY_REASON_FOR_REFERRAL_TO_THERAPY:: KNEE
SWELLING: I DO NOT HAVE THE SYMPTOM
SIT WITH YOUR KNEE BENT: ACTIVITY IS SOMEWHAT DIFFICULT
GO DOWN STAIRS: ACTIVITY IS SOMEWHAT DIFFICULT
WALK: ACTIVITY IS SOMEWHAT DIFFICULT
LIMPING: THE SYMPTOM AFFECTS MY ACTIVITY SLIGHTLY
RAW_SCORE: 37.69
WEAKNESS: THE SYMPTOM AFFECTS MY ACTIVITY SLIGHTLY
GO UP STAIRS: ACTIVITY IS VERY DIFFICULT
KNEE_ACTIVITY_OF_DAILY_LIVING_SCORE: 53.84
HOW_WOULD_YOU_RATE_THE_OVERALL_FUNCTION_OF_YOUR_KNEE_DURING_YOUR_USUAL_DAILY_ACTIVITIES?: ABNORMAL
AS_A_RESULT_OF_YOUR_KNEE_INJURY,_HOW_WOULD_YOU_RATE_YOUR_CURRENT_LEVEL_OF_DAILY_ACTIVITY?: ABNORMAL
STAND: ACTIVITY IS SOMEWHAT DIFFICULT
AS_A_RESULT_OF_YOUR_KNEE_INJURY,_HOW_WOULD_YOU_RATE_YOUR_CURRENT_LEVEL_OF_DAILY_ACTIVITY?: ABNORMAL
WALK: ACTIVITY IS SOMEWHAT DIFFICULT
KNEE_ACTIVITY_OF_DAILY_LIVING_SUM: 35
GIVING WAY, BUCKLING OR SHIFTING OF KNEE: THE SYMPTOM AFFECTS MY ACTIVITY SLIGHTLY
PAIN: THE SYMPTOM AFFECTS MY ACTIVITY MODERATELY
LIMPING: THE SYMPTOM AFFECTS MY ACTIVITY SLIGHTLY
KNEEL ON THE FRONT OF YOUR KNEE: ACTIVITY IS VERY DIFFICULT
RISE FROM A CHAIR: ACTIVITY IS SOMEWHAT DIFFICULT
PAIN: THE SYMPTOM AFFECTS MY ACTIVITY MODERATELY
SIT WITH YOUR KNEE BENT: ACTIVITY IS SOMEWHAT DIFFICULT
HOW_WOULD_YOU_RATE_THE_OVERALL_FUNCTION_OF_YOUR_KNEE_DURING_YOUR_USUAL_DAILY_ACTIVITIES?: ABNORMAL

## 2024-08-28 ENCOUNTER — OFFICE VISIT (OUTPATIENT)
Dept: FAMILY MEDICINE | Facility: CLINIC | Age: 59
End: 2024-08-28
Payer: COMMERCIAL

## 2024-08-28 VITALS
TEMPERATURE: 98.3 F | HEART RATE: 64 BPM | WEIGHT: 170.2 LBS | RESPIRATION RATE: 16 BRPM | BODY MASS INDEX: 28.36 KG/M2 | OXYGEN SATURATION: 99 % | DIASTOLIC BLOOD PRESSURE: 79 MMHG | HEIGHT: 65 IN | SYSTOLIC BLOOD PRESSURE: 133 MMHG

## 2024-08-28 DIAGNOSIS — J45.20 MILD INTERMITTENT ASTHMA WITHOUT COMPLICATION: ICD-10-CM

## 2024-08-28 DIAGNOSIS — E78.2 MIXED HYPERLIPIDEMIA: ICD-10-CM

## 2024-08-28 DIAGNOSIS — J30.9 CHRONIC ALLERGIC RHINITIS: ICD-10-CM

## 2024-08-28 DIAGNOSIS — E66.811 CLASS 1 OBESITY DUE TO EXCESS CALORIES WITH SERIOUS COMORBIDITY AND BODY MASS INDEX (BMI) OF 32.0 TO 32.9 IN ADULT: Primary | ICD-10-CM

## 2024-08-28 DIAGNOSIS — E66.09 CLASS 1 OBESITY DUE TO EXCESS CALORIES WITH SERIOUS COMORBIDITY AND BODY MASS INDEX (BMI) OF 32.0 TO 32.9 IN ADULT: Primary | ICD-10-CM

## 2024-08-28 DIAGNOSIS — I10 BENIGN ESSENTIAL HYPERTENSION: ICD-10-CM

## 2024-08-28 DIAGNOSIS — E03.8 SUBCLINICAL HYPOTHYROIDISM: ICD-10-CM

## 2024-08-28 DIAGNOSIS — M79.605 PAIN OF LEFT LOWER EXTREMITY: ICD-10-CM

## 2024-08-28 PROBLEM — E05.80 HYPERTHYROIDISM WITH HASHIMOTO DISEASE: Status: ACTIVE | Noted: 2024-06-01

## 2024-08-28 PROBLEM — E06.3 HYPERTHYROIDISM WITH HASHIMOTO DISEASE: Status: ACTIVE | Noted: 2024-06-01

## 2024-08-28 LAB
CHOLEST SERPL-MCNC: 115 MG/DL
FASTING STATUS PATIENT QL REPORTED: YES
HDLC SERPL-MCNC: 59 MG/DL
LDLC SERPL CALC-MCNC: 40 MG/DL
NONHDLC SERPL-MCNC: 56 MG/DL
T4 FREE SERPL-MCNC: 1.58 NG/DL (ref 0.9–1.7)
TRIGL SERPL-MCNC: 80 MG/DL
TSH SERPL DL<=0.005 MIU/L-ACNC: 1.45 UIU/ML (ref 0.3–4.2)

## 2024-08-28 PROCEDURE — 84443 ASSAY THYROID STIM HORMONE: CPT | Performed by: FAMILY MEDICINE

## 2024-08-28 PROCEDURE — G2211 COMPLEX E/M VISIT ADD ON: HCPCS | Performed by: FAMILY MEDICINE

## 2024-08-28 PROCEDURE — 84439 ASSAY OF FREE THYROXINE: CPT | Performed by: FAMILY MEDICINE

## 2024-08-28 PROCEDURE — 36415 COLL VENOUS BLD VENIPUNCTURE: CPT | Performed by: FAMILY MEDICINE

## 2024-08-28 PROCEDURE — 80061 LIPID PANEL: CPT | Performed by: FAMILY MEDICINE

## 2024-08-28 PROCEDURE — 99214 OFFICE O/P EST MOD 30 MIN: CPT | Performed by: FAMILY MEDICINE

## 2024-08-28 RX ORDER — ROSUVASTATIN CALCIUM 40 MG/1
40 TABLET, COATED ORAL DAILY
Qty: 90 TABLET | Refills: 3 | Status: SHIPPED | OUTPATIENT
Start: 2024-08-28

## 2024-08-28 RX ORDER — MONTELUKAST SODIUM 10 MG/1
TABLET ORAL
Qty: 90 TABLET | Refills: 3 | Status: SHIPPED | OUTPATIENT
Start: 2024-08-28

## 2024-08-28 RX ORDER — LOSARTAN POTASSIUM 50 MG/1
50 TABLET ORAL DAILY
Qty: 90 TABLET | Refills: 3 | Status: SHIPPED | OUTPATIENT
Start: 2024-08-28

## 2024-08-28 ASSESSMENT — PAIN SCALES - GENERAL: PAINLEVEL: NO PAIN (0)

## 2024-08-28 NOTE — PROGRESS NOTES
Assessment & Plan     Class 1 obesity due to excess calories with serious comorbidity and body mass index (BMI) of 32.0 to 32.9 in adult  Down around 20 lbs, reached a plateau currently.  BMI 28.3 today at 170 lbs.  Continue Wegovy 2.4 mg.  Decrease Coca-cola intake and monitor diet for potential other changes. Continue increased activity levels.  Follow up in 6 months or sooner as needed.    Mixed hyperlipidemia  Due for labs, recommendations pending results.  Continue Rosuvastatin.  - Lipid panel reflex to direct LDL Fasting; Future  - rosuvastatin (CRESTOR) 40 MG tablet; Take 1 tablet (40 mg) by mouth daily.  - Lipid panel reflex to direct LDL Fasting    Mild intermittent asthma without complication  Experiencing some symptoms right now with allergies, continue current regimen.  - montelukast (SINGULAIR) 10 MG tablet; TAKE 1 TABLET BY MOUTH EVERYDAY AT BEDTIME    Chronic allergic rhinitis  As above  - montelukast (SINGULAIR) 10 MG tablet; TAKE 1 TABLET BY MOUTH EVERYDAY AT BEDTIME    Benign essential hypertension  Controlled, continue current regimen.  - losartan (COZAAR) 50 MG tablet; Take 1 tablet (50 mg) by mouth daily.    Subclinical hypothyroidism  Per Endo, Hashimoto's diagnosed.  - T4 free  - TSH    Pain of left lower extremity  Monitor.      The longitudinal plan of care for the diagnosis(es)/condition(s) as documented were addressed during this visit. Due to the added complexity in care, I will continue to support Rayna in the subsequent management and with ongoing continuity of care.      See Patient Instructions    Subjective   Rayna is a 59 year old, presenting for the following health issues:  Weight Check (Follow Up) and Musculoskeletal Problem (Numbness, left hip to foot)        8/28/2024     8:44 AM   Additional Questions   Roomed by Aminah Crockett, EMT-B     History of Present Illness       Reason for visit:  Weight loss    She eats 4 or more servings of fruits and vegetables daily.She consumes 1  sweetened beverage(s) daily.She exercises with enough effort to increase her heart rate 20 to 29 minutes per day.  She exercises with enough effort to increase her heart rate 3 or less days per week.   She is taking medications regularly.     Pt being seen today to follow up on weight loss.    Thinks she has stalled and plateau.  Weighs daily, gains and loses the same 4 lbs, thinks is water weight, over the last couple months.  She is down more than 20 lbs.  168-170 today, home vs clinic. Was 195-204 at her highest when starting. BMI 28.3 today. On Wegovy, 2.4 mg weekly, doing well, no issues.  Activity levels are increased. Does still drink Coke, maybe once per day, sometimes less.  Craving more healthy food.  She has not had swelling in her feet like she did in the past.  She can eat ice cream now without getting sick, which she was not able to do in the past, would be running to the bathroom.      Medication refills.    Concern - Left hip to foot numbness  Onset: 5 days  Description: Pt reports numbness that starts from left hip that radiates down to toes mainly on right side of foot. Denies any pain.  Pt stated she first noticed this the day after being at the [employer] [facility] [employer] the day before.   Intensity: mild to moderate  Progression of Symptoms:  worsening and waxing and waning  Accompanying Signs & Symptoms: none  Previous history of similar problem: none  Precipitating factors:        Worsened by: walking  Alleviating factors:        Improved by: none  Therapies tried and outcome: None    Had hip replaced a year ago.  [place] to fair Friday, Saturday had severe pain.  Leg numb, pain on dorsum of left foot.  Lump on upper medial leg, called into work Monday because of pain.  Has not gotten much better over the week, Meloxicam daily right.  Starts PT tomorrow for her knee.    Allergy season- asthma; she is using the Flovent, albuterol and singulair.        Objective    /79 (BP Location: Right arm, Patient  "Position: Sitting, Cuff Size: Adult Regular)   Pulse 64   Temp 98.3  F (36.8  C) (Oral)   Resp 16   Ht 1.651 m (5' 5\")   Wt 77.2 kg (170 lb 3.2 oz)   SpO2 99%   BMI 28.32 kg/m    Body mass index is 28.32 kg/m .  Physical Exam   GENERAL: alert and no distress  RESP: lungs clear to auscultation - no rales, rhonchi or wheezes  CV: regular rates and rhythm, normal S1 S2, no S3 or S4, no murmur, click or rub, peripheral pulses strong, and no peripheral edema  PSYCH: mentation appears normal, affect normal/bright    Lab on 05/06/2024   Component Date Value Ref Range Status    Free T4 05/06/2024 1.49  0.90 - 1.70 ng/dL Final    TSH 05/06/2024 1.58  0.30 - 4.20 uIU/mL Final    Thyroid Peroxidase Antibody 05/06/2024 1,140 (H)  <35 IU/mL Final           Signed Electronically by: Brooke Bach MD    "

## 2024-08-29 ENCOUNTER — THERAPY VISIT (OUTPATIENT)
Dept: PHYSICAL THERAPY | Facility: CLINIC | Age: 59
End: 2024-08-29
Attending: STUDENT IN AN ORGANIZED HEALTH CARE EDUCATION/TRAINING PROGRAM
Payer: COMMERCIAL

## 2024-08-29 DIAGNOSIS — M17.11 OSTEOARTHROSIS, LOCALIZED, PRIMARY, KNEE, RIGHT: ICD-10-CM

## 2024-08-29 DIAGNOSIS — G89.29 CHRONIC PAIN OF BOTH KNEES: ICD-10-CM

## 2024-08-29 DIAGNOSIS — M17.12 OSTEOARTHROSIS, LOCALIZED, PRIMARY, KNEE, LEFT: ICD-10-CM

## 2024-08-29 DIAGNOSIS — M25.561 CHRONIC PAIN OF BOTH KNEES: ICD-10-CM

## 2024-08-29 DIAGNOSIS — M17.0 OSTEOARTHRITIS OF BOTH KNEES, UNSPECIFIED OSTEOARTHRITIS TYPE: Primary | ICD-10-CM

## 2024-08-29 DIAGNOSIS — M25.562 CHRONIC PAIN OF BOTH KNEES: ICD-10-CM

## 2024-08-29 PROCEDURE — 97110 THERAPEUTIC EXERCISES: CPT | Mod: GP | Performed by: PHYSICAL THERAPIST

## 2024-08-29 PROCEDURE — 97161 PT EVAL LOW COMPLEX 20 MIN: CPT | Mod: GP | Performed by: PHYSICAL THERAPIST

## 2024-08-29 NOTE — PROGRESS NOTES
PHYSICAL THERAPY EVALUATION  Type of Visit: Evaluation        Fall Risk Screen:  Have you fallen 2 or more times in the past year?: No  Have you fallen and had an injury in the past year?: No  Is patient a fall risk?: No    Subjective   Patient reports to PT for bilateral knee pain. States the pain is increased with stairs, squatting, kneeling, getting up from sitting and initial steps after rest. Pain is only decreased with rest. Pain at worst 8/10 and at rest 0/10. Pain in anterior and medial knees.       Presenting condition or subjective complaint: Unable to bend, squat , kneel. Difficult with stairs  Date of onset:      Relevant medical history: Arthritis   Dates & types of surgery: Hip replacement 8/23    Prior diagnostic imaging/testing results: MRI; X-ray     Prior therapy history for the same diagnosis, illness or injury: Yes Pt      Living Environment  Social support: With a significant other or spouse   Type of home: House   Stairs to enter the home: Yes 2 Is there a railing: No     Ramp: No   Stairs inside the home: Yes 14 Is there a railing: Yes     Help at home: None  Equipment owned: Straight Cane; Walker with wheels; Raised toilet seat     Employment: Yes Vocational Rehabilitation Manager  Hobbies/Interests: Sports    Patient goals for therapy: All if above       Objective   KNEE EVALUATION  GAIT:  Weightbearing Status: WBAT  Assistive Device(s): None  Gait Deviations: Antalgic  ROM:  AROM Left Knee lacking 6 degrees of ext and 130 flexion; Right Knee lacking 2 degrees of extension and 130 deg of flexion  STRENGTH:  Quad set fair L>R, hip flexion +4/5, abduction 4/5, extension 4/5  FUNCTIONAL TESTS:  Squat anterior knee excursion and knee varus R  PALPATION:  medial joint line and patellar tendon       Assessment & Plan   CLINICAL IMPRESSIONS  Medical Diagnosis: Osteoarthrosis, localized, primary, knee, right  Osteoarthrosis, localized, primary, knee, left    Treatment Diagnosis: B knee pain    Impression/Assessment: Patient is a 59 year old female with bilateral knees  complaints.  The following significant findings have been identified: Pain, Decreased ROM/flexibility, Decreased joint mobility, Decreased strength, Impaired gait, Impaired muscle performance, and Decreased activity tolerance. These impairments interfere with their ability to perform self care tasks, work tasks, recreational activities, household chores, household mobility, and community mobility as compared to previous level of function.     Clinical Decision Making (Complexity):  Clinical Presentation: Stable/Uncomplicated  Clinical Presentation Rationale: based on medical and personal factors listed in PT evaluation  Clinical Decision Making (Complexity): Low complexity    PLAN OF CARE  Treatment Interventions:  Interventions: Gait Training, Manual Therapy, Neuromuscular Re-education, Therapeutic Activity, Therapeutic Exercise, Self-Care/Home Management    Long Term Goals     PT Goal 1  Goal Identifier: Goal 1  Goal Description: Patient will be able to navigate stairs with pain at worst 3/10  Rationale: to maximize safety and independence with self cares;to maximize safety and independence with transportation;to maximize safety and independence within the community;to maximize safety and independence within the home;to maximize safety and independence with performance of ADLs and functional tasks  Target Date: 11/07/24  PT Goal 2  Goal Identifier: Goal 2  Goal Description: Patient will be able to squat with pain at worst 4/10  Rationale: to maximize safety and independence with performance of ADLs and functional tasks;to maximize safety and independence within the home;to maximize safety and independence within the community;to maximize safety and independence with transportation;to maximize safety and independence with self cares  Target Date: 11/07/24      Frequency of Treatment: 1 time a week tapering to every other  Duration of  Treatment: 10 weeks    Recommended Referrals to Other Professionals:   Education Assessment:   Learner/Method: No Barriers to Learning  Education Comments: ptrx    Risks and benefits of evaluation/treatment have been explained.   Patient/Family/caregiver agrees with Plan of Care.     Evaluation Time:     PT Eval, Low Complexity Minutes (75851): 20       Signing Clinician: London Brody PT

## 2024-09-26 ENCOUNTER — MYC MEDICAL ADVICE (OUTPATIENT)
Dept: FAMILY MEDICINE | Facility: CLINIC | Age: 59
End: 2024-09-26
Payer: COMMERCIAL

## 2024-09-27 ENCOUNTER — TELEPHONE (OUTPATIENT)
Dept: FAMILY MEDICINE | Facility: CLINIC | Age: 59
End: 2024-09-27
Payer: COMMERCIAL

## 2024-09-27 NOTE — TELEPHONE ENCOUNTER
See my chart - sent PA in separate encounter     Sonali Varela, Registered Nurse  [facility]

## 2024-09-27 NOTE — TELEPHONE ENCOUNTER
PA team     Please complete PA on Wegovy 2.4 mg     Thank you   Sonali Varela, Registered Nurse  [facility]

## 2024-10-01 NOTE — TELEPHONE ENCOUNTER
Central Prior Authorization Team   Phone: [phone]    PA Initiation    Medication: Wegovy 2.4   Insurance Company: [employer] - Phone [phone] Fax [phone]  Pharmacy Filling the Rx: [facility] [place]  [place], [place] - [address]  Filling Pharmacy Phone: [phone]  Filling Pharmacy Fax:    Start Date: 10/1/2024

## 2024-10-03 ENCOUNTER — THERAPY VISIT (OUTPATIENT)
Dept: PHYSICAL THERAPY | Facility: CLINIC | Age: 59
End: 2024-10-03
Payer: COMMERCIAL

## 2024-10-03 DIAGNOSIS — M17.0 OSTEOARTHRITIS OF BOTH KNEES, UNSPECIFIED OSTEOARTHRITIS TYPE: Primary | ICD-10-CM

## 2024-10-03 DIAGNOSIS — M25.561 CHRONIC PAIN OF BOTH KNEES: ICD-10-CM

## 2024-10-03 DIAGNOSIS — G89.29 CHRONIC PAIN OF BOTH KNEES: ICD-10-CM

## 2024-10-03 DIAGNOSIS — M25.562 CHRONIC PAIN OF BOTH KNEES: ICD-10-CM

## 2024-10-03 PROCEDURE — 97110 THERAPEUTIC EXERCISES: CPT | Mod: GP | Performed by: PHYSICAL THERAPIST

## 2024-10-03 PROCEDURE — 97112 NEUROMUSCULAR REEDUCATION: CPT | Mod: GP | Performed by: PHYSICAL THERAPIST

## 2024-10-03 PROCEDURE — 97530 THERAPEUTIC ACTIVITIES: CPT | Mod: GP | Performed by: PHYSICAL THERAPIST

## 2024-10-03 NOTE — TELEPHONE ENCOUNTER
Prior Authorization Approval    Authorization Effective Date: 10/1/2024  Authorization Expiration Date: 10/1/2025  Medication: Wegovy 2.4 -PA APPROVED   Approved Dose/Quantity:   Reference #:     Insurance Company: [employer] - Phone [phone] Fax [phone]  Expected CoPay:       CoPay Card Available:      Foundation Assistance Needed:    Which Pharmacy is filling the prescription (Not needed for infusion/clinic administered): [facility] [place] - [place] - [address] [facility]  Pharmacy Notified:  Yes  Patient Notified:  No         normal...

## 2024-10-11 ENCOUNTER — MYC REFILL (OUTPATIENT)
Dept: FAMILY MEDICINE | Facility: CLINIC | Age: 59
End: 2024-10-11
Payer: COMMERCIAL

## 2024-10-11 DIAGNOSIS — E66.09 CLASS 1 OBESITY DUE TO EXCESS CALORIES WITH SERIOUS COMORBIDITY AND BODY MASS INDEX (BMI) OF 32.0 TO 32.9 IN ADULT: ICD-10-CM

## 2024-10-11 DIAGNOSIS — E66.811 CLASS 1 OBESITY DUE TO EXCESS CALORIES WITH SERIOUS COMORBIDITY AND BODY MASS INDEX (BMI) OF 32.0 TO 32.9 IN ADULT: ICD-10-CM

## 2024-11-15 PROBLEM — G89.29 CHRONIC PAIN OF BOTH KNEES: Status: RESOLVED | Noted: 2024-08-29 | Resolved: 2024-11-15

## 2024-11-15 PROBLEM — M25.562 CHRONIC PAIN OF BOTH KNEES: Status: RESOLVED | Noted: 2024-08-29 | Resolved: 2024-11-15

## 2024-11-15 PROBLEM — M25.561 CHRONIC PAIN OF BOTH KNEES: Status: RESOLVED | Noted: 2024-08-29 | Resolved: 2024-11-15

## 2024-11-15 NOTE — PROGRESS NOTES
DISCHARGE  Reason for Discharge: Patient reported that her symptoms have improved and will continue with HEP    Equipment Issued:     Discharge Plan: Patient to continue home program.    Referring Provider:  Neto Amezcua

## 2024-12-28 ENCOUNTER — OFFICE VISIT (OUTPATIENT)
Dept: URGENT CARE | Facility: URGENT CARE | Age: 59
End: 2024-12-28
Payer: COMMERCIAL

## 2024-12-28 VITALS
WEIGHT: 170 LBS | TEMPERATURE: 97.9 F | DIASTOLIC BLOOD PRESSURE: 85 MMHG | HEART RATE: 66 BPM | OXYGEN SATURATION: 98 % | BODY MASS INDEX: 28.29 KG/M2 | SYSTOLIC BLOOD PRESSURE: 133 MMHG | RESPIRATION RATE: 20 BRPM

## 2024-12-28 DIAGNOSIS — R59.0 LYMPHADENOPATHY, CERVICAL: Primary | ICD-10-CM

## 2024-12-28 DIAGNOSIS — R68.89 SENSATION OF SWOLLEN THROAT: ICD-10-CM

## 2024-12-28 DIAGNOSIS — R11.2 NAUSEA AND VOMITING, UNSPECIFIED VOMITING TYPE: ICD-10-CM

## 2024-12-28 LAB — DEPRECATED S PYO AG THROAT QL EIA: NEGATIVE

## 2024-12-28 PROCEDURE — 87651 STREP A DNA AMP PROBE: CPT | Performed by: FAMILY MEDICINE

## 2024-12-28 PROCEDURE — 99213 OFFICE O/P EST LOW 20 MIN: CPT | Performed by: FAMILY MEDICINE

## 2024-12-28 ASSESSMENT — PAIN SCALES - GENERAL: PAINLEVEL_OUTOF10: MODERATE PAIN (5)

## 2024-12-29 LAB — S PYO DNA THROAT QL NAA+PROBE: NOT DETECTED

## 2024-12-29 NOTE — PATIENT INSTRUCTIONS
Follow up with an ear nose throat specialist for further evaluation of the abnormal throat sensation, for the enlarged lymph nodes under the jaw.      Go to the emergency room if you feel as if you are choking/suffocating/if you have swallowing problems/worsening pain.

## 2024-12-29 NOTE — PROGRESS NOTES
SUBJECTIVE:   Rayna Jackson is a 59 year old female presenting with the following complaints:    Swelling at the nasal bridge and facial cheeks for the past couple of days.  No swelling.    No new facial products/soaps.    No shampoos.      Patient also had nasal clear mucus, coughing, fatigue for the past three weeks.    For the past week there has been a sensation of the throat closing (worse last night).  The sensation causes vomiting about once in the middle of the night each night.    No hemoptysis.    No diarrhea  No fevers  No constipation.   No abdominal pain  There has been tightness at the midline chest for the past couple of weeks.      There is a noticeable bump under the anterior bilateral jaw over the past week.  This bump swells up and goes down repeatedly.      Patient has been having having dry cough over the past week.   No shortness of breath.      Many family members have a current COVID-19 infection.  Patient's multiple at-home COVID-19 tests have been negative over the past week.      No improvement with Albuterol MDI.      Past Medical History:   Diagnosis Date    Arthritis     Hypertension     Torn meniscus 01/01/2013    left     Uncomplicated asthma      Current Outpatient Medications   Medication Sig Dispense Refill    albuterol (PROAIR HFA/PROVENTIL HFA/VENTOLIN HFA) 108 (90 Base) MCG/ACT inhaler INHALE 2 PUFFS INTO THE LUNGS AS NEEDED FOR SHORTNESS OF BREATH / DYSPNEA OR WHEEZING 18 g 1    fexofenadine (ALLEGRA) 180 MG tablet Take 1 tablet (180 mg) by mouth daily Take 180 mg by mouth 90 tablet 1    fluticasone (FLONASE) 50 MCG/ACT nasal spray USE ONE SPRAY IN EACH NOSTRIL ONCE DAILY 16 mL 1    fluticasone (FLOVENT HFA) 110 MCG/ACT inhaler TAKE 2 PUFFS BY MOUTH TWICE A DAY (Patient taking differently: Inhale 2 puffs into the lungs 2 times daily as needed. TAKE 2 PUFFS BY MOUTH TWICE A DAY) 12 g 5    levothyroxine (SYNTHROID/LEVOTHROID) 75 MCG tablet Take 1 tablet (75 mcg) by mouth daily 90  tablet 3    losartan (COZAAR) 50 MG tablet Take 1 tablet (50 mg) by mouth daily. 90 tablet 3    meloxicam (MOBIC) 15 MG tablet Take 1 tablet (15 mg) by mouth daily 30 tablet 2    montelukast (SINGULAIR) 10 MG tablet TAKE 1 TABLET BY MOUTH EVERYDAY AT BEDTIME 90 tablet 3    rosuvastatin (CRESTOR) 40 MG tablet Take 1 tablet (40 mg) by mouth daily. 90 tablet 3    Semaglutide-Weight Management (WEGOVY) 2.4 MG/0.75ML pen Inject 2.4 mg subcutaneously once a week. 3 mL 3    triamcinolone (KENALOG) 0.1 % external cream Apply topically 2 times daily 80 g 1     Social History     Tobacco Use    Smoking status: Never     Passive exposure: Never    Smokeless tobacco: Never   Substance Use Topics    Alcohol use: Yes     Comment: 2 drinks once a week       ROS:  CONSTITUTIONAL:negative for fevers.    ENT/MOUTH:  positive for enlarged lymph nodes under the jaw.   RESP: positive for cough.      OBJECTIVE:  /85 (BP Location: Right arm, Patient Position: Sitting, Cuff Size: Adult Regular)   Pulse 66   Temp 97.9  F (36.6  C) (Tympanic)   Resp 20   Wt 77.1 kg (170 lb)   SpO2 98%   BMI 28.29 kg/m    GENERAL APPEARANCE: healthy, alert and no distress.  She has no cough and no shortness of breath.    EYES: no eyelid swelling.  No scleral icterus/erythema.  No redness nor tenderness medial to the medial corner of the eyes.  There is mild edema to the left of the nasal bridge (patient wears glasses) without any redness/tenderness.  The facial cheeks have no increased edema. /erythema.    HENT: Ears;  TMs and canals are within normal limits.  Nose:  turbinates are within normal limits.  Mouth:  oropharynx, uvula, tongue, gums, palate lips are within normal limits.  No parotid enlargement/edema.    NECK: there is tender lymphadenopathy at the bilateral anterior submandibular region.  Normal ROM.    RESP: lungs clear to auscultation - no rales, rhonchi or wheezes  CV: regular rates and rhythm, normal S1 S2, no murmur noted  SKIN:  No rashes/edema/redness.      LAB:    Results for orders placed or performed in visit on 12/28/24   Streptococcus A Rapid Screen w/Reflex to PCR - Clinic Collect     Status: Normal    Specimen: Throat; Swab   Result Value Ref Range    Group A Strep antigen Negative Negative       ASSESSMENT:  Cervical lymphadenopathy.  Today's rapid strep test is negative.      Vomiting, unspecified type    Sensation of a swollen throat.      PLAN:    I ordered an otolaryngology  referral for the patient.     Go to the emergency room if you feel as if you are choking/suffocating/if you have swallowing problems/worsening pain.       Pending lab:  Strep PCR Test.      Sarabjit Lentz MD

## 2024-12-30 ENCOUNTER — TELEPHONE (OUTPATIENT)
Dept: OTOLARYNGOLOGY | Facility: CLINIC | Age: 59
End: 2024-12-30
Payer: COMMERCIAL

## 2024-12-30 NOTE — TELEPHONE ENCOUNTER
[facility] Call Center    Phone Message    May a detailed message be left on voicemail: yes     Reason for Call: Appointment Intake    Referring Provider Name: Sarabjit Lentz MD   Diagnosis and/or Symptoms: Patient complains of a constricted sensation in the throat that causes vomiting in the middle of the night and enlarged bilateral submandibular lymph nodes./      PLEASE REVIEW PER PROTOCOLS FOR SWOLLEN LYMPH NODES    Action Taken: Message routed to:  [facility] ENT    Travel Screening: Not Applicable

## 2025-01-06 ENCOUNTER — HOSPITAL ENCOUNTER (OUTPATIENT)
Dept: CT IMAGING | Facility: HOSPITAL | Age: 60
Discharge: HOME | End: 2025-01-06
Attending: OTOLARYNGOLOGY | Admitting: OTOLARYNGOLOGY
Payer: COMMERCIAL

## 2025-01-06 ENCOUNTER — OFFICE VISIT (OUTPATIENT)
Dept: OTOLARYNGOLOGY | Facility: CLINIC | Age: 60
End: 2025-01-06
Attending: FAMILY MEDICINE
Payer: COMMERCIAL

## 2025-01-06 VITALS — WEIGHT: 170 LBS | BODY MASS INDEX: 28.29 KG/M2

## 2025-01-06 DIAGNOSIS — R22.0 FACIAL SWELLING: ICD-10-CM

## 2025-01-06 DIAGNOSIS — J34.89 SINUS PRESSURE: ICD-10-CM

## 2025-01-06 DIAGNOSIS — R09.81 CONGESTION OF PARANASAL SINUS: ICD-10-CM

## 2025-01-06 DIAGNOSIS — R22.0 FACIAL SWELLING: Primary | ICD-10-CM

## 2025-01-06 PROCEDURE — 70486 CT MAXILLOFACIAL W/O DYE: CPT

## 2025-01-06 PROCEDURE — 99203 OFFICE O/P NEW LOW 30 MIN: CPT | Performed by: OTOLARYNGOLOGY

## 2025-01-06 RX ORDER — AZELASTINE 1 MG/ML
SPRAY, METERED NASAL
Qty: 30 ML | Refills: 11 | Status: SHIPPED | OUTPATIENT
Start: 2025-01-06

## 2025-01-06 NOTE — LETTER
1/6/2025      Rayna Jackson  [address]  [place] [address]      Dear Colleague,    Thank you for referring your patient, Rayna Jackson, to the [facility]. Please see a copy of my visit note below.    CHIEF COMPLAINT: Patient presents with:  Throat Problem: Cervical lymphadenopathy, sensation of swollen throat x 1 week that causes vomiting. Seen in  12/28. No imaging done. PT reports that she has had a cold for about a month and was experiencing a sore throat and swollen glands. She says she is at the end of the cold and her throat feels better. Her nose is swollen as well but she feeling better than when she went to .  RSI 28.Drinks 1 pop a day. No spicy foods,          HISTORY OF PRESENT ILLNESS    Rayna was seen at the behest of [facility] for enlarged lymph nodes    RECENT FAMILY MEDICINCE VISIT    pepper Jackson is a 59 year old female presenting with the following complaints:     Swelling at the nasal bridge and facial cheeks for the past couple of days.  No swelling.    No new facial products/soaps.    No shampoos.       Patient also had nasal clear mucus, coughing, fatigue for the past three weeks.     For the past week there has been a sensation of the throat closing (worse last night).  The sensation causes vomiting about once in the middle of the night each night.    No hemoptysis.    No diarrhea  No fevers  No constipation.   No abdominal pain  There has been tightness at the midline chest for the past couple of weeks.       There is a noticeable bump under the anterior bilateral jaw over the past week.  This bump swells up and goes down repeatedly.       Patient has been having having dry cough over the past week.   No shortness of breath.       Many family members have a current COVID-19 infection.  Patient's multiple at-home COVID-19 tests have been negative over the past week.         Reflux Symptom Index    Within the last MONTH, how did the following problems affect  you?  (0 = no problem, 5 = severe problem)    Hoarseness or a problem with your voice: (Patient-Rptd) 5  Clearing your throat: (Patient-Rptd) 5  Excess throat mucous or postnasal drip: (Patient-Rptd) 5  Difficulty swallowing food, liquids, or pills: (Patient-Rptd) 4  Coughing after you ate or after lying down: (Patient-Rptd) 0  Breathing difficulties or choking episodes: (Patient-Rptd) 0  Troublesome or annoying cough: (Patient-Rptd) 5  Sensations of something sticking in your throat or a lump in your throat: (Patient-Rptd) 4  Heartburn, chest pain, indigestion, or stomach acid coming up: (Patient-Rptd) 0    Total: (Patient-Rptd) 28        REVIEW OF SYSTEMS    Review of Systems as per HPI and PMHx, otherwise 10 system review system are negative.       ALLERGIES    Lisinopril, Lactose, Amlodipine, Aspirin, and Codeine    CURRENT MEDICATIONS      Current Outpatient Medications:      azelastine (ASTELIN) 0.1 % nasal spray, 2 sprays each nostril 1-2x daily as needed for nasal congestion (use nightly for first 2 week), Disp: 30 mL, Rfl: 11     albuterol (PROAIR HFA/PROVENTIL HFA/VENTOLIN HFA) 108 (90 Base) MCG/ACT inhaler, INHALE 2 PUFFS INTO THE LUNGS AS NEEDED FOR SHORTNESS OF BREATH / DYSPNEA OR WHEEZING, Disp: 18 g, Rfl: 1     fexofenadine (ALLEGRA) 180 MG tablet, Take 1 tablet (180 mg) by mouth daily Take 180 mg by mouth, Disp: 90 tablet, Rfl: 1     fluticasone (FLONASE) 50 MCG/ACT nasal spray, USE ONE SPRAY IN EACH NOSTRIL ONCE DAILY, Disp: 16 mL, Rfl: 1     fluticasone (FLOVENT HFA) 110 MCG/ACT inhaler, TAKE 2 PUFFS BY MOUTH TWICE A DAY (Patient taking differently: Inhale 2 puffs into the lungs 2 times daily as needed. TAKE 2 PUFFS BY MOUTH TWICE A DAY), Disp: 12 g, Rfl: 5     levothyroxine (SYNTHROID/LEVOTHROID) 75 MCG tablet, Take 1 tablet (75 mcg) by mouth daily, Disp: 90 tablet, Rfl: 3     losartan (COZAAR) 50 MG tablet, Take 1 tablet (50 mg) by mouth daily., Disp: 90 tablet, Rfl: 3     meloxicam (MOBIC) 15 MG  tablet, Take 1 tablet (15 mg) by mouth daily, Disp: 30 tablet, Rfl: 2     montelukast (SINGULAIR) 10 MG tablet, TAKE 1 TABLET BY MOUTH EVERYDAY AT BEDTIME, Disp: 90 tablet, Rfl: 3     rosuvastatin (CRESTOR) 40 MG tablet, Take 1 tablet (40 mg) by mouth daily., Disp: 90 tablet, Rfl: 3     Semaglutide-Weight Management (WEGOVY) 2.4 MG/0.75ML pen, Inject 2.4 mg subcutaneously once a week., Disp: 3 mL, Rfl: 3     triamcinolone (KENALOG) 0.1 % external cream, Apply topically 2 times daily, Disp: 80 g, Rfl: 1     PAST MEDICAL HISTORY    PAST MEDICAL HISTORY:   Past Medical History:   Diagnosis Date     Arthritis      Hypertension      Torn meniscus 01/01/2013    left      Uncomplicated asthma        PAST SURGICAL HISTORY    PAST SURGICAL HISTORY:   Past Surgical History:   Procedure Laterality Date     APPENDECTOMY       appendicitis  01/01/1980     ARTHROPLASTY HIP Left 8/8/2023    Procedure: Left total hip arthroplasty;  Surgeon: Neto Amezcua MD;  Location:  OR     BIOPSY  2017    Removed gland in back of neck     COLONOSCOPY N/A 06/30/2017    Procedure: COLONOSCOPY;  COLONOSCOPY   ;  Surgeon: Brooks Villa MD;  Location:  GI     CYSTECTOMY OVARIAN BENIGN  01/01/1980     KNEE SURGERY Left        FAMILY  HISTORY    FAMILY HISTORY:   Family History   Problem Relation Age of Onset     Hypertension Mother      Thyroid Disease Mother      Coronary Artery Disease Mother      Hypertension Father      Diabetes Father      Coronary Artery Disease Father      Hyperlipidemia Father      Obesity Father      Macular Degeneration Maternal Grandmother      Thyroid Disease Maternal Grandmother      Coronary Artery Disease Maternal Grandfather      Coronary Artery Disease Paternal Grandmother      Cerebrovascular Disease Paternal Grandmother      Thyroid Disease Sister      Thyroid Disease Maternal Aunt      Diabetes Other         great grandmother     Glaucoma No family hx of        SOCIAL HISTORY    SOCIAL HISTORY:    Social History     Tobacco Use     Smoking status: Never     Passive exposure: Never     Smokeless tobacco: Never   Substance Use Topics     Alcohol use: Yes     Comment: 2 drinks once a week        PHYSICAL EXAM    HEAD: Normal appearance and symmetry:  No cutaneous lesions.      NECK:  supple     EARS:    Right:   TM intact   LEFT:   TM intact    EYES:  EOMI    CN VII/XII:  intact     NOSE:     Dorsum:   straight  Septum:  midline  Mucosa:  moist        ORAL CAVITY/OROPHARYNX:     Lips:  Normal.  Tongue: normal, midline  Mucosa:   no lesions     NECK:  Trachea:  midline.              Thyroid:  normal              Adenopathy:  none        NEURO:   Alert and Oriented     GAIT AND STATION:  normal     RESPIRATORY:   Symmetry and Respiratory effort     PSYCH:  Normal mood and affect     SKIN:   warm and dry         IMPRESSION:    Encounter Diagnoses   Name Primary?     Facial swelling Yes     Sinus pressure      Congestion of paranasal sinus           RECOMMENDATIONS:      Orders Placed This Encounter   Procedures     CT Sinus w/o Contrast     Orders Placed This Encounter   Medications     azelastine (ASTELIN) 0.1 % nasal spray     Si sprays each nostril 1-2x daily as needed for nasal congestion (use nightly for first 2 week)     Dispense:  30 mL     Refill:  11      Results via myChart  Wean off of sodas  Return as needed      Again, thank you for allowing me to participate in the care of your patient.        Sincerely,        David Morris MD    Electronically signed

## 2025-01-06 NOTE — PATIENT INSTRUCTIONS
You were seen in the ENT Clinic today by Dr. Morris. If you have any questions or concerns after your appointment, please contact us (see below).    2.   Please return to clinic as needed    3.   Astelin Nasal spray was sent to your pharmacy    4.   CT Sinus today- we will send your results via [employer]    5. Dietary and Lifestyle modifications below to help with throat irritation/swelling feeling (wean off sodas)      How to Contact Us:  Send a [employer] message to your provider. Our team will respond to you via [employer]. Occasionally, we will need to call you to get further information.  For urgent matters (Monday-Friday), call the ENT Clinic: [phone] and speak with a call center team member - they will route your call appropriately.   If you'd like to speak directly with a nurse, please find our contact information below. We do our best to check voicemail frequently throughout the day, and will work to call you back within 1-2 days. For urgent matters, please use the general clinic phone numbers listed above.      Shena Jamil RN  [facility] [place] [address]  [employer]  Office: [phone]  Fax: [phone]              Lifestyle changes:    Avoid eating 2-3 hours before bedtime.   You may find it helpful to elevate the head of your bed.     Avoid following foods that are likely to trigger acid reflux:    Coffee or tea (try LOW ACID coffee or herbal tea)  Anything that s fizzy or has caffeine in it  Alcohol   Citrus fruits, such as oranges and peterson  Tomato based foods (salsa, pizza, lasanga)  Chocolate   Mint or peppermint  Fatty foods (ice cream)  Spicy foods  Onions and garlic      Try alkaline water (ph 9.5) instead of regular water

## 2025-01-06 NOTE — PROGRESS NOTES
CHIEF COMPLAINT: Patient presents with:  Throat Problem: Cervical lymphadenopathy, sensation of swollen throat x 1 week that causes vomiting. Seen in  12/28. No imaging done. PT reports that she has had a cold for about a month and was experiencing a sore throat and swollen glands. She says she is at the end of the cold and her throat feels better. Her nose is swollen as well but she feeling better than when she went to .  RSI 28.Drinks 1 pop a day. No spicy foods,          HISTORY OF PRESENT ILLNESS    Rayna was seen at the behest of [facility] for enlarged lymph nodes    RECENT FAMILY MEDICINCE VISIT    pepper Jackson is a 59 year old female presenting with the following complaints:     Swelling at the nasal bridge and facial cheeks for the past couple of days.  No swelling.    No new facial products/soaps.    No shampoos.       Patient also had nasal clear mucus, coughing, fatigue for the past three weeks.     For the past week there has been a sensation of the throat closing (worse last night).  The sensation causes vomiting about once in the middle of the night each night.    No hemoptysis.    No diarrhea  No fevers  No constipation.   No abdominal pain  There has been tightness at the midline chest for the past couple of weeks.       There is a noticeable bump under the anterior bilateral jaw over the past week.  This bump swells up and goes down repeatedly.       Patient has been having having dry cough over the past week.   No shortness of breath.       Many family members have a current COVID-19 infection.  Patient's multiple at-home COVID-19 tests have been negative over the past week.         Reflux Symptom Index    Within the last MONTH, how did the following problems affect you?  (0 = no problem, 5 = severe problem)    Hoarseness or a problem with your voice: (Patient-Rptd) 5  Clearing your throat: (Patient-Rptd) 5  Excess throat mucous or postnasal drip: (Patient-Rptd) 5  Difficulty swallowing food, liquids,  or pills: (Patient-Rptd) 4  Coughing after you ate or after lying down: (Patient-Rptd) 0  Breathing difficulties or choking episodes: (Patient-Rptd) 0  Troublesome or annoying cough: (Patient-Rptd) 5  Sensations of something sticking in your throat or a lump in your throat: (Patient-Rptd) 4  Heartburn, chest pain, indigestion, or stomach acid coming up: (Patient-Rptd) 0    Total: (Patient-Rptd) 28        REVIEW OF SYSTEMS    Review of Systems as per HPI and PMHx, otherwise 10 system review system are negative.       ALLERGIES    Lisinopril, Lactose, Amlodipine, Aspirin, and Codeine    CURRENT MEDICATIONS      Current Outpatient Medications:     azelastine (ASTELIN) 0.1 % nasal spray, 2 sprays each nostril 1-2x daily as needed for nasal congestion (use nightly for first 2 week), Disp: 30 mL, Rfl: 11    albuterol (PROAIR HFA/PROVENTIL HFA/VENTOLIN HFA) 108 (90 Base) MCG/ACT inhaler, INHALE 2 PUFFS INTO THE LUNGS AS NEEDED FOR SHORTNESS OF BREATH / DYSPNEA OR WHEEZING, Disp: 18 g, Rfl: 1    fexofenadine (ALLEGRA) 180 MG tablet, Take 1 tablet (180 mg) by mouth daily Take 180 mg by mouth, Disp: 90 tablet, Rfl: 1    fluticasone (FLONASE) 50 MCG/ACT nasal spray, USE ONE SPRAY IN EACH NOSTRIL ONCE DAILY, Disp: 16 mL, Rfl: 1    fluticasone (FLOVENT HFA) 110 MCG/ACT inhaler, TAKE 2 PUFFS BY MOUTH TWICE A DAY (Patient taking differently: Inhale 2 puffs into the lungs 2 times daily as needed. TAKE 2 PUFFS BY MOUTH TWICE A DAY), Disp: 12 g, Rfl: 5    levothyroxine (SYNTHROID/LEVOTHROID) 75 MCG tablet, Take 1 tablet (75 mcg) by mouth daily, Disp: 90 tablet, Rfl: 3    losartan (COZAAR) 50 MG tablet, Take 1 tablet (50 mg) by mouth daily., Disp: 90 tablet, Rfl: 3    meloxicam (MOBIC) 15 MG tablet, Take 1 tablet (15 mg) by mouth daily, Disp: 30 tablet, Rfl: 2    montelukast (SINGULAIR) 10 MG tablet, TAKE 1 TABLET BY MOUTH EVERYDAY AT BEDTIME, Disp: 90 tablet, Rfl: 3    rosuvastatin (CRESTOR) 40 MG tablet, Take 1 tablet (40 mg) by  mouth daily., Disp: 90 tablet, Rfl: 3    Semaglutide-Weight Management (WEGOVY) 2.4 MG/0.75ML pen, Inject 2.4 mg subcutaneously once a week., Disp: 3 mL, Rfl: 3    triamcinolone (KENALOG) 0.1 % external cream, Apply topically 2 times daily, Disp: 80 g, Rfl: 1     PAST MEDICAL HISTORY    PAST MEDICAL HISTORY:   Past Medical History:   Diagnosis Date    Arthritis     Hypertension     Torn meniscus 01/01/2013    left     Uncomplicated asthma        PAST SURGICAL HISTORY    PAST SURGICAL HISTORY:   Past Surgical History:   Procedure Laterality Date    APPENDECTOMY      appendicitis  01/01/1980    ARTHROPLASTY HIP Left 8/8/2023    Procedure: Left total hip arthroplasty;  Surgeon: Neto Amezcua MD;  Location: [facility] OR    BIOPSY  2017    Removed gland in back of neck    COLONOSCOPY N/A 06/30/2017    Procedure: COLONOSCOPY;  COLONOSCOPY   ;  Surgeon: Brooks Villa MD;  Location:  GI    CYSTECTOMY OVARIAN BENIGN  01/01/1980    KNEE SURGERY Left        FAMILY  HISTORY    FAMILY HISTORY:   Family History   Problem Relation Age of Onset    Hypertension Mother     Thyroid Disease Mother     Coronary Artery Disease Mother     Hypertension Father     Diabetes Father     Coronary Artery Disease Father     Hyperlipidemia Father     Obesity Father     Macular Degeneration Maternal Grandmother     Thyroid Disease Maternal Grandmother     Coronary Artery Disease Maternal Grandfather     Coronary Artery Disease Paternal Grandmother     Cerebrovascular Disease Paternal Grandmother     Thyroid Disease Sister     Thyroid Disease Maternal Aunt     Diabetes Other         great grandmother    Glaucoma No family hx of        SOCIAL HISTORY    SOCIAL HISTORY:   Social History     Tobacco Use    Smoking status: Never     Passive exposure: Never    Smokeless tobacco: Never   Substance Use Topics    Alcohol use: Yes     Comment: 2 drinks once a week        PHYSICAL EXAM    HEAD: Normal appearance and symmetry:  No cutaneous lesions.       NECK:  supple     EARS:    Right:   TM intact   LEFT:   TM intact    EYES:  EOMI    CN VII/XII:  intact     NOSE:     Dorsum:   straight  Septum:  midline  Mucosa:  moist        ORAL CAVITY/OROPHARYNX:     Lips:  Normal.  Tongue: normal, midline  Mucosa:   no lesions     NECK:  Trachea:  midline.              Thyroid:  normal              Adenopathy:  none        NEURO:   Alert and Oriented     GAIT AND STATION:  normal     RESPIRATORY:   Symmetry and Respiratory effort     PSYCH:  Normal mood and affect     SKIN:   warm and dry         IMPRESSION:    Encounter Diagnoses   Name Primary?    Facial swelling Yes    Sinus pressure     Congestion of paranasal sinus           RECOMMENDATIONS:      Orders Placed This Encounter   Procedures    CT Sinus w/o Contrast     Orders Placed This Encounter   Medications    azelastine (ASTELIN) 0.1 % nasal spray     Si sprays each nostril 1-2x daily as needed for nasal congestion (use nightly for first 2 week)     Dispense:  30 mL     Refill:  11      Results via myChart  Wean off of sodas  Return as needed

## 2025-01-21 ENCOUNTER — PATIENT OUTREACH (OUTPATIENT)
Dept: CARE COORDINATION | Facility: CLINIC | Age: 60
End: 2025-01-21
Payer: COMMERCIAL

## 2025-01-25 ENCOUNTER — MYC REFILL (OUTPATIENT)
Dept: FAMILY MEDICINE | Facility: CLINIC | Age: 60
End: 2025-01-25
Payer: COMMERCIAL

## 2025-01-25 DIAGNOSIS — E66.811 CLASS 1 OBESITY DUE TO EXCESS CALORIES WITH SERIOUS COMORBIDITY AND BODY MASS INDEX (BMI) OF 32.0 TO 32.9 IN ADULT: ICD-10-CM

## 2025-01-25 DIAGNOSIS — E66.09 CLASS 1 OBESITY DUE TO EXCESS CALORIES WITH SERIOUS COMORBIDITY AND BODY MASS INDEX (BMI) OF 32.0 TO 32.9 IN ADULT: ICD-10-CM

## 2025-02-09 ASSESSMENT — ASTHMA QUESTIONNAIRES
QUESTION_4 LAST FOUR WEEKS HOW OFTEN HAVE YOU USED YOUR RESCUE INHALER OR NEBULIZER MEDICATION (SUCH AS ALBUTEROL): NOT AT ALL
QUESTION_3 LAST FOUR WEEKS HOW OFTEN DID YOUR ASTHMA SYMPTOMS (WHEEZING, COUGHING, SHORTNESS OF BREATH, CHEST TIGHTNESS OR PAIN) WAKE YOU UP AT NIGHT OR EARLIER THAN USUAL IN THE MORNING: NOT AT ALL
QUESTION_5 LAST FOUR WEEKS HOW WOULD YOU RATE YOUR ASTHMA CONTROL: COMPLETELY CONTROLLED
ACT_TOTALSCORE: 24
QUESTION_1 LAST FOUR WEEKS HOW MUCH OF THE TIME DID YOUR ASTHMA KEEP YOU FROM GETTING AS MUCH DONE AT WORK, SCHOOL OR AT HOME: NONE OF THE TIME
QUESTION_2 LAST FOUR WEEKS HOW OFTEN HAVE YOU HAD SHORTNESS OF BREATH: ONCE OR TWICE A WEEK
ACT_TOTALSCORE: 24

## 2025-02-12 ENCOUNTER — OFFICE VISIT (OUTPATIENT)
Dept: FAMILY MEDICINE | Facility: CLINIC | Age: 60
End: 2025-02-12
Payer: COMMERCIAL

## 2025-02-12 VITALS
HEART RATE: 69 BPM | OXYGEN SATURATION: 97 % | HEIGHT: 65 IN | DIASTOLIC BLOOD PRESSURE: 79 MMHG | BODY MASS INDEX: 27.96 KG/M2 | RESPIRATION RATE: 16 BRPM | WEIGHT: 167.8 LBS | TEMPERATURE: 98.2 F | SYSTOLIC BLOOD PRESSURE: 118 MMHG

## 2025-02-12 DIAGNOSIS — E66.811 CLASS 1 OBESITY DUE TO EXCESS CALORIES WITH SERIOUS COMORBIDITY AND BODY MASS INDEX (BMI) OF 32.0 TO 32.9 IN ADULT: Primary | ICD-10-CM

## 2025-02-12 DIAGNOSIS — M25.511 ACUTE PAIN OF RIGHT SHOULDER: ICD-10-CM

## 2025-02-12 DIAGNOSIS — E66.09 CLASS 1 OBESITY DUE TO EXCESS CALORIES WITH SERIOUS COMORBIDITY AND BODY MASS INDEX (BMI) OF 32.0 TO 32.9 IN ADULT: Primary | ICD-10-CM

## 2025-02-12 LAB
ANION GAP SERPL CALCULATED.3IONS-SCNC: 12 MMOL/L (ref 7–15)
BUN SERPL-MCNC: 12.5 MG/DL (ref 6–20)
CALCIUM SERPL-MCNC: 9.5 MG/DL (ref 8.8–10.4)
CHLORIDE SERPL-SCNC: 103 MMOL/L (ref 98–107)
CREAT SERPL-MCNC: 0.73 MG/DL (ref 0.51–0.95)
EGFRCR SERPLBLD CKD-EPI 2021: >90 ML/MIN/1.73M2
GLUCOSE SERPL-MCNC: 89 MG/DL (ref 70–99)
HCO3 SERPL-SCNC: 24 MMOL/L (ref 22–29)
POTASSIUM SERPL-SCNC: 4.5 MMOL/L (ref 3.4–5.3)
SODIUM SERPL-SCNC: 139 MMOL/L (ref 135–145)

## 2025-02-12 PROCEDURE — 99213 OFFICE O/P EST LOW 20 MIN: CPT | Performed by: FAMILY MEDICINE

## 2025-02-12 PROCEDURE — G2211 COMPLEX E/M VISIT ADD ON: HCPCS | Performed by: FAMILY MEDICINE

## 2025-02-12 PROCEDURE — 80048 BASIC METABOLIC PNL TOTAL CA: CPT | Performed by: FAMILY MEDICINE

## 2025-02-12 PROCEDURE — 36415 COLL VENOUS BLD VENIPUNCTURE: CPT | Performed by: FAMILY MEDICINE

## 2025-02-12 RX ORDER — TRIAMCINOLONE ACETONIDE 1 MG/G
CREAM TOPICAL 2 TIMES DAILY
Qty: 80 G | Refills: 1 | Status: CANCELLED | OUTPATIENT
Start: 2025-02-12

## 2025-02-12 RX ORDER — FLUTICASONE PROPIONATE 50 MCG
SPRAY, SUSPENSION (ML) NASAL
Qty: 16 ML | Refills: 1 | Status: CANCELLED | OUTPATIENT
Start: 2025-02-12

## 2025-02-12 RX ORDER — ALBUTEROL SULFATE 90 UG/1
INHALANT RESPIRATORY (INHALATION)
Qty: 18 G | Refills: 1 | Status: CANCELLED | OUTPATIENT
Start: 2025-02-12

## 2025-02-12 RX ORDER — FEXOFENADINE HCL 180 MG/1
180 TABLET ORAL DAILY
Qty: 90 TABLET | Refills: 1 | Status: CANCELLED | OUTPATIENT
Start: 2025-02-12

## 2025-02-12 ASSESSMENT — PAIN SCALES - GENERAL: PAINLEVEL_OUTOF10: SEVERE PAIN (9)

## 2025-02-12 NOTE — PROGRESS NOTES
"  Assessment & Plan     Class 1 obesity due to excess calories with serious comorbidity and body mass index (BMI) of 32.0 to 32.9 in adult  Continue Wegovy 2.4 mg once weekly.  Follow up in 6 months or sooner as needed.  - Semaglutide-Weight Management (WEGOVY) 2.4 MG/0.75ML pen; Inject 2.4 mg subcutaneously once a week.  - Basic metabolic panel  (Ca, Cl, CO2, Creat, Gluc, K, Na, BUN); Future  - Basic metabolic panel  (Ca, Cl, CO2, Creat, Gluc, K, Na, BUN)    Acute pain of right shoulder  Recommend PT, referral placed.  - Physical Therapy  Referral; Future    The longitudinal plan of care for the diagnosis(es)/condition(s) as documented were addressed during this visit. Due to the added complexity in care, I will continue to support Rayna in the subsequent management and with ongoing continuity of care.      BMI  Estimated body mass index is 27.92 kg/m  as calculated from the following:    Height as of this encounter: 1.651 m (5' 5\").    Weight as of this encounter: 76.1 kg (167 lb 12.8 oz).   Weight management plan: Discussed healthy diet and exercise guidelines      See Patient Instructions    Subjective   Rayna is a 60 year old, presenting for the following health issues:  Weight Loss, Asthma, Lipids, Hypertension, and Shoulder Pain (right)        2/12/2025     9:07 AM   Additional Questions   Roomed by Aminah Crockett, EMT-B     History of Present Illness     Asthma:  She presents for follow up of asthma.  She has some cough, no wheezing, and no shortness of breath. She is not using a relief medication.  She does not miss any doses of her controller medication throughout the week. Patient is aware of the following triggers: cold air, pollens and strong odors and fumes. The patient has had a visit to the Emergency Room, Urgent Care or Hospital due to asthma since the last clinic visit. She has been to the Emergency Room or Urgent Care 1 time.She has had a Hospitalization 0 times.    Hyperlipidemia:  She " presents for follow up of hyperlipidemia.   She is taking medication to lower cholesterol. She is not having myalgia or other side effects to statin medications.    Hypertension: She presents for follow up of hypertension.  She does not check blood pressure  regularly outside of the clinic. Outpatient blood pressures have not been over 140/90. She follows a low salt diet.     Reason for visit:  Follow up for weight loss    She eats 2-3 servings of fruits and vegetables daily.She consumes 1 sweetened beverage(s) daily.She exercises with enough effort to increase her heart rate 9 or less minutes per day.  She exercises with enough effort to increase her heart rate 3 or less days per week.   She is taking medications regularly.      Medication Followup of Multiple  Taking Medication as prescribed: yes  Side Effects:  None  Medication Helping Symptoms:  yes    Semaglutide 2.4 once weekly.  She is not losing any more, did not gain with the switch from zepbound to wegovy.  She has not lost any more weight, but is maintaining.  Swelling is improved and she is able to eat foods she couldn't before.      Pain History:  When did you first notice your pain? 1 month   Have you seen anyone else for your pain? No  How has your pain affected your ability to work? 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 Musculoskeletal problem/pain  Onset/Duration: more than a 1 month,   Description  Location: shoulder - right  Joint Swelling: No  Redness: No  Pain: YES  Warmth: No  Intensity:  severe  Progression of Symptoms:  same and constant  Accompanying signs and symptoms:   Fevers: No  Numbness/tingling/weakness: No  History  Trauma to the area: No  Recent illness:  YES- cold for 2 months ago  Previous similar problem: No  Previous evaluation:  No  Precipitating or alleviating factors:  Aggravating factors include: lifting, overuse, and typing, holding phone,  "throwing items  Therapies tried and outcome: heat, ice, acetaminophen, and salon pas pain patch    Does not think it is arthritis.  Pain goes down the sides of the arm, the front and back of the upper arm.  If she is not typing things aren't as bad. No accidents or injuries.  Meloxicam has not helped her shoulder.          Objective    /79 (BP Location: Right arm, Patient Position: Sitting, Cuff Size: Adult Regular)   Pulse 69   Temp 98.2  F (36.8  C) (Oral)   Resp 16   Ht 1.651 m (5' 5\")   Wt 76.1 kg (167 lb 12.8 oz)   SpO2 97%   BMI 27.92 kg/m    Body mass index is 27.92 kg/m .  Physical Exam   GENERAL: alert and no distress  MS: no gross musculoskeletal defects noted, no edema  PSYCH: mentation appears normal, affect normal/bright            Signed Electronically by: Brooke Bach MD    "

## 2025-03-05 DIAGNOSIS — M25.562 CHRONIC PAIN OF BOTH KNEES: ICD-10-CM

## 2025-03-05 DIAGNOSIS — M25.561 CHRONIC PAIN OF BOTH KNEES: ICD-10-CM

## 2025-03-05 DIAGNOSIS — G89.29 CHRONIC PAIN OF BOTH KNEES: ICD-10-CM

## 2025-03-05 RX ORDER — MELOXICAM 15 MG/1
15 TABLET ORAL DAILY
Qty: 30 TABLET | Refills: 2 | Status: SHIPPED | OUTPATIENT
Start: 2025-03-05

## 2025-03-07 PROBLEM — M25.511 ACUTE PAIN OF RIGHT SHOULDER: Status: ACTIVE | Noted: 2025-03-07

## 2025-03-13 ENCOUNTER — THERAPY VISIT (OUTPATIENT)
Dept: PHYSICAL THERAPY | Facility: CLINIC | Age: 60
End: 2025-03-13
Payer: COMMERCIAL

## 2025-03-13 DIAGNOSIS — M25.511 ACUTE PAIN OF RIGHT SHOULDER: Primary | ICD-10-CM

## 2025-04-24 PROBLEM — M25.511 ACUTE PAIN OF RIGHT SHOULDER: Status: RESOLVED | Noted: 2025-03-07 | Resolved: 2025-04-24

## 2025-05-06 ENCOUNTER — ANCILLARY PROCEDURE (OUTPATIENT)
Dept: GENERAL RADIOLOGY | Facility: CLINIC | Age: 60
End: 2025-05-06
Attending: FAMILY MEDICINE
Payer: COMMERCIAL

## 2025-05-06 ENCOUNTER — OFFICE VISIT (OUTPATIENT)
Dept: ORTHOPEDICS | Facility: CLINIC | Age: 60
End: 2025-05-06
Payer: COMMERCIAL

## 2025-05-06 DIAGNOSIS — M25.511 ACUTE PAIN OF RIGHT SHOULDER: ICD-10-CM

## 2025-05-06 DIAGNOSIS — M75.21 BICEPS TENDINITIS OF RIGHT UPPER EXTREMITY: ICD-10-CM

## 2025-05-06 DIAGNOSIS — M70.61 TROCHANTERIC BURSITIS OF RIGHT HIP: ICD-10-CM

## 2025-05-06 DIAGNOSIS — M25.551 ACUTE PAIN OF RIGHT HIP: ICD-10-CM

## 2025-05-06 DIAGNOSIS — M16.11 PRIMARY OSTEOARTHRITIS OF RIGHT HIP: ICD-10-CM

## 2025-05-06 DIAGNOSIS — M76.01 GLUTEAL TENDINITIS OF RIGHT BUTTOCK: ICD-10-CM

## 2025-05-06 DIAGNOSIS — M75.91 SUPRASPINATUS TENDINITIS, RIGHT: ICD-10-CM

## 2025-05-06 DIAGNOSIS — M25.551 ACUTE PAIN OF RIGHT HIP: Primary | ICD-10-CM

## 2025-05-06 PROCEDURE — 99214 OFFICE O/P EST MOD 30 MIN: CPT | Mod: 25 | Performed by: FAMILY MEDICINE

## 2025-05-06 PROCEDURE — 20611 DRAIN/INJ JOINT/BURSA W/US: CPT | Mod: RT | Performed by: FAMILY MEDICINE

## 2025-05-06 PROCEDURE — 73502 X-RAY EXAM HIP UNI 2-3 VIEWS: CPT | Mod: TC | Performed by: RADIOLOGY

## 2025-05-06 PROCEDURE — 73030 X-RAY EXAM OF SHOULDER: CPT | Mod: TC | Performed by: RADIOLOGY

## 2025-05-06 RX ORDER — METHYLPREDNISOLONE ACETATE 40 MG/ML
40 INJECTION, SUSPENSION INTRA-ARTICULAR; INTRALESIONAL; INTRAMUSCULAR; SOFT TISSUE
Status: COMPLETED | OUTPATIENT
Start: 2025-05-06 | End: 2025-05-06

## 2025-05-06 RX ORDER — LIDOCAINE HYDROCHLORIDE 10 MG/ML
1 INJECTION, SOLUTION INFILTRATION; PERINEURAL
Status: COMPLETED | OUTPATIENT
Start: 2025-05-06 | End: 2025-05-06

## 2025-05-06 RX ADMIN — LIDOCAINE HYDROCHLORIDE 1 ML: 10 INJECTION, SOLUTION INFILTRATION; PERINEURAL at 16:50

## 2025-05-06 RX ADMIN — METHYLPREDNISOLONE ACETATE 40 MG: 40 INJECTION, SUSPENSION INTRA-ARTICULAR; INTRALESIONAL; INTRAMUSCULAR; SOFT TISSUE at 16:50

## 2025-05-06 NOTE — LETTER
5/6/2025      Rayna Jackson  [address] [facility] [id number]      Dear Colleague,    Thank you for referring your patient, Rayna Jackson, to the [facility] [place]. Please see a copy of my visit note below.    ASSESSMENT & PLAN  Patient Instructions     1. Acute pain of right hip    2. Acute pain of right shoulder    3. Trochanteric bursitis of right hip    4. Primary osteoarthritis of right hip    5. Gluteal tendinitis of right buttock    6. Supraspinatus tendinitis, right    7. Biceps tendinitis of right upper extremity      - Patient has acute right hip pain due to trochanteric bursitis, gluteal tendinitis and hip arthritis.  Patient has acute right shoulder pain due to supraspinatus and biceps tendinitis.  -X-rays taken off today of the right shoulder shows downsloping type II acromion with subacromial osteophytes, mild degenerative changes of the AC joint.  Glenohumeral joint is intact  -X-rays taken in office today of the right hip shows osteophyte at the acetabulum, small osteophytes of the femoral head with well-maintained joint space.  Patient also has significant osteophytes around the trochanter.  -Patient will start formal physical therapy and home exercise program.  -Patient tolerated right trochanteric bursa cortisone injection today without complications.  Patient was given postprocedure instruction  -Patient may continue with meloxicam as needed for pain.  Patient may also take 1000 mg of Tylenol every 6 hours as needed for breakthrough pain  -Patient will continue to follow-up with me for further treatment and recommendations.  -Call direct clinic number [[phone]] at any time with questions or concerns.    Albert Yeo MD [facility]  [facility]        -----    SUBJECTIVE  Rayna Jackson is a/an 60 year old female who is seen as a self referral for evaluation of right hip pain. The patient is seen by  themselves.    Onset: 2 week(s) ago. Reports insidious onset without acute precipitating event.  Location of Pain: right lateral hip   Rating of Pain at worst: 10/10  Rating of Pain Currently: 8/10  Worsened by: At night and in the morning   Better with: Nothing   Treatments tried: rest/activity avoidance, ice, and other medications: Meloxicam  Quality: aching, sharp  Associated symptoms: locking or catching  Orthopedic history: NO  Relevant surgical history: YES - Date: 2023 LEFT NINA   Social history: social history: works as a manager     Patient reports they have been having right shoulder pain for the past 3 months with no injury. They report pain over their anterior shoulder. They have tried physical therapy. They report they have some pain that will radiate down the bicep.     Past Medical History:   Diagnosis Date     Arthritis      Hypertension      Torn meniscus 01/01/2013    left      Uncomplicated asthma      Social History     Socioeconomic History     Marital status:      Spouse name: Juan F     Number of children: 4     Highest education level: Master's degree (e.g., MA, MS, Adry, MEd, MSW, [employer])   Occupational History     Occupation: manager     Employer: [employer]   Tobacco Use     Smoking status: Never     Passive exposure: Never     Smokeless tobacco: Never   Vaping Use     Vaping status: Never Used   Substance and Sexual Activity     Alcohol use: Yes     Comment: 2 drinks once a week     Drug use: No     Sexual activity: Yes     Partners: Male     Birth control/protection: I.U.D., Female Surgical   Other Topics Concern     Parent/sibling w/ CABG, MI or angioplasty before 65F 55M? Yes     Social Drivers of Health     Financial Resource Strain: Low Risk  (11/21/2023)    Financial Resource Strain      Within the past 12 months, have you or your family members you live with been unable to get utilities (heat, electricity) when it was really needed?: No   Food Insecurity: Low Risk   (11/21/2023)    Food Insecurity      Within the past 12 months, did you worry that your food would run out before you got money to buy more?: No      Within the past 12 months, did the food you bought just not last and you didn t have money to get more?: No   Transportation Needs: Low Risk  (11/21/2023)    Transportation Needs      Within the past 12 months, has lack of transportation kept you from medical appointments, getting your medicines, non-medical meetings or appointments, work, or from getting things that you need?: No   Physical Activity: Insufficiently Active (11/21/2023)    Exercise Vital Sign      Days of Exercise per Week: 3 days      Minutes of Exercise per Session: 30 min   Stress: No Stress Concern Present (11/21/2023)    [employer] - Occupational Stress Questionnaire      Feeling of Stress : Not at all   Social Connections: Socially Integrated (11/21/2023)    Social Connection and Isolation Panel [NHANES]      Frequency of Communication with Friends and Family: More than three times a week      Frequency of Social Gatherings with Friends and Family: More than three times a week      Attends [employer] Services: 1 to 4 times per year      Active Member of Clubs or Organizations: Yes      Attends Club or Organization Meetings: More than 4 times per year      Marital Status:    Interpersonal Safety: Low Risk  (2/12/2025)    Interpersonal Safety      Do you feel physically and emotionally safe where you currently live?: Yes      Within the past 12 months, have you been hit, slapped, kicked or otherwise physically hurt by someone?: No      Within the past 12 months, have you been humiliated or emotionally abused in other ways by your partner or ex-partner?: No   Housing Stability: Low Risk  (11/21/2023)    Housing Stability      Do you have housing? : Yes      Are you worried about losing your housing?: No         Patient's past medical, surgical, social, and family  histories were reviewed today and no changes are noted.    REVIEW OF SYSTEMS:  10 point ROS is negative other than symptoms noted above in HPI, Past Medical History or as stated below  Constitutional: NEGATIVE for fever, chills, change in weight  Skin: NEGATIVE for worrisome rashes, moles or lesions  GI/: NEGATIVE for bowel or bladder changes  Neuro: NEGATIVE for weakness, dizziness or paresthesias    OBJECTIVE:  There were no vitals taken for this visit.   General: healthy, alert and in no distress  HEENT: no scleral icterus or conjunctival erythema  Skin: no suspicious lesions or rash. No jaundice.  CV: no pedal edema  Resp: normal respiratory effort without conversational dyspnea   Psych: normal mood and affect  Gait: normal steady gait with appropriate coordination and balance  Neuro: Normal light sensory exam of lower extremity  MSK:  RIGHT HIP  Inspection:    No obvious deformity or asymmetry, level pelvis  Palpation:    Tender about the greater trochanteric region and gluteus medius insertion. Otherwise all other landmarks are nontender.  Active Range of Motion:     Flexion within normal limits, IR limited by tightness, ER  limited by tightness  Strength:    Flexion grossly intact, adduction grossly intact, abduction grossly intact  Special Tests:    Positive: resisted gluteus medius provocation, anterior impingement (FADIR), posterior impingement (EX/AB/ER)    Negative: YASMINE Sena    Independent visualization of the below image:  No results found for this or any previous visit (from the past 24 hours).    Right hip and pelvis x-ray taken off today show osteophyte over the acetabulam osteophytes over the greater trochanter.  No significant joint space narrowing of the hip joint.  No acute fracture or dislocation.    Large Joint Injection/Arthocentesis: R greater trochanteric bursa    Date/Time: 5/6/2025 4:50 PM    Performed by: Yeo, Albert, MD  Authorized by: Yeo, Albert, MD    Indications:   Pain  Needle Size:  22 G  Guidance: ultrasound    Approach:  Lateral  Location:  Hip      Site:  R greater trochanteric bursa  Medications:  40 mg methylPREDNISolone 40 MG/ML; 1 mL lidocaine 1 %  Outcome:  Tolerated well, no immediate complications  Procedure discussed: discussed risks, benefits, and alternatives    Consent Given by:  Patient  Timeout: timeout called immediately prior to procedure    Prep: patient was prepped and draped in usual sterile fashion     Ultrasound was used to ensure safe and accurate needle placement and injection. Ultrasound images of the procedure were permanently stored.          Albert Yeo MD [facility]      Again, thank you for allowing me to participate in the care of your patient.        Sincerely,        Albert Yeo, MD    Electronically signed

## 2025-05-06 NOTE — PATIENT INSTRUCTIONS
1. Acute pain of right hip    2. Acute pain of right shoulder    3. Trochanteric bursitis of right hip    4. Primary osteoarthritis of right hip    5. Gluteal tendinitis of right buttock    6. Supraspinatus tendinitis, right    7. Biceps tendinitis of right upper extremity      - Patient has acute right hip pain due to trochanteric bursitis, gluteal tendinitis and hip arthritis.  Patient has acute right shoulder pain due to supraspinatus and biceps tendinitis.  -X-rays taken off today of the right shoulder shows downsloping type II acromion with subacromial osteophytes, mild degenerative changes of the AC joint.  Glenohumeral joint is intact  -X-rays taken in office today of the right hip shows osteophyte at the acetabulum, small osteophytes of the femoral head with well-maintained joint space.  Patient also has significant osteophytes around the trochanter.  -Patient will start formal physical therapy and home exercise program.  -Patient tolerated right trochanteric bursa cortisone injection today without complications.  Patient was given postprocedure instruction  -Patient may continue with meloxicam as needed for pain.  Patient may also take 1000 mg of Tylenol every 6 hours as needed for breakthrough pain  -Patient will continue to follow-up with me for further treatment and recommendations.  -Call direct clinic number [[phone]] at any time with questions or concerns.    Albert Yeo MD [facility]  [facility] [place]

## 2025-05-06 NOTE — PROGRESS NOTES
ASSESSMENT & PLAN  Patient Instructions     1. Acute pain of right hip    2. Acute pain of right shoulder    3. Trochanteric bursitis of right hip    4. Primary osteoarthritis of right hip    5. Gluteal tendinitis of right buttock    6. Supraspinatus tendinitis, right    7. Biceps tendinitis of right upper extremity      - Patient has acute right hip pain due to trochanteric bursitis, gluteal tendinitis and hip arthritis.  Patient has acute right shoulder pain due to supraspinatus and biceps tendinitis.  -X-rays taken off today of the right shoulder shows downsloping type II acromion with subacromial osteophytes, mild degenerative changes of the AC joint.  Glenohumeral joint is intact  -X-rays taken in office today of the right hip shows osteophyte at the acetabulum, small osteophytes of the femoral head with well-maintained joint space.  Patient also has significant osteophytes around the trochanter.  -Patient will start formal physical therapy and home exercise program.  -Patient tolerated right trochanteric bursa cortisone injection today without complications.  Patient was given postprocedure instruction  -Patient may continue with meloxicam as needed for pain.  Patient may also take 1000 mg of Tylenol every 6 hours as needed for breakthrough pain  -Patient will continue to follow-up with me for further treatment and recommendations.  -Call direct clinic number [[phone]] at any time with questions or concerns.    Albert Yeo MD [facility]  [facility]        -----    SUBJECTIVE  Rayna Jackson is a/an 60 year old female who is seen as a self referral for evaluation of right hip pain. The patient is seen by themselves.    Onset: 2 week(s) ago. Reports insidious onset without acute precipitating event.  Location of Pain: right lateral hip   Rating of Pain at worst: 10/10  Rating of Pain Currently: 8/10  Worsened by: At night and in the morning   Better with:  Nothing   Treatments tried: rest/activity avoidance, ice, and other medications: Meloxicam  Quality: aching, sharp  Associated symptoms: locking or catching  Orthopedic history: NO  Relevant surgical history: YES - Date: 2023 LEFT NINA   Social history: social history: works as a manager     Patient reports they have been having right shoulder pain for the past 3 months with no injury. They report pain over their anterior shoulder. They have tried physical therapy. They report they have some pain that will radiate down the bicep.     Past Medical History:   Diagnosis Date    Arthritis     Hypertension     Torn meniscus 01/01/2013    left     Uncomplicated asthma      Social History     Socioeconomic History    Marital status:      Spouse name: Juan F    Number of children: 4    Highest education level: Master's degree (e.g., MA, MS, Adry, MEd, MSW, [employer])   Occupational History    Occupation: manager     Employer: [employer]   Tobacco Use    Smoking status: Never     Passive exposure: Never    Smokeless tobacco: Never   Vaping Use    Vaping status: Never Used   Substance and Sexual Activity    Alcohol use: Yes     Comment: 2 drinks once a week    Drug use: No    Sexual activity: Yes     Partners: Male     Birth control/protection: I.U.D., Female Surgical   Other Topics Concern    Parent/sibling w/ CABG, MI or angioplasty before 65F 55M? Yes     Social Drivers of Health     Financial Resource Strain: Low Risk  (11/21/2023)    Financial Resource Strain     Within the past 12 months, have you or your family members you live with been unable to get utilities (heat, electricity) when it was really needed?: No   Food Insecurity: Low Risk  (11/21/2023)    Food Insecurity     Within the past 12 months, did you worry that your food would run out before you got money to buy more?: No     Within the past 12 months, did the food you bought just not last and you didn t have money to get more?: No   Transportation  Needs: Low Risk  (11/21/2023)    Transportation Needs     Within the past 12 months, has lack of transportation kept you from medical appointments, getting your medicines, non-medical meetings or appointments, work, or from getting things that you need?: No   Physical Activity: Insufficiently Active (11/21/2023)    Exercise Vital Sign     Days of Exercise per Week: 3 days     Minutes of Exercise per Session: 30 min   Stress: No Stress Concern Present (11/21/2023)    [place] [employer] [place] [employer]ational [employer] - Occupational Stress Questionnaire     Feeling of Stress : Not at all   Social Connections: Socially Integrated (11/21/2023)    Social Connection and Isolation Panel [NHANES]     Frequency of Communication with Friends and Family: More than three times a week     Frequency of Social Gatherings with Friends and Family: More than three times a week     Attends [employer] Services: 1 to 4 times per year     Active Member of Clubs or Organizations: Yes     Attends Club or Organization Meetings: More than 4 times per year     Marital Status:    Interpersonal Safety: Low Risk  (2/12/2025)    Interpersonal Safety     Do you feel physically and emotionally safe where you currently live?: Yes     Within the past 12 months, have you been hit, slapped, kicked or otherwise physically hurt by someone?: No     Within the past 12 months, have you been humiliated or emotionally abused in other ways by your partner or ex-partner?: No   Housing Stability: Low Risk  (11/21/2023)    Housing Stability     Do you have housing? : Yes     Are you worried about losing your housing?: No         Patient's past medical, surgical, social, and family histories were reviewed today and no changes are noted.    REVIEW OF SYSTEMS:  10 point ROS is negative other than symptoms noted above in HPI, Past Medical History or as stated below  Constitutional: NEGATIVE for fever, chills, change in weight  Skin: NEGATIVE for worrisome rashes, moles  or lesions  GI/: NEGATIVE for bowel or bladder changes  Neuro: NEGATIVE for weakness, dizziness or paresthesias    OBJECTIVE:  There were no vitals taken for this visit.   General: healthy, alert and in no distress  HEENT: no scleral icterus or conjunctival erythema  Skin: no suspicious lesions or rash. No jaundice.  CV: no pedal edema  Resp: normal respiratory effort without conversational dyspnea   Psych: normal mood and affect  Gait: normal steady gait with appropriate coordination and balance  Neuro: Normal light sensory exam of lower extremity  MSK:  RIGHT HIP  Inspection:    No obvious deformity or asymmetry, level pelvis  Palpation:    Tender about the greater trochanteric region and gluteus medius insertion. Otherwise all other landmarks are nontender.  Active Range of Motion:     Flexion within normal limits, IR limited by tightness, ER  limited by tightness  Strength:    Flexion grossly intact, adduction grossly intact, abduction grossly intact  Special Tests:    Positive: resisted gluteus medius provocation, anterior impingement (FADIR), posterior impingement (EX/AB/ER)    Negative: YASMINE Sena    Independent visualization of the below image:  No results found for this or any previous visit (from the past 24 hours).    Right hip and pelvis x-ray taken off today show osteophyte over the acetabulam osteophytes over the greater trochanter.  No significant joint space narrowing of the hip joint.  No acute fracture or dislocation.    Large Joint Injection/Arthocentesis: R greater trochanteric bursa    Date/Time: 5/6/2025 4:50 PM    Performed by: Yeo, Albert, MD  Authorized by: Yeo, Albert, MD    Indications:  Pain  Needle Size:  22 G  Guidance: ultrasound    Approach:  Lateral  Location:  Hip      Site:  R greater trochanteric bursa  Medications:  40 mg methylPREDNISolone 40 MG/ML; 1 mL lidocaine 1 %  Outcome:  Tolerated well, no immediate complications  Procedure discussed: discussed risks, benefits, and  alternatives    Consent Given by:  Patient  Timeout: timeout called immediately prior to procedure    Prep: patient was prepped and draped in usual sterile fashion     Ultrasound was used to ensure safe and accurate needle placement and injection. Ultrasound images of the procedure were permanently stored.          Albert Yeo MD [facility]

## 2025-05-13 ENCOUNTER — VIRTUAL VISIT (OUTPATIENT)
Dept: ENDOCRINOLOGY | Facility: CLINIC | Age: 60
End: 2025-05-13
Payer: COMMERCIAL

## 2025-05-13 DIAGNOSIS — E03.9 HYPOTHYROIDISM, UNSPECIFIED TYPE: ICD-10-CM

## 2025-05-13 DIAGNOSIS — E06.3 HASHIMOTO'S DISEASE: Primary | ICD-10-CM

## 2025-05-13 PROCEDURE — 98006 SYNCH AUDIO-VIDEO EST MOD 30: CPT | Performed by: INTERNAL MEDICINE

## 2025-05-13 PROCEDURE — G2211 COMPLEX E/M VISIT ADD ON: HCPCS | Performed by: INTERNAL MEDICINE

## 2025-05-13 RX ORDER — LEVOTHYROXINE SODIUM 75 UG/1
75 TABLET ORAL DAILY
Qty: 90 TABLET | Refills: 3 | Status: SHIPPED | OUTPATIENT
Start: 2025-05-13

## 2025-05-13 ASSESSMENT — PAIN SCALES - GENERAL: PAINLEVEL_OUTOF10: NO PAIN (0)

## 2025-05-13 NOTE — LETTER
"5/13/2025      Rayna Jackson  [address]  [facility] [address]      Dear Colleague,    Thank you for referring your patient, Rayna Jackson, to the [facility]. Please see a copy of my visit note below.    THIS IS A VIDEO VISIT:    Phone call visit/virtual visit encounter:    Name of patient: Rayna Jackson    Date of encounter: 5/13/2025    Time of start of video visit: 1:32    Video started: 1:37    Video ended: 1:42    Provider location: working from home/ [facility]    Patient location: patients home.    Mode of transmission: video/ Doximity    Verbal consent: obtained before starting visit. Pt is agreeable.      The patient has been notified of following:      \"This VIDEO visit will be conducted via a call between you and your physician/provider. We have found that certain health care needs can be provided without the need for a physical exam.  This service lets us provide the care you need with a short phone conversation.  If a prescription is necessary we can send it directly to your pharmacy.  If lab work is needed we can place an order for that and you can then stop by our lab to have the test done at a later time.     With new updates with corona virus patient might be billed as clinic visit.     If during the course of the call the physician/provider feels a telephone visit is not appropriate, you will not be charged for this service.\"      Past medical history, social history, family history, allergy and medications were reviewed and updated as appropriate.  Reviewed pertinent labs, notes, imaging studies personally.    Name: Rayna Jackson  F/u for obesity.   HPI:  Rayna Jackson is a 60 year old female who presents for the management of obestiy.   has a past medical history of Arthritis, Hypertension, Torn meniscus (01/01/2013), and Uncomplicated asthma.  Since last visit she was working with Dr.Coming Bach for hypothyroidism and dose was adjusted.    1. Obesity:  Has " noted abnormal weight gain over the past 3 years - states she has gained 70 lbs.   Making healthy food choices - doesn't feel she overeats.  Unable to lose weight and has continued to gain.  Growing up was active.  Weight gain started after college.  FH of obesity. (Dad and Dads side is heavy, brother is heavy)    Previously tried a program called [employer], also weight watchers, [place] [facility] was most effective - lost 20 lbs.    She was on Phentermine 37.5 mg daily ( on it X 5/2019) and Topiramate 50 mg bid ( started it in 4/2020).    Currently treated with Wegovy ( prescribed by PCP)-- lost about 20 lbs so far. ( But now stable in last 6 months)  Diabetes:No but states her blood sugars are 'always' high-normal.  No GD with pregnancy but was monitored more closely because glucose levels were elevated but not in the diabetes range.  + FH of DM2 - father and MGGM.  Sleep Apnea/Snores:snores - was seen by specialist who did not feel sleep apnea eval was indicated, didn't believe she had sleep apnea  Hypertension or CAD:Yes: Controlled on Losartan  Hyperlipidemia:Yes: untreated.  Diet: trying healthy food choices, low carb, portion controlled  Exercise: Difficult due to joint pain, + OA  #2. Hypothyroidism: Currently treated with levothyroxine 75 mcg/day. (on this dose X 9/2023)   Takes generic.  Reports compliance.  + tired. Acute on chronic.  Sleeping ok.  FH + for thyroid disease - Mother, MGM, maternal aunt, sister.    Previous lymph node biopsy + for Lupus - was seen at [place], no diagnosis of Lupus has been confirmed.  Patient feels well at this time and denies any tachycardia, palpitations, heat intolerance, tremor, insomnia, diarrhea, or unexplained weight loss.  Patient also denies  cold intolerance, constipation, or unexplained weight gain.   Wt Readings from Last 2 Encounters:   02/12/25 76.1 kg (167 lb 12.8 oz)   01/06/25 77.1 kg (170 lb)     PMH/PSH:  Past Medical History:   Diagnosis Date     Arthritis       Hypertension      Torn meniscus 01/01/2013    left      Uncomplicated asthma      Past Surgical History:   Procedure Laterality Date     APPENDECTOMY       appendicitis  01/01/1980     ARTHROPLASTY HIP Left 8/8/2023    Procedure: Left total hip arthroplasty;  Surgeon: Neto Amezcua MD;  Location: [facility] OR     BIOPSY  2017    Removed gland in back of neck     COLONOSCOPY N/A 06/30/2017    Procedure: COLONOSCOPY;  COLONOSCOPY   ;  Surgeon: Brooks Villa MD;  Location:  GI     CYSTECTOMY OVARIAN BENIGN  01/01/1980     KNEE SURGERY Left      Family Hx:  Family History   Problem Relation Age of Onset     Hypertension Mother      Thyroid Disease Mother      Coronary Artery Disease Mother      Hypertension Father      Diabetes Father      Coronary Artery Disease Father      Hyperlipidemia Father      Obesity Father      Macular Degeneration Maternal Grandmother      Thyroid Disease Maternal Grandmother      Coronary Artery Disease Maternal Grandfather      Coronary Artery Disease Paternal Grandmother      Cerebrovascular Disease Paternal Grandmother      Thyroid Disease Sister      Thyroid Disease Maternal Aunt      Diabetes Other         great grandmother     Glaucoma No family hx of      Thyroid disease: Yes, mother, sister, aunt, MGM        DM2: Yes: father, MGGM         Autoimmune: DM1, SLE, RA, Vitiligo     Social Hx:  Social History     Socioeconomic History     Marital status:      Spouse name: Juan F     Number of children: 4     Years of education: Not on file     Highest education level: Master's degree (e.g., MA, MS, Adry, MEd, MSW, [employer])   Occupational History     Occupation: manager     Employer: [employer]   Tobacco Use     Smoking status: Never     Passive exposure: Never     Smokeless tobacco: Never   Vaping Use     Vaping status: Never Used   Substance and Sexual Activity     Alcohol use: Yes     Comment: 2 drinks once a week     Drug use: No     Sexual activity: Yes     Partners: Male      Birth control/protection: I.U.D., Female Surgical   Other Topics Concern     Parent/sibling w/ CABG, MI or angioplasty before 65F 55M? Yes   Social History Narrative     Not on file     Social Drivers of Health     Financial Resource Strain: Low Risk  (11/21/2023)    Financial Resource Strain      Within the past 12 months, have you or your family members you live with been unable to get utilities (heat, electricity) when it was really needed?: No   Food Insecurity: Low Risk  (11/21/2023)    Food Insecurity      Within the past 12 months, did you worry that your food would run out before you got money to buy more?: No      Within the past 12 months, did the food you bought just not last and you didn t have money to get more?: No   Transportation Needs: Low Risk  (11/21/2023)    Transportation Needs      Within the past 12 months, has lack of transportation kept you from medical appointments, getting your medicines, non-medical meetings or appointments, work, or from getting things that you need?: No   Physical Activity: Insufficiently Active (11/21/2023)    Exercise Vital Sign      Days of Exercise per Week: 3 days      Minutes of Exercise per Session: 30 min   Stress: No Stress Concern Present (11/21/2023)    [place] of Occupational Health - Occupational Stress Questionnaire      Feeling of Stress : Not at all   Social Connections: Socially Integrated (11/21/2023)    Social Connection and Isolation Panel [NHANES]      Frequency of Communication with Friends and Family: More than three times a week      Frequency of Social Gatherings with Friends and Family: More than three times a week      Attends Jainism Services: 1 to 4 times per year      Active Member of Clubs or Organizations: Yes      Attends Club or Organization Meetings: More than 4 times per year      Marital Status:    Interpersonal Safety: Low Risk  (2/12/2025)    Interpersonal Safety      Do you feel physically and emotionally  safe where you currently live?: Yes      Within the past 12 months, have you been hit, slapped, kicked or otherwise physically hurt by someone?: No      Within the past 12 months, have you been humiliated or emotionally abused in other ways by your partner or ex-partner?: No   Housing Stability: Low Risk  (11/21/2023)    Housing Stability      Do you have housing? : Yes      Are you worried about losing your housing?: No          MEDICATIONS:  has a current medication list which includes the following prescription(s): albuterol, azelastine, fexofenadine, fluticasone, fluticasone, levothyroxine, losartan, meloxicam, montelukast, rosuvastatin, semaglutide-weight management, and triamcinolone.    ROS   ROS: 10 point ROS neg other than the symptoms noted above in the HPI.    PE  VS: There were no vitals taken for this visit.  GENERAL: healthy, alert and no distress  EYES: Eyes grossly normal to inspection, conjunctivae and sclerae normal  ENT: no nose swelling, nasal discharge.  Thyroid: no apparent thyroid nodules  RESP: no audible wheeze, cough, or visible cyanosis.  No visible retractions or increased work of breathing.  Able to speak fully in complete sentences.  ABDO: not evaluated.  EXTREMITIES: no hand tremors.  NEURO: Cranial nerves grossly intact, mentation intact and speech normal  SKIN: No apparent skin lesions, rash or edema seen   PSYCH: mentation appears normal, affect normal/bright, judgement and insight intact, normal speech and appearance well-groomed    LABS:   Latest Ref Rng 8/28/2024  9:37 AM   ENDO THYROID LABS-[facility]     TSH 0.30 - 4.20 uIU/mL 1.45    FREE T4 0.90 - 1.70 ng/dL 1.58      All pertinent notes, labs, and images personally reviewed by me.     A/P  Ms.Jodi ROBERT Jackson is a 60 year old evaluated via video visit for the management of obesity and hypothyroidism:    1. Hypothyroidism (+TPO):  Currently treated with levothyroxine 75  mcg/day. (X 9/2023)  Clinically and biochemically  euthyroid.  8/2024 labs are in range.  No recent labs to review.  Plan:  Discussed diagnosis, pathophysiology, management and treatment options of condition with pt.  Thyroid labs needed.  If labs are in range, plan to continue current dose of levothyroxine 75 mcg/day  Dose change based on labs.  I discussed that as long as thyroid labs are in range, then her s/s are less likely thyroid related. Encouraged pt to continue to work with PCP for further work up of fatigue.  Tentative labs and follow up in 1 year or sooner if concerns or major weight changes.  Please make a lab appointment for blood work and follow up clinic appointment in 1 week after that to discuss results.       2. Dysphagia:  Ongoing dysphagia.  On video visit, does not appear thyromegaly.  Thyroid US: no discrete nodules.    3. Obesity:  Obesity related comorbidity include HTN, OA, hyperlipidemia.  Currently treated with Wegovy and managed by PCP.  Not discussed.    Plan: T4 free, TSH, T4 free, TSH  Plan: levothyroxine (SYNTHROID/LEVOTHROID) 75 MCG         tablet, T4 free, TSH, T4 free, TSH           Discussed s/s of hypothyroidism and hyperthyroidism to watch for.  The patient indicates understanding of these issues and agrees with the plan.    Discussed indications, risks and benefits of all medications prescribed, and answered questions to patient's satisfaction.  The longitudinal plan of care for the diagnosis(es)/condition(s) as documented were addressed during this visit. Due to the added complexity in care, I will continue to support Rayna in the subsequent management and with ongoing continuity of care.  All questions were answered.  The patient indicates understanding of the above issues and agrees with the plan set forth.      Follow-up:  As noted in AVS    Alice Jules MD  Endocrinology   [facility]  CC: Mitra Keita     Again, thank you for allowing me to participate in the care of your patient.         Sincerely,        Alice Jules MD    Electronically signed

## 2025-05-13 NOTE — NURSING NOTE
Current patient location: [address] [facility] [address]    Is the patient currently in the state of [place]? YES    Visit mode: VIDEO    If the visit is dropped, the patient can be reconnected by:VIDEO VISIT: Text to cell phone:   Telephone Information:   Mobile [phone]       Will anyone else be joining the visit? NO  (If patient encounters technical issues they should call [phone]    Are changes needed to the allergy or medication list? No    Are refills needed on medications prescribed by this physician? NO    Rooming Documentation:  Questionnaire(s) completed    Reason for visit: JUNIOR STEVEN

## 2025-05-13 NOTE — PROGRESS NOTES
"THIS IS A VIDEO VISIT:    Phone call visit/virtual visit encounter:    Name of patient: Rayna Jackson    Date of encounter: 5/13/2025    Time of start of video visit: 1:32    Video started: 1:37    Video ended: 1:42    Provider location: working from home/ [facility]    Patient location: patients home.    Mode of transmission: video/ Doximity    Verbal consent: obtained before starting visit. Pt is agreeable.      The patient has been notified of following:      \"This VIDEO visit will be conducted via a call between you and your physician/provider. We have found that certain health care needs can be provided without the need for a physical exam.  This service lets us provide the care you need with a short phone conversation.  If a prescription is necessary we can send it directly to your pharmacy.  If lab work is needed we can place an order for that and you can then stop by our lab to have the test done at a later time.     With new updates with corona virus patient might be billed as clinic visit.     If during the course of the call the physician/provider feels a telephone visit is not appropriate, you will not be charged for this service.\"      Past medical history, social history, family history, allergy and medications were reviewed and updated as appropriate.  Reviewed pertinent labs, notes, imaging studies personally.    Name: Rayna Jackson  F/u for obesity.   HPI:  Rayna Jackson is a 60 year old female who presents for the management of obestiy.   has a past medical history of Arthritis, Hypertension, Torn meniscus (01/01/2013), and Uncomplicated asthma.  Since last visit she was working with Dr.Coming Bach for hypothyroidism and dose was adjusted.    1. Obesity:  Has noted abnormal weight gain over the past 3 years - states she has gained 70 lbs.   Making healthy food choices - doesn't feel she overeats.  Unable to lose weight and has continued to gain.  Growing up was active.  Weight gain started after " college.  FH of obesity. (Dad and Dads side is heavy, brother is heavy)    Previously tried a program called [employer], also weight watchers, [place] [facility] was most effective - lost 20 lbs.    She was on Phentermine 37.5 mg daily ( on it X 5/2019) and Topiramate 50 mg bid ( started it in 4/2020).    Currently treated with Wegovy ( prescribed by PCP)-- lost about 20 lbs so far. ( But now stable in last 6 months)  Diabetes:No but states her blood sugars are 'always' high-normal.  No GD with pregnancy but was monitored more closely because glucose levels were elevated but not in the diabetes range.  + FH of DM2 - father and MGGM.  Sleep Apnea/Snores:snores - was seen by specialist who did not feel sleep apnea eval was indicated, didn't believe she had sleep apnea  Hypertension or CAD:Yes: Controlled on Losartan  Hyperlipidemia:Yes: untreated.  Diet: trying healthy food choices, low carb, portion controlled  Exercise: Difficult due to joint pain, + OA  #2. Hypothyroidism: Currently treated with levothyroxine 75 mcg/day. (on this dose X 9/2023)   Takes generic.  Reports compliance.  + tired. Acute on chronic.  Sleeping ok.  FH + for thyroid disease - Mother, MGM, maternal aunt, sister.    Previous lymph node biopsy + for Lupus - was seen at [place], no diagnosis of Lupus has been confirmed.  Patient feels well at this time and denies any tachycardia, palpitations, heat intolerance, tremor, insomnia, diarrhea, or unexplained weight loss.  Patient also denies  cold intolerance, constipation, or unexplained weight gain.   Wt Readings from Last 2 Encounters:   02/12/25 76.1 kg (167 lb 12.8 oz)   01/06/25 77.1 kg (170 lb)     PMH/PSH:  Past Medical History:   Diagnosis Date    Arthritis     Hypertension     Torn meniscus 01/01/2013    left     Uncomplicated asthma      Past Surgical History:   Procedure Laterality Date    APPENDECTOMY      appendicitis  01/01/1980    ARTHROPLASTY HIP Left 8/8/2023    Procedure: Left total hip  arthroplasty;  Surgeon: Neto Amezcua MD;  Location: [facility] OR    BIOPSY  2017    Removed gland in back of neck    COLONOSCOPY N/A 06/30/2017    Procedure: COLONOSCOPY;  COLONOSCOPY   ;  Surgeon: Brooks Villa MD;  Location: [facility] GI    CYSTECTOMY OVARIAN BENIGN  01/01/1980    KNEE SURGERY Left      Family Hx:  Family History   Problem Relation Age of Onset    Hypertension Mother     Thyroid Disease Mother     Coronary Artery Disease Mother     Hypertension Father     Diabetes Father     Coronary Artery Disease Father     Hyperlipidemia Father     Obesity Father     Macular Degeneration Maternal Grandmother     Thyroid Disease Maternal Grandmother     Coronary Artery Disease Maternal Grandfather     Coronary Artery Disease Paternal Grandmother     Cerebrovascular Disease Paternal Grandmother     Thyroid Disease Sister     Thyroid Disease Maternal Aunt     Diabetes Other         great grandmother    Glaucoma No family hx of      Thyroid disease: Yes, mother, sister, aunt, MGM        DM2: Yes: father, MGGM         Autoimmune: DM1, SLE, RA, Vitiligo     Social Hx:  Social History     Socioeconomic History    Marital status:      Spouse name: Juan F    Number of children: 4    Years of education: Not on file    Highest education level: Master's degree (e.g., MA, MS, Adry, MEd, MSW, [employer])   Occupational History    Occupation: manager     Employer: [employer]   Tobacco Use    Smoking status: Never     Passive exposure: Never    Smokeless tobacco: Never   Vaping Use    Vaping status: Never Used   Substance and Sexual Activity    Alcohol use: Yes     Comment: 2 drinks once a week    Drug use: No    Sexual activity: Yes     Partners: Male     Birth control/protection: I.U.D., Female Surgical   Other Topics Concern    Parent/sibling w/ CABG, MI or angioplasty before 65F 55M? Yes   Social History Narrative    Not on file     Social Drivers of Health     Financial Resource Strain: Low Risk  (11/21/2023)     Financial Resource Strain     Within the past 12 months, have you or your family members you live with been unable to get utilities (heat, electricity) when it was really needed?: No   Food Insecurity: Low Risk  (11/21/2023)    Food Insecurity     Within the past 12 months, did you worry that your food would run out before you got money to buy more?: No     Within the past 12 months, did the food you bought just not last and you didn t have money to get more?: No   Transportation Needs: Low Risk  (11/21/2023)    Transportation Needs     Within the past 12 months, has lack of transportation kept you from medical appointments, getting your medicines, non-medical meetings or appointments, work, or from getting things that you need?: No   Physical Activity: Insufficiently Active (11/21/2023)    Exercise Vital Sign     Days of Exercise per Week: 3 days     Minutes of Exercise per Session: 30 min   Stress: No Stress Concern Present (11/21/2023)    Citizen of [place] of Occupational Health - Occupational Stress Questionnaire     Feeling of Stress : Not at all   Social Connections: Socially Integrated (11/21/2023)    Social Connection and Isolation Panel [NHANES]     Frequency of Communication with Friends and Family: More than three times a week     Frequency of Social Gatherings with Friends and Family: More than three times a week     Attends Amish Services: 1 to 4 times per year     Active Member of Clubs or Organizations: Yes     Attends Club or Organization Meetings: More than 4 times per year     Marital Status:    Interpersonal Safety: Low Risk  (2/12/2025)    Interpersonal Safety     Do you feel physically and emotionally safe where you currently live?: Yes     Within the past 12 months, have you been hit, slapped, kicked or otherwise physically hurt by someone?: No     Within the past 12 months, have you been humiliated or emotionally abused in other ways by your partner or ex-partner?: No   Housing  Stability: Low Risk  (11/21/2023)    Housing Stability     Do you have housing? : Yes     Are you worried about losing your housing?: No          MEDICATIONS:  has a current medication list which includes the following prescription(s): albuterol, azelastine, fexofenadine, fluticasone, fluticasone, levothyroxine, losartan, meloxicam, montelukast, rosuvastatin, semaglutide-weight management, and triamcinolone.    ROS   ROS: 10 point ROS neg other than the symptoms noted above in the HPI.    PE  VS: There were no vitals taken for this visit.  GENERAL: healthy, alert and no distress  EYES: Eyes grossly normal to inspection, conjunctivae and sclerae normal  ENT: no nose swelling, nasal discharge.  Thyroid: no apparent thyroid nodules  RESP: no audible wheeze, cough, or visible cyanosis.  No visible retractions or increased work of breathing.  Able to speak fully in complete sentences.  ABDO: not evaluated.  EXTREMITIES: no hand tremors.  NEURO: Cranial nerves grossly intact, mentation intact and speech normal  SKIN: No apparent skin lesions, rash or edema seen   PSYCH: mentation appears normal, affect normal/bright, judgement and insight intact, normal speech and appearance well-groomed    LABS:   Latest Ref Rng 8/28/2024  9:37 AM   ENDO THYROID LABS-[facility]     TSH 0.30 - 4.20 uIU/mL 1.45    FREE T4 0.90 - 1.70 ng/dL 1.58      All pertinent notes, labs, and images personally reviewed by me.     A/P  Ms.Jodi ROBERT Jackson is a 60 year old evaluated via video visit for the management of obesity and hypothyroidism:    1. Hypothyroidism (+TPO):  Currently treated with levothyroxine 75  mcg/day. (X 9/2023)  Clinically and biochemically euthyroid.  8/2024 labs are in range.  No recent labs to review.  Plan:  Discussed diagnosis, pathophysiology, management and treatment options of condition with pt.  Thyroid labs needed.  If labs are in range, plan to continue current dose of levothyroxine 75 mcg/day  Dose change based on labs.  I  discussed that as long as thyroid labs are in range, then her s/s are less likely thyroid related. Encouraged pt to continue to work with PCP for further work up of fatigue.  Tentative labs and follow up in 1 year or sooner if concerns or major weight changes.  Please make a lab appointment for blood work and follow up clinic appointment in 1 week after that to discuss results.       2. Dysphagia:  Ongoing dysphagia.  On video visit, does not appear thyromegaly.  Thyroid US: no discrete nodules.    3. Obesity:  Obesity related comorbidity include HTN, OA, hyperlipidemia.  Currently treated with Wegovy and managed by PCP.  Not discussed.    Plan: T4 free, TSH, T4 free, TSH  Plan: levothyroxine (SYNTHROID/LEVOTHROID) 75 MCG         tablet, T4 free, TSH, T4 free, TSH           Discussed s/s of hypothyroidism and hyperthyroidism to watch for.  The patient indicates understanding of these issues and agrees with the plan.    Discussed indications, risks and benefits of all medications prescribed, and answered questions to patient's satisfaction.  The longitudinal plan of care for the diagnosis(es)/condition(s) as documented were addressed during this visit. Due to the added complexity in care, I will continue to support Rayna in the subsequent management and with ongoing continuity of care.  All questions were answered.  The patient indicates understanding of the above issues and agrees with the plan set forth.      Follow-up:  As noted in AVS    Alice Jules MD  Endocrinology   [facility]  CC: Mitra Keita

## 2025-05-13 NOTE — PATIENT INSTRUCTIONS
[facility]  Dr Jules, Endocrinology Department    [place] [address] [id number]  [place] [address]  Appointment [phone]  Fax: [phone]  [place]: Monday - Thursday      Thyroid labs needed.  If labs are in range, plan to continue current dose of levothyroxine 75 mcg/day  Dose change based on labs.  Tentative labs and follow up in 1 year or sooner if concerns or major weight changes.  Please make a lab appointment for blood work and follow up clinic appointment in 1 week after that to discuss results.    Take Levothyroxine on an empty stomach. Take it with a full glass of water at least 30 minutes to 1 hour before eating breakfast.   This medicine should be taken at least 4 hours before or 4 hours after these medicines: antacids (Maalox , Mylanta , Tums ), calcium supplements, cholestyramine (Prevalite , Questran ), colestipol (Colestid ), iron supplements, orlistat (Johnnie , Xenical ), simethicone (Gas-X , Mylicon ), and sucralfate (Carafate ).   Swallow the capsule whole. Do not cut or crush it.     
(3) adequate

## 2025-05-20 ENCOUNTER — THERAPY VISIT (OUTPATIENT)
Dept: PHYSICAL THERAPY | Facility: CLINIC | Age: 60
End: 2025-05-20
Payer: COMMERCIAL

## 2025-05-20 DIAGNOSIS — M25.511 ACUTE PAIN OF RIGHT SHOULDER: Primary | ICD-10-CM

## 2025-05-20 PROCEDURE — 97530 THERAPEUTIC ACTIVITIES: CPT | Mod: GP | Performed by: PHYSICAL THERAPIST

## 2025-05-20 PROCEDURE — 97110 THERAPEUTIC EXERCISES: CPT | Mod: GP | Performed by: PHYSICAL THERAPIST

## 2025-05-20 NOTE — PROGRESS NOTES
PLAN  Continue therapy per current plan of care.  Will continue to work on shoulder and scapular strength as pt has not attended PT for about 2 months.     Beginning/End Dates of Progress Note Reporting Period:  03/07/25 to 05/20/2025    Referring Provider:  Brooke Bach       05/20/25 0500   Appointment Info   Signing clinician's name / credentials London Brody DPT   Total/Authorized Visits 8 (E&T)   Visits Used 3   Medical Diagnosis Acute pain of right shoulder   PT Tx Diagnosis Right shoulder impingement pain   Progress Note/Certification   Onset of illness/injury or Date of Surgery 01/07/25   Therapy Frequency 1 x a week   Predicted Duration 8 weeks   Progress Note Completed Date 03/07/25   GOALS   PT Goals 2   PT Goal 1   Goal Identifier Reaching   Goal Description Patient will be able overhead, out to the side and behind her with pain at worst 2/10   Rationale to maximize safety and independence with performance of ADLs and functional tasks;to maximize safety and independence within the home;to maximize safety and independence within the community;to maximize safety and independence with transportation;to maximize safety and independence with self cares   Goal Progress slightly better- was out of town and did not attend PT for 2 months progress due date   Target Date 07/01/25   PT Goal 2   Goal Identifier Sleeping   Goal Description Patient will be able to lie on either side to sleep at night with 0/10 pain to restore normal sleep pattern   Rationale to maximize safety and independence with performance of ADLs and functional tasks;to maximize safety and independence within the home;to maximize safety and independence within the community;to maximize safety and independence with transportation;to maximize safety and independence with self cares   Goal Progress slightly better- was out of town and did not attend PT for 2 months progress due date   Target Date 07/01/25   Subjective Report    Subjective Report States that pain is better and less frequent. Still gets the pain down her arm with sleeping and using her phone and once she has the pain is usually lasts the rest of the day. Has noticed some locking pain in the shoulder with forward hand in pickle ball and then will have more pain most of the day. Had some imaging done recenlty on the shoulder.   Objective Measures   Objective Measures Objective Measure 1   Objective Measure 1   Objective Measure Pain and decreased ROM reaching overhead   Details Weakness in scapular muscles with dec mobility with shoulder motion, weakness with ER, IR, ext and pain with flexion   Treatment Interventions (PT)   Interventions Therapeutic Procedure/Exercise;Therapeutic Activity;Manual Therapy   Therapeutic Procedure/Exercise   Therapeutic Procedures: strength, endurance, ROM, flexibility minutes (10017) 24   Therapeutic Procedures Ther Proc 2;Ther Proc 3   Ther Proc 2 Bent Over Shoulder Horiz Abd   Ther Proc 2 - Details xup to 30 focus on scpaular stability   Ther Proc 3 Shoulder ER SL   Ther Proc 3 - Details xup to 30 reps until fatigue lots of cueing to use scapula and less biceps activation   PTRx Ther Proc 1 Scapular Retraction/Depression   PTRx Ther Proc 1 - Details x10 and will work on thorughout the day   PTRx Ther Proc 2 Shoulder Extension in Standing   PTRx Ther Proc 2 - Details focus on scpaular stability xup to 30 fatigued and no pain   Skilled Intervention worked on exercises focusing a lot on scpaular control and less on the shoulder and biceps activation, rev previous execises, discussed antatomy of the shoulder and the importance of the exercises   Patient Response/Progress tolerated well and good understanding   Therapeutic Activity   Therapeutic Activities: dynamic activities to improve functional performance minutes (85370) 10   Ther Act 1 Posture sitting with using phone and reading, also discussed sleeping. General posture with related to neck  and shoulders at the same time.   Patient Response/Progress good understanding   Education   Learner/Method No Barriers to Learning   Plan   Home program ptrx   Total Session Time   Timed Code Treatment Minutes 34   Total Treatment Time (sum of timed and untimed services) 34

## 2025-05-29 ENCOUNTER — THERAPY VISIT (OUTPATIENT)
Dept: PHYSICAL THERAPY | Facility: CLINIC | Age: 60
End: 2025-05-29
Payer: COMMERCIAL

## 2025-05-29 DIAGNOSIS — M25.511 ACUTE PAIN OF RIGHT SHOULDER: Primary | ICD-10-CM

## 2025-06-15 ENCOUNTER — HEALTH MAINTENANCE LETTER (OUTPATIENT)
Age: 60
End: 2025-06-15

## 2025-06-18 ENCOUNTER — ANCILLARY PROCEDURE (OUTPATIENT)
Dept: MAMMOGRAPHY | Facility: CLINIC | Age: 60
End: 2025-06-18
Attending: FAMILY MEDICINE
Payer: COMMERCIAL

## 2025-06-18 DIAGNOSIS — Z12.31 VISIT FOR SCREENING MAMMOGRAM: ICD-10-CM

## 2025-06-18 PROCEDURE — 77063 BREAST TOMOSYNTHESIS BI: CPT | Mod: TC | Performed by: RADIOLOGY

## 2025-06-18 PROCEDURE — 77067 SCR MAMMO BI INCL CAD: CPT | Mod: TC | Performed by: RADIOLOGY

## 2025-07-16 NOTE — DISCHARGE SUMMARY
[facility]  Discharge Summary            Interval History:     58 year old female admitted postoperatively for elective Left Total Hip Arthroplasty with Dr. Amezcua due to DJD unresponsive to non operative treatment.  There were no notable intraoperative complications.  Patient being discharged post op Day #1.  Rayna Jackson received skilled nursing, PT, OT, SW inquiry.  There was no Hospitalist care during the stay.     Rayna Jackson has progressed satisfactorily with ambulation and pain management and without medical complication.  Patient is confident in their discharge and has met goals with PT and OT. Pt lives at home with  and will be discharged to home with assist of  based on home living conditions and level of support.  Rayna Jackson leaves the hospital in stable condition with good chance for recovery.  Today: 8/9/23    Pain: Well-controlled. Having some breakthrough muscle spasm pain in left leg and buttocks. Added hydroxyzine for muscle spasms.   Nausea: Denies  Light headedness : none  Chest pain: none  Shortness of breath: none              Assessment and Plan:     S/P Left Total Hip Arthroplasty Day #1.  Final Diagnosis: S/P Left Total Hip Arthroplasty  VSS, Hemodynamically asymptomatic, pain management adequate.    PLAN: Discharge Home today with assist of . Has outpatient PT scheduled  DVT prophylaxis with Xarelto.  Pain management with oxycodone, acetaminophen, hydroxyzine and ice.     Patient instructions attached to discharge.      Discharge to Home  Follow up in clinic as previously scheduled, approx 10-14 days post op.    [facility]  [address] [place]  [address]  [phone] fax  [phone] for scheduling                      Physical Exam:   Patient Vitals for the past 12 hrs:   BP Temp Temp src Pulse Resp SpO2   08/09/23 0809 -- -- -- -- -- 97 %   08/09/23 0729 114/65 98.3  F (36.8  C) Temporal 80 15 94 %    08/09/23 0524 -- -- -- -- 18 --   08/09/23 0410 120/70 97.6  F (36.4  C) Temporal 67 17 97 %   08/09/23 0320 -- -- -- -- 18 --   08/09/23 0250 -- -- -- -- 18 --   08/09/23 0235 -- -- -- -- 20 96 %   08/08/23 2354 114/65 99.4  F (37.4  C) Temporal 69 24 94 %   08/08/23 2136 121/69 98.4  F (36.9  C) Temporal 73 16 94 %     Hemoglobin   Date Value Ref Range Status   08/09/2023 10.5 (L) 11.7 - 15.7 g/dL Final   06/28/2023 14.4 11.7 - 15.7 g/dL Final   02/19/2021 15.3 11.7 - 15.7 g/dL Final   09/08/2016 14.6 11.7 - 15.7 g/dL Final       Patient is alert and oriented.  Vitals: stable  Neurovascular status: Grossly intact to touch. Good perfusion. Brisk cap refill. 2+ DP pulses. DF, PF intact.   Dressing: clean and dry  Calves: soft and non tender          MIRZA Galloway, CNP   [facility] - Surgery    8/9/2023        [FreeTextEntry1] : Alert, NAD. HC 42.5 cm (below 2nd %ile). Mild deceleration of head growth over time: HC 33 cm at birth, 38.5 at 4 months, 41.5cm at 9 months. Heart sounds NL. Neck FROM. Back NL. Abdomen soft, no masses. PERRL, EOMI, face symmetric, hearing grossly intact. Tone, power, sensation, DTRs NL. No nystagmus or tremor.

## 2025-07-17 ENCOUNTER — MYC REFILL (OUTPATIENT)
Dept: FAMILY MEDICINE | Facility: CLINIC | Age: 60
End: 2025-07-17
Payer: COMMERCIAL

## 2025-07-17 DIAGNOSIS — E66.09 CLASS 1 OBESITY DUE TO EXCESS CALORIES WITH SERIOUS COMORBIDITY AND BODY MASS INDEX (BMI) OF 32.0 TO 32.9 IN ADULT: ICD-10-CM

## 2025-07-17 DIAGNOSIS — E66.811 CLASS 1 OBESITY DUE TO EXCESS CALORIES WITH SERIOUS COMORBIDITY AND BODY MASS INDEX (BMI) OF 32.0 TO 32.9 IN ADULT: ICD-10-CM

## 2025-07-18 ENCOUNTER — OFFICE VISIT (OUTPATIENT)
Dept: ANESTHESIOLOGY | Facility: CLINIC | Age: 60
End: 2025-07-18
Payer: COMMERCIAL

## 2025-07-18 VITALS
DIASTOLIC BLOOD PRESSURE: 86 MMHG | OXYGEN SATURATION: 99 % | RESPIRATION RATE: 16 BRPM | SYSTOLIC BLOOD PRESSURE: 134 MMHG | HEART RATE: 64 BPM

## 2025-07-18 DIAGNOSIS — M47.816 LUMBAR SPONDYLOSIS: Primary | ICD-10-CM

## 2025-07-18 DIAGNOSIS — M46.1 SACROILIITIS: ICD-10-CM

## 2025-07-18 ASSESSMENT — PAIN SCALES - GENERAL: PAINLEVEL_OUTOF10: MODERATE PAIN (4)

## 2025-07-18 NOTE — NURSING NOTE
Patient presents with:  Back Pain  Pain Management: Chronic back pain      Moderate Pain (4)     What medications are you using for pain? Meloxicam (not working)     Have you been seen by another pain clinic/ provider? no      Expectations: pain management. Review Xrays from about 2 years ago    Kyra Sanchez LPN

## 2025-07-18 NOTE — LETTER
7/18/2025       RE: Rayna Jackson  [address]  [facility] [address]     Dear Colleague,    Thank you for referring your patient, Rayna Jackson, to the [facility] [place] at [facility]. Please see a copy of my visit note below.                          [facility] Consultation    Date of visit: 7/18/2025    Reason for consultation:    Rayna Jackson is a 60 year old female who is seen in consultation today to discuss pain in the low back.    Primary Care Provider is Brooke Heredia.  Pain medications are being prescribed by PCP.    Please see the [facility] health questionnaire which the patient completed and reviewed with me in detail.    Chief Complaint:    Chief Complaint   Patient presents with     Back Pain     Pain Management     Chronic back pain       Pain history:  Rayna Jackson is a 60 year old female who first started having problems with pain in the low back and back of the left leg that has been present since this spring and has not improved.  She does have a history of hip replacement on the left side as well that was 2 years ago. She also notes pain on the right side of the hip that she just recently had a bursa injection for with Dr Charles.     The pain radiates down into the back of the calf, but not into the toes    Pain rating: intensity ranges from 4/10 to 10/10, and Averages 6/10 on a 0-10 scale.  Aggravating factors include: standing, but walking feels better  Relieving factors include: not sure other than walking  Any bowel or bladder incontinence: none    Current treatments include:  Meloxicam - not helping - stopped taking this week  PRN OTCs    Previous medication treatments included:  none    Other treatments have included:  Rayna Jackson has not been seen at a pain clinic in the past.    PT: not for back pain  Acupuncture: no  TENs Unit:  no  Injections: no    Past Medical History:  Past Medical History:   Diagnosis Date     Arthritis      Hypertension      Torn meniscus 01/01/2013    left      Uncomplicated asthma      Patient Active Problem List    Diagnosis Date Noted     Hashimoto's disease 06/01/2024     Priority: Medium     Class 1 obesity due to excess calories with serious comorbidity and body mass index (BMI) of 32.0 to 32.9 in adult 03/18/2024     Priority: Medium     Mixed hyperlipidemia 03/18/2024     Priority: Medium     S/P total hip arthroplasty 08/08/2023     Priority: Medium     Chills (without fever) 02/09/2020     Priority: Medium     Laryngitis 02/09/2020     Priority: Medium     Myalgia 02/09/2020     Priority: Medium     Subclinical hypothyroidism 04/15/2019     Priority: Medium     Environmental allergies 06/14/2018     Priority: Medium     Moderate persistent asthma without complication 06/14/2018     Priority: Medium     Allergic rhinitis 05/28/2018     Priority: Medium     Asthma 05/28/2018     Priority: Medium     HTN, goal below 140/90 01/02/2018     Priority: Medium     Chronic nonintractable headache, unspecified headache type 11/17/2017     Priority: Medium     IUD (intrauterine device) in place 11/16/2017     Priority: Medium     Osteoarthritis of both feet, unspecified osteoarthritis type 06/02/2017     Priority: Medium     Benign essential hypertension 09/09/2016     Priority: Medium     Bilateral edema of lower extremity 09/09/2016     Priority: Medium       Past Surgical History:  Past Surgical History:   Procedure Laterality Date     APPENDECTOMY       appendicitis  01/01/1980     ARTHROPLASTY HIP Left 8/8/2023    Procedure: Left total hip arthroplasty;  Surgeon: Neto Amezcua MD;  Location:  OR     BIOPSY  2017    Removed gland in back of neck     COLONOSCOPY N/A 06/30/2017    Procedure: COLONOSCOPY;  COLONOSCOPY   ;  Surgeon: Brooks Villa MD;  Location:  GI     CYSTECTOMY OVARIAN BENIGN   01/01/1980     KNEE SURGERY Left      Medications:  Current Outpatient Medications   Medication Sig Dispense Refill     albuterol (PROAIR HFA/PROVENTIL HFA/VENTOLIN HFA) 108 (90 Base) MCG/ACT inhaler INHALE 2 PUFFS INTO THE LUNGS AS NEEDED FOR SHORTNESS OF BREATH / DYSPNEA OR WHEEZING 18 g 1     azelastine (ASTELIN) 0.1 % nasal spray 2 sprays each nostril 1-2x daily as needed for nasal congestion (use nightly for first 2 week) 30 mL 11     fexofenadine (ALLEGRA) 180 MG tablet Take 1 tablet (180 mg) by mouth daily Take 180 mg by mouth 90 tablet 1     fluticasone (FLONASE) 50 MCG/ACT nasal spray USE ONE SPRAY IN EACH NOSTRIL ONCE DAILY 16 mL 1     fluticasone (FLOVENT HFA) 110 MCG/ACT inhaler TAKE 2 PUFFS BY MOUTH TWICE A DAY (Patient taking differently: Inhale 2 puffs into the lungs 2 times daily as needed. TAKE 2 PUFFS BY MOUTH TWICE A DAY) 12 g 5     levothyroxine (SYNTHROID/LEVOTHROID) 75 MCG tablet Take 1 tablet (75 mcg) by mouth daily. 90 tablet 3     losartan (COZAAR) 50 MG tablet Take 1 tablet (50 mg) by mouth daily. 90 tablet 3     meloxicam (MOBIC) 15 MG tablet Take 1 tablet (15 mg) by mouth daily. 30 tablet 2     montelukast (SINGULAIR) 10 MG tablet TAKE 1 TABLET BY MOUTH EVERYDAY AT BEDTIME 90 tablet 3     rosuvastatin (CRESTOR) 40 MG tablet Take 1 tablet (40 mg) by mouth daily. 90 tablet 3     Semaglutide-Weight Management (WEGOVY) 2.4 MG/0.75ML pen Inject 2.4 mg subcutaneously once a week. 9 mL 1     triamcinolone (KENALOG) 0.1 % external cream Apply topically 2 times daily 80 g 1     Allergies:     Allergies   Allergen Reactions     Lisinopril Cough     Lactose GI Disturbance     Amlodipine Swelling     Aspirin Other (See Comments)     Throat swells     Codeine Unknown     headache     Social History:  Home situation: lives near [place] with   Occupation/Schooling: clinical supervisor  Tobacco use: not discussed  Alcohol use: not discussed  Drug use: not discussed  History of chemical  dependency treatment: not discussed    Family history:  Family History   Problem Relation Age of Onset     Hypertension Mother      Thyroid Disease Mother      Coronary Artery Disease Mother      Hypertension Father      Diabetes Father      Coronary Artery Disease Father      Hyperlipidemia Father      Obesity Father      Macular Degeneration Maternal Grandmother      Thyroid Disease Maternal Grandmother      Coronary Artery Disease Maternal Grandfather      Coronary Artery Disease Paternal Grandmother      Cerebrovascular Disease Paternal Grandmother      Thyroid Disease Sister      Thyroid Disease Maternal Aunt      Diabetes Other         great grandmother     Glaucoma No family hx of      Family history of headaches: not discussed    Review of Systems:    POSTIVE IN BOLD  GENERAL: fever/chills, fatigue, general unwell feeling, weight gain/loss.  HEAD/EYES:  headache, dizziness, or vision changes.    EARS/NOSE/THROAT:  Nosebleeds, hearing loss, sinus infection, earache, tinnitus.  IMMUNE:  Allergies, cancer, immune deficiency, or infections.  SKIN:  Urticaria, rash, hives  HEME/Lymphatic:   anemia, easy bruising, easy bleeding.  RESPIRATORY:  cough, wheezing, or shortness of breath  CARDIOVASCULAR/Circulation:  Extremity edema, syncope, hypertension, tachycardia, or angina.  GASTROINTESTINAL:  abdominal pain, nausea/emesis, diarrhea, constipation,  hematochezia, or melena.  ENDOCRINE:  Diabetes, steroid use,  thyroid disease or osteoporosis.  MUSCULOSKELETAL: neck pain, back pain, arthralgia, arthritis, or gout.  GENITOURINARY:  frequency, urgency, dysuria, difficulty voiding, hematuria or incontinence.  NEUROLOGIC:  weakness, numbness, paresthesias, seizure, tremor, stroke or memory loss.  PSYCHIATRIC:  depression, anxiety, stress, suicidal thoughts or mood swings.     Physical Exam:  Vitals:    07/18/25 0956   BP: 134/86   Pulse: 64   Resp: 16   SpO2: 99%     Exam:  Constitutional: healthy, alert, and no  distress  Head: normocephalic. Atraumatic.   Eyes: no redness or jaundice noted   Respiratory: conversing without difficulties breathing  Skin: no suspicious lesions or rashes  Psychiatric: mentation appears normal and affect normal/bright    Musculoskeletal exam:  Gait/Station/Posture: slightly stiff around back/hips when standing and walking    Lumbar spine: Moderately decreased ROM wthi f/e/bl rotation      Rotation/ext to right: pain free   Rotation/ext to left: painful     Myofascial tenderness:  negative  Straight leg exam: negative  Nicola's maneuver: positive in RLE. Not performed on LLE due to right hip arthroplasty.  Positive Sacral compression and Willi's finger for right sided SI joint pain    Neurologic exam:  CN:  Cranial nerves 2-12 are normal  Motor:  5/5 BLE strength     Sensory:  (upper and lower extremities):   Light touch: normal    Allodynia: absent    Dysethesia: absent    Hyperalgesia: absent     Diagnostic tests:  No imaging has been completed yet     Other testing (labs, diagnostics) reviewed:  Labs  Lab Results   Component Value Date    A1C 5.3 06/28/2023    A1C 5.4 03/11/2019    A1C 4.9 08/20/2018       [place] Prescription Monitoring Program reviewed    Outside records reviewed          Assessment:  Right sided SI Joint pain    Rayna Jackson is a 60 year old female who presents with the complaints of low back pain that has been present for several months. Based on interview, history, and exam, patient's pain seems to be originating from the right SI joint. We discussed starting our treatment plan with an MRI and Physical Therapy specifically for her low back, since neither of these have been conducted yet. We discussed a steroid injection may be a consideration down the road, but given her recent right hip bursa injection, avoiding another steroid dose is best for now.    Plan:  Diagnosis reviewed, treatment option addressed, and risk/benefits discussed.  Self-care instructions given.  I am  recommending a multidisciplinary treatment plan to help this patient better manage her pain.      Physical Therapy: specifically for low back  Pain Psychologist to address issues of relaxation, behavioral change, coping style, and other factors important to improvement: not at this time  Diagnostic Studies: MRI of the lumbar spine  Medication Management: continue current regimen  Further procedures recommended: not at this time  Recommendations/follow-up for PCP:  none  Follow up: after MRI results    I saw and examined the patient with the medical student. I have reviewed and agree with the student's note and plan of care and made changes and corrections directly to the body of the note.    TIME SPENT ON DAY OF VISIT:    BY MYSELF AND MEDICAL STUDENT 45 MIN      Quin Myles MD  Pain Medicine, Department of Anesthesiology  , [facility]         Again, thank you for allowing me to participate in the care of your patient.      Sincerely,    Quin Myles MD

## 2025-07-18 NOTE — PROGRESS NOTES
[facility] Consultation    Date of visit: 7/18/2025    Reason for consultation:    Rayna Jackson is a 60 year old female who is seen in consultation today to discuss pain in the low back.    Primary Care Provider is Brooke Heredia.  Pain medications are being prescribed by PCP.    Please see the [facility] health questionnaire which the patient completed and reviewed with me in detail.    Chief Complaint:    Chief Complaint   Patient presents with    Back Pain    Pain Management     Chronic back pain       Pain history:  Rayna Jackson is a 60 year old female who first started having problems with pain in the low back and back of the left leg that has been present since this spring and has not improved.  She does have a history of hip replacement on the left side as well that was 2 years ago. She also notes pain on the right side of the hip that she just recently had a bursa injection for with Dr Charles.     The pain radiates down into the back of the calf, but not into the toes    Pain rating: intensity ranges from 4/10 to 10/10, and Averages 6/10 on a 0-10 scale.  Aggravating factors include: standing, but walking feels better  Relieving factors include: not sure other than walking  Any bowel or bladder incontinence: none    Current treatments include:  Meloxicam - not helping - stopped taking this week  PRN OTCs    Previous medication treatments included:  none    Other treatments have included:  Rayna Jackson has not been seen at a pain clinic in the past.    PT: not for back pain  Acupuncture: no  TENs Unit: no  Injections: no    Past Medical History:  Past Medical History:   Diagnosis Date    Arthritis     Hypertension     Torn meniscus 01/01/2013    left     Uncomplicated asthma      Patient Active Problem List    Diagnosis Date Noted    Hashimoto's disease 06/01/2024     Priority: Medium    Class 1 obesity due to excess calories with serious  comorbidity and body mass index (BMI) of 32.0 to 32.9 in adult 03/18/2024     Priority: Medium    Mixed hyperlipidemia 03/18/2024     Priority: Medium    S/P total hip arthroplasty 08/08/2023     Priority: Medium    Chills (without fever) 02/09/2020     Priority: Medium    Laryngitis 02/09/2020     Priority: Medium    Myalgia 02/09/2020     Priority: Medium    Subclinical hypothyroidism 04/15/2019     Priority: Medium    Environmental allergies 06/14/2018     Priority: Medium    Moderate persistent asthma without complication 06/14/2018     Priority: Medium    Allergic rhinitis 05/28/2018     Priority: Medium    Asthma 05/28/2018     Priority: Medium    HTN, goal below 140/90 01/02/2018     Priority: Medium    Chronic nonintractable headache, unspecified headache type 11/17/2017     Priority: Medium    IUD (intrauterine device) in place 11/16/2017     Priority: Medium    Osteoarthritis of both feet, unspecified osteoarthritis type 06/02/2017     Priority: Medium    Benign essential hypertension 09/09/2016     Priority: Medium    Bilateral edema of lower extremity 09/09/2016     Priority: Medium       Past Surgical History:  Past Surgical History:   Procedure Laterality Date    APPENDECTOMY      appendicitis  01/01/1980    ARTHROPLASTY HIP Left 8/8/2023    Procedure: Left total hip arthroplasty;  Surgeon: Neto Amezcua MD;  Location:  OR    BIOPSY  2017    Removed gland in back of neck    COLONOSCOPY N/A 06/30/2017    Procedure: COLONOSCOPY;  COLONOSCOPY   ;  Surgeon: Brooks Villa MD;  Location:  GI    CYSTECTOMY OVARIAN BENIGN  01/01/1980    KNEE SURGERY Left      Medications:  Current Outpatient Medications   Medication Sig Dispense Refill    albuterol (PROAIR HFA/PROVENTIL HFA/VENTOLIN HFA) 108 (90 Base) MCG/ACT inhaler INHALE 2 PUFFS INTO THE LUNGS AS NEEDED FOR SHORTNESS OF BREATH / DYSPNEA OR WHEEZING 18 g 1    azelastine (ASTELIN) 0.1 % nasal spray 2 sprays each nostril 1-2x daily as needed for  nasal congestion (use nightly for first 2 week) 30 mL 11    fexofenadine (ALLEGRA) 180 MG tablet Take 1 tablet (180 mg) by mouth daily Take 180 mg by mouth 90 tablet 1    fluticasone (FLONASE) 50 MCG/ACT nasal spray USE ONE SPRAY IN EACH NOSTRIL ONCE DAILY 16 mL 1    fluticasone (FLOVENT HFA) 110 MCG/ACT inhaler TAKE 2 PUFFS BY MOUTH TWICE A DAY (Patient taking differently: Inhale 2 puffs into the lungs 2 times daily as needed. TAKE 2 PUFFS BY MOUTH TWICE A DAY) 12 g 5    levothyroxine (SYNTHROID/LEVOTHROID) 75 MCG tablet Take 1 tablet (75 mcg) by mouth daily. 90 tablet 3    losartan (COZAAR) 50 MG tablet Take 1 tablet (50 mg) by mouth daily. 90 tablet 3    meloxicam (MOBIC) 15 MG tablet Take 1 tablet (15 mg) by mouth daily. 30 tablet 2    montelukast (SINGULAIR) 10 MG tablet TAKE 1 TABLET BY MOUTH EVERYDAY AT BEDTIME 90 tablet 3    rosuvastatin (CRESTOR) 40 MG tablet Take 1 tablet (40 mg) by mouth daily. 90 tablet 3    Semaglutide-Weight Management (WEGOVY) 2.4 MG/0.75ML pen Inject 2.4 mg subcutaneously once a week. 9 mL 1    triamcinolone (KENALOG) 0.1 % external cream Apply topically 2 times daily 80 g 1     Allergies:     Allergies   Allergen Reactions    Lisinopril Cough    Lactose GI Disturbance    Amlodipine Swelling    Aspirin Other (See Comments)     Throat swells    Codeine Unknown     headache     Social History:  Home situation: lives near [place] with   Occupation/Schooling: clinical supervisor  Tobacco use: not discussed  Alcohol use: not discussed  Drug use: not discussed  History of chemical dependency treatment: not discussed    Family history:  Family History   Problem Relation Age of Onset    Hypertension Mother     Thyroid Disease Mother     Coronary Artery Disease Mother     Hypertension Father     Diabetes Father     Coronary Artery Disease Father     Hyperlipidemia Father     Obesity Father     Macular Degeneration Maternal Grandmother     Thyroid Disease Maternal Grandmother      Coronary Artery Disease Maternal Grandfather     Coronary Artery Disease Paternal Grandmother     Cerebrovascular Disease Paternal Grandmother     Thyroid Disease Sister     Thyroid Disease Maternal Aunt     Diabetes Other         great grandmother    Glaucoma No family hx of      Family history of headaches: not discussed    Review of Systems:    POSTIVE IN BOLD  GENERAL: fever/chills, fatigue, general unwell feeling, weight gain/loss.  HEAD/EYES:  headache, dizziness, or vision changes.    EARS/NOSE/THROAT:  Nosebleeds, hearing loss, sinus infection, earache, tinnitus.  IMMUNE:  Allergies, cancer, immune deficiency, or infections.  SKIN:  Urticaria, rash, hives  HEME/Lymphatic:   anemia, easy bruising, easy bleeding.  RESPIRATORY:  cough, wheezing, or shortness of breath  CARDIOVASCULAR/Circulation:  Extremity edema, syncope, hypertension, tachycardia, or angina.  GASTROINTESTINAL:  abdominal pain, nausea/emesis, diarrhea, constipation,  hematochezia, or melena.  ENDOCRINE:  Diabetes, steroid use,  thyroid disease or osteoporosis.  MUSCULOSKELETAL: neck pain, back pain, arthralgia, arthritis, or gout.  GENITOURINARY:  frequency, urgency, dysuria, difficulty voiding, hematuria or incontinence.  NEUROLOGIC:  weakness, numbness, paresthesias, seizure, tremor, stroke or memory loss.  PSYCHIATRIC:  depression, anxiety, stress, suicidal thoughts or mood swings.     Physical Exam:  Vitals:    07/18/25 0956   BP: 134/86   Pulse: 64   Resp: 16   SpO2: 99%     Exam:  Constitutional: healthy, alert, and no distress  Head: normocephalic. Atraumatic.   Eyes: no redness or jaundice noted   Respiratory: conversing without difficulties breathing  Skin: no suspicious lesions or rashes  Psychiatric: mentation appears normal and affect normal/bright    Musculoskeletal exam:  Gait/Station/Posture: slightly stiff around back/hips when standing and walking    Lumbar spine: Moderately decreased ROM wthi f/e/bl rotation      Rotation/ext  to right: pain free   Rotation/ext to left: painful     Myofascial tenderness:  negative  Straight leg exam: negative  Nicola's maneuver: positive in RLE. Not performed on LLE due to right hip arthroplasty.  Positive Sacral compression and Willi's finger for right sided SI joint pain    Neurologic exam:  CN:  Cranial nerves 2-12 are normal  Motor:  5/5 BLE strength     Sensory:  (upper and lower extremities):   Light touch: normal    Allodynia: absent    Dysethesia: absent    Hyperalgesia: absent     Diagnostic tests:  No imaging has been completed yet     Other testing (labs, diagnostics) reviewed:  Labs  Lab Results   Component Value Date    A1C 5.3 06/28/2023    A1C 5.4 03/11/2019    A1C 4.9 08/20/2018       [place] Prescription Monitoring Program reviewed    Outside records reviewed          Assessment:  Right sided SI Joint pain    Rayna Jackson is a 60 year old female who presents with the complaints of low back pain that has been present for several months. Based on interview, history, and exam, patient's pain seems to be originating from the right SI joint. We discussed starting our treatment plan with an MRI and Physical Therapy specifically for her low back, since neither of these have been conducted yet. We discussed a steroid injection may be a consideration down the road, but given her recent right hip bursa injection, avoiding another steroid dose is best for now.    Plan:  Diagnosis reviewed, treatment option addressed, and risk/benefits discussed.  Self-care instructions given.  I am recommending a multidisciplinary treatment plan to help this patient better manage her pain.      Physical Therapy: specifically for low back  Pain Psychologist to address issues of relaxation, behavioral change, coping style, and other factors important to improvement: not at this time  Diagnostic Studies: MRI of the lumbar spine  Medication Management: continue current regimen  Further procedures recommended: not at this  time  Recommendations/follow-up for PCP:  none  Follow up: after MRI results    I saw and examined the patient with the medical student. I have reviewed and agree with the student's note and plan of care and made changes and corrections directly to the body of the note.    TIME SPENT ON DAY OF VISIT:    BY MYSELF AND MEDICAL STUDENT 45 MIN      Quin Myles MD  Pain Medicine, Department of Anesthesiology  , [facility]

## 2025-07-18 NOTE — PATIENT INSTRUCTIONS
Referrals:    Physical Therapy Referral placed-   If you have not heard from the scheduling office within 2 business days, please call [phone] for all locations     Imaging:    IMAGING SERVICES HOURS:    All imaging modalities are available from 7 a.m. - 9 p.m. Monday through Friday  X-ray, CT, MRI, and General Ultrasound appointments are available from 7 a.m. -3:30 p.m. on Saturdays  X-ray, CT and MRI appointments are available from 8 a.m. - 4:30 p.m. on Sundays  Please call [phone] to schedule imaging exams     Recommended Follow up:      Follow up in 3-6 months       To speak with a nurse, schedule/reschedule/cancel a clinic appointment, or request a medication refill call: [phone].    You can also reach us by [employer]: [email or web address]

## 2025-07-18 NOTE — PROGRESS NOTES
[facility] Consultation    Date of visit: 7/18/2025    Reason for consultation:    Rayna Jackson is a 60 year old female who is seen in consultation today at the request of her provider, ***.    Primary Care Provider is Brooke Heredia.  Pain medications are being prescribed by ***.    Please see the [facility] health questionnaire which the patient completed and reviewed with me in detail.    Chief Complaint:    No chief complaint on file.      Pain history:  Rayna Jackson is a 60 year old female who first started having problems with pain in ***    Pain rating: intensity ranges from {PAIN SCALE:453889} to {PAIN SCALE:219310}, and Averages {PAIN SCALE:464520} on a 0-10 scale.  Aggravating factors include: ***  Relieving factors include: ***  Any bowel or bladder incontinence: ***    Current treatments include:  ***    Previous medication treatments included:  ***    Other treatments have included:  Rayna Jackson {HAS:[id number]} been seen at a pain clinic in the past.  ***  PT: ***  Acupuncture: ***  TENs Unit: ***  Injections: ***    Past Medical History:  Past Medical History:   Diagnosis Date    Arthritis     Hypertension     Torn meniscus 01/01/2013    left     Uncomplicated asthma      Patient Active Problem List    Diagnosis Date Noted    Hashimoto's disease 06/01/2024     Priority: Medium    Class 1 obesity due to excess calories with serious comorbidity and body mass index (BMI) of 32.0 to 32.9 in adult 03/18/2024     Priority: Medium    Mixed hyperlipidemia 03/18/2024     Priority: Medium    S/P total hip arthroplasty 08/08/2023     Priority: Medium    Chills (without fever) 02/09/2020     Priority: Medium    Laryngitis 02/09/2020     Priority: Medium    Myalgia 02/09/2020     Priority: Medium    Subclinical hypothyroidism 04/15/2019     Priority: Medium    Environmental allergies 06/14/2018     Priority: Medium    Moderate persistent asthma  without complication 06/14/2018     Priority: Medium    Allergic rhinitis 05/28/2018     Priority: Medium    Asthma 05/28/2018     Priority: Medium    HTN, goal below 140/90 01/02/2018     Priority: Medium    Chronic nonintractable headache, unspecified headache type 11/17/2017     Priority: Medium    IUD (intrauterine device) in place 11/16/2017     Priority: Medium    Osteoarthritis of both feet, unspecified osteoarthritis type 06/02/2017     Priority: Medium    Benign essential hypertension 09/09/2016     Priority: Medium    Bilateral edema of lower extremity 09/09/2016     Priority: Medium       Past Surgical History:  Past Surgical History:   Procedure Laterality Date    APPENDECTOMY      appendicitis  01/01/1980    ARTHROPLASTY HIP Left 8/8/2023    Procedure: Left total hip arthroplasty;  Surgeon: Neto Amezcua MD;  Location:  OR    BIOPSY  2017    Removed gland in back of neck    COLONOSCOPY N/A 06/30/2017    Procedure: COLONOSCOPY;  COLONOSCOPY   ;  Surgeon: Brooks Villa MD;  Location:  GI    CYSTECTOMY OVARIAN BENIGN  01/01/1980    KNEE SURGERY Left      Medications:  Current Outpatient Medications   Medication Sig Dispense Refill    albuterol (PROAIR HFA/PROVENTIL HFA/VENTOLIN HFA) 108 (90 Base) MCG/ACT inhaler INHALE 2 PUFFS INTO THE LUNGS AS NEEDED FOR SHORTNESS OF BREATH / DYSPNEA OR WHEEZING 18 g 1    azelastine (ASTELIN) 0.1 % nasal spray 2 sprays each nostril 1-2x daily as needed for nasal congestion (use nightly for first 2 week) 30 mL 11    fexofenadine (ALLEGRA) 180 MG tablet Take 1 tablet (180 mg) by mouth daily Take 180 mg by mouth 90 tablet 1    fluticasone (FLONASE) 50 MCG/ACT nasal spray USE ONE SPRAY IN EACH NOSTRIL ONCE DAILY 16 mL 1    fluticasone (FLOVENT HFA) 110 MCG/ACT inhaler TAKE 2 PUFFS BY MOUTH TWICE A DAY (Patient taking differently: Inhale 2 puffs into the lungs 2 times daily as needed. TAKE 2 PUFFS BY MOUTH TWICE A DAY) 12 g 5    levothyroxine  (SYNTHROID/LEVOTHROID) 75 MCG tablet Take 1 tablet (75 mcg) by mouth daily. 90 tablet 3    losartan (COZAAR) 50 MG tablet Take 1 tablet (50 mg) by mouth daily. 90 tablet 3    meloxicam (MOBIC) 15 MG tablet Take 1 tablet (15 mg) by mouth daily. 30 tablet 2    montelukast (SINGULAIR) 10 MG tablet TAKE 1 TABLET BY MOUTH EVERYDAY AT BEDTIME 90 tablet 3    rosuvastatin (CRESTOR) 40 MG tablet Take 1 tablet (40 mg) by mouth daily. 90 tablet 3    Semaglutide-Weight Management (WEGOVY) 2.4 MG/0.75ML pen Inject 2.4 mg subcutaneously once a week. 9 mL 1    triamcinolone (KENALOG) 0.1 % external cream Apply topically 2 times daily 80 g 1     Allergies:     Allergies   Allergen Reactions    Lisinopril Cough    Lactose GI Disturbance    Amlodipine Swelling    Aspirin Other (See Comments)     Throat swells    Codeine Unknown     headache     Social History:  Home situation: ***  Occupation/Schooling: ***  Tobacco use: ***  Alcohol use: ***  Drug use: ***  History of chemical dependency treatment: ***    Family history:  Family History   Problem Relation Age of Onset    Hypertension Mother     Thyroid Disease Mother     Coronary Artery Disease Mother     Hypertension Father     Diabetes Father     Coronary Artery Disease Father     Hyperlipidemia Father     Obesity Father     Macular Degeneration Maternal Grandmother     Thyroid Disease Maternal Grandmother     Coronary Artery Disease Maternal Grandfather     Coronary Artery Disease Paternal Grandmother     Cerebrovascular Disease Paternal Grandmother     Thyroid Disease Sister     Thyroid Disease Maternal Aunt     Diabetes Other         great grandmother    Glaucoma No family hx of      Family history of headaches: ***    Review of Systems:    POSTIVE IN BOLD  GENERAL: fever/chills, fatigue, general unwell feeling, weight gain/loss.  HEAD/EYES:  headache, dizziness, or vision changes.    EARS/NOSE/THROAT:  Nosebleeds, hearing loss, sinus infection, earache, tinnitus.  IMMUNE:   "Allergies, cancer, immune deficiency, or infections.  SKIN:  Urticaria, rash, hives  HEME/Lymphatic:   anemia, easy bruising, easy bleeding.  RESPIRATORY:  cough, wheezing, or shortness of breath  CARDIOVASCULAR/Circulation:  Extremity edema, syncope, hypertension, tachycardia, or angina.  GASTROINTESTINAL:  abdominal pain, nausea/emesis, diarrhea, constipation,  hematochezia, or melena.  ENDOCRINE:  Diabetes, steroid use,  thyroid disease or osteoporosis.  MUSCULOSKELETAL: neck pain, back pain, arthralgia, arthritis, or gout.  GENITOURINARY:  frequency, urgency, dysuria, difficulty voiding, hematuria or incontinence.  NEUROLOGIC:  weakness, numbness, paresthesias, seizure, tremor, stroke or memory loss.  PSYCHIATRIC:  depression, anxiety, stress, suicidal thoughts or mood swings.     Physical Exam:  There were no vitals filed for this visit.  Exam:  Constitutional: {GENERAL APPEARANCE:696366::\"healthy\",\"alert\",\"no distress\"}  Head: normocephalic. Atraumatic. ***  Eyes: no redness or jaundice noted ***  ENT: oropharnx normal.  MMM.  Neck supple.  ***  Cardiovascular: RRR no m/g/r ***  Respiratory: clear ***  Gastrointestinal: soft, non-tender, normoactive bowel sounds ***  : deferred***  Skin: {[facility] SKIN EXAM:755631::\"no suspicious lesions or rashes\"}  Psychiatric: {[facility] PATIENT PSYCH APPEARANCE:807764::\"mentation appears normal\",\"affect normal/bright\"}    Musculoskeletal exam:  Gait/Station/Posture: ***  Cervical spine: ***   Flex:  *** degrees   Ext: *** degrees   Rotation to right: *** degrees   Rotation to left: *** degrees   ***    Thoracic spine:  Normal ***    Lumbar spine: ***   Flex:  *** degrees   Ext: *** degrees   Rotation/ext to right: {PAINFUL/PAIN FREE:777512}   Rotation/ext to left: {PAINFUL/PAIN FREE:233844}    Myofascial tenderness:  ***  Straight leg exam: ***  Nicola's maneuver: ***    Neurologic exam:  CN:  Cranial nerves 2-12 are normal***  Motor:  5/5 UE and LE strength, except for " "***  Reflexes:     Biceps:     R:  ***/4 L: ***/4   Brachioradialis   R:  ***/4 L: ***/4   Triceps:  R:  ***/4 L: ***/4   Patella:  R:  ***/4 L: ***/4   Achilles:  R:  ***/4 L: ***/4  Other reflexes:  Toes downgoing***   Sensory:  (upper and lower extremities):   Light touch: normal ***   Vibration: normal ***   Allodynia: absent ***   Dysethesia: absent ***   Hyperalgesia: absent ***    Diagnostic tests:  MRI of *** was completed on *** showing:  \"***\"    Personally reviewed imaging ***    Other testing (labs, diagnostics) reviewed:  Labs***  EKG***    [place] Prescription Monitoring Program reviewed***    Outside records reviewed***      Screening tools:  DIRE Score for ongoing opioid management is calculated as follows:    Diagnosis = ***    Intractability = ***    Risk: Psych = ***  Chem Hlth = ***  Reliability = ***  Social = ***    Efficacy = ***    Total DIRE Score = *** (14 or higher predicts good candidate for ongoing opioid management; 13 or lower predicts poor candidate for opioid management)         Assessment:  ***    Rayna Jackson is a 60 year old female who presents with the complaints of ***. ***    Plan:  Diagnosis reviewed, treatment option addressed, and risk/benefits discussed.  Self-care instructions given.  I am recommending a multidisciplinary treatment plan to help this patient better manage her pain.      Physical Therapy: ***  Pain Psychologist to address issues of relaxation, behavioral change, coping style, and other factors important to improvement: ***  Diagnostic Studies: ***  Medication Management: ***  Further procedures recommended: ***  Other treatments:  Urine toxicology screen: ***   Recommendations/follow-up for PCP:  ***  Release of information: ***  Follow up: ***    Total time spent on day of visit was *** minutes, and more than 50% of face to face time was spent in counseling and/or coordination of care regarding principles of multidisciplinary care, medication management, and " ***

## 2025-07-26 ENCOUNTER — HOSPITAL ENCOUNTER (OUTPATIENT)
Dept: MRI IMAGING | Facility: CLINIC | Age: 60
Discharge: HOME | End: 2025-07-26
Attending: ANESTHESIOLOGY | Admitting: ANESTHESIOLOGY
Payer: COMMERCIAL

## 2025-07-26 DIAGNOSIS — M47.816 LUMBAR SPONDYLOSIS: ICD-10-CM

## 2025-07-26 PROCEDURE — 72148 MRI LUMBAR SPINE W/O DYE: CPT

## 2025-08-04 ENCOUNTER — PATIENT OUTREACH (OUTPATIENT)
Dept: FAMILY MEDICINE | Facility: CLINIC | Age: 60
End: 2025-08-04
Payer: COMMERCIAL

## 2025-08-07 ENCOUNTER — THERAPY VISIT (OUTPATIENT)
Dept: PHYSICAL THERAPY | Facility: CLINIC | Age: 60
End: 2025-08-07
Payer: COMMERCIAL

## 2025-08-07 DIAGNOSIS — M46.1 SACROILIITIS: ICD-10-CM

## 2025-08-07 DIAGNOSIS — M54.16 LUMBAR RADICULOPATHY: Primary | ICD-10-CM

## 2025-08-07 DIAGNOSIS — G89.29 CHRONIC BILATERAL LOW BACK PAIN: ICD-10-CM

## 2025-08-07 DIAGNOSIS — M54.50 CHRONIC BILATERAL LOW BACK PAIN: ICD-10-CM

## 2025-08-07 DIAGNOSIS — M47.816 LUMBAR SPONDYLOSIS: ICD-10-CM

## 2025-08-19 ENCOUNTER — MYC MEDICAL ADVICE (OUTPATIENT)
Dept: FAMILY MEDICINE | Facility: CLINIC | Age: 60
End: 2025-08-19
Payer: COMMERCIAL

## 2025-09-03 ENCOUNTER — MYC REFILL (OUTPATIENT)
Dept: FAMILY MEDICINE | Facility: CLINIC | Age: 60
End: 2025-09-03

## 2025-09-03 DIAGNOSIS — E78.2 MIXED HYPERLIPIDEMIA: ICD-10-CM

## 2025-09-04 RX ORDER — ROSUVASTATIN CALCIUM 40 MG/1
40 TABLET, COATED ORAL DAILY
Qty: 90 TABLET | Refills: 2 | Status: SHIPPED | OUTPATIENT
Start: 2025-09-04

## (undated) DEVICE — GLOVE BIOGEL PI ULTRATOUCH SZ 7.5 41175

## (undated) DEVICE — SUCTION TIP YANKAUER W/O VENT K86

## (undated) DEVICE — SU MONOCRYL 3-0 PS-1 27" Y936H

## (undated) DEVICE — ESU ELEC BLADE 6" COATED E1450-6

## (undated) DEVICE — SPONGE RAY-TEC 4X8" 7318

## (undated) DEVICE — DRAPE CONVERTORS U-DRAPE 60X72" 8476

## (undated) DEVICE — BLADE SAW SAGITTAL STRK 18X90X1.27MM HD SYS 6 6118-127-090

## (undated) DEVICE — DRSG AQUACEL AG HYDROFIBER  3.5X10" 422605

## (undated) DEVICE — SUTURE VICRYL+ 0 CT-1 18" DYED VIO VCP740D

## (undated) DEVICE — SOL NACL 0.9% IRRIG 3000ML BAG 2B7477

## (undated) DEVICE — HOOD FLYTE W/PEELAWAY 408-800-100

## (undated) DEVICE — SU VICRYL+ 1 MO-4 18" DYED VCP702D

## (undated) DEVICE — DRSG STERI STRIP 1/2X4" R1547

## (undated) DEVICE — PACK TOTAL HIP RIDGES LATEX PO15HIFSG

## (undated) DEVICE — PREP CHLORAPREP 26ML TINTED HI-LITE ORANGE 930815

## (undated) DEVICE — DEVICE RETRIEVER HEWSON 71111579

## (undated) DEVICE — KIT ENDO TURNOVER/PROCEDURE W/CLEAN A SCOPE LINERS 103888

## (undated) DEVICE — DRAPE STERI U 1015

## (undated) DEVICE — SU VICRYL 2-0 CT-1 27" UND J259H

## (undated) DEVICE — LINEN FULL SHEET 5511

## (undated) DEVICE — ESU PENCIL SMOKE EVAC W/ROCKER SWITCH 0703-047-000

## (undated) DEVICE — LINEN DRAPE 54X72" 5467

## (undated) DEVICE — GLOVE BIOGEL PI ULTRATOUCH SZ 8.0 41180

## (undated) DEVICE — LINEN ORTHO ACL PACK 5447

## (undated) DEVICE — SUCTION MANIFOLD NEPTUNE 2 SYS 4 PORT 0702-020-000

## (undated) DEVICE — GLOVE BIOGEL PI MICRO INDICATOR UNDERGLOVE SZ 8.0 48980

## (undated) DEVICE — SET HANDPIECE INTERPULSE W/COAXIAL FAN SPRAY TIP 0210118000

## (undated) DEVICE — BAG CLEAR TRASH 1.3M 39X33" P4040C

## (undated) DEVICE — LINEN HALF SHEET 5512

## (undated) DEVICE — IMM PILLOW ABDUCT HIP MED M60-025-M

## (undated) DEVICE — GLOVE BIOGEL PI SZ 7.5 40875

## (undated) DEVICE — SU NDL MAYO 1824-4

## (undated) RX ORDER — ONDANSETRON 2 MG/ML
INJECTION INTRAMUSCULAR; INTRAVENOUS
Status: DISPENSED
Start: 2017-06-30

## (undated) RX ORDER — ONDANSETRON 2 MG/ML
INJECTION INTRAMUSCULAR; INTRAVENOUS
Status: DISPENSED
Start: 2023-08-08

## (undated) RX ORDER — DEXAMETHASONE SODIUM PHOSPHATE 4 MG/ML
INJECTION, SOLUTION INTRA-ARTICULAR; INTRALESIONAL; INTRAMUSCULAR; INTRAVENOUS; SOFT TISSUE
Status: DISPENSED
Start: 2023-08-08

## (undated) RX ORDER — LIDOCAINE HYDROCHLORIDE 10 MG/ML
INJECTION, SOLUTION EPIDURAL; INFILTRATION; INTRACAUDAL; PERINEURAL
Status: DISPENSED
Start: 2023-08-08

## (undated) RX ORDER — FENTANYL CITRATE 50 UG/ML
INJECTION, SOLUTION INTRAMUSCULAR; INTRAVENOUS
Status: DISPENSED
Start: 2023-08-08

## (undated) RX ORDER — PROPOFOL 10 MG/ML
INJECTION, EMULSION INTRAVENOUS
Status: DISPENSED
Start: 2023-08-08

## (undated) RX ORDER — IVABRADINE 5 MG/1
TABLET, FILM COATED ORAL
Status: DISPENSED
Start: 2023-07-26

## (undated) RX ORDER — TRANEXAMIC ACID 10 MG/ML
INJECTION, SOLUTION INTRAVENOUS
Status: DISPENSED
Start: 2023-08-08

## (undated) RX ORDER — ERYTHROMYCIN 5 MG/G
OINTMENT OPHTHALMIC
Status: DISPENSED
Start: 2023-05-01

## (undated) RX ORDER — PROMETHAZINE HYDROCHLORIDE 25 MG/ML
INJECTION, SOLUTION INTRAMUSCULAR; INTRAVENOUS
Status: DISPENSED
Start: 2023-08-08

## (undated) RX ORDER — METOPROLOL TARTRATE 1 MG/ML
INJECTION, SOLUTION INTRAVENOUS
Status: DISPENSED
Start: 2023-07-26

## (undated) RX ORDER — GLYCOPYRROLATE 0.2 MG/ML
INJECTION INTRAMUSCULAR; INTRAVENOUS
Status: DISPENSED
Start: 2023-08-08

## (undated) RX ORDER — ACETAMINOPHEN 325 MG/1
TABLET ORAL
Status: DISPENSED
Start: 2023-08-08

## (undated) RX ORDER — FENTANYL CITRATE-0.9 % NACL/PF 10 MCG/ML
PLASTIC BAG, INJECTION (ML) INTRAVENOUS
Status: DISPENSED
Start: 2023-08-08

## (undated) RX ORDER — TRANEXAMIC ACID 650 MG/1
TABLET ORAL
Status: DISPENSED
Start: 2023-08-08

## (undated) RX ORDER — NITROGLYCERIN 0.4 MG/1
TABLET SUBLINGUAL
Status: DISPENSED
Start: 2023-07-26

## (undated) RX ORDER — CEFAZOLIN SODIUM/WATER 2 G/20 ML
SYRINGE (ML) INTRAVENOUS
Status: DISPENSED
Start: 2023-08-08

## (undated) RX ORDER — OXYCODONE HYDROCHLORIDE 5 MG/1
TABLET ORAL
Status: DISPENSED
Start: 2023-08-08

## (undated) RX ORDER — METOPROLOL TARTRATE 50 MG
TABLET ORAL
Status: DISPENSED
Start: 2023-07-26

## (undated) RX ORDER — FENTANYL CITRATE 50 UG/ML
INJECTION, SOLUTION INTRAMUSCULAR; INTRAVENOUS
Status: DISPENSED
Start: 2017-06-30

## (undated) RX ORDER — EPHEDRINE SULFATE 50 MG/ML
INJECTION, SOLUTION INTRAVENOUS
Status: DISPENSED
Start: 2023-08-08

## (undated) RX ORDER — LABETALOL HYDROCHLORIDE 5 MG/ML
INJECTION, SOLUTION INTRAVENOUS
Status: DISPENSED
Start: 2023-08-08

## (undated) RX ORDER — NEOSTIGMINE METHYLSULFATE 1 MG/ML
VIAL (ML) INJECTION
Status: DISPENSED
Start: 2023-08-08